# Patient Record
Sex: FEMALE | Race: WHITE | Employment: OTHER | URBAN - METROPOLITAN AREA
[De-identification: names, ages, dates, MRNs, and addresses within clinical notes are randomized per-mention and may not be internally consistent; named-entity substitution may affect disease eponyms.]

---

## 2017-04-24 ENCOUNTER — OFFICE VISIT (OUTPATIENT)
Dept: FAMILY MEDICINE CLINIC | Facility: CLINIC | Age: 67
End: 2017-04-24

## 2017-04-24 VITALS
OXYGEN SATURATION: 96 % | HEART RATE: 73 BPM | TEMPERATURE: 97.5 F | BODY MASS INDEX: 29.65 KG/M2 | RESPIRATION RATE: 12 BRPM | SYSTOLIC BLOOD PRESSURE: 124 MMHG | DIASTOLIC BLOOD PRESSURE: 68 MMHG | HEIGHT: 69 IN | WEIGHT: 200.2 LBS

## 2017-04-24 DIAGNOSIS — J32.0 MAXILLARY SINUSITIS, UNSPECIFIED CHRONICITY: Primary | ICD-10-CM

## 2017-04-24 DIAGNOSIS — J30.81 ALLERGIC RHINITIS DUE TO ANIMAL HAIR AND DANDER: ICD-10-CM

## 2017-04-24 RX ORDER — AMOXICILLIN 500 MG/1
500 CAPSULE ORAL 2 TIMES DAILY
Qty: 20 CAP | Refills: 0 | Status: SHIPPED | OUTPATIENT
Start: 2017-04-24 | End: 2017-05-04

## 2017-04-24 NOTE — PATIENT INSTRUCTIONS
Saline Nasal Washes: Care Instructions  Your Care Instructions  Saline nasal washes help keep the nasal passages open by washing out thick or dried mucus. This simple remedy can help relieve symptoms of allergies, sinusitis, and colds. It also can make the nose feel more comfortable by keeping the mucous membranes moist. You may notice a little burning sensation in your nose the first few times you use the solution, but this usually gets better in a few days. Follow-up care is a key part of your treatment and safety. Be sure to make and go to all appointments, and call your doctor if you are having problems. It's also a good idea to know your test results and keep a list of the medicines you take. How can you care for yourself at home? · You can buy premixed saline solution in a squeeze bottle or other sinus rinse products at a drugstore. Read and follow the instructions on the label. · You also can make your own saline solution by adding 1 teaspoon of salt and 1 teaspoon of baking soda to 2 cups of distilled water. · If you use a homemade solution, pour a small amount into a clean bowl. Using a rubber bulb syringe, squeeze the syringe and place the tip in the salt water. Pull a small amount of the salt water into the syringe by relaxing your hand. · Sit down with your head tilted slightly back. Do not lie down. Put the tip of the bulb syringe or the squeeze bottle a little way into one of your nostrils. Gently drip or squirt a few drops into the nostril. Repeat with the other nostril. Some sneezing and gagging are normal at first.  · Gently blow your nose. · Wipe the syringe or bottle tip clean after each use. · Repeat this 2 or 3 times a day. · Use nasal washes gently if you have nosebleeds often. When should you call for help? Watch closely for changes in your health, and be sure to contact your doctor if:  · You often get nosebleeds. · You have problems doing the nasal washes.   Where can you learn more? Go to http://bruno-alicia.info/. Enter [id number] in the search box to learn more about \"Saline Nasal Washes: Care Instructions. \"  Current as of: July 29, 2016  Content Version: 11.2  © 8145-5856 [employer]. Care instructions adapted under license by [employer] (which disclaims liability or warranty for this information). If you have questions about a medical condition or this instruction, always ask your healthcare professional. [address] any warranty or liability for your use of this information. Sinusitis: Care Instructions  Your Care Instructions    Sinusitis is an infection of the lining of the sinus cavities in your head. Sinusitis often follows a cold. It causes pain and pressure in your head and face. In most cases, sinusitis gets better on its own in 1 to 2 weeks. But some mild symptoms may last for several weeks. Sometimes antibiotics are needed. Follow-up care is a key part of your treatment and safety. Be sure to make and go to all appointments, and call your doctor if you are having problems. It's also a good idea to know your test results and keep a list of the medicines you take. How can you care for yourself at home? · Take an over-the-counter pain medicine, such as acetaminophen (Tylenol), ibuprofen (Advil, Motrin), or naproxen (Aleve). Read and follow all instructions on the label. · If the doctor prescribed antibiotics, take them as directed. Do not stop taking them just because you feel better. You need to take the full course of antibiotics. · Be careful when taking over-the-counter cold or flu medicines and Tylenol at the same time. Many of these medicines have acetaminophen, which is Tylenol. Read the labels to make sure that you are not taking more than the recommended dose. Too much acetaminophen (Tylenol) can be harmful.   · Breathe warm, moist air from a steamy shower, a hot bath, or a sink filled with hot water. Avoid cold, dry air. Using a humidifier in your home may help. Follow the directions for cleaning the machine. · Use saline (saltwater) nasal washes to help keep your nasal passages open and wash out mucus and bacteria. You can buy saline nose drops at a grocery store or drugstore. Or you can make your own at home by adding 1 teaspoon of salt and 1 teaspoon of baking soda to 2 cups of distilled water. If you make your own, fill a bulb syringe with the solution, insert the tip into your nostril, and squeeze gently. [place] Maffucci your nose. · Put a hot, wet towel or a warm gel pack on your face 3 or 4 times a day for 5 to 10 minutes each time. · Try a decongestant nasal spray like oxymetazoline (Afrin). Do not use it for more than 3 days in a row. Using it for more than 3 days can make your congestion worse. When should you call for help? Call your doctor now or seek immediate medical care if:  · You have new or worse swelling or redness in your face or around your eyes. · You have a new or higher fever. Watch closely for changes in your health, and be sure to contact your doctor if:  · You have new or worse facial pain. · The mucus from your nose becomes thicker (like pus) or has new blood in it. · You are not getting better as expected. Where can you learn more? Go to http://bruno-alicia.info/. Enter A121 in the search box to learn more about \"Sinusitis: Care Instructions. \"  Current as of: July 29, 2016  Content Version: 11.2  © 1869-3205 [employer]. Care instructions adapted under license by [employer] (which disclaims liability or warranty for this information). If you have questions about a medical condition or this instruction, always ask your healthcare professional. [address] any warranty or liability for your use of this information.

## 2017-04-24 NOTE — MR AVS SNAPSHOT
Visit Information Date & Time Provider Department Dept. Phone Encounter #  
 4/24/2017  2:00 PM Priya Abreu NP [employer] [phone] [id number] Follow-up Instructions Return if symptoms worsen or fail to improve. Upcoming Health Maintenance Date Due COLONOSCOPY 8/1/1970 ZOSTER VACCINE AGE 60> 8/1/2012 EYE EXAM RETINAL OR DILATED Q1 3/1/2016 PAP AKA CERVICAL CYTOLOGY 5/30/2016 MICROALBUMIN Q1 6/9/2016 INFLUENZA AGE 9 TO ADULT 8/1/2016 HEMOGLOBIN A1C Q6M 2/10/2017 BREAST CANCER SCRN MAMMOGRAM 4/7/2017 FOOT EXAM Q1 5/4/2017 LIPID PANEL Q1 8/10/2017 DTaP/Tdap/Td series (2 - Td) 12/3/2025 Allergies as of 4/24/2017  Review Complete On: 4/24/2017 By: Shelby Zendejas LPN Severity Noted Reaction Type Reactions Iodine High 02/05/2013    Anaphylaxis Current Immunizations  Never Reviewed Name Date Tdap 12/3/2015 Not reviewed this visit You Were Diagnosed With   
  
 Codes Comments Maxillary sinusitis, unspecified chronicity    -  Primary ICD-10-CM: J32.0 ICD-9-CM: 473.0 Allergic rhinitis due to animal hair and dander     ICD-10-CM: J30.81 ICD-9-CM: 477.2 Vitals BP Pulse Temp Resp Height(growth percentile) Weight(growth percentile) 124/68 (BP 1 Location: Right arm, BP Patient Position: Sitting) 73 97.5 °F (36.4 °C) (Oral) 12 5' 9\" (1.753 m) 200 lb 3.2 oz (90.8 kg) SpO2 BMI OB Status Smoking Status 96% 29.56 kg/m2 Hysterectomy Former Smoker BMI and BSA Data Body Mass Index Body Surface Area  
 29.56 kg/m 2 2.1 m 2 Preferred Pharmacy Pharmacy Name Phone [facility] [place] [facility] [address] [phone] Your Updated Medication List  
  
   
This list is accurate as of: 4/24/17  2:40 PM.  Always use your most recent med list.  
  
  
  
  
 amoxicillin 500 mg capsule Commonly known as:  AMOXIL Take 1 Cap by mouth two (2) times a day for 10 days. aspirin delayed-release 81 mg tablet Take  by mouth daily. atorvastatin 20 mg tablet Commonly known as:  LIPITOR Take 1 Tab by mouth daily. FLUoxetine 40 mg capsule Commonly known as:  PROzac Take 1 Cap by mouth daily. fluticasone 50 mcg/actuation nasal spray Commonly known as:  Maritza Aid 2 Sprays by Both Nostrils route daily. gabapentin 300 mg capsule Commonly known as:  NEURONTIN Take 1 Cap by mouth three (3) times daily. lisinopril 5 mg tablet Commonly known as:  Noe Camel Take 1 Tab by mouth daily. loratadine 10 mg tablet Commonly known as:  Vonna Hedger Take 1 Tab by mouth daily as needed for Allergies. LORazepam 1 mg tablet Commonly known as:  ATIVAN Take 1 Tab by mouth every eight (8) hours as needed for Anxiety. Max Daily Amount: 3 mg.  
  
 metFORMIN 500 mg tablet Commonly known as:  GLUCOPHAGE Take 1 Tab by mouth two (2) times daily (with meals). omeprazole 40 mg capsule Commonly known as:  PriLOSEC Take 1 Cap by mouth daily. Prescriptions Sent to Pharmacy Refills  
 amoxicillin (AMOXIL) 500 mg capsule 0 Sig: Take 1 Cap by mouth two (2) times a day for 10 days. Class: Normal  
 Pharmacy: [facility] [address] [place] [address] #: [phone] Route: Oral  
  
Follow-up Instructions Return if symptoms worsen or fail to improve. Patient Instructions Saline Nasal Washes: Care Instructions Your Care Instructions Saline nasal washes help keep the nasal passages open by washing out thick or dried mucus. This simple remedy can help relieve symptoms of allergies, sinusitis, and colds.  It also can make the nose feel more comfortable by keeping the mucous membranes moist. You may notice a little burning sensation in your nose the first few times you use the solution, but this usually gets better in a few days. Follow-up care is a key part of your treatment and safety. Be sure to make and go to all appointments, and call your doctor if you are having problems. It's also a good idea to know your test results and keep a list of the medicines you take. How can you care for yourself at home? · You can buy premixed saline solution in a squeeze bottle or other sinus rinse products at a drugstore. Read and follow the instructions on the label. · You also can make your own saline solution by adding 1 teaspoon of salt and 1 teaspoon of baking soda to 2 cups of distilled water. · If you use a homemade solution, pour a small amount into a clean bowl. Using a rubber bulb syringe, squeeze the syringe and place the tip in the salt water. Pull a small amount of the salt water into the syringe by relaxing your hand. · Sit down with your head tilted slightly back. Do not lie down. Put the tip of the bulb syringe or the squeeze bottle a little way into one of your nostrils. Gently drip or squirt a few drops into the nostril. Repeat with the other nostril. Some sneezing and gagging are normal at first. 
· Gently blow your nose. · Wipe the syringe or bottle tip clean after each use. · Repeat this 2 or 3 times a day. · Use nasal washes gently if you have nosebleeds often. When should you call for help? Watch closely for changes in your health, and be sure to contact your doctor if: 
· You often get nosebleeds. · You have problems doing the nasal washes. Where can you learn more? Go to http://bruno-alicia.info/. Enter [id number] in the search box to learn more about \"Saline Nasal Washes: Care Instructions. \" Current as of: July 29, 2016 Content Version: 11.2 © 9558-5523 [employer]. Care instructions adapted under license by [employer] (which disclaims liability or warranty for this information).  If you have questions about a medical condition or this instruction, always ask your healthcare professional. [place] [address] any warranty or liability for your use of this information. Sinusitis: Care Instructions Your Care Instructions Sinusitis is an infection of the lining of the sinus cavities in your head. Sinusitis often follows a cold. It causes pain and pressure in your head and face. In most cases, sinusitis gets better on its own in 1 to 2 weeks. But some mild symptoms may last for several weeks. Sometimes antibiotics are needed. Follow-up care is a key part of your treatment and safety. Be sure to make and go to all appointments, and call your doctor if you are having problems. It's also a good idea to know your test results and keep a list of the medicines you take. How can you care for yourself at home? · Take an over-the-counter pain medicine, such as acetaminophen (Tylenol), ibuprofen (Advil, Motrin), or naproxen (Aleve). Read and follow all instructions on the label. · If the doctor prescribed antibiotics, take them as directed. Do not stop taking them just because you feel better. You need to take the full course of antibiotics. · Be careful when taking over-the-counter cold or flu medicines and Tylenol at the same time. Many of these medicines have acetaminophen, which is Tylenol. Read the labels to make sure that you are not taking more than the recommended dose. Too much acetaminophen (Tylenol) can be harmful. · Breathe warm, moist air from a steamy shower, a hot bath, or a sink filled with hot water. Avoid cold, dry air. Using a humidifier in your home may help. Follow the directions for cleaning the machine. · Use saline (saltwater) nasal washes to help keep your nasal passages open and wash out mucus and bacteria. You can buy saline nose drops at a grocery store or drugstore.  Or you can make your own at home by adding 1 teaspoon of salt and 1 teaspoon of baking soda to 2 cups of distilled water. If you make your own, fill a bulb syringe with the solution, insert the tip into your nostril, and squeeze gently. Sherryn Anis your nose. · Put a hot, wet towel or a warm gel pack on your face 3 or 4 times a day for 5 to 10 minutes each time. · Try a decongestant nasal spray like oxymetazoline (Afrin). Do not use it for more than 3 days in a row. Using it for more than 3 days can make your congestion worse. When should you call for help? Call your doctor now or seek immediate medical care if: 
· You have new or worse swelling or redness in your face or around your eyes. · You have a new or higher fever. Watch closely for changes in your health, and be sure to contact your doctor if: 
· You have new or worse facial pain. · The mucus from your nose becomes thicker (like pus) or has new blood in it. · You are not getting better as expected. Where can you learn more? Go to http://bruno-alicia.info/. Enter P563 in the search box to learn more about \"Sinusitis: Care Instructions. \" Current as of: July 29, 2016 Content Version: 11.2 © 7952-2710 [employer]. Care instructions adapted under license by [employer] (which disclaims liability or warranty for this information). If you have questions about a medical condition or this instruction, always ask your healthcare professional. [place] [address] any warranty or liability for your use of this information. Introducing [facility]! [employer] introduces HOSTEX patient portal. Now you can access parts of your medical record, email your doctor's office, and request medication refills online. 1. In your internet browser, go to [email or web address] 2. Click on the First Time User? Click Here link in the Sign In box. You will see the New Member Sign Up page. 3. Enter your HOSTEX Access Code exactly as it appears below.  You will not need to use this code after youve completed the sign-up process. If you do not sign up before the expiration date, you must request a new code. · LiveOps Access Code: [email or web address] Expires: 7/23/2017  2:37 PM 
 
4. Enter the last four digits of your Social Security Number (xxxx) and Date of Birth (mm/dd/yyyy) as indicated and click Submit. You will be taken to the next sign-up page. 5. Create a LiveOps ID. This will be your LiveOps login ID and cannot be changed, so think of one that is secure and easy to remember. 6. Create a LiveOps password. You can change your password at any time. 7. Enter your Password Reset Question and Answer. This can be used at a later time if you forget your password. 8. Enter your e-mail address. You will receive e-mail notification when new information is available in [address]. 9. Click Sign Up. You can now view and download portions of your medical record. 10. Click the Download Summary menu link to download a portable copy of your medical information. If you have questions, please visit the Frequently Asked Questions section of the LiveOps website. Remember, LiveOps is NOT to be used for urgent needs. For medical emergencies, dial 911. Now available from your iPhone and Android! Please provide this summary of care documentation to your next provider. Your primary care clinician is listed as Angus Guevara. If you have any questions after today's visit, please call [phone].

## 2017-04-24 NOTE — PROGRESS NOTES
1. Have you been to the ER, urgent care clinic since your last visit? Hospitalized since your last visit? No    2. Have you seen or consulted any other health care providers outside of the [address] [place] [address] since your last visit? Include any pap smears or colon screening.  No

## 2017-04-24 NOTE — PROGRESS NOTES
HISTORY OF PRESENT ILLNESS  Champ Fowler is a 61 y.o. female. HPI Comments: Acute care visit with c/o nasal congestion and sinus pain for more than 2 days. She reports an allergy to cat dander, which she has been around recently. She denies any fever or chills. She does not have a cough. She has a daughter getting  this weekend and wants to make sure that she is better by then. Sinus Pain    The history is provided by the patient. This is a new problem. The current episode started more than 2 days ago. The problem has not changed since onset. There has been no fever. The pain is at a severity of 1/10. The pain is mild. The pain has been constant since onset. Associated symptoms include congestion (nasal) and sinus pressure. Pertinent negatives include no chills, no ear pain and no cough. She has tried nothing for the symptoms. Nausea    The history is provided by the patient. This is a new problem. The current episode started more than 2 days ago. The problem has been resolved. There has been no fever. Pertinent negatives include no chills and no cough. Nasal Congestion    The history is provided by the patient. This is a new problem. The current episode started more than 2 days ago. The problem has not changed since onset. There has been no fever. The pain is at a severity of 1/10. The pain is mild. The pain has been constant since onset. Associated symptoms include congestion (nasal) and sinus pressure. Pertinent negatives include no chills, no ear pain and no cough. She has tried nothing for the symptoms. Review of Systems   Constitutional: Negative for chills. HENT: Positive for congestion (nasal) and sinus pressure. Negative for ear pain. Respiratory: Negative for cough. Cardiovascular: Negative. Gastrointestinal: Positive for nausea. Genitourinary: Negative. Musculoskeletal: Negative. Skin: Negative. Neurological: Negative.     Endo/Heme/Allergies: Positive for environmental allergies (allergic to cats. ). Past Medical History:   Diagnosis Date    Abnormal nuclear cardiac imaging test 05/11/2015    Low risk. No ischemia or prior infarction. No RWMA. EF 68%. Neg EKG on pharm stress test.    Agatston CAC score, <100 05/15/2015    Coronary calcium score 70.  Diabetes ([facility] [address]     GERD (gastroesophageal reflux disease)     History of echocardiogram 02/06/2013    EF 65%. No RWMA. Mod LVH. Gr 1 DDfx. RVSP 25 mmHg.  HX OTHER MEDICAL     anxiety, \"mitral valve prolapse\"    Hyperlipidemia     Hypertension     Psychiatric disorder     anxiety    RLE venous duplex 05/06/2015    Right leg:  No DVT. Past Surgical History:   Procedure Laterality Date    ABDOMEN SURGERY PROC UNLISTED  1999    tumor removal    HX CHOLECYSTECTOMY      HX GYN  1997    hysterectomy    HX OTHER SURGICAL      FATTY TISSUE REMOVED    HX TONSILLECTOMY       Current Outpatient Prescriptions on File Prior to Visit   Medication Sig Dispense Refill    metFORMIN (GLUCOPHAGE) 500 mg tablet Take 1 Tab by mouth two (2) times daily (with meals). 60 Tab 6    FLUoxetine (PROZAC) 40 mg capsule Take 1 Cap by mouth daily. 30 Cap 6    atorvastatin (LIPITOR) 20 mg tablet Take 1 Tab by mouth daily. 30 Tab 6    lisinopril (PRINIVIL, ZESTRIL) 5 mg tablet Take 1 Tab by mouth daily. 30 Tab 6    omeprazole (PRILOSEC) 40 mg capsule Take 1 Cap by mouth daily. 30 Cap 6    gabapentin (NEURONTIN) 300 mg capsule Take 1 Cap by mouth three (3) times daily. 180 Cap 2    aspirin delayed-release 81 mg tablet Take  by mouth daily.  loratadine (CLARITIN) 10 mg tablet Take 1 Tab by mouth daily as needed for Allergies. 30 Tab 1    fluticasone (FLONASE) 50 mcg/actuation nasal spray 2 Sprays by Both Nostrils route daily. 1 Bottle 3    LORazepam (ATIVAN) 1 mg tablet Take 1 Tab by mouth every eight (8) hours as needed for Anxiety.  Max Daily Amount: 3 mg. 30 Tab 0     No current facility-administered medications on file prior to visit. Allergies and Intolerances: Allergies   Allergen Reactions    Iodine Anaphylaxis       Family History:   Family History   Problem Relation Age of Onset    Heart Disease Father     Heart Attack Father     Heart Disease Paternal Grandmother     Heart Disease Paternal Grandfather     Breast Cancer Mother        Social History:   She  reports that she quit smoking about 4 years ago. She has a 20.00 pack-year smoking history. She has quit using smokeless tobacco. She  reports that she does not drink alcohol. Vitals:   Visit Vitals    /68 (BP 1 Location: Right arm, BP Patient Position: Sitting)    Pulse 73    Temp 97.5 °F (36.4 °C) (Oral)    Resp 12    Ht 5' 9\" (1.753 m)    Wt 200 lb 3.2 oz (90.8 kg)    SpO2 96%    BMI 29.56 kg/m2     Body surface area is 2.1 meters squared. Physical Exam   Constitutional: She is oriented to person, place, and time. She appears well-developed and well-nourished. HENT:   Head: Atraumatic. Cardiovascular: Normal rate. Pulmonary/Chest: Effort normal and breath sounds normal. She has no wheezes. Neurological: She is alert and oriented to person, place, and time. Psychiatric: She has a normal mood and affect. Her behavior is normal.   Nursing note and vitals reviewed. ASSESSMENT and PLAN    ICD-10-CM ICD-9-CM    1. Maxillary sinusitis, unspecified chronicity J32.0 473.0 amoxicillin (AMOXIL) 500 mg capsule   2. Allergic rhinitis due to animal hair and dander J30.81 477.2      Follow-up Disposition:  Return if symptoms worsen or fail to improve. reviewed medications and side effects in detail    - Alarm signals discussed. ER precautions  - Plan of care reviewed with patient. Understanding verbalized and they are in agreement with plan of care.

## 2017-06-20 ENCOUNTER — HOSPITAL ENCOUNTER (OUTPATIENT)
Dept: LAB | Age: 67
Discharge: HOME | End: 2017-06-20
Payer: SELF-PAY

## 2017-06-20 ENCOUNTER — OFFICE VISIT (OUTPATIENT)
Dept: FAMILY MEDICINE CLINIC | Age: 67
End: 2017-06-20

## 2017-06-20 VITALS
WEIGHT: 198.8 LBS | RESPIRATION RATE: 16 BRPM | SYSTOLIC BLOOD PRESSURE: 142 MMHG | OXYGEN SATURATION: 95 % | HEIGHT: 69 IN | TEMPERATURE: 98.2 F | BODY MASS INDEX: 29.44 KG/M2 | DIASTOLIC BLOOD PRESSURE: 80 MMHG | HEART RATE: 95 BPM

## 2017-06-20 DIAGNOSIS — R30.9 PAIN PASSING URINE: Primary | ICD-10-CM

## 2017-06-20 DIAGNOSIS — R35.0 URINARY FREQUENCY: ICD-10-CM

## 2017-06-20 DIAGNOSIS — R30.9 PAIN PASSING URINE: ICD-10-CM

## 2017-06-20 LAB
BILIRUB UR QL STRIP: NEGATIVE
GLUCOSE UR-MCNC: NORMAL MG/DL
KETONES P FAST UR STRIP-MCNC: NEGATIVE MG/DL
PH UR STRIP: 5 [PH] (ref 4.6–8)
PROT UR QL STRIP: NORMAL MG/DL
SP GR UR STRIP: 1.02 (ref 1–1.03)
UA UROBILINOGEN AMB POC: NORMAL (ref 0.2–1)
URINALYSIS CLARITY POC: CLEAR
URINALYSIS COLOR POC: YELLOW
URINE BLOOD POC: NEGATIVE
URINE LEUKOCYTES POC: NEGATIVE
URINE NITRITES POC: NEGATIVE

## 2017-06-20 PROCEDURE — 87086 URINE CULTURE/COLONY COUNT: CPT | Performed by: NURSE PRACTITIONER

## 2017-06-20 RX ORDER — AZITHROMYCIN 250 MG/1
TABLET, FILM COATED ORAL
Qty: 6 TAB | Refills: 0 | Status: SHIPPED | OUTPATIENT
Start: 2017-06-20 | End: 2017-06-25

## 2017-06-20 NOTE — PROGRESS NOTES
1. Have you been to the ER, urgent care clinic since your last visit? Hospitalized since your last visit? No    2. Have you seen or consulted any other health care providers outside of the [address] [place] [address] since your last visit? Include any pap smears or colon screening.  No

## 2017-06-20 NOTE — PROGRESS NOTES
HISTORY OF PRESENT ILLNESS  Dharmesh Grayson is a 61 y.o. female. Pt presents today with back pain, urinary burning and frequency. She has seen a chiropractor for years regarding her back pain, this however has not improved with manipulation treatments. She denies fever, chills, nausea or vomiting. She just doesn't feel good. She knows her diet is not good. Urinary Frequency    The history is provided by the patient. This is a new problem. The current episode started more than 2 days ago. The problem occurs every urination. The problem has been gradually worsening. The quality of the pain is described as burning and aching. There has been no fever. Associated symptoms include frequency, urgency, flank pain and back pain. Pertinent negatives include no chills, no nausea, no vomiting, no discharge, no hematuria, no vaginal discharge and no abdominal pain. The patient is not pregnant. She has tried nothing for the symptoms. LOW BACK PAIN   Pertinent negatives include no abdominal pain and no headaches. Urinary Pain    Associated symptoms include frequency, urgency, flank pain and back pain. Pertinent negatives include no chills, no nausea, no vomiting, no discharge, no hematuria, no vaginal discharge and no abdominal pain. The patient is not pregnant. Review of Systems   Constitutional: Negative for chills and fever. Respiratory: Negative. Cardiovascular: Negative. Gastrointestinal: Negative for abdominal pain, constipation, diarrhea, nausea and vomiting. Genitourinary: Positive for flank pain, frequency and urgency. Negative for dysuria, hematuria and vaginal discharge. Musculoskeletal: Positive for back pain. Skin: Negative for rash. Neurological: Negative for headaches. Physical Exam   Constitutional: She appears well-developed. No distress. Neck: Neck supple. Cardiovascular: Normal rate, regular rhythm and normal heart sounds. No murmur heard.   Pulmonary/Chest: Effort normal and breath sounds normal. No respiratory distress. She has no wheezes. She has no rales. Musculoskeletal: She exhibits no edema. positive CVA tenderness aleida. ASSESSMENT and PLAN    ICD-10-CM ICD-9-CM    1. Pain passing urine R30.9 788.1 AMB POC URINALYSIS DIP STICK AUTO W/O MICRO      CULTURE, URINE   2. Urinary frequency R35.0 788.41 azithromycin (ZITHROMAX) 250 mg tablet       PLAN: push fluids  We discussed her symptoms. Pt is to call if symptoms persist or worsen. Pt given after care summary.

## 2017-06-20 NOTE — MR AVS SNAPSHOT
Visit Information Date & Time Provider Department Dept. Phone Encounter #  
 6/20/2017 10:00 AM Janice Abreu, [facility] [address] [facility] [id number] Upcoming Health Maintenance Date Due COLONOSCOPY 8/1/1970 ZOSTER VACCINE AGE 60> 8/1/2012 PAP AKA CERVICAL CYTOLOGY 5/30/2016 BREAST CANCER SCRN MAMMOGRAM 4/7/2017 INFLUENZA AGE 9 TO ADULT 8/1/2017 DTaP/Tdap/Td series (2 - Td) 12/3/2025 Allergies as of 6/20/2017  Review Complete On: 6/20/2017 By: Jordi Jimenez LPN Severity Noted Reaction Type Reactions Iodine High 02/05/2013    Anaphylaxis Current Immunizations  Never Reviewed Name Date Tdap 12/3/2015 Not reviewed this visit You Were Diagnosed With   
  
 Codes Comments Pain passing urine    -  Primary ICD-10-CM: R30.9 ICD-9-CM: 788.1 Urinary frequency     ICD-10-CM: R35.0 ICD-9-CM: 788.41 Vitals BP Pulse Temp Resp Height(growth percentile) Weight(growth percentile) 142/80 (BP 1 Location: Left arm, BP Patient Position: Sitting) 95 98.2 °F (36.8 °C) (Oral) 16 5' 9\" (1.753 m) 198 lb 12.8 oz (90.2 kg) SpO2 BMI OB Status Smoking Status 95% 29.36 kg/m2 Hysterectomy Former Smoker BMI and BSA Data Body Mass Index Body Surface Area  
 29.36 kg/m 2 2.1 m 2 Preferred Pharmacy Pharmacy Name Phone Lori Rizvi [facility] [address] [address] [phone] Your Updated Medication List  
  
   
This list is accurate as of: 6/20/17 10:42 AM.  Always use your most recent med list.  
  
  
  
  
 aspirin delayed-release 81 mg tablet Take  by mouth daily. atorvastatin 20 mg tablet Commonly known as:  LIPITOR Take 1 Tab by mouth daily. azithromycin 250 mg tablet Commonly known as:  Carlos Alexys Take 2 tablets today, then take 1 tablet daily FLUoxetine 40 mg capsule Commonly known as:  PROzac Take 1 Cap by mouth daily. fluticasone 50 mcg/actuation nasal spray Commonly known as:  Algernon Raad 2 Sprays by Both Nostrils route daily. gabapentin 300 mg capsule Commonly known as:  NEURONTIN Take 1 Cap by mouth three (3) times daily. lisinopril 5 mg tablet Commonly known as:  Anurag Mash Take 1 Tab by mouth daily. loratadine 10 mg tablet Commonly known as:  Manual Me Take 1 Tab by mouth daily as needed for Allergies. LORazepam 1 mg tablet Commonly known as:  ATIVAN Take 1 Tab by mouth every eight (8) hours as needed for Anxiety. Max Daily Amount: 3 mg.  
  
 metFORMIN 500 mg tablet Commonly known as:  GLUCOPHAGE Take 1 Tab by mouth two (2) times daily (with meals). omeprazole 40 mg capsule Commonly known as:  PriLOSEC Take 1 Cap by mouth daily. Prescriptions Sent to Pharmacy Refills  
 azithromycin (ZITHROMAX) 250 mg tablet 0 Sig: Take 2 tablets today, then take 1 tablet daily Class: Normal  
 Pharmacy: [address] #: [phone] We Performed the Following AMB POC URINALYSIS DIP STICK AUTO W/O MICRO [30662 CPT(R)] To-Do List   
 06/20/2017 Microbiology:  CULTURE, URINE Introducing [facility]! Jakub Chavez introduces [employer] patient portal. Now you can access parts of your medical record, email your doctor's office, and request medication refills online. 1. In your internet browser, go to [email or web address] 2. Click on the First Time User? Click Here link in the Sign In box. You will see the New Member Sign Up page. 3. Enter your [employer] Access Code exactly as it appears below. You will not need to use this code after youve completed the sign-up process. If you do not sign up before the expiration date, you must request a new code. · [employer] Access Code: [email or web address] Expires: 7/23/2017  2:37 PM 
 
 4. Enter the last four digits of your Social Security Number (xxxx) and Date of Birth (mm/dd/yyyy) as indicated and click Submit. You will be taken to the next sign-up page. 5. Create a Windcentrale ID. This will be your Windcentr[employer] login ID and cannot be changed, so think of one that is secure and easy to remember. 6. Create a Windcentr[employer] password. You can change your password at any time. 7. Enter your Password Reset Question and Answer. This can be used at a later time if you forget your password. 8. Enter your e-mail address. You will receive e-mail notification when new information is available in [address] 9. Click Sign Up. You can now view and download portions of your medical record. 10. Click the Download Summary menu link to download a portable copy of your medical information. If you have questions, please visit the Frequently Asked Questions section of the [employer] website. Remember, [employer] is NOT to be used for urgent needs. For medical emergencies, dial 911. Now available from your iPhone and Android! Please provide this summary of care documentation to your next provider. Your primary care clinician is listed as Roe Guevara. If you have any questions after today's visit, please call [phone].

## 2017-06-21 NOTE — PROGRESS NOTES
Please advise Pt that her urine culture showed no bacterial infection. How is she feeling? If better, advise her to complete the antibiotic.     Thanks

## 2017-06-22 ENCOUNTER — TELEPHONE (OUTPATIENT)
Dept: FAMILY MEDICINE CLINIC | Age: 67
End: 2017-06-22

## 2017-06-22 LAB
BACTERIA SPEC CULT: NORMAL
SERVICE CMNT-IMP: NORMAL

## 2017-06-22 NOTE — TELEPHONE ENCOUNTER
Spoke with patient informing her that alexis would like for her to come in for a repeat UA after her trip and if it shows protein again we will do a 24 hour urine collection. I told patient to call when she gets back. Patient verbally understood.

## 2017-06-22 NOTE — TELEPHONE ENCOUNTER
----- Message from Jay Cabrera NP sent at 6/21/2017 12:10 PM EDT -----  Please advise Pt that her urine culture showed no bacterial infection. How is she feeling? If better, advise her to complete the antibiotic.     Thanks

## 2017-06-22 NOTE — TELEPHONE ENCOUNTER
Spoke with patient informing her that her urine culture was negative and to continue antibiotics. Patient states she is feeling better but just feels \"worn out\"  She was curious about the protein in her urine. I informed her I would let alexis know and if she needed anything to call the office. Patient verbally understood.

## 2017-07-18 RX ORDER — LISINOPRIL 5 MG/1
TABLET ORAL
Qty: 30 TAB | Refills: 3 | Status: SHIPPED | OUTPATIENT
Start: 2017-07-18 | End: 2017-08-23 | Stop reason: SDUPTHER

## 2017-07-19 ENCOUNTER — OFFICE VISIT (OUTPATIENT)
Dept: FAMILY MEDICINE CLINIC | Age: 67
End: 2017-07-19

## 2017-07-19 VITALS
TEMPERATURE: 98 F | WEIGHT: 200.2 LBS | DIASTOLIC BLOOD PRESSURE: 78 MMHG | RESPIRATION RATE: 16 BRPM | BODY MASS INDEX: 29.65 KG/M2 | HEIGHT: 69 IN | SYSTOLIC BLOOD PRESSURE: 126 MMHG | HEART RATE: 74 BPM

## 2017-07-19 DIAGNOSIS — N95.2 ATROPHIC VAGINITIS: Primary | ICD-10-CM

## 2017-07-19 NOTE — PROGRESS NOTES
HISTORY OF PRESENT ILLNESS  Katarina Bryson is a 61 y.o. female. Pt returns today with continued vaginal issues. She states she is now in pain down there and feels like something is going to fall out. She denies fever or chills, denies vaginal discharge. She denies nausea or vomiting. Follow-up         Review of Systems   Constitutional: Negative. HENT: Negative. Eyes: Negative. Respiratory: Negative. Cardiovascular: Negative. Gastrointestinal: Negative. Genitourinary: Negative. Skin: Negative. Visit Vitals    /78 (BP 1 Location: Right arm, BP Patient Position: Sitting)    Pulse 74    Temp 98 °F (36.7 °C) (Oral)    Resp 16    Ht 5' 9\" (1.753 m)    Wt 200 lb 3.2 oz (90.8 kg)    BMI 29.56 kg/m2       Physical Exam   Constitutional: She appears well-developed. No distress. Cardiovascular: Normal rate, regular rhythm and normal heart sounds. Pulmonary/Chest: Effort normal and breath sounds normal. No respiratory distress. She has no wheezes. She has no rales. Abdominal: Soft. Bowel sounds are normal. She exhibits no distension and no mass. There is no tenderness. There is no rebound and no guarding. Genitourinary: No labial fusion. There is no rash, tenderness, lesion or injury on the right labia. There is no rash, tenderness, lesion or injury on the left labia. There is erythema and tenderness in the vagina. No bleeding in the vagina. No foreign body in the vagina. No signs of injury around the vagina. No vaginal discharge found. Cervix is absent, uterus absent     ASSESSMENT and PLAN    ICD-10-CM ICD-9-CM    1. Atrophic vaginitis N95.2 627.3 conjugated estrogens (PREMARIN) 0.625 mg/gram vaginal cream       PLAN:  We discussed atrophic vaginitis and treatment options. Pt advised to use medicine at night for 3 weeks, then she can use it twice a week for maintenance if symptoms improve. Pt is to RTC if symptoms persist.    Pt was given after care summary.

## 2017-07-19 NOTE — MR AVS SNAPSHOT
Visit Information Date & Time Provider Department Dept. Phone Encounter #  
 7/19/2017  4:15 PM Nayana Lopez  [facility] [phone] [id number] Upcoming Health Maintenance Date Due COLONOSCOPY 8/1/1970 ZOSTER VACCINE AGE 60> 8/1/2012 PAP AKA CERVICAL CYTOLOGY 5/30/2016 BREAST CANCER SCRN MAMMOGRAM 4/7/2017 INFLUENZA AGE 9 TO ADULT 8/1/2017 DTaP/Tdap/Td series (2 - Td) 12/3/2025 Allergies as of 7/19/2017  Review Complete On: 7/19/2017 By: Nayana Lopez NP Severity Noted Reaction Type Reactions Iodine High 02/05/2013    Anaphylaxis Current Immunizations  Never Reviewed Name Date Tdap 12/3/2015 Not reviewed this visit You Were Diagnosed With   
  
 Codes Comments Atrophic vaginitis    -  Primary ICD-10-CM: N95.2 ICD-9-CM: 627.3 Vitals BP Pulse Temp Resp Height(growth percentile) Weight(growth percentile) 126/78 (BP 1 Location: Right arm, BP Patient Position: Sitting) 74 98 °F (36.7 °C) (Oral) 16 5' 9\" (1.753 m) 200 lb 3.2 oz (90.8 kg) BMI OB Status Smoking Status 29.56 kg/m2 Hysterectomy Former Smoker BMI and BSA Data Body Mass Index Body Surface Area  
 29.56 kg/m 2 2.1 m 2 Preferred Pharmacy Pharmacy Name Phone Ivanna Rizvi [facility],[address], [address] [address] [phone] Your Updated Medication List  
  
   
This list is accurate as of: 7/19/17  4:59 PM.  Always use your most recent med list.  
  
  
  
  
 aspirin delayed-release 81 mg tablet Take  by mouth daily. atorvastatin 20 mg tablet Commonly known as:  LIPITOR Take 1 Tab by mouth daily. conjugated estrogens 0.625 mg/gram vaginal cream  
Commonly known as:  PREMARIN Use nightly 3 week then one week off, repeat. FLUoxetine 40 mg capsule Commonly known as:  PROzac Take 1 Cap by mouth daily. fluticasone 50 mcg/actuation nasal spray Commonly known as:  Lashell Saez 2 Sprays by Both Nostrils route daily. gabapentin 300 mg capsule Commonly known as:  NEURONTIN Take 1 Cap by mouth three (3) times daily. lisinopril 5 mg tablet Commonly known as:  PRINIVIL, ZESTRIL  
TAKE ONE TABLET BY MOUTH DAILY  
  
 loratadine 10 mg tablet Commonly known as:  Hero Nereyda Take 1 Tab by mouth daily as needed for Allergies. LORazepam 1 mg tablet Commonly known as:  ATIVAN Take 1 Tab by mouth every eight (8) hours as needed for Anxiety. Max Daily Amount: 3 mg.  
  
 metFORMIN 500 mg tablet Commonly known as:  GLUCOPHAGE Take 1 Tab by mouth two (2) times daily (with meals). omeprazole 40 mg capsule Commonly known as:  PriLOSEC Take 1 Cap by mouth daily. Prescriptions Sent to Pharmacy Refills  
 conjugated estrogens (PREMARIN) 0.625 mg/gram vaginal cream 11 Sig: Use nightly 3 week then one week off, repeat. Class: Normal  
 Pharmacy: [address] #: [phone] Introducing [facility]! [facility] introduces [employer] patient portal. Now you can access parts of your medical record, email your doctor's office, and request medication refills online. 1. In your internet browser, go to [email or web address] 2. Click on the First Time User? Click Here link in the Sign In box. You will see the New Member Sign Up page. 3. Enter your [employer] Access Code exactly as it appears below. You will not need to use this code after youve completed the sign-up process. If you do not sign up before the expiration date, you must request a new code. · [employer] Access Code: [email or web address] Expires: 7/23/2017  2:37 PM 
 
4. Enter the last four digits of your Social Security Number (xxxx) and Date of Birth (mm/dd/yyyy) as indicated and click Submit. You will be taken to the next sign-up page. 5. Create a FanDuel ID. This will be your FanDuel login ID and cannot be changed, so think of one that is secure and easy to remember. 6. Create a FanDuel password. You can change your password at any time. 7. Enter your Password Reset Question and Answer. This can be used at a later time if you forget your password. 8. Enter your e-mail address. You will receive e-mail notification when new information is available in [address] 9. Click Sign Up. You can now view and download portions of your medical record. 10. Click the Download Summary menu link to download a portable copy of your medical information. If you have questions, please visit the Frequently Asked Questions section of the FanD[employer] website. Remember, Fan[employer] is NOT to be used for urgent needs. For medical emergencies, dial 911. Now available from your iPhone and Android! Please provide this summary of care documentation to your next provider. Your primary care clinician is listed as Pauline Neumann 61. If you have any questions after today's visit, please call [phone].

## 2017-07-20 ENCOUNTER — TELEPHONE (OUTPATIENT)
Dept: FAMILY MEDICINE CLINIC | Age: 67
End: 2017-07-20

## 2017-07-20 RX ORDER — ESTRADIOL 10 UG/1
10 INSERT VAGINAL DAILY
Qty: 22 TAB | Refills: 11 | Status: SHIPPED | OUTPATIENT
Start: 2017-07-20 | End: 2018-01-30 | Stop reason: ALTCHOICE

## 2017-07-21 NOTE — TELEPHONE ENCOUNTER
Spoke with patient. States the tablets are 400 dollars. Has 400 dollar deductible. So she will try to find something over the counter.

## 2017-07-27 DIAGNOSIS — E11.43 DIABETIC AUTONOMIC NEUROPATHY ASSOCIATED WITH TYPE 2 DIABETES MELLITUS (HCC): ICD-10-CM

## 2017-07-27 RX ORDER — GABAPENTIN 300 MG/1
300 CAPSULE ORAL 3 TIMES DAILY
Qty: 180 CAP | Refills: 2 | Status: SHIPPED | OUTPATIENT
Start: 2017-07-27 | End: 2018-01-30 | Stop reason: ALTCHOICE

## 2017-08-23 ENCOUNTER — OFFICE VISIT (OUTPATIENT)
Dept: FAMILY MEDICINE CLINIC | Age: 67
End: 2017-08-23

## 2017-08-23 ENCOUNTER — HOSPITAL ENCOUNTER (OUTPATIENT)
Dept: LAB | Age: 67
Discharge: HOME | End: 2017-08-23
Payer: MEDICARE

## 2017-08-23 VITALS
DIASTOLIC BLOOD PRESSURE: 74 MMHG | TEMPERATURE: 97.8 F | OXYGEN SATURATION: 96 % | HEART RATE: 81 BPM | HEIGHT: 69 IN | WEIGHT: 202.4 LBS | BODY MASS INDEX: 29.98 KG/M2 | SYSTOLIC BLOOD PRESSURE: 136 MMHG | RESPIRATION RATE: 16 BRPM

## 2017-08-23 DIAGNOSIS — E78.2 MIXED HYPERLIPIDEMIA: ICD-10-CM

## 2017-08-23 DIAGNOSIS — Z13.820 ENCOUNTER FOR SCREENING FOR OSTEOPOROSIS: ICD-10-CM

## 2017-08-23 DIAGNOSIS — M89.9 DISORDER OF BONE AND CARTILAGE, UNSPECIFIED: ICD-10-CM

## 2017-08-23 DIAGNOSIS — I10 ESSENTIAL HYPERTENSION: ICD-10-CM

## 2017-08-23 DIAGNOSIS — Z23 ENCOUNTER FOR IMMUNIZATION: ICD-10-CM

## 2017-08-23 DIAGNOSIS — L20.84 INTRINSIC ECZEMA: Primary | ICD-10-CM

## 2017-08-23 DIAGNOSIS — Z11.59 NEED FOR HEPATITIS C SCREENING TEST: ICD-10-CM

## 2017-08-23 DIAGNOSIS — F41.9 ANXIETY: ICD-10-CM

## 2017-08-23 DIAGNOSIS — F32.89 OTHER DEPRESSION: ICD-10-CM

## 2017-08-23 DIAGNOSIS — Z00.00 WELCOME TO MEDICARE PREVENTIVE VISIT: ICD-10-CM

## 2017-08-23 DIAGNOSIS — M94.9 DISORDER OF BONE AND CARTILAGE, UNSPECIFIED: ICD-10-CM

## 2017-08-23 DIAGNOSIS — Z12.31 ENCOUNTER FOR SCREENING MAMMOGRAM FOR MALIGNANT NEOPLASM OF BREAST: ICD-10-CM

## 2017-08-23 LAB
ALBUMIN SERPL-MCNC: 4 G/DL (ref 3.4–5)
ALBUMIN/GLOB SERPL: 1.3 {RATIO} (ref 0.8–1.7)
ALP SERPL-CCNC: 86 U/L (ref 45–117)
ALT SERPL-CCNC: 50 U/L (ref 13–56)
ANION GAP SERPL CALC-SCNC: 11 MMOL/L (ref 3–18)
AST SERPL-CCNC: 23 U/L (ref 15–37)
BILIRUB SERPL-MCNC: 0.4 MG/DL (ref 0.2–1)
BUN SERPL-MCNC: 14 MG/DL (ref 7–18)
BUN/CREAT SERPL: 15 (ref 12–20)
CALCIUM SERPL-MCNC: 9.7 MG/DL (ref 8.5–10.1)
CHLORIDE SERPL-SCNC: 102 MMOL/L (ref 100–108)
CHOLEST SERPL-MCNC: 214 MG/DL
CO2 SERPL-SCNC: 26 MMOL/L (ref 21–32)
CREAT SERPL-MCNC: 0.96 MG/DL (ref 0.6–1.3)
GLOBULIN SER CALC-MCNC: 3 G/DL (ref 2–4)
GLUCOSE SERPL-MCNC: 154 MG/DL (ref 74–99)
HDLC SERPL-MCNC: 54 MG/DL (ref 40–60)
HDLC SERPL: 4 {RATIO} (ref 0–5)
LDLC SERPL CALC-MCNC: 115.2 MG/DL (ref 0–100)
LIPID PROFILE,FLP: ABNORMAL
POTASSIUM SERPL-SCNC: 4.8 MMOL/L (ref 3.5–5.5)
PROT SERPL-MCNC: 7 G/DL (ref 6.4–8.2)
SODIUM SERPL-SCNC: 139 MMOL/L (ref 136–145)
TRIGL SERPL-MCNC: 224 MG/DL (ref ?–150)
VLDLC SERPL CALC-MCNC: 44.8 MG/DL

## 2017-08-23 PROCEDURE — 36415 COLL VENOUS BLD VENIPUNCTURE: CPT | Performed by: NURSE PRACTITIONER

## 2017-08-23 PROCEDURE — 80061 LIPID PANEL: CPT | Performed by: NURSE PRACTITIONER

## 2017-08-23 PROCEDURE — 80053 COMPREHEN METABOLIC PANEL: CPT | Performed by: NURSE PRACTITIONER

## 2017-08-23 RX ORDER — LORAZEPAM 1 MG/1
1 TABLET ORAL
Qty: 30 TAB | Refills: 0 | Status: SHIPPED | OUTPATIENT
Start: 2017-08-23 | End: 2020-10-30

## 2017-08-23 RX ORDER — CLOBETASOL PROPIONATE 0.46 MG/ML
SOLUTION TOPICAL
Qty: 1 BOTTLE | Refills: 5 | Status: SHIPPED | OUTPATIENT
Start: 2017-08-23 | End: 2018-01-30

## 2017-08-23 RX ORDER — LISINOPRIL 5 MG/1
5 TABLET ORAL DAILY
Qty: 90 TAB | Refills: 1 | Status: SHIPPED | OUTPATIENT
Start: 2017-08-23

## 2017-08-23 RX ORDER — FLUOXETINE HYDROCHLORIDE 40 MG/1
40 CAPSULE ORAL DAILY
Qty: 90 CAP | Refills: 1 | Status: SHIPPED | OUTPATIENT
Start: 2017-08-23 | End: 2017-11-07

## 2017-08-23 RX ORDER — ATORVASTATIN CALCIUM 20 MG/1
20 TABLET, FILM COATED ORAL DAILY
Qty: 90 TAB | Refills: 1 | Status: SHIPPED | OUTPATIENT
Start: 2017-08-23 | End: 2018-07-17 | Stop reason: SDUPTHER

## 2017-08-23 NOTE — PROGRESS NOTES
This is a \"Welcome to [employer]"  Initial Preventive Physical Examination (IPPE) providing Personalized Prevention Plan Services (Performed in the first 12 months of enrollment)    I have reviewed the patient's medical history in detail and updated the computerized patient record. History     Past Medical History:   Diagnosis Date    Abnormal nuclear cardiac imaging test 05/11/2015    Low risk. No ischemia or prior infarction. No RWMA. EF 68%. Neg EKG on pharm stress test.    Agatston CAC score, <100 05/15/2015    Coronary calcium score 70.  Diabetes ([address]     GERD (gastroesophageal reflux disease)     History of echocardiogram 02/06/2013    EF 65%. No RWMA. Mod LVH. Gr 1 DDfx. RVSP 25 mmHg.  HX OTHER MEDICAL     anxiety, \"mitral valve prolapse\"    Hyperlipidemia     Hypertension     Psychiatric disorder     anxiety    RLE venous duplex 05/06/2015    Right leg:  No DVT. Past Surgical History:   Procedure Laterality Date    ABDOMEN SURGERY PROC UNLISTED  1999    tumor removal    HX CHOLECYSTECTOMY      HX GYN  1997    hysterectomy    HX OTHER SURGICAL      FATTY TISSUE REMOVED    HX TONSILLECTOMY       Current Outpatient Prescriptions   Medication Sig Dispense Refill    gabapentin (NEURONTIN) 300 mg capsule Take 1 Cap by mouth three (3) times daily. 180 Cap 2    lisinopril (PRINIVIL, ZESTRIL) 5 mg tablet TAKE ONE TABLET BY MOUTH DAILY 30 Tab 3    metFORMIN (GLUCOPHAGE) 500 mg tablet Take 1 Tab by mouth two (2) times daily (with meals). 60 Tab 6    FLUoxetine (PROZAC) 40 mg capsule Take 1 Cap by mouth daily. 30 Cap 6    atorvastatin (LIPITOR) 20 mg tablet Take 1 Tab by mouth daily. 30 Tab 6    LORazepam (ATIVAN) 1 mg tablet Take 1 Tab by mouth every eight (8) hours as needed for Anxiety. Max Daily Amount: 3 mg. 30 Tab 0    aspirin delayed-release 81 mg tablet Take  by mouth daily.  estradiol (VAGIFEM) 10 mcg tab vaginal tablet Insert 1 Tab into vagina daily.  1 tablet daily x 2 weeks then 1 tablet twice a week. 22 Tab 11    omeprazole (PRILOSEC) 40 mg capsule Take 1 Cap by mouth daily. 30 Cap 6    loratadine (CLARITIN) 10 mg tablet Take 1 Tab by mouth daily as needed for Allergies. 30 Tab 1    fluticasone (FLONASE) 50 mcg/actuation nasal spray 2 Sprays by Both Nostrils route daily. 1 Bottle 3     Allergies   Allergen Reactions    Iodine Anaphylaxis     Family History   Problem Relation Age of Onset    Heart Disease Father     Heart Attack Father     Heart Disease Paternal Grandmother     Heart Disease Paternal Grandfather     Breast Cancer Mother      Social History   Substance Use Topics    Smoking status: Former Smoker     Packs/day: 1.00     Years: 20.00     Quit date: 10/19/2012    Smokeless tobacco: Former User    Alcohol use No      Comment: occasional     Diet, Lifestyle: The patient is prescribed and does not follow the special diet. Exercise level: moderately active    Depression Risk Screen     PHQ over the last two weeks 8/23/2017   PHQ Not Done -   Little interest or pleasure in doing things Not at all   Feeling down, depressed or hopeless Not at all   Total Score PHQ 2 0     Alcohol Risk Screen   On any occasion during the past 3 months, have you had more than 3 drinks containing alcohol? Yes    Do you average more than 7 drinks per week? No      Functional Ability and Level of Safety   Hearing LossHearing is good. Vision Screening  Vision is good. No exam data present      Activities of Daily Living  The home contains: no safety equipment  Patient does total self care    Fall Risk Screen  Fall Risk Assessment, last 12 mths 8/23/2017   Able to walk? Yes   Fall in past 12 months?  No       Abuse Screen  Patient is not abused    Screening EKG   EKG order placed: Yes    Patient Care Team   Patient Care Team:  Beena Macias NP as PCP - General (Family Practice)  Marena Bumpers, MD (Cardiology)  Chaz Chowdhury MD (General Surgery) End-of-life planning  Advanced care planning directives were discussed with the patient and /or family/caregiver. Assessment/Plan   Education and counseling provided:  Are appropriate based on today's review and evaluation  Pneumococcal Vaccine  Screening Mammography  Screening Pap and pelvic (covered once every 2 years)  Colorectal cancer screening tests  Bone mass measurement (DEXA)  Diabetes screening test  Diagnoses and all orders for this visit:    1. Intrinsic eczema  -     clobetasol (TEMOVATE) 0.05 % external solution; Apply twice a day prn    2. Welcome to Medicare preventive visit  -     Bilateral Digital Screening Mammography; Future  -     EKG, SCREEN, INIT PREV EXAM W/ INTERP/REPORT (only orderable in first 12 months of Medicare)  -     Pneumococcal Conjugate Vaccine  -     Pneumococcal Admin ()  -     varicella zoster vacine live (ZOSTAVAX) 19,400 unit/0.65 mL susr injection; 1 Vial by SubCUTAneous route once for 1 dose. -     AMB POC EKG ROUTINE W/ 12 LEADS, INTER & REP    3. Encounter for screening for osteoporosis  -     Dexa Bone Density Study Axial ([id number]); Future    4. Anxiety  -     LORazepam (ATIVAN) 1 mg tablet; Take 1 Tab by mouth every eight (8) hours as needed for Anxiety. Max Daily Amount: 3 mg.    5. Other depression  -     FLUoxetine (PROZAC) 40 mg capsule; Take 1 Cap by mouth daily. 6. Mixed hyperlipidemia  -     LIPID PANEL; Future    7. Essential hypertension  -     METABOLIC PANEL, COMPREHENSIVE; Future    8. Encounter for screening mammogram for malignant neoplasm of breast  -     Bilateral Digital Screening Mammography; Future    9. Encounter for immunization  -     Pneumococcal Conjugate Vaccine  -     Pneumococcal Admin ()  -     varicella zoster vacine live (ZOSTAVAX) 19,400 unit/0.65 mL susr injection; 1 Vial by SubCUTAneous route once for 1 dose. 10. Disorder of bone and cartilage, unspecified    11.  Need for hepatitis C screening test    Other orders  -     lisinopril (PRINIVIL, ZESTRIL) 5 mg tablet; Take 1 Tab by mouth daily. -     atorvastatin (LIPITOR) 20 mg tablet; Take 1 Tab by mouth daily. Health Maintenance Due   Topic Date Due    COLONOSCOPY  08/01/1970    ZOSTER VACCINE AGE 60>  06/01/2012    PAP AKA CERVICAL CYTOLOGY  05/30/2016    BREAST CANCER SCRN MAMMOGRAM  04/07/2017    GLAUCOMA SCREENING Q2Y  08/01/2017    OSTEOPOROSIS SCREENING (DEXA)  08/01/2017    Pneumococcal 65+ Low/Medium Risk (1 of 2 - PCV13) 08/01/2017    MEDICARE YEARLY EXAM  08/01/2017    INFLUENZA AGE 9 TO ADULT  08/01/2017       PLAN:  Pt will fill out form for advance directives and bring that back in. Pt will schedul an appointment to discuss her symptoms of shortness of breath. Pt given after visit summary.

## 2017-08-23 NOTE — MR AVS SNAPSHOT
Visit Information Date & Time Provider Department Dept. Phone Encounter #  
 8/23/2017  9:00 AM Monalisa Gallardo NP [facility] [id number] Upcoming Health Maintenance Date Due COLONOSCOPY 8/1/1970 ZOSTER VACCINE AGE 60> 6/1/2012 PAP AKA CERVICAL CYTOLOGY 5/30/2016 BREAST CANCER SCRN MAMMOGRAM 4/7/2017 GLAUCOMA SCREENING Q2Y 8/1/2017 OSTEOPOROSIS SCREENING (DEXA) 8/1/2017 Pneumococcal 65+ Low/Medium Risk (1 of 2 - PCV13) 8/1/2017 MEDICARE YEARLY EXAM 8/1/2017 INFLUENZA AGE 9 TO ADULT 8/1/2017 DTaP/Tdap/Td series (2 - Td) 12/3/2025 Allergies as of 8/23/2017  Review Complete On: 8/23/2017 By: Monalisa Gallardo NP Severity Noted Reaction Type Reactions Iodine High 02/05/2013    Anaphylaxis Current Immunizations  Reviewed on 8/23/2017 Name Date Pneumococcal Conjugate (PCV-13)  Incomplete Tdap 12/3/2015 Reviewed by Monalisa Gallardo NP on 8/23/2017 at  9:24 AM  
You Were Diagnosed With   
  
 Codes Comments Intrinsic eczema    -  Primary ICD-10-CM: L20.84 ICD-9-CM: 691.8 Welcome to Medicare preventive visit     ICD-10-CM: Z00.00 ICD-9-CM: V70.0 Encounter for screening for osteoporosis     ICD-10-CM: Z13.820 ICD-9-CM: V82.81 Anxiety     ICD-10-CM: F41.9 ICD-9-CM: 300.00 Other depression     ICD-10-CM: F32.89 ICD-9-CM: 902 Mixed hyperlipidemia     ICD-10-CM: E78.2 ICD-9-CM: 272.2 Essential hypertension     ICD-10-CM: I10 
ICD-9-CM: 401.9 Encounter for screening mammogram for malignant neoplasm of breast     ICD-10-CM: Z12.31 
ICD-9-CM: V76.12 Encounter for immunization     ICD-10-CM: N36 ICD-9-CM: V03.89 Disorder of bone and cartilage, unspecified     ICD-10-CM: M89.9, M94.9 ICD-9-CM: 733.90 Need for hepatitis C screening test     ICD-10-CM: Z11.59 
ICD-9-CM: V73.89 Vitals BP Pulse Temp Resp Height(growth percentile) Weight(growth percentile) 136/74 81 97.8 °F (36.6 °C) 16 5' 9\" (1.753 m) 202 lb 6.4 oz (91.8 kg) SpO2 BMI OB Status Smoking Status 96% 29.89 kg/m2 Hysterectomy Former Smoker Vitals History BMI and BSA Data Body Mass Index Body Surface Area  
 29.89 kg/m 2 2.11 m 2 Preferred Pharmacy Pharmacy Name Phone [facility] [address] [facility] [phone] Your Updated Medication List  
  
   
This list is accurate as of: 8/23/17  9:59 AM.  Always use your most recent med list.  
  
  
  
  
 aspirin delayed-release 81 mg tablet Take  by mouth daily. atorvastatin 20 mg tablet Commonly known as:  LIPITOR Take 1 Tab by mouth daily. clobetasol 0.05 % external solution Commonly known as:  Orbie Deter Apply twice a day prn  
  
 estradiol 10 mcg Tab vaginal tablet Commonly known as:  Lonne Cutting Insert 1 Tab into vagina daily. 1 tablet daily x 2 weeks then 1 tablet twice a week. FLUoxetine 40 mg capsule Commonly known as:  PROzac Take 1 Cap by mouth daily. fluticasone 50 mcg/actuation nasal spray Commonly known as:  Ladd Blanco 2 Sprays by Both Nostrils route daily. gabapentin 300 mg capsule Commonly known as:  NEURONTIN Take 1 Cap by mouth three (3) times daily. lisinopril 5 mg tablet Commonly known as:  Dorathy Massed Take 1 Tab by mouth daily. loratadine 10 mg tablet Commonly known as:  Susa Fuchs Take 1 Tab by mouth daily as needed for Allergies. LORazepam 1 mg tablet Commonly known as:  ATIVAN Take 1 Tab by mouth every eight (8) hours as needed for Anxiety. Max Daily Amount: 3 mg.  
  
 metFORMIN 500 mg tablet Commonly known as:  GLUCOPHAGE Take 1 Tab by mouth two (2) times daily (with meals). omeprazole 40 mg capsule Commonly known as:  PriLOSEC Take 1 Cap by mouth daily. varicella zoster vacine live 19,400 unit/0.65 mL Susr injection Commonly known as:  ZOSTAVAX 1 Vial by SubCUTAneous route once for 1 dose. Prescriptions Printed Refills LORazepam (ATIVAN) 1 mg tablet 0 Sig: Take 1 Tab by mouth every eight (8) hours as needed for Anxiety. Max Daily Amount: 3 mg. Class: Print Route: Oral  
 varicella zoster vacine live (ZOSTAVAX) 19,400 unit/0.65 mL susr injection 0 Si Vial by SubCUTAneous route once for 1 dose. Class: Print Route: SubCUTAneous Prescriptions Sent to Pharmacy Refills  
 lisinopril (PRINIVIL, ZESTRIL) 5 mg tablet 1 Sig: Take 1 Tab by mouth daily. Class: Normal  
 Pharmacy: [address] Ph #: [phone] Route: Oral  
 FLUoxetine (PROZAC) 40 mg capsule 1 Sig: Take 1 Cap by mouth daily. Class: Normal  
 Pharmacy: [address] Ph #: [phone] Route: Oral  
 atorvastatin (LIPITOR) 20 mg tablet 1 Sig: Take 1 Tab by mouth daily. Class: Normal  
 Pharmacy: [address] Ph #: [phone] Route: Oral  
 clobetasol (TEMOVATE) 0.05 % external solution 5 Sig: Apply twice a day prn  
 Class: Normal  
 Pharmacy: [address] Ph #: [phone] We Performed the Following ADMIN PNEUMOCOCCAL VACCINE [ HCP] EKG, SCREEN, INIT PREV EXAM W/ INTERP/REPORT [ HCP] PNEUMOCOCCAL CONJ VACCINE 13 VALENT IM [id number] CPT(R)] To-Do List   
 2017 Lab:  LIPID PANEL   
  
 2017 Lab:  METABOLIC PANEL, COMPREHENSIVE   
  
 2017 Imaging:  DEXA BONE DENSITY STUDY AXIAL   
  
 2017 Imaging:  SEAN MAMMO BI SCREENING INCL CAD Patient Instructions Medicare Wellness Visit, Female The best way to live healthy is to have a healthy lifestyle by eating a well-balanced diet, exercising regularly, limiting alcohol and stopping smoking. Regular physical exams and screening tests are another way to keep healthy. Preventive exams provided by your health care provider can find health problems before they become diseases or illnesses. Preventive services including immunizations, screening tests, monitoring and exams can help you take care of your own health. All people over age 72 should have a pneumovax  and and a prevnar shot to prevent pneumonia. These are once in a lifetime unless you and your provider decide differently. All people over 65 should have a yearly flu shot and a tetanus vaccine every 10 years. A bone mass density to screen for osteoporosis or thinning of the bones should be done every 2 years after 65. Screening for diabetes mellitus with a blood sugar test should be done every year. Glaucoma is a disease of the eye due to increased ocular pressure that can lead to blindness and it should be done every year by an eye professional. 
 
Cardiovascular screening tests that check for elevated lipids (fatty part of blood) which can lead to heart disease and strokes should be done every 5 years. Colorectal screening that evaluates for blood or polyps in your colon should be done yearly as a stool test or every five years as a flexible sigmoidoscope or every 10 years as a colonoscopy up to age 76. Breast cancer screening with a mammogram is recommended biennially  for women age 54-69. Screening for cervical cancer with a pap smear and pelvic exam is recommended for women after age 72 years every 2 years up to age 79 or when the provider and patient decide to stop. If there is a history of cervical abnormalities or other increased risk for cancer then the test is recommended yearly. Hepatitis C screening is also recommended for anyone born between 80 through [address]. A shingles vaccine is also recommended once in a lifetime after age 61. Your Medicare Wellness Exam is recommended annually. Here is a list of your current Health Maintenance items with a due date: 
Health Maintenance Due Topic Date Due  
 Colonoscopy  08/01/1970  Shingles Vaccine  06/01/2012  Cervical Cancer Screening  05/30/2016  Breast Cancer Screening  04/07/2017  Glaucoma Screening   08/01/2017  Bone Density Screening  08/01/2017  Pneumococcal Vaccine (1 of 2 - PCV13) 08/01/2017 [facility] Annual Well Visit  08/01/2017  Flu Vaccine  08/01/2017 Medicare Wellness Visit, Female The best way to live healthy is to have a healthy lifestyle by eating a well-balanced diet, exercising regularly, limiting alcohol and stopping smoking. Regular physical exams and screening tests are another way to keep healthy. Preventive exams provided by your health care provider can find health problems before they become diseases or illnesses. Preventive services including immunizations, screening tests, monitoring and exams can help you take care of your own health. All people over age 72 should have a pneumovax  and and a prevnar shot to prevent pneumonia. These are once in a lifetime unless you and your provider decide differently. All people over 65 should have a yearly flu shot and a tetanus vaccine every 10 years. A bone mass density to screen for osteoporosis or thinning of the bones should be done every 2 years after 65. Screening for diabetes mellitus with a blood sugar test should be done every year. Glaucoma is a disease of the eye due to increased ocular pressure that can lead to blindness and it should be done every year by an eye professional. 
 
Cardiovascular screening tests that check for elevated lipids (fatty part of blood) which can lead to heart disease and strokes should be done every 5 years.  
 
Colorectal screening that evaluates for blood or polyps in your colon should be done yearly as a stool test or every five years as a flexible sigmoidoscope or every 10 years as a colonoscopy up to age 76. Breast cancer screening with a mammogram is recommended biennially  for women age 54-69. Screening for cervical cancer with a pap smear and pelvic exam is recommended for women after age 72 years every 2 years up to age 79 or when the provider and patient decide to stop. If there is a history of cervical abnormalities or other increased risk for cancer then the test is recommended yearly. Hepatitis C screening is also recommended for anyone born between 80 through [address]. A shingles vaccine is also recommended once in a lifetime after age 61. Your Medicare Wellness Exam is recommended annually. Here is a list of your current Health Maintenance items with a due date: 
Health Maintenance Due Topic Date Due  
 Colonoscopy  08/01/1970  Shingles Vaccine  06/01/2012  Cervical Cancer Screening  05/30/2016  Breast Cancer Screening  04/07/2017  Glaucoma Screening   08/01/2017  Bone Density Screening  08/01/2017  Pneumococcal Vaccine (1 of 2 - PCV13) 08/01/2017 [facility] Annual Well Visit  08/01/2017  Flu Vaccine  08/01/2017 Introducing \[facility] [place] [facility]\"" & HEALTH SERVICES! [employer] introduces [employer] patient portal. Now you can access parts of your medical record, email your doctor's office, and request medication refills online. 1. In your internet browser, go to [email or web address] 2. Click on the First Time User? Click Here link in the Sign In box. You will see the New Member Sign Up page. 3. Enter your [employer] Access Code exactly as it appears below. You will not need to use this code after youve completed the sign-up process. If you do not sign up before the expiration date, you must request a new code. · [employer] Access Code: [email or web address] Expires: 11/21/2017  9:25 AM 
 
4.  Enter the last four digits of your Social Security Number (xxxx) and Date of Birth (mm/dd/yyyy) as indicated and click Submit. You will be taken to the next sign-up page. 5. Create a Umami ID. This will be your [employer]ami login ID and cannot be changed, so think of one that is secure and easy to remember. 6. Create a Umami password. You can change your password at any time. 7. Enter your Password Reset Question and Answer. This can be used at a later time if you forget your password. 8. Enter your e-mail address. You will receive e-mail notification when new information is available in [address] 9. Click Sign Up. You can now view and download portions of your medical record. 10. Click the Download Summary menu link to download a portable copy of your medical information. If you have questions, please visit the Frequently Asked Questions section of the Um[employer] website. Remember, [employer] is NOT to be used for urgent needs. For medical emergencies, dial 911. Now available from your iPhone and Android! Please provide this summary of care documentation to your next provider. Your primary care clinician is listed as Derick Guevara. If you have any questions after today's visit, please call [phone].

## 2017-08-23 NOTE — LETTER
8/28/2017 5:07 PM 
 
Ms. Sosa Kumar 
[address] [facility] [id number] Dear Sosa Kumar: 
 
Please find your most recent results below. Resulted Orders METABOLIC PANEL, COMPREHENSIVE Result Value Ref Range Sodium 139 136 - 145 mmol/L Potassium 4.8 3.5 - 5.5 mmol/L Chloride 102 100 - 108 mmol/L  
 CO2 26 21 - 32 mmol/L Anion gap 11 3.0 - 18 mmol/L Glucose 154 (H) 74 - 99 mg/dL BUN 14 7.0 - 18 MG/DL Creatinine 0.96 0.6 - 1.3 MG/DL  
 BUN/Creatinine ratio 15 12 - 20 GFR est AA >60 >60 ml/min/1.73m2 GFR est non-AA 58 (L) >60 ml/min/1.73m2 Comment:  
   (NOTE) Estimated GFR is calculated using the Modification of Diet in Renal  
Disease (MDRD) Study equation, reported for both  Americans [facility]) and non- Americans (GFRNA), and normalized to 1.73m2  
body surface area. The physician must decide which value applies to  
the patient. The MDRD study equation should only be used in  
individuals age 25 or older. It has not been validated for the  
following: pregnant women, patients with serious comorbid conditions,  
or on certain medications, or persons with extremes of body size,  
muscle mass, or nutritional status. Calcium 9.7 8.5 - 10.1 MG/DL Bilirubin, total 0.4 0.2 - 1.0 MG/DL  
 ALT (SGPT) 50 13 - 56 U/L  
 AST (SGOT) 23 15 - 37 U/L Alk. phosphatase 86 45 - 117 U/L Protein, total 7.0 6.4 - 8.2 g/dL Albumin 4.0 3.4 - 5.0 g/dL Globulin 3.0 2.0 - 4.0 g/dL A-G Ratio 1.3 0.8 - 1.7 LIPID PANEL Result Value Ref Range LIPID PROFILE Cholesterol, total 214 (H) <200 MG/DL Triglyceride 224 (H) <150 MG/DL Comment:  
   The drugs N-acetylcysteine (NAC) and 
Metamiszole have been found to cause falsely 
low results in this chemical assay. Please 
be sure to submit blood samples obtained BEFORE administration of either of these 
drugs to assure correct results.  
  
 HDL Cholesterol 54 40 - 60 MG/DL  
 LDL, calculated 115.2 (H) 0 - 100 MG/DL VLDL, calculated 44.8 MG/DL  
 CHOL/HDL Ratio 4.0 0 - 5.0    
 
 
RECOMMENDATIONS: 
Your labs were essentially normal. However your glucose is elevated. Adhere to lower carbohydrate/glucose diet and incorporate daily aerobic exercise as tolerated. Try to eat food rich in Omega 3 and Niacin as your triglycerides are elevated as well. We will continue to monitor. Please follow up in about 6 months. Schedule morning appointment and be sure to fast.  
 
  
Please call me if you have any questions: [phone] Sincerely, Josh Gleason NP

## 2017-08-23 NOTE — PATIENT INSTRUCTIONS
Medicare Wellness Visit, Female    The best way to live healthy is to have a healthy lifestyle by eating a well-balanced diet, exercising regularly, limiting alcohol and stopping smoking. Regular physical exams and screening tests are another way to keep healthy. Preventive exams provided by your health care provider can find health problems before they become diseases or illnesses. Preventive services including immunizations, screening tests, monitoring and exams can help you take care of your own health. All people over age 72 should have a pneumovax  and and a prevnar shot to prevent pneumonia. These are once in a lifetime unless you and your provider decide differently. All people over 65 should have a yearly flu shot and a tetanus vaccine every 10 years. A bone mass density to screen for osteoporosis or thinning of the bones should be done every 2 years after 65. Screening for diabetes mellitus with a blood sugar test should be done every year. Glaucoma is a disease of the eye due to increased ocular pressure that can lead to blindness and it should be done every year by an eye professional.    Cardiovascular screening tests that check for elevated lipids (fatty part of blood) which can lead to heart disease and strokes should be done every 5 years. Colorectal screening that evaluates for blood or polyps in your colon should be done yearly as a stool test or every five years as a flexible sigmoidoscope or every 10 years as a colonoscopy up to age 76. Breast cancer screening with a mammogram is recommended biennially  for women age 54-69. Screening for cervical cancer with a pap smear and pelvic exam is recommended for women after age 72 years every 2 years up to age 79 or when the provider and patient decide to stop. If there is a history of cervical abnormalities or other increased risk for cancer then the test is recommended yearly.     Hepatitis C screening is also recommended for anyone born between [birth year] [address] [facility] [address]. A shingles vaccine is also recommended once in a lifetime after age 61. Your Medicare Wellness Exam is recommended annually. Here is a list of your current Health Maintenance items with a due date:  Health Maintenance Due   Topic Date Due    Colonoscopy  08/01/1970    Shingles Vaccine  06/01/2012    Cervical Cancer Screening  05/30/2016    Breast Cancer Screening  04/07/2017    Glaucoma Screening   08/01/2017    Bone Density Screening  08/01/2017    Pneumococcal Vaccine (1 of 2 - PCV13) 08/01/2017    Annual Well Visit  08/01/2017    Flu Vaccine  08/01/2017       Medicare Wellness Visit, Female    The best way to live healthy is to have a healthy lifestyle by eating a well-balanced diet, exercising regularly, limiting alcohol and stopping smoking. Regular physical exams and screening tests are another way to keep healthy. Preventive exams provided by your health care provider can find health problems before they become diseases or illnesses. Preventive services including immunizations, screening tests, monitoring and exams can help you take care of your own health. All people over age 72 should have a pneumovax  and and a prevnar shot to prevent pneumonia. These are once in a lifetime unless you and your provider decide differently. All people over 65 should have a yearly flu shot and a tetanus vaccine every 10 years. A bone mass density to screen for osteoporosis or thinning of the bones should be done every 2 years after 65. Screening for diabetes mellitus with a blood sugar test should be done every year. Glaucoma is a disease of the eye due to increased ocular pressure that can lead to blindness and it should be done every year by an eye professional.    Cardiovascular screening tests that check for elevated lipids (fatty part of blood) which can lead to heart disease and strokes should be done every 5 years.     Colorectal screening that evaluates for blood or polyps in your colon should be done yearly as a stool test or every five years as a flexible sigmoidoscope or every 10 years as a colonoscopy up to age 76. Breast cancer screening with a mammogram is recommended biennially  for women age 54-69. Screening for cervical cancer with a pap smear and pelvic exam is recommended for women after age 72 years every 2 years up to age 79 or when the provider and patient decide to stop. If there is a history of cervical abnormalities or other increased risk for cancer then the test is recommended yearly. Hepatitis C screening is also recommended for anyone born between [birth year] through [address]. A shingles vaccine is also recommended once in a lifetime after age 61. Your Medicare Wellness Exam is recommended annually.     Here is a list of your current Health Maintenance items with a due date:  Health Maintenance Due   Topic Date Due    Colonoscopy  08/01/1970    Shingles Vaccine  06/01/2012    Cervical Cancer Screening  05/30/2016    Breast Cancer Screening  04/07/2017    Glaucoma Screening   08/01/2017    Bone Density Screening  08/01/2017    Pneumococcal Vaccine (1 of 2 - PCV13) 08/01/2017    Annual Well Visit  08/01/2017    Flu Vaccine  08/01/2017

## 2017-08-24 NOTE — PROGRESS NOTES
Please advise patient that her glucose is elevated at 154, we need to get this down below 120 or lower. Her total cholesterol is 214 and this should be below 200, her triglycerides are 224 this is what I want her to work on, eat more foods rich in Omega 3 and Niacin, walk more. Eat less foods that are fried and rich. Her HDL is 54 which is good as it is above 40 . Her LDL is 115 which is not bad. If she can get her triglycerides down her glucose will also come down as well as her other cholesterol numbers. These should be rechecked fasting in 6 months.

## 2017-09-06 ENCOUNTER — OFFICE VISIT (OUTPATIENT)
Dept: FAMILY MEDICINE CLINIC | Age: 67
End: 2017-09-06

## 2017-09-06 VITALS
BODY MASS INDEX: 30.01 KG/M2 | WEIGHT: 202.6 LBS | HEART RATE: 77 BPM | RESPIRATION RATE: 8 BRPM | OXYGEN SATURATION: 98 % | DIASTOLIC BLOOD PRESSURE: 90 MMHG | SYSTOLIC BLOOD PRESSURE: 138 MMHG | HEIGHT: 69 IN | TEMPERATURE: 97.5 F

## 2017-09-06 DIAGNOSIS — F43.0 ACUTE REACTION TO STRESS: ICD-10-CM

## 2017-09-06 DIAGNOSIS — R06.02 SHORTNESS OF BREATH: Primary | ICD-10-CM

## 2017-09-06 NOTE — MR AVS SNAPSHOT
Visit Information Date & Time Provider Department Dept. Phone Encounter #  
 9/6/2017  9:00 AM Sonali Anderson  [place] [id number] [phone] Upcoming Health Maintenance Date Due COLONOSCOPY 8/1/1970 ZOSTER VACCINE AGE 60> 6/1/2012 BREAST CANCER SCRN MAMMOGRAM 4/7/2017 GLAUCOMA SCREENING Q2Y 8/1/2017 OSTEOPOROSIS SCREENING (DEXA) 8/1/2017 INFLUENZA AGE 9 TO ADULT 8/1/2017 Pneumococcal 65+ Low/Medium Risk (2 of 2 - PPSV23) 8/23/2018 MEDICARE YEARLY EXAM 8/24/2018 DTaP/Tdap/Td series (2 - Td) 12/3/2025 Allergies as of 9/6/2017  Review Complete On: 9/6/2017 By: Sonali Anderson NP Severity Noted Reaction Type Reactions Iodine High 02/05/2013    Anaphylaxis Current Immunizations  Reviewed on 8/23/2017 Name Date Pneumococcal Conjugate (PCV-13) 8/23/2017 Tdap 12/3/2015 Not reviewed this visit You Were Diagnosed With   
  
 Codes Comments Dyspnea on exertion    -  Primary ICD-10-CM: R06.09 
ICD-9-CM: 786.09 Shortness of breath     ICD-10-CM: R06.02 
ICD-9-CM: 786.05 Vitals BP Pulse Temp Resp Height(growth percentile) Weight(growth percentile) 138/90 (BP 1 Location: Left arm, BP Patient Position: Sitting) 77 97.5 °F (36.4 °C) (Oral) 8 5' 9\" (1.753 m) 202 lb 9.6 oz (91.9 kg) SpO2 BMI OB Status Smoking Status 98% 29.92 kg/m2 Hysterectomy Former Smoker BMI and BSA Data Body Mass Index Body Surface Area  
 29.92 kg/m 2 2.12 m 2 Preferred Pharmacy Pharmacy Name Phone [facility] [place] [address] [facility] [phone] Your Updated Medication List  
  
   
This list is accurate as of: 9/6/17  9:37 AM.  Always use your most recent med list.  
  
  
  
  
 aspirin delayed-release 81 mg tablet Take  by mouth daily. atorvastatin 20 mg tablet Commonly known as:  LIPITOR Take 1 Tab by mouth daily. clobetasol 0.05 % external solution Commonly known as:  Regenia Thais Apply twice a day prn  
  
 estradiol 10 mcg Tab vaginal tablet Commonly known as:  Shaila Feliciano Insert 1 Tab into vagina daily. 1 tablet daily x 2 weeks then 1 tablet twice a week. FLUoxetine 40 mg capsule Commonly known as:  PROzac Take 1 Cap by mouth daily. fluticasone 50 mcg/actuation nasal spray Commonly known as:  Gino Ahsley 2 Sprays by Both Nostrils route daily. gabapentin 300 mg capsule Commonly known as:  NEURONTIN Take 1 Cap by mouth three (3) times daily. lisinopril 5 mg tablet Commonly known as:  Yip LaGrange Take 1 Tab by mouth daily. loratadine 10 mg tablet Commonly known as:  Beather Genera Take 1 Tab by mouth daily as needed for Allergies. LORazepam 1 mg tablet Commonly known as:  ATIVAN Take 1 Tab by mouth every eight (8) hours as needed for Anxiety. Max Daily Amount: 3 mg.  
  
 metFORMIN 500 mg tablet Commonly known as:  GLUCOPHAGE Take 1 Tab by mouth two (2) times daily (with meals). omeprazole 40 mg capsule Commonly known as:  PriLOSEC Take 1 Cap by mouth daily. We Performed the Following REFERRAL TO PULMONARY DISEASE [GEP05 Custom] Comments:  
 Please evaluate patient for shortness of breath at rest and worse with exertion. Pt has been seen in ED and has had some cardiac work up done there. To-Do List   
 09/07/2017 2:30 PM  
  Appointment with [facility]OLA ESTRADA RM 1 at [address] [place] [address] ([phone]) OUTSIDE FILMS  - Any outside films related to the study being scheduled should be brought with you on the day of the exam.  If this cannot be done there may be a delay in the reading of the study.   MEDICATIONS  - Patient must bring a complete list of all medications currently taking to include prescriptions, over-the-counter meds, herbals, vitamins & any dietary supplements  GENERAL INSTRUCTIONS  - On the day of your exam do not use any bath powder, deodorant or lotions on the armpit area. -Tenderness of breasts may cause an increase of discomfort during procedure. If you are experiencing breast tenderness on the day of your appointment and would like to reschedule, please call [phone].  
  
 09/07/2017 3:00 PM  
  Appointment with [facility] BONE DEXA RM 1 at [facility] RAD BONE DENSITY ([phone]) OUTSIDE FILMS  - Any outside films related to the study being scheduled should be brought with you on the day of the exam.  If this cannot be done there may be a delay in the reading of the study. MEDICATIONS  - Patient must bring a complete list of all medications currently taking to include prescriptions, over-the-counter meds, herbals, vitamins & any dietary supplements - Patient must discontinue use of calcium, vitamins, or calcium supplements including antacids (calcium based) 24 hours before scan. GENERAL INSTRUCTIONS  - [facility] cannot accommodate patients on stretchers, patient must be able to walk into the room and be able to sit up for a portion of the exam.  
  
  
Referral Information Referral ID Referred By Referred To  
  
 [id number] [facility] [place]   
   [address] [place] [address] [facility] [place], [address] [facility] [address] Phone: [phone] Fax: [phone] Visits Status Start Date End Date 1 New Request 9/6/17 9/6/18 If your referral has a status of pending review or denied, additional information will be sent to support the outcome of this decision. Introducing hospitals & HEALTH SERVICES! [facility] introduces [employer] patient portal. Now you can access parts of your medical record, email your doctor's office, and request medication refills online. 1. In your internet browser, go to [email or web address] 2. Click on the First Time User? Click Here link in the Sign In box. You will see the New Member Sign Up page. 3. Enter your [email or web address] Access Code exactly as it appears below. You will not need to use this code after youve completed the sign-up process. If you do not sign up before the expiration date, you must request a new code. · [email or web address] Access Code: [email or web address] Expires: 11/21/2017  9:25 AM 
 
4. Enter the last four digits of your Social Security Number (xxxx) and Date of Birth (mm/dd/yyyy) as indicated and click Submit. You will be taken to the next sign-up page. 5. Create a [email or web address] ID. This will be your [email or web address] login ID and cannot be changed, so think of one that is secure and easy to remember. 6. Create a [email or web address] password. You can change your password at any time. 7. Enter your Password Reset Question and Answer. This can be used at a later time if you forget your password. 8. Enter your e-mail address. You will receive e-mail notification when new information is available in [address] 9. Click Sign Up. You can now view and download portions of your medical record. 10. Click the Download Summary menu link to download a portable copy of your medical information. If you have questions, please visit the Frequently Asked Questions section of the [email or web address] website. Remember, [email or web address] is NOT to be used for urgent needs. For medical emergencies, dial 911. Now available from your iPhone and Android! Please provide this summary of care documentation to your next provider. Your primary care clinician is listed as Pauline Guevara. If you have any questions after today's visit, please call [phone].

## 2017-09-06 NOTE — PROGRESS NOTES
1. Have you been to the ER, urgent care clinic since your last visit? Hospitalized since your last visit? No    2. Have you seen or consulted any other health care providers outside of the [address] [place] [address] since your last visit? Include any pap smears or colon screening.  No

## 2017-09-06 NOTE — PROGRESS NOTES
HISTORY OF PRESENT ILLNESS  Bryce Cockayne is a 74 y.o. female. Patient presents today for follow up chest pain and shortness of breath. HPI   She has been in the ED several times for this and has had EKGs and stress test done which have been all normal.  She is under a lot of stress. Review of Systems   Constitutional: Positive for malaise/fatigue. HENT: Negative. Eyes: Negative. Respiratory: Positive for shortness of breath. Cardiovascular: Positive for chest pain. Visit Vitals    /90 (BP 1 Location: Left arm, BP Patient Position: Sitting)    Pulse 77    Temp 97.5 °F (36.4 °C) (Oral)    Resp 8    Ht 5' 9\" (1.753 m)    Wt 202 lb 9.6 oz (91.9 kg)    SpO2 98%    BMI 29.92 kg/m2       Physical Exam   Constitutional: She appears well-developed. No distress. Cardiovascular: Normal rate, regular rhythm and normal heart sounds. Exam reveals no gallop and no friction rub. No murmur heard. Pulmonary/Chest: Effort normal and breath sounds normal. No respiratory distress. She has no wheezes. She has no rales. Musculoskeletal: She exhibits no edema. Psychiatric: She exhibits a depressed mood. ASSESSMENT and PLAN    ICD-10-CM ICD-9-CM    1. Dyspnea on exertion R06.09 786.09    2. Shortness of breath R06.02 786.05 REFERRAL TO PULMONARY DISEASE     PLAN:  We spent 30 minutes face to face discussing her stressors. We may need to make medicine adjustments pending Pulmonary findings. Pt given ref to pulmonologist.  Pt has her mammogram and bone density is scheduled for tomorrow. Pt was given after visit summary.

## 2017-09-07 ENCOUNTER — HOSPITAL ENCOUNTER (OUTPATIENT)
Dept: BONE DENSITY | Age: 67
Discharge: HOME | End: 2017-09-07
Attending: NURSE PRACTITIONER
Payer: MEDICARE

## 2017-09-07 ENCOUNTER — HOSPITAL ENCOUNTER (OUTPATIENT)
Dept: MAMMOGRAPHY | Age: 67
Discharge: HOME | End: 2017-09-07
Attending: NURSE PRACTITIONER
Payer: MEDICARE

## 2017-09-07 DIAGNOSIS — Z12.31 ENCOUNTER FOR SCREENING MAMMOGRAM FOR MALIGNANT NEOPLASM OF BREAST: ICD-10-CM

## 2017-09-07 DIAGNOSIS — Z00.00 WELCOME TO MEDICARE PREVENTIVE VISIT: ICD-10-CM

## 2017-09-07 DIAGNOSIS — Z13.820 ENCOUNTER FOR SCREENING FOR OSTEOPOROSIS: ICD-10-CM

## 2017-09-07 PROCEDURE — 77067 SCR MAMMO BI INCL CAD: CPT

## 2017-09-07 PROCEDURE — 77080 DXA BONE DENSITY AXIAL: CPT

## 2017-09-11 NOTE — TELEPHONE ENCOUNTER
----- Message from Alexander Roger NP sent at 9/11/2017 10:05 AM EDT -----  Please advise Pt that her mammogram is normal.

## 2017-09-12 ENCOUNTER — TELEPHONE (OUTPATIENT)
Dept: FAMILY MEDICINE CLINIC | Age: 67
End: 2017-09-12

## 2017-09-12 NOTE — TELEPHONE ENCOUNTER
Spoke with patient informing her of normal mammogram.  Patient verbally understood. She also needs a prescription refill of metformin.

## 2017-09-12 NOTE — TELEPHONE ENCOUNTER
----- Message from Alfred Ribera NP sent at 9/7/2017  5:22 PM EDT -----  Please advise pt that her bone density is normal.

## 2017-09-13 DIAGNOSIS — E11.43 DIABETIC AUTONOMIC NEUROPATHY ASSOCIATED WITH TYPE 2 DIABETES MELLITUS (HCC): Primary | ICD-10-CM

## 2017-09-13 RX ORDER — METFORMIN HYDROCHLORIDE 500 MG/1
500 TABLET ORAL 2 TIMES DAILY WITH MEALS
Qty: 60 TAB | Refills: 6 | Status: SHIPPED | OUTPATIENT
Start: 2017-09-13 | End: 2017-11-07 | Stop reason: SDUPTHER

## 2017-09-13 RX ORDER — METFORMIN HYDROCHLORIDE 500 MG/1
500 TABLET ORAL 2 TIMES DAILY WITH MEALS
Qty: 60 TAB | Refills: 6 | Status: SHIPPED | OUTPATIENT
Start: 2017-09-13 | End: 2017-09-13 | Stop reason: SDUPTHER

## 2017-09-15 ENCOUNTER — HOSPITAL ENCOUNTER (INPATIENT)
Age: 67
LOS: 1 days | Discharge: HOME | DRG: 311 | End: 2017-09-18
Attending: EMERGENCY MEDICINE | Admitting: INTERNAL MEDICINE
Payer: MEDICARE

## 2017-09-15 ENCOUNTER — APPOINTMENT (OUTPATIENT)
Dept: GENERAL RADIOLOGY | Age: 67
DRG: 311 | End: 2017-09-15
Attending: EMERGENCY MEDICINE
Payer: MEDICARE

## 2017-09-15 DIAGNOSIS — R07.9 CHEST PAIN, UNSPECIFIED TYPE: Primary | ICD-10-CM

## 2017-09-15 LAB
ALBUMIN SERPL-MCNC: 3.8 G/DL (ref 3.4–5)
ALBUMIN/GLOB SERPL: 1.1 {RATIO} (ref 0.8–1.7)
ALP SERPL-CCNC: 95 U/L (ref 45–117)
ALT SERPL-CCNC: 50 U/L (ref 13–56)
ANION GAP SERPL CALC-SCNC: 5 MMOL/L (ref 3–18)
AST SERPL-CCNC: 21 U/L (ref 15–37)
ATRIAL RATE: 71 BPM
BASOPHILS # BLD: 0 K/UL (ref 0–0.1)
BASOPHILS NFR BLD: 0 % (ref 0–2)
BILIRUB SERPL-MCNC: 0.4 MG/DL (ref 0.2–1)
BUN SERPL-MCNC: 21 MG/DL (ref 7–18)
BUN/CREAT SERPL: 20 (ref 12–20)
CALCIUM SERPL-MCNC: 10.1 MG/DL (ref 8.5–10.1)
CALCULATED P AXIS, ECG09: 19 DEGREES
CALCULATED R AXIS, ECG10: -33 DEGREES
CALCULATED T AXIS, ECG11: 98 DEGREES
CHLORIDE SERPL-SCNC: 102 MMOL/L (ref 100–108)
CK MB CFR SERPL CALC: NORMAL % (ref 0–4)
CK MB CFR SERPL CALC: NORMAL % (ref 0–4)
CK MB SERPL-MCNC: <1 NG/ML (ref 5–25)
CK MB SERPL-MCNC: <1 NG/ML (ref 5–25)
CK SERPL-CCNC: 55 U/L (ref 26–192)
CK SERPL-CCNC: 69 U/L (ref 26–192)
CO2 SERPL-SCNC: 30 MMOL/L (ref 21–32)
CREAT SERPL-MCNC: 1.05 MG/DL (ref 0.6–1.3)
DIAGNOSIS, 93000: NORMAL
DIFFERENTIAL METHOD BLD: NORMAL
EOSINOPHIL # BLD: 0.1 K/UL (ref 0–0.4)
EOSINOPHIL NFR BLD: 1 % (ref 0–5)
ERYTHROCYTE [DISTWIDTH] IN BLOOD BY AUTOMATED COUNT: 12.8 % (ref 11.6–14.5)
GLOBULIN SER CALC-MCNC: 3.6 G/DL (ref 2–4)
GLUCOSE BLD STRIP.AUTO-MCNC: 187 MG/DL (ref 70–110)
GLUCOSE SERPL-MCNC: 185 MG/DL (ref 74–99)
HCT VFR BLD AUTO: 42.5 % (ref 35–45)
HGB BLD-MCNC: 14.7 G/DL (ref 12–16)
LYMPHOCYTES # BLD: 2.7 K/UL (ref 0.9–3.6)
LYMPHOCYTES NFR BLD: 32 % (ref 21–52)
MCH RBC QN AUTO: 29.5 PG (ref 24–34)
MCHC RBC AUTO-ENTMCNC: 34.6 G/DL (ref 31–37)
MCV RBC AUTO: 85.3 FL (ref 74–97)
MONOCYTES # BLD: 0.3 K/UL (ref 0.05–1.2)
MONOCYTES NFR BLD: 4 % (ref 3–10)
NEUTS SEG # BLD: 5.3 K/UL (ref 1.8–8)
NEUTS SEG NFR BLD: 63 % (ref 40–73)
P-R INTERVAL, ECG05: 150 MS
PLATELET # BLD AUTO: 266 K/UL (ref 135–420)
PMV BLD AUTO: 9.7 FL (ref 9.2–11.8)
POTASSIUM SERPL-SCNC: 4.6 MMOL/L (ref 3.5–5.5)
PROT SERPL-MCNC: 7.4 G/DL (ref 6.4–8.2)
Q-T INTERVAL, ECG07: 372 MS
QRS DURATION, ECG06: 74 MS
QTC CALCULATION (BEZET), ECG08: 404 MS
RBC # BLD AUTO: 4.98 M/UL (ref 4.2–5.3)
SODIUM SERPL-SCNC: 137 MMOL/L (ref 136–145)
TROPONIN I SERPL-MCNC: <0.02 NG/ML (ref 0–0.04)
TROPONIN I SERPL-MCNC: <0.02 NG/ML (ref 0–0.04)
VENTRICULAR RATE, ECG03: 71 BPM
WBC # BLD AUTO: 8.5 K/UL (ref 4.6–13.2)

## 2017-09-15 PROCEDURE — 77030013079 HC BLNKT BAIR HGGR 3M -A

## 2017-09-15 PROCEDURE — 84484 ASSAY OF TROPONIN QUANT: CPT | Performed by: INTERNAL MEDICINE

## 2017-09-15 PROCEDURE — 71010 XR CHEST PORT: CPT

## 2017-09-15 PROCEDURE — 82550 ASSAY OF CK (CPK): CPT | Performed by: EMERGENCY MEDICINE

## 2017-09-15 PROCEDURE — 99218 HC RM OBSERVATION: CPT

## 2017-09-15 PROCEDURE — 85025 COMPLETE CBC W/AUTO DIFF WBC: CPT | Performed by: EMERGENCY MEDICINE

## 2017-09-15 PROCEDURE — 99285 EMERGENCY DEPT VISIT HI MDM: CPT

## 2017-09-15 PROCEDURE — 36415 COLL VENOUS BLD VENIPUNCTURE: CPT | Performed by: INTERNAL MEDICINE

## 2017-09-15 PROCEDURE — 93005 ELECTROCARDIOGRAM TRACING: CPT

## 2017-09-15 PROCEDURE — 82962 GLUCOSE BLOOD TEST: CPT

## 2017-09-15 PROCEDURE — 74011250637 HC RX REV CODE- 250/637: Performed by: EMERGENCY MEDICINE

## 2017-09-15 PROCEDURE — 74011250636 HC RX REV CODE- 250/636: Performed by: INTERNAL MEDICINE

## 2017-09-15 PROCEDURE — 74011250637 HC RX REV CODE- 250/637: Performed by: INTERNAL MEDICINE

## 2017-09-15 PROCEDURE — 80053 COMPREHEN METABOLIC PANEL: CPT | Performed by: EMERGENCY MEDICINE

## 2017-09-15 RX ORDER — HEPARIN SODIUM 5000 [USP'U]/ML
5000 INJECTION, SOLUTION INTRAVENOUS; SUBCUTANEOUS EVERY 8 HOURS
Status: DISCONTINUED | OUTPATIENT
Start: 2017-09-16 | End: 2017-09-18 | Stop reason: HOSPADM

## 2017-09-15 RX ORDER — NITROGLYCERIN 0.4 MG/1
0.4 TABLET SUBLINGUAL ONCE
Status: COMPLETED | OUTPATIENT
Start: 2017-09-15 | End: 2017-09-15

## 2017-09-15 RX ORDER — LORAZEPAM 1 MG/1
1 TABLET ORAL ONCE
Status: COMPLETED | OUTPATIENT
Start: 2017-09-15 | End: 2017-09-15

## 2017-09-15 RX ORDER — LORAZEPAM 1 MG/1
1 TABLET ORAL
Status: DISCONTINUED | OUTPATIENT
Start: 2017-09-15 | End: 2017-09-18 | Stop reason: HOSPADM

## 2017-09-15 RX ORDER — ACETAMINOPHEN 325 MG/1
650 TABLET ORAL
Status: DISCONTINUED | OUTPATIENT
Start: 2017-09-15 | End: 2017-09-18 | Stop reason: HOSPADM

## 2017-09-15 RX ORDER — GABAPENTIN 300 MG/1
300 CAPSULE ORAL 3 TIMES DAILY
Status: DISCONTINUED | OUTPATIENT
Start: 2017-09-16 | End: 2017-09-18 | Stop reason: HOSPADM

## 2017-09-15 RX ORDER — PANTOPRAZOLE SODIUM 40 MG/1
40 TABLET, DELAYED RELEASE ORAL
Status: DISCONTINUED | OUTPATIENT
Start: 2017-09-16 | End: 2017-09-18 | Stop reason: HOSPADM

## 2017-09-15 RX ORDER — GUAIFENESIN 100 MG/5ML
324 LIQUID (ML) ORAL
Status: COMPLETED | OUTPATIENT
Start: 2017-09-15 | End: 2017-09-15

## 2017-09-15 RX ORDER — MAGNESIUM SULFATE 100 %
16 CRYSTALS MISCELLANEOUS AS NEEDED
Status: DISCONTINUED | OUTPATIENT
Start: 2017-09-15 | End: 2017-09-18 | Stop reason: HOSPADM

## 2017-09-15 RX ORDER — DEXTROSE 50 % IN WATER (D50W) INTRAVENOUS SYRINGE
25-50 AS NEEDED
Status: DISCONTINUED | OUTPATIENT
Start: 2017-09-15 | End: 2017-09-18 | Stop reason: HOSPADM

## 2017-09-15 RX ORDER — INSULIN LISPRO 100 [IU]/ML
INJECTION, SOLUTION INTRAVENOUS; SUBCUTANEOUS
Status: DISCONTINUED | OUTPATIENT
Start: 2017-09-15 | End: 2017-09-18 | Stop reason: HOSPADM

## 2017-09-15 RX ORDER — FLUTICASONE PROPIONATE 50 MCG
2 SPRAY, SUSPENSION (ML) NASAL DAILY
Status: DISCONTINUED | OUTPATIENT
Start: 2017-09-16 | End: 2017-09-18 | Stop reason: HOSPADM

## 2017-09-15 RX ORDER — ASPIRIN 81 MG/1
81 TABLET ORAL DAILY
Status: DISCONTINUED | OUTPATIENT
Start: 2017-09-16 | End: 2017-09-18 | Stop reason: HOSPADM

## 2017-09-15 RX ORDER — LISINOPRIL 5 MG/1
5 TABLET ORAL DAILY
Status: DISCONTINUED | OUTPATIENT
Start: 2017-09-16 | End: 2017-09-18 | Stop reason: HOSPADM

## 2017-09-15 RX ORDER — METFORMIN HYDROCHLORIDE 500 MG/1
500 TABLET ORAL 2 TIMES DAILY WITH MEALS
Status: DISCONTINUED | OUTPATIENT
Start: 2017-09-16 | End: 2017-09-16

## 2017-09-15 RX ORDER — ATORVASTATIN CALCIUM 20 MG/1
20 TABLET, FILM COATED ORAL DAILY
Status: DISCONTINUED | OUTPATIENT
Start: 2017-09-16 | End: 2017-09-18 | Stop reason: HOSPADM

## 2017-09-15 RX ORDER — LORATADINE 10 MG/1
10 TABLET ORAL
Status: DISCONTINUED | OUTPATIENT
Start: 2017-09-15 | End: 2017-09-18 | Stop reason: HOSPADM

## 2017-09-15 RX ORDER — FLUOXETINE HYDROCHLORIDE 20 MG/1
40 CAPSULE ORAL DAILY
Status: DISCONTINUED | OUTPATIENT
Start: 2017-09-16 | End: 2017-09-18 | Stop reason: HOSPADM

## 2017-09-15 RX ADMIN — ASPIRIN 81 MG 324 MG: 81 TABLET ORAL at 15:59

## 2017-09-15 RX ADMIN — LORAZEPAM 1 MG: 1 TABLET ORAL at 15:59

## 2017-09-15 RX ADMIN — GABAPENTIN 300 MG: 300 CAPSULE ORAL at 23:41

## 2017-09-15 RX ADMIN — HEPARIN SODIUM 5000 UNITS: 5000 INJECTION, SOLUTION INTRAVENOUS; SUBCUTANEOUS at 23:41

## 2017-09-15 RX ADMIN — NITROGLYCERIN 0.4 MG: 0.4 TABLET SUBLINGUAL at 15:59

## 2017-09-15 NOTE — H&P
History and Physical      NAME:  Sunita Pacheco   :   1950   MRN:   [id number]     Date/Time:  9/15/2017     CHIEF COMPLAINT: chest pain     HISTORY OF PRESENT ILLNESS:     Ms. Nash Lepe is a 74 y.o.   female with a PMH of DM-2, HTN, Hyperlipidemia, GERD and anxiety who presents with c/c of  Chest pain. She has intermittent chest pain for the past several weeks. She is feeling chest heaviness. Pain is non radiating, associated with dyspnea but denies diaphoresis or palpitation. Here in ED she has EKG that showed T wave abnormality on lateral leads. She is chest pain free at this time. ED physician has consulted cardiology and recommended admission and will see her as a consult. She said that she had stress test ib  and was unremarkable. Past Medical History:   Diagnosis Date    Abnormal nuclear cardiac imaging test 2015    Low risk. No ischemia or prior infarction. No RWMA. EF 68%. Neg EKG on pharm stress test.    Agatston CAC score, <100 05/15/2015    Coronary calcium score 70.  Diabetes ([address]     GERD (gastroesophageal reflux disease)     History of echocardiogram 2013    EF 65%. No RWMA. Mod LVH. Gr 1 DDfx. RVSP 25 mmHg.  HX OTHER MEDICAL     anxiety, \"mitral valve prolapse\"    Hyperlipidemia     Hypertension     Psychiatric disorder     anxiety    RLE venous duplex 2015    Right leg:  No DVT.           Past Surgical History:   Procedure Laterality Date    ABDOMEN SURGERY PROC UNLISTED      tumor removal    HX CHOLECYSTECTOMY      HX GYN      hysterectomy    HX OTHER SURGICAL      FATTY TISSUE REMOVED    HX TONSILLECTOMY         Social History   Substance Use Topics    Smoking status: Former Smoker     Packs/day: 1.00     Years: 20.00     Quit date: 10/19/2012    Smokeless tobacco: Former User    Alcohol use No      Comment: occasional        Family History   Problem Relation Age of Onset    Heart Disease Father     Heart Attack Father     Heart Disease Paternal Grandmother     Heart Disease Paternal Grandfather     Breast Cancer Mother         Allergies   Allergen Reactions    Iodine Anaphylaxis        Prior to Admission medications    Medication Sig Start Date End Date Taking? Authorizing Provider   metFORMIN (GLUCOPHAGE) 500 mg tablet Take 1 Tab by mouth two (2) times daily (with meals). 9/13/17   Gris Walker NP   lisinopril (PRINIVIL, ZESTRIL) 5 mg tablet Take 1 Tab by mouth daily. 8/23/17   Gris Walker NP   FLUoxetine (PROZAC) 40 mg capsule Take 1 Cap by mouth daily. 8/23/17   Gris Walker NP   atorvastatin (LIPITOR) 20 mg tablet Take 1 Tab by mouth daily. 8/23/17   Gris Walker NP   LORazepam (ATIVAN) 1 mg tablet Take 1 Tab by mouth every eight (8) hours as needed for Anxiety. Max Daily Amount: 3 mg. 8/23/17   Gris Walker NP   clobetasol (TEMOVATE) 0.05 % external solution Apply twice a day prn 8/23/17   Gris Walker NP   gabapentin (NEURONTIN) 300 mg capsule Take 1 Cap by mouth three (3) times daily. 7/27/17   Gris Wakler NP   estradiol (VAGIFEM) 10 mcg tab vaginal tablet Insert 1 Tab into vagina daily. 1 tablet daily x 2 weeks then 1 tablet twice a week. 7/20/17   Gris Walker NP   omeprazole (PRILOSEC) 40 mg capsule Take 1 Cap by mouth daily. 8/9/16   Isael Khan MD   loratadine (CLARITIN) 10 mg tablet Take 1 Tab by mouth daily as needed for Allergies. 5/4/16   Valentina Godinez NP   fluticasone (FLONASE) 50 mcg/actuation nasal spray 2 Sprays by Both Nostrils route daily. 5/4/16   Valentina Godinez NP   aspirin delayed-release 81 mg tablet Take  by mouth daily.     Historical Provider       REVIEW OF SYSTEMS:     CONSTITUTIONAL: No Fever, No chills, No weight loss, No Night sweats  HEENT:  No epistaxis, No diff in swallowing  CVS: No chest pain, No palpitations, No syncope, No peripheral edema, No PND, No orthopnea  RS: No shortness of breath, No cough, No hemoptysis, No pleuritic chest pain  GI: No abd pain, No vomitting, No diarrhea, No hematemesis, No rectal bleeding, No acid reflux or heartburn  NEURO: No focal weakness, No headaches, No seizures  PSYCH: No anxiety, No depression  MUSCULOSKLETAL: No joint pain or swelling  : No hematuria or dysuria  SKIN: No rash      Physical Exam:    VITALS:    Vital signs reviewed; most recent are:    Visit Vitals    /60    Pulse 68    Temp 97.6 °F (36.4 °C)    Resp 15    Ht 5' 9\" (1.753 m)    Wt 90.7 kg (200 lb)    SpO2 99%    BMI 29.53 kg/m2     SpO2 Readings from Last 6 Encounters:   09/15/17 99%   09/06/17 98%   08/23/17 96%   06/20/17 95%   04/24/17 96%   08/09/16 98%        No intake or output data in the 24 hours ending 09/15/17 1943       GENERAL: Not in acute distress  HEENT: pink conjunctiva, un icteric sclera,   NECK: No lymphadenopthy or thyroid swelling, JVD not seen  LYMPH: No supraclavicular or cervical or axillary nodes on both sides  CVS: S1S2, No murmurs, No gallop or rub  RS: CTA, No wheezing or crackles  Abd: Soft, non tender, not distended, No guarding, No rigidity  NEURO:  No focal neurologic deficits   Extrm: no leg edema or swelling   Skin: No rash      Labs:  Recent Results (from the past 24 hour(s))   EKG, 12 LEAD, INITIAL    Collection Time: 09/15/17  2:49 PM   Result Value Ref Range    Ventricular Rate 71 BPM    Atrial Rate 71 BPM    P-R Interval 150 ms    QRS Duration 74 ms    Q-T Interval 372 ms    QTC Calculation (Bezet) 404 ms    Calculated P Axis 19 degrees    Calculated R Axis -33 degrees    Calculated T Axis 98 degrees    Diagnosis       Normal sinus rhythm  Nonspecific ST and T wave abnormality  Abnormal ECG  When compared with ECG of 08-SEP-2015 17:45,  Non-specific ST/T wave changes Lateral leads  improved precordial R progression  Confirmed by Kelvin Toussaint MD ([id number]) on 9/15/2017 3:24:05 PM     CBC WITH AUTOMATED DIFF    Collection Time: 09/15/17  2:50 PM   Result Value Ref Range WBC 8.5 4.6 - 13.2 K/uL    RBC 4.98 4.20 - 5.30 M/uL    HGB 14.7 12.0 - 16.0 g/dL    HCT 42.5 35.0 - 45.0 %    MCV 85.3 74.0 - 97.0 FL    MCH 29.5 24.0 - 34.0 PG    MCHC 34.6 31.0 - 37.0 g/dL    RDW 12.8 11.6 - 14.5 %    PLATELET 726 112 - 776 K/uL    MPV 9.7 9.2 - 11.8 FL    NEUTROPHILS 63 40 - 73 %    LYMPHOCYTES 32 21 - 52 %    MONOCYTES 4 3 - 10 %    EOSINOPHILS 1 0 - 5 %    BASOPHILS 0 0 - 2 %    ABS. NEUTROPHILS 5.3 1.8 - 8.0 K/UL    ABS. LYMPHOCYTES 2.7 0.9 - 3.6 K/UL    ABS. MONOCYTES 0.3 0.05 - 1.2 K/UL    ABS. EOSINOPHILS 0.1 0.0 - 0.4 K/UL    ABS. BASOPHILS 0.0 0.0 - 0.1 K/UL    DF AUTOMATED     METABOLIC PANEL, COMPREHENSIVE    Collection Time: 09/15/17  2:50 PM   Result Value Ref Range    Sodium 137 136 - 145 mmol/L    Potassium 4.6 3.5 - 5.5 mmol/L    Chloride 102 100 - 108 mmol/L    CO2 30 21 - 32 mmol/L    Anion gap 5 3.0 - 18 mmol/L    Glucose 185 (H) 74 - 99 mg/dL    BUN 21 (H) 7.0 - 18 MG/DL    Creatinine 1.05 0.6 - 1.3 MG/DL    BUN/Creatinine ratio 20 12 - 20      GFR est AA >60 >60 ml/min/1.73m2    GFR est non-AA 53 (L) >60 ml/min/1.73m2    Calcium 10.1 8.5 - 10.1 MG/DL    Bilirubin, total 0.4 0.2 - 1.0 MG/DL    ALT (SGPT) 50 13 - 56 U/L    AST (SGOT) 21 15 - 37 U/L    Alk. phosphatase 95 45 - 117 U/L    Protein, total 7.4 6.4 - 8.2 g/dL    Albumin 3.8 3.4 - 5.0 g/dL    Globulin 3.6 2.0 - 4.0 g/dL    A-G Ratio 1.1 0.8 - 1.7     CARDIAC PANEL,(CK, CKMB & TROPONIN)    Collection Time: 09/15/17  2:50 PM   Result Value Ref Range    CK 69 26 - 192 U/L    CK - MB <1.0 <3.6 ng/ml    CK-MB Index  0.0 - 4.0 %     CALCULATION NOT PERFORMED WHEN RESULT IS BELOW LINEAR LIMIT    Troponin-I, Qt. <0.02 0.0 - 0.045 NG/ML         Active Problems:    Chest pain (9/15/2017)        Assessment:       1. Chest pain  2. DM-2, uncontrolled with hyperglycemia  3. HTN, controlled  4. Hyperlipidemia,   5. GERD   6.  Anxiety     Plan:       · Admit to telemetry unit  · Serial cardiac enzymes and EKG  · Cardiology already consulted  · Will get Echo  · She may need stress test. Unfortunately she may need wait until Monday  · Monitor blood glucose, SSI coverage  · Resume home medications  · Full code  · DVT prophylaxsis        Total time:  62 minutes             _______________________________________________________________________        Attending Physician:  Ying Morton MD

## 2017-09-15 NOTE — ED NOTES
Patient: Jemal Simons MRN: [id number]  CSN: [id number]    YOB: 1950  Age: 74 y.o. Sex: female           HPI:     Jemal Simons is a 74 y.o. female with PMH DM2, diabetic neuropathy, HTN, HLD, GERD, scoliosis, spinal arthritis, and anxiety, now presenting with complaint of CP and SOB. Pt has had intermittent CP since before last seeing PCP on 8/23. Referred to pulm at last appt with PCP but felt she could not wait. Pain is substernal to L side, \"burning, kind of like someone is sitting on my chest, also like my lungs hurt\". She has also experienced increased work of breathing and dry cough in AM. She has also been dizzy and lightheaded. Review of Systems  General ROS: negative for  - chills, fever, and weight loss, positive for weight gain  Psychological ROS: positive for - anxiety  Ophthalmic ROS: negative for - blurry vision, decreased vision or loss of vision  ENT ROS: negative for - headaches, hearing change or visual changes  Hematological and Lymphatic ROS: negative for - bruising, jaundice  Respiratory ROS: negative for - hemoptysis, dyspnea, or wheezing, positive for dry cough and SOB  Cardiovascular ROS: negative for - dyspnea on exertion, edema, loss of consciousness, or palpitations, positive for substernal/ L sided burning/crushing/pleuritic CP   Gastrointestinal ROS: negative for - abdominal pain, blood in stools, change in stools, constipation, diarrhea, hematemesis, nausea/vomiting   Genito-Urinary ROS: negative for - dysuria, hematuria or urinary frequency/urgency  Musculoskeletal ROS: negative for - joint pain, joint swelling or muscle pain  Neurological ROS: positive for - dizziness and lightheadedness      Past Medical History:   Diagnosis Date    Abnormal nuclear cardiac imaging test 05/11/2015    Low risk. No ischemia or prior infarction. No RWMA. EF 68%. Neg EKG on pharm stress test.    Agatston CAC score, <100 05/15/2015    Coronary calcium score 70.       Diabetes ([facility] [address]     GERD (gastroesophageal reflux disease)     History of echocardiogram 02/06/2013    EF 65%. No RWMA. Mod LVH. Gr 1 DDfx. RVSP 25 mmHg.  HX OTHER MEDICAL     anxiety, \"mitral valve prolapse\"    Hyperlipidemia     Hypertension     Psychiatric disorder     anxiety    RLE venous duplex 05/06/2015    Right leg:  No DVT. Past Surgical History:   Procedure Laterality Date    ABDOMEN SURGERY PROC UNLISTED  1999    tumor removal    HX CHOLECYSTECTOMY      HX GYN  1997    hysterectomy    HX OTHER SURGICAL      FATTY TISSUE REMOVED    HX TONSILLECTOMY         Family History   Problem Relation Age of Onset    Heart Disease Father     Heart Attack Father     Heart Disease Paternal Grandmother     Heart Disease Paternal Grandfather     Breast Cancer Mother        Social History     Social History    Marital status:      Spouse name: N/A    Number of children: N/A    Years of education: N/A     Social History Main Topics    Smoking status: Former Smoker     Packs/day: 1.00     Years: 20.00     Quit date: 10/19/2012    Smokeless tobacco: Former User    Alcohol use No      Comment: occasional    Drug use: No    Sexual activity: Yes     Partners: Male     Birth control/ protection: Surgical     Other Topics Concern    Caffeine Concern Yes     coffee daily 8oz    Sleep Concern No    Stress Concern No    Weight Concern Yes     increased weight    Exercise No    Seat Belt Yes     Social History Narrative       Allergies   Allergen Reactions    Iodine Anaphylaxis       Prior to Admission Medications   Prescriptions Last Dose Informant Patient Reported? Taking? FLUoxetine (PROZAC) 40 mg capsule   No No   Sig: Take 1 Cap by mouth daily. LORazepam (ATIVAN) 1 mg tablet   No No   Sig: Take 1 Tab by mouth every eight (8) hours as needed for Anxiety. Max Daily Amount: 3 mg.   aspirin delayed-release 81 mg tablet   Yes No   Sig: Take  by mouth daily.    atorvastatin (LIPITOR) 20 mg tablet   No No   Sig: Take 1 Tab by mouth daily. clobetasol (TEMOVATE) 0.05 % external solution   No No   Sig: Apply twice a day prn   estradiol (VAGIFEM) 10 mcg tab vaginal tablet   No No   Sig: Insert 1 Tab into vagina daily. 1 tablet daily x 2 weeks then 1 tablet twice a week. fluticasone (FLONASE) 50 mcg/actuation nasal spray   No No   Si Sprays by Both Nostrils route daily. gabapentin (NEURONTIN) 300 mg capsule   No No   Sig: Take 1 Cap by mouth three (3) times daily. lisinopril (PRINIVIL, ZESTRIL) 5 mg tablet   No No   Sig: Take 1 Tab by mouth daily. loratadine (CLARITIN) 10 mg tablet   No No   Sig: Take 1 Tab by mouth daily as needed for Allergies. metFORMIN (GLUCOPHAGE) 500 mg tablet   No No   Sig: Take 1 Tab by mouth two (2) times daily (with meals). omeprazole (PRILOSEC) 40 mg capsule   No No   Sig: Take 1 Cap by mouth daily. Facility-Administered Medications: None       Physical Exam:     Patient Vitals for the past 24 hrs:   Temp Pulse Resp BP SpO2   09/15/17 1444 97.6 °F (36.4 °C) 79 24 143/68 100 %       Physical Exam:   General:  Alert and Responsive and in No acute distress. HEENT: Conjunctiva pink, sclera anicteric. EOMI. Pharynx moist, nonerythematous. Moist mucous membranes. Thyroid not enlarged, no nodules. No cervical, supraclavicular, occipital or submandibular lymphadenopathy. No other gross abnormalities appreciated. CV:  RRR. No murmurs, rubs, or gallops appreciated. No visible pulsations or thrills. RESP:  Unlabored breathing. Lungs clear to auscultation. No wheeze, rales, or rhonchi. Equal expansion bilaterally. ABD:  Soft, nontender, nondistended. Normoactive bowel sounds. No hepatosplenomegaly. No suprapubic tenderness. MS:  No joint deformity or instability. No atrophy. Neuro: A+Ox3. Ext:  No edema. 2+ radial and dp pulses bilaterally. Skin:  No rashes, lesions, or ulcers. Good turgor.     IMAGING: EKG: Normal sinus rhythm, Left axis deviation, Nonspecific lateral ST and T wave abnormality      Recent Results (from the past 12 hour(s))   EKG, 12 LEAD, INITIAL    Collection Time: 09/15/17  2:49 PM   Result Value Ref Range    Ventricular Rate 71 BPM    Atrial Rate 71 BPM    P-R Interval 150 ms    QRS Duration 74 ms    Q-T Interval 372 ms    QTC Calculation (Bezet) 404 ms    Calculated P Axis 19 degrees    Calculated R Axis -33 degrees    Calculated T Axis 98 degrees    Diagnosis       Normal sinus rhythm  Left axis deviation  Nonspecific ST and T wave abnormality  Abnormal ECG  When compared with ECG of 08-SEP-2015 17:45,  Nonspecific T wave abnormality now evident in Inferior leads  T wave inversion now evident in Lateral leads     CBC WITH AUTOMATED DIFF    Collection Time: 09/15/17  2:50 PM   Result Value Ref Range    WBC 8.5 4.6 - 13.2 K/uL    RBC 4.98 4.20 - 5.30 M/uL    HGB 14.7 12.0 - 16.0 g/dL    HCT 42.5 35.0 - 45.0 %    MCV 85.3 74.0 - 97.0 FL    MCH 29.5 24.0 - 34.0 PG    MCHC 34.6 31.0 - 37.0 g/dL    RDW 12.8 11.6 - 14.5 %    PLATELET 204 443 - 863 K/uL    MPV 9.7 9.2 - 11.8 FL    NEUTROPHILS 63 40 - 73 %    LYMPHOCYTES 32 21 - 52 %    MONOCYTES 4 3 - 10 %    EOSINOPHILS 1 0 - 5 %    BASOPHILS 0 0 - 2 %    ABS. NEUTROPHILS 5.3 1.8 - 8.0 K/UL    ABS. LYMPHOCYTES 2.7 0.9 - 3.6 K/UL    ABS. MONOCYTES 0.3 0.05 - 1.2 K/UL    ABS. EOSINOPHILS 0.1 0.0 - 0.4 K/UL    ABS. BASOPHILS 0.0 0.0 - 0.1 K/UL    DF AUTOMATED           Assessment/Plan:   74 y.o. female with PMH DM2, diabetic neuropathy, HTN, HLD, GERD, scoliosis, spinal arthritis, and anxiety, now admitted with chest pain. Chest pain: moderate risk of cardiac event d/t age & risk factors, slight ST changes on EKG, CXR unremarkable, cardiac enzymes wnl; PE unlikely d/t smoking cessation, no leg swelling or pain, normal exam; Pt has h/o anxiety and repeated request for cardiac workup that has been normal. Possible chronic condition 2/2 anxiety.     Recommend admission for observation          Letty Nina MD, PGY-1  [facility]  09/15/17 3:10 PM        I have reviewed the chart and agree with the documentation recorded by the resident, including the assessment, treatment plan, and disposition. I performed a history and physical examination of the patient and discussed the management with the resident. Pt with new TWI lateral +/- inferior leads. Neg trop but mod HEART score- cardiology consulted, Dr. Cristina Olmstead will see pt, to be admitted for obs/tele.       Graham Oliveros MD

## 2017-09-15 NOTE — IP AVS SNAPSHOT
Ana Harrington 
 
 
 [address] [facility] [address] Patient: Veronica Pinto 
MRN: [id number] YDH:1/0/9695 You are allergic to the following Allergen Reactions Iodine Anaphylaxis Recent Documentation Height Weight Breastfeeding? BMI OB Status Smoking Status 1.753 m 91.6 kg No 29.83 kg/m2 Hysterectomy Former Smoker Unresulted Labs Order Current Status HEMOGLOBIN A1C W/O EAG In process Emergency Contacts Name Discharge Info Relation Home Work Mobile Ranulfo De La Cruz CAREGIVER [3] Spouse [3] [phone] About your hospitalization You were admitted on:  September 15, 2017 You last received care in the:  [address] [place] [address] You were discharged on:  September 18, 2017 Unit phone number:  [phone] Why you were hospitalized Your primary diagnosis was:  Not on File Your diagnoses also included:  Chest Pain Providers Seen During Your Hospitalizations Provider Role Specialty Primary office phone Trenton Rowe MD Attending Provider Emergency Medicine [phone] Ru Miles MD Attending Provider Internal Medicine [phone] Arlen Santa MD Attending Provider [facility] [phone] Your Primary Care Physician (PCP) Primary Care Physician Office Phone Office Fax Nael Flowers [phone] Follow-up Information Follow up With Details Comments Contact Info Ronnie Huynh NP In 1 week  [address] [address] [phone] Patrizia Shrestha MD In 4 weeks  [address] [facility] [address] [facility] 
[phone] Your Appointments Tuesday September 26, 2017 11:30 AM EDT Nurse Visit with PPA SPIROMETRY [address] ([facility]) 235 [facility], [address] [address]  
[phone] Tuesday September 26, 2017  2:30 PM EDT New Patient with Nithin Olmstead MD  
[address] ([facility]) 235 [facility] [address], [address] [address]  
[phone] Current Discharge Medication List  
  
START taking these medications Dose & Instructions Dispensing Information Comments Morning Noon Evening Bedtime  
 nitroglycerin 0.4 mg SL tablet Commonly known as:  NITROSTAT Your last dose was: Your next dose is:    
   
   
 Dose:  0.4 mg  
1 Tab by SubLINGual route every five (5) minutes as needed for Chest Pain. Quantity:  1 Bottle Refills:  1 CONTINUE these medications which have NOT CHANGED Dose & Instructions Dispensing Information Comments Morning Noon Evening Bedtime  
 aspirin delayed-release 81 mg tablet Your last dose was: Your next dose is: Take  by mouth daily. Refills:  0  
     
   
   
   
  
 atorvastatin 20 mg tablet Commonly known as:  LIPITOR Your last dose was: Your next dose is:    
   
   
 Dose:  20 mg Take 1 Tab by mouth daily. Quantity:  90 Tab Refills:  1  
     
   
   
   
  
 clobetasol 0.05 % external solution Commonly known as:  Seth Torrez Your last dose was: Your next dose is:    
   
   
 Apply twice a day prn Quantity:  1 Bottle Refills:  5  
     
   
   
   
  
 estradiol 10 mcg Tab vaginal tablet Commonly known as:  Jose Rodolfo Your last dose was: Your next dose is:    
   
   
 Dose:  10 mcg Insert 1 Tab into vagina daily. 1 tablet daily x 2 weeks then 1 tablet twice a week. Quantity:  22 Tab Refills:  11 FLUoxetine 40 mg capsule Commonly known as:  PROzac Your last dose was: Your next dose is:    
   
   
 Dose:  40 mg Take 1 Cap by mouth daily. Quantity:  90 Cap Refills:  1 fluticasone 50 mcg/actuation nasal spray Commonly known as:  Ladd [place] Your last dose was: Your next dose is:    
   
   
 Dose:  2 Spray 2 Sprays by Both Nostrils route daily. Quantity:  1 Bottle Refills:  3  
     
   
   
   
  
 gabapentin 300 mg capsule Commonly known as:  NEURONTIN Your last dose was: Your next dose is:    
   
   
 Dose:  300 mg Take 1 Cap by mouth three (3) times daily. Quantity:  180 Cap Refills:  2  
     
   
   
   
  
 lisinopril 5 mg tablet Commonly known as:  Dorathy Massed Your last dose was: Your next dose is:    
   
   
 Dose:  5 mg Take 1 Tab by mouth daily. Quantity:  90 Tab Refills:  1  
     
   
   
   
  
 loratadine 10 mg tablet Commonly known as:  Susa Fuchs Your last dose was: Your next dose is:    
   
   
 Dose:  10 mg Take 1 Tab by mouth daily as needed for Allergies. Quantity:  30 Tab Refills:  1 LORazepam 1 mg tablet Commonly known as:  ATIVAN Your last dose was: Your next dose is:    
   
   
 Dose:  1 mg Take 1 Tab by mouth every eight (8) hours as needed for Anxiety. Max Daily Amount: 3 mg. Quantity:  30 Tab Refills:  0  
     
   
   
   
  
 metFORMIN 500 mg tablet Commonly known as:  GLUCOPHAGE Your last dose was: Your next dose is:    
   
   
 Dose:  500 mg Take 1 Tab by mouth two (2) times daily (with meals). Quantity:  60 Tab Refills:  6  
     
   
   
   
  
 omeprazole 40 mg capsule Commonly known as:  PriLOSEC Your last dose was: Your next dose is:    
   
   
 Dose:  40 mg Take 1 Cap by mouth daily. Quantity:  30 Cap Refills:  6 Where to Get Your Medications These medications were sent to [facility] [address] [place], [place] [address] [address] Phone:  [phone] nitroglycerin 0.4 mg SL tablet Discharge Instructions Chest Pain: Care Instructions Your Care Instructions There are many things that can cause chest pain. Some are not serious and will get better on their own in a few days. But some kinds of chest pain need more testing and treatment. Your doctor may have recommended a follow-up visit in the next 8 to 12 hours. If you are not getting better, you may need more tests or treatment. Even though your doctor has released you, you still need to watch for any problems. The doctor carefully checked you, but sometimes problems can develop later. If you have new symptoms or if your symptoms do not get better, get medical care right away. If you have worse or different chest pain or pressure that lasts more than 5 minutes or you passed out (lost consciousness), call 911 or seek other emergency help right away. A medical visit is only one step in your treatment. Even if you feel better, you still need to do what your doctor recommends, such as going to all suggested follow-up appointments and taking medicines exactly as directed. This will help you recover and help prevent future problems. How can you care for yourself at home? · Rest until you feel better. · Take your medicine exactly as prescribed. Call your doctor if you think you are having a problem with your medicine. · Do not drive after taking a prescription pain medicine. When should you call for help? Call 911 if: 
· You passed out (lost consciousness). · You have severe difficulty breathing. · You have symptoms of a heart attack. These may include: ¨ Chest pain or pressure, or a strange feeling in your chest. 
¨ Sweating. ¨ Shortness of breath. ¨ Nausea or vomiting. ¨ Pain, pressure, or a strange feeling in your back, neck, jaw, or upper belly or in one or both shoulders or arms. ¨ Lightheadedness or sudden weakness. ¨ A fast or irregular heartbeat. After you call 911, the  may tell you to chew 1 adult-strength or 2 to 4 low-dose aspirin. Wait for an ambulance. Do not try to drive yourself. Call your doctor today if: 
· You have any trouble breathing. · Your chest pain gets worse. · You are dizzy or lightheaded, or you feel like you may faint. · You are not getting better as expected. · You are having new or different chest pain. Where can you learn more? Go to http://bruno-alicia.info/. Enter A120 in the search box to learn more about \"Chest Pain: Care Instructions. \" Current as of: March 20, 2017 Content Version: 11.3 © 4775-3098 [employer]. Care instructions adapted under license by [employer] (which disclaims liability or warranty for this information). If you have questions about a medical condition or this instruction, always ask your healthcare professional. [place] [address] any warranty or liability for your use of this information. Musculoskeletal Chest Pain: Care Instructions Your Care Instructions Chest pain is not always a sign that something is wrong with your heart or that you have another serious problem. The doctor thinks your chest pain is caused by strained muscles or ligaments, inflamed chest cartilage, or another problem in your chest, rather than by your heart. You may need more tests to find the cause of your chest pain. Follow-up care is a key part of your treatment and safety. Be sure to make and go to all appointments, and call your doctor if you are having problems. Its also a good idea to know your test results and keep a list of the medicines you take. How can you care for yourself at home? · Take pain medicines exactly as directed. ¨ If the doctor gave you a prescription medicine for pain, take it as prescribed. ¨ If you are not taking a prescription pain medicine, ask your doctor if you can take an over-the-counter medicine. · Rest and protect the sore area. · Stop, change, or take a break from any activity that may be causing your pain or soreness. · Put ice or a cold pack on the sore area for 10 to 20 minutes at a time. Try to do this every 1 to 2 hours for the next 3 days (when you are awake) or until the swelling goes down. Put a thin cloth between the ice and your skin. · After 2 or 3 days, apply a heating pad set on low or a warm cloth to the area that hurts. Some doctors suggest that you go back and forth between hot and cold. · Do not wrap or tape your ribs for support. This may cause you to take smaller breaths, which could increase your risk of lung problems. · Mentholated creams such as Bengay or Icy Hot may soothe sore muscles. Follow the instructions on the package. · Follow your doctor's instructions for exercising. · Gentle stretching and massage may help you get better faster. Stretch slowly to the point just before pain begins, and hold the stretch for at least 15 to 30 seconds. Do this 3 or 4 times a day. Stretch just after you have applied heat. · As your pain gets better, slowly return to your normal activities. Any increased pain may be a sign that you need to rest a while longer. When should you call for help? Call 911 anytime you think you may need emergency care. For example, call if: 
· You have chest pain or pressure. This may occur with: ¨ Sweating. ¨ Shortness of breath. ¨ Nausea or vomiting. ¨ Pain that spreads from the chest to the neck, jaw, or one or both shoulders or arms. ¨ Dizziness or lightheadedness. ¨ A fast or uneven pulse. After calling 911, chew 1 adult-strength aspirin. Wait for an ambulance. Do not try to drive yourself. · You have sudden chest pain and shortness of breath, or you cough up blood. Call your doctor now or seek immediate medical care if: 
· You have any trouble breathing. · Your chest pain gets worse. · Your chest pain occurs consistently with exercise and is relieved by rest. 
Watch closely for changes in your health, and be sure to contact your doctor if: 
· Your chest pain does not get better after 1 week. Where can you learn more? Go to http://bruno-alicia.info/. Enter V293 in the search box to learn more about \"Musculoskeletal Chest Pain: Care Instructions. \" Current as of: March 20, 2017 Content Version: 11.3 © 0107-1228 [employer]. Care instructions adapted under license by [employer] (which disclaims liability or warranty for this information). If you have questions about a medical condition or this instruction, always ask your healthcare professional. [place] [address] any warranty or liability for your use of this information. DISCHARGE SUMMARY from Nurse The following personal items are in your possession at time of discharge: 
 
Dental Appliances: None Visual Aid: At bedside, Glasses Home Medications: None Jewelry: None Clothing: At bedside, Pants, Shirt Other Valuables: At bedside, Cell Phone Personal Items Sent to Safe: N/A 
 
 
 
 
PATIENT INSTRUCTIONS: 
 
 
F-face looks uneven A-arms unable to move or move unevenly S-speech slurred or non-existent T-time-call 911 as soon as signs and symptoms begin-DO NOT go Back to bed or wait to see if you get better-TIME IS BRAIN. Warning Signs of HEART ATTACK Call 911 if you have these symptoms: 
? Chest discomfort. Most heart attacks involve discomfort in the center of the chest that lasts more than a few minutes, or that goes away and comes back. It can feel like uncomfortable pressure, squeezing, fullness, or pain. ? Discomfort in other areas of the upper body.  Symptoms can include pain or discomfort in one or both arms, the back, neck, jaw, or stomach. ? Shortness of breath with or without chest discomfort. ? Other signs may include breaking out in a cold sweat, nausea, or lightheadedness. Don't wait more than five minutes to call [address]! Fast action can save your life. Calling 911 is almost always the fastest way to get lifesaving treatment. Emergency Medical Services staff can begin treatment when they arrive  up to an hour sooner than if someone gets to the hospital by car. The discharge information has been reviewed with the {PATIENT}. The {PATIENT} verbalized understanding. Discharge medications reviewed with the {Dishcarge meds reviewed with patient} and appropriate educational materials and side effects teaching were provided. Discharge Orders None [employer] Announcement We are excited to announce that we are making your provider's discharge notes available to you in [employer]. You will see these notes when they are completed and signed by the physician that discharged you from your recent hospital stay. If you have any questions or concerns about any information you see in [employer], please call the Health Information Department where you were seen or reach out to your Primary Care Provider for more information about your plan of care. Introducing [facility]! [employer] introduces [employer] patient portal. Now you can access parts of your medical record, email your doctor's office, and request medication refills online. 1. In your internet browser, go to [email or web address] 2. Click on the First Time User? Click Here link in the Sign In box. You will see the New Member Sign Up page. 3. Enter your [employer] Access Code exactly as it appears below. You will not need to use this code after youve completed the sign-up process. If you do not sign up before the expiration date, you must request a new code. · [employer] Access Code: [email or web address] Expires: 11/21/2017  9:25 AM 
 
4. Enter the last four digits of your Social Security Number (xxxx) and Date of Birth (mm/dd/yyyy) as indicated and click Submit. You will be taken to the next sign-up page. 5. Create a [employer] ID. This will be your [employer] login ID and cannot be changed, so think of one that is secure and easy to remember. 6. Create a [employer] password. You can change your password at any time. 7. Enter your Password Reset Question and Answer. This can be used at a later time if you forget your password. 8. Enter your e-mail address. You will receive e-mail notification when new information is available in [address] 9. Click Sign Up. You can now view and download portions of your medical record. 10. Click the Download Summary menu link to download a portable copy of your medical information. If you have questions, please visit the Frequently Asked Questions section of the [employer] website. Remember, [employer] is NOT to be used for urgent needs. For medical emergencies, dial 911. Now available from your iPhone and Android! General Information Please provide this summary of care documentation to your next provider. Patient Signature:  ____________________________________________________________ Date:  ____________________________________________________________  
  
Fabio Mckeon Provider Signature:  ____________________________________________________________ Date:  ____________________________________________________________

## 2017-09-15 NOTE — ED TRIAGE NOTES
PT arrived via triage, shortness of breath and chest pain, CP radiates to left arm, has been going on for a few days, patient in mild distress

## 2017-09-15 NOTE — ED NOTES
Bedside shift change report given to Tawanna Baxter RN (oncoming nurse) by Theodora Peter RN (offgoing nurse). Report included the following information SBAR, ED Summary, MAR and Recent Results.

## 2017-09-15 NOTE — IP AVS SNAPSHOT
[place] [address] Patient: Katarina Bryson 
MRN: [id number] [facility]:0[birth year]13 Current Discharge Medication List  
  
START taking these medications Dose & Instructions Dispensing Information Comments Morning Noon Evening Bedtime  
 nitroglycerin 0.4 mg SL tablet Commonly known as:  NITROSTAT Your last dose was: Your next dose is:    
   
   
 Dose:  0.4 mg  
1 Tab by SubLINGual route every five (5) minutes as needed for Chest Pain. Quantity:  1 Bottle Refills:  1 CONTINUE these medications which have NOT CHANGED Dose & Instructions Dispensing Information Comments Morning Noon Evening Bedtime  
 aspirin delayed-release 81 mg tablet Your last dose was: Your next dose is: Take  by mouth daily. Refills:  0  
     
   
   
   
  
 atorvastatin 20 mg tablet Commonly known as:  LIPITOR Your last dose was: Your next dose is:    
   
   
 Dose:  20 mg Take 1 Tab by mouth daily. Quantity:  90 Tab Refills:  1  
     
   
   
   
  
 clobetasol 0.05 % external solution Commonly known as:  Trewendy Zapienel Your last dose was: Your next dose is:    
   
   
 Apply twice a day prn Quantity:  1 Bottle Refills:  5  
     
   
   
   
  
 estradiol 10 mcg Tab vaginal tablet Commonly known as:  James Biggs Your last dose was: Your next dose is:    
   
   
 Dose:  10 mcg Insert 1 Tab into vagina daily. 1 tablet daily x 2 weeks then 1 tablet twice a week. Quantity:  22 Tab Refills:  11 FLUoxetine 40 mg capsule Commonly known as:  PROzac Your last dose was: Your next dose is:    
   
   
 Dose:  40 mg Take 1 Cap by mouth daily. Quantity:  90 Cap Refills:  1  
     
   
   
   
  
 fluticasone 50 mcg/actuation nasal spray Commonly known as:  Senia Lazo Your last dose was: Your next dose is:    
   
   
 Dose:  2 Spray 2 Sprays by Both Nostrils route daily. Quantity:  1 Bottle Refills:  3  
     
   
   
   
  
 gabapentin 300 mg capsule Commonly known as:  NEURONTIN Your last dose was: Your next dose is:    
   
   
 Dose:  300 mg Take 1 Cap by mouth three (3) times daily. Quantity:  180 Cap Refills:  2  
     
   
   
   
  
 lisinopril 5 mg tablet Commonly known as:  Farrah Teressa Your last dose was: Your next dose is:    
   
   
 Dose:  5 mg Take 1 Tab by mouth daily. Quantity:  90 Tab Refills:  1  
     
   
   
   
  
 loratadine 10 mg tablet Commonly known as:  Veryl Bleak Your last dose was: Your next dose is:    
   
   
 Dose:  10 mg Take 1 Tab by mouth daily as needed for Allergies. Quantity:  30 Tab Refills:  1 LORazepam 1 mg tablet Commonly known as:  ATIVAN Your last dose was: Your next dose is:    
   
   
 Dose:  1 mg Take 1 Tab by mouth every eight (8) hours as needed for Anxiety. Max Daily Amount: 3 mg. Quantity:  30 Tab Refills:  0  
     
   
   
   
  
 metFORMIN 500 mg tablet Commonly known as:  GLUCOPHAGE Your last dose was: Your next dose is:    
   
   
 Dose:  500 mg Take 1 Tab by mouth two (2) times daily (with meals). Quantity:  60 Tab Refills:  6  
     
   
   
   
  
 omeprazole 40 mg capsule Commonly known as:  PriLOSEC Your last dose was: Your next dose is:    
   
   
 Dose:  40 mg Take 1 Cap by mouth daily. Quantity:  30 Cap Refills:  6 Where to Get Your Medications These medications were sent to [address] [address] Phone:  [phone]  
  nitroglycerin 0.4 mg SL tablet

## 2017-09-16 LAB
GLUCOSE BLD STRIP.AUTO-MCNC: 123 MG/DL (ref 70–110)
GLUCOSE BLD STRIP.AUTO-MCNC: 154 MG/DL (ref 70–110)
GLUCOSE BLD STRIP.AUTO-MCNC: 192 MG/DL (ref 70–110)
TROPONIN I SERPL-MCNC: <0.02 NG/ML (ref 0–0.04)

## 2017-09-16 PROCEDURE — 99218 HC RM OBSERVATION: CPT

## 2017-09-16 PROCEDURE — 74011636637 HC RX REV CODE- 636/637: Performed by: INTERNAL MEDICINE

## 2017-09-16 PROCEDURE — 78452 HT MUSCLE IMAGE SPECT MULT: CPT | Performed by: PHYSICIAN ASSISTANT

## 2017-09-16 PROCEDURE — 74011250636 HC RX REV CODE- 250/636: Performed by: INTERNAL MEDICINE

## 2017-09-16 PROCEDURE — 82962 GLUCOSE BLOOD TEST: CPT

## 2017-09-16 PROCEDURE — 74011250637 HC RX REV CODE- 250/637: Performed by: INTERNAL MEDICINE

## 2017-09-16 RX ORDER — IPRATROPIUM BROMIDE AND ALBUTEROL SULFATE 2.5; .5 MG/3ML; MG/3ML
3 SOLUTION RESPIRATORY (INHALATION)
Status: DISCONTINUED | OUTPATIENT
Start: 2017-09-16 | End: 2017-09-18 | Stop reason: HOSPADM

## 2017-09-16 RX ADMIN — PANTOPRAZOLE SODIUM 40 MG: 40 TABLET, DELAYED RELEASE ORAL at 08:49

## 2017-09-16 RX ADMIN — HEPARIN SODIUM 5000 UNITS: 5000 INJECTION, SOLUTION INTRAVENOUS; SUBCUTANEOUS at 23:44

## 2017-09-16 RX ADMIN — LISINOPRIL 5 MG: 5 TABLET ORAL at 08:49

## 2017-09-16 RX ADMIN — ASPIRIN 81 MG: 81 TABLET, COATED ORAL at 08:49

## 2017-09-16 RX ADMIN — GABAPENTIN 300 MG: 300 CAPSULE ORAL at 08:49

## 2017-09-16 RX ADMIN — Medication 2 SPRAY: at 08:49

## 2017-09-16 RX ADMIN — INSULIN LISPRO 2 UNITS: 100 INJECTION, SOLUTION INTRAVENOUS; SUBCUTANEOUS at 21:19

## 2017-09-16 RX ADMIN — FLUOXETINE 40 MG: 20 CAPSULE ORAL at 08:49

## 2017-09-16 RX ADMIN — HEPARIN SODIUM 5000 UNITS: 5000 INJECTION, SOLUTION INTRAVENOUS; SUBCUTANEOUS at 17:29

## 2017-09-16 RX ADMIN — GABAPENTIN 300 MG: 300 CAPSULE ORAL at 21:19

## 2017-09-16 RX ADMIN — HEPARIN SODIUM 5000 UNITS: 5000 INJECTION, SOLUTION INTRAVENOUS; SUBCUTANEOUS at 08:54

## 2017-09-16 RX ADMIN — GABAPENTIN 300 MG: 300 CAPSULE ORAL at 17:29

## 2017-09-16 RX ADMIN — ATORVASTATIN CALCIUM 20 MG: 20 TABLET, FILM COATED ORAL at 08:49

## 2017-09-16 NOTE — ROUTINE PROCESS
Bedside and Verbal shift change report given to [facility] RN (oncoming nurse) by Enid Millard RN (offgoing nurse). Report included the following information SBAR, Kardex and MAR.

## 2017-09-16 NOTE — CONSULTS
[facility] - Consult Note    Consultation request by Carson Olmstead MD for advice/opinion related to evaluating Chest pain    Date of  Admission: 9/15/2017  2:40 PM   Primary Care Physician:  Sammi Johnson NP     Assessment:     Patient Active Problem List   Diagnosis Code    Chest pain, unspecified R07.9    Hypercalcemia E83.52    CHURCH (dyspnea on exertion) R06.09    Seasonal allergic rhinitis J30.2    Essential hypertension I10    Advanced care planning/counseling discussion Z71.89    Chest pain R07.9     -Chest pain- ACS. On heparin and no changes to her EKG but does have some non-specific ST changes. She is currently pain free. -HTN- stable at this time.  -Seasonal allergies  -Multiple stessors     Plan:     Stable at this time. Will proceed with nuclear stress on Monday  Continue with ASA, statin, ACE  More recommendations pending stress and echo, which is also ordered for today. Will follow. History of Present Illness: This is a 74 y.o. female admitted for Chest pain. Patient complains of:  Patient admitted for chest tightness that has worsened over the last month. She states that 6-months ago she was able to perform most activities without any problems. Now she states that she cannot walk a few steps without getting a tight feeling across her chest.  She also has some shortness of breath with some of these episodes. No other symptoms noted. Cardiac risk factors: diabetes mellitus, stress, post-menopausal      Review of Symptoms:  Except as stated above include:  Constitutional:  negative  Respiratory:  negative  Cardiovascular:  negative  Gastrointestinal: negative  Genitourinary:  negative  Musculoskeletal:  Negative  Neurological:  Negative  Dermatological:  Negative  Endocrinological: Negative  Psychological:  Negative    A comprehensive review of systems was negative except for that written in the HPI.      Past Medical History:     Past Medical History: Diagnosis Date    Abnormal nuclear cardiac imaging test 05/11/2015    Low risk. No ischemia or prior infarction. No RWMA. EF 68%. Neg EKG on pharm stress test.    Agatston CAC score, <100 05/15/2015    Coronary calcium score 70.  Diabetes ([address]     GERD (gastroesophageal reflux disease)     History of echocardiogram 02/06/2013    EF 65%. No RWMA. Mod LVH. Gr 1 DDfx. RVSP 25 mmHg.  HX OTHER MEDICAL     anxiety, \"mitral valve prolapse\"    Hyperlipidemia     Hypertension     Psychiatric disorder     anxiety    RLE venous duplex 05/06/2015    Right leg:  No DVT. Social History:     Social History     Social History    Marital status:      Spouse name: N/A    Number of children: N/A    Years of education: N/A     Social History Main Topics    Smoking status: Former Smoker     Packs/day: 1.00     Years: 20.00     Quit date: 10/19/2012    Smokeless tobacco: Former User    Alcohol use No      Comment: occasional    Drug use: No    Sexual activity: Yes     Partners: Male     Birth control/ protection: Surgical     Other Topics Concern    Caffeine Concern Yes     coffee daily 8oz    Sleep Concern No    Stress Concern No    Weight Concern Yes     increased weight    Exercise No    Seat Belt Yes     Social History Narrative        Family History:     Family History   Problem Relation Age of Onset    Heart Disease Father     Heart Attack Father     Heart Disease Paternal Grandmother     Heart Disease Paternal Grandfather     Breast Cancer Mother         Medications:      Allergies   Allergen Reactions    Iodine Anaphylaxis        Current Facility-Administered Medications   Medication Dose Route Frequency    aspirin delayed-release tablet 81 mg  81 mg Oral DAILY    atorvastatin (LIPITOR) tablet 20 mg  20 mg Oral DAILY    FLUoxetine (PROzac) capsule 40 mg  40 mg Oral DAILY    fluticasone (FLONASE) 50 mcg/actuation nasal spray 2 Spray  2 Spray Both Nostrils DAILY  gabapentin (NEURONTIN) capsule 300 mg  300 mg Oral TID    lisinopril (PRINIVIL, ZESTRIL) tablet 5 mg  5 mg Oral DAILY    loratadine (CLARITIN) tablet 10 mg  10 mg Oral DAILY PRN    LORazepam (ATIVAN) tablet 1 mg  1 mg Oral Q8H PRN    metFORMIN (GLUCOPHAGE) tablet 500 mg  500 mg Oral BID WITH MEALS    pantoprazole (PROTONIX) tablet 40 mg  40 mg Oral ACB    acetaminophen (TYLENOL) tablet 650 mg  650 mg Oral Q4H PRN    heparin (porcine) injection 5,000 Units  5,000 Units SubCUTAneous Q8H    insulin lispro (HUMALOG) injection   SubCUTAneous AC&HS    glucose chewable tablet 16 g  16 g Oral PRN    glucagon (GLUCAGEN) injection 1 mg  1 mg IntraMUSCular PRN    dextrose (D50W) injection syrg 12.5-25 g  25-50 mL IntraVENous PRN         Physical Exam:     Visit Vitals    /74 (BP 1 Location: Right arm, BP Patient Position: At rest)    Pulse 70    Temp 97.9 °F (36.6 °C)    Resp 19    Ht 5' 9\" (1.753 m)    Wt 91.8 kg (202 lb 4.8 oz)    SpO2 95%    Breastfeeding No    BMI 29.87 kg/m2     BP Readings from Last 3 Encounters:   09/16/17 139/74   09/06/17 138/90   08/23/17 136/74     Pulse Readings from Last 3 Encounters:   09/16/17 70   09/06/17 77   08/23/17 81     Wt Readings from Last 3 Encounters:   09/16/17 91.8 kg (202 lb 4.8 oz)   09/06/17 91.9 kg (202 lb 9.6 oz)   08/23/17 91.8 kg (202 lb 6.4 oz)       General:  alert, cooperative, no distress, appears stated age  Neck:  no JVD  Lungs:  clear to auscultation bilaterally  Heart:  regular rate and rhythm, S1, S2 normal, no murmur, click, rub or gallop  Abdomen:  abdomen is soft without significant tenderness, masses, organomegaly or guarding  Extremities:  extremities normal, atraumatic, no cyanosis or edema  Skin: Warm and dry.  no hyperpigmentation, vitiligo, or suspicious lesions  Neuro: alert, oriented x3, affect appropriate, no focal neurological deficits, moves all extremities well, no involuntary movements  Psych: non focal     Data Review: Recent Labs      09/15/17   1450   WBC  8.5   HGB  14.7   HCT  42.5   PLT  266     Recent Labs      09/15/17   1450   NA  137   K  4.6   CL  102   CO2  30   GLU  185*   BUN  21*   CREA  1.05   CA  10.1   ALB  3.8   SGOT  21   ALT  50       Results for orders placed or performed during the hospital encounter of 09/15/17   EKG, 12 LEAD, INITIAL   Result Value Ref Range    Ventricular Rate 71 BPM    Atrial Rate 71 BPM    P-R Interval 150 ms    QRS Duration 74 ms    Q-T Interval 372 ms    QTC Calculation (Bezet) 404 ms    Calculated P Axis 19 degrees    Calculated R Axis -33 degrees    Calculated T Axis 98 degrees    Diagnosis       Normal sinus rhythm  Nonspecific ST and T wave abnormality  Abnormal ECG  When compared with ECG of 08-SEP-2015 17:45,  Non-specific ST/T wave changes Lateral leads  improved precordial R progression  Confirmed by Floridalma Ly MD ([id number]) on 9/15/2017 3:24:05 PM     Results for orders placed or performed in visit on 05/12/15   AMB POC EKG ROUTINE W/ 12 LEADS, INTER & REP    Narrative    EKG: unchanged from previous tracings, normal sinus rhythm,  nonspecific ST and T waves changes, left axis deviation.          All Cardiac Markers in the last 24 hours:    Lab Results   Component Value Date/Time    CPK 55 09/15/2017 07:40 PM    CPK 69 09/15/2017 02:50 PM    CKMB <1.0 09/15/2017 07:40 PM    CKMB <1.0 09/15/2017 02:50 PM    CKND1  09/15/2017 07:40 PM     CALCULATION NOT PERFORMED WHEN RESULT IS BELOW LINEAR LIMIT    CKND1  09/15/2017 02:50 PM     CALCULATION NOT PERFORMED WHEN RESULT IS BELOW LINEAR LIMIT    Noy Lady <0.02 09/15/2017 11:53 PM    TROIQ <0.02 09/15/2017 07:40 PM    Noy Lady <0.02 09/15/2017 02:50 PM       Last Lipid:    Lab Results   Component Value Date/Time    Cholesterol, total 214 08/23/2017 10:22 AM    HDL Cholesterol 54 08/23/2017 10:22 AM    LDL, calculated 115.2 08/23/2017 10:22 AM    Triglyceride 224 08/23/2017 10:22 AM    CHOL/HDL Ratio 4.0 08/23/2017 10:22 AM Signed By: DIANA Hudson     September 16, 2017

## 2017-09-16 NOTE — PROGRESS NOTES
[facility] Hospitalist Group  Progress Note    Patient: Sosa Kumar Age: 74 y.o. : 1950 MR#: [id number] SSN: [id number]  Date/Time: 2017     Subjective: pt feels ok, c/o chronic SOB and on/off CP. Currently CP free. Assessment/Plan:   1. Chest pain: CE and trop neg, cardio in put noted. Cont asa and statin  2. DM-2, uncontrolled with hyperglycemia: cont SSI  3. HTN, controlled: cont lisinopril   4. Hyperlipidemia, cont statin   5. GERD: cont PPI  6. Anxiety: stable   7. Former smoker ? COPD: start BD        Case discussed with:  [x]Patient  []Family  [x]Nursing  []Case Management  DVT Prophylaxis:  []Lovenox  [x]Hep SQ  []SCDs  []Coumadin   []On Heparin gtt    Objective:   VS:   Visit Vitals    /74 (BP 1 Location: Right arm, BP Patient Position: At rest)    Pulse 62    Temp 98 °F (36.7 °C)    Resp 18    Ht 5' 9\" (1.753 m)    Wt 91.8 kg (202 lb 4.8 oz)    SpO2 96%    Breastfeeding No    BMI 29.87 kg/m2      Tmax/24hrs: Temp (24hrs), Av.5 °F (36.4 °C), Min:97 °F (36.1 °C), Max:98 °F (36.7 °C)  IOBRIEF  Intake/Output Summary (Last 24 hours) at 17 1232  Last data filed at 17 1133   Gross per 24 hour   Intake              240 ml   Output             1450 ml   Net            -1210 ml       General:  Alert, cooperative, no acute distress    Pulmonary:  CTA Bilaterally. No Wheezing/Rhonchi/Rales. Cardiovascular: Regular rate and Rhythm. GI:  Soft, Non distended, Non tender. + Bowel sounds. Extremities:  No edema, cyanosis, clubbing. No calf tenderness. Psych: Good insight. Not anxious or agitated. Neurologic: Alert and oriented X 3. No acute neuro deficits.   Additional:    Medications:   Current Facility-Administered Medications   Medication Dose Route Frequency    aspirin delayed-release tablet 81 mg  81 mg Oral DAILY    atorvastatin (LIPITOR) tablet 20 mg  20 mg Oral DAILY    FLUoxetine (PROzac) capsule 40 mg  40 mg Oral DAILY    fluticasone (FLONASE) 50 mcg/actuation nasal spray 2 Spray  2 Spray Both Nostrils DAILY    gabapentin (NEURONTIN) capsule 300 mg  300 mg Oral TID    lisinopril (PRINIVIL, ZESTRIL) tablet 5 mg  5 mg Oral DAILY    loratadine (CLARITIN) tablet 10 mg  10 mg Oral DAILY PRN    LORazepam (ATIVAN) tablet 1 mg  1 mg Oral Q8H PRN    pantoprazole (PROTONIX) tablet 40 mg  40 mg Oral ACB    acetaminophen (TYLENOL) tablet 650 mg  650 mg Oral Q4H PRN    heparin (porcine) injection 5,000 Units  5,000 Units SubCUTAneous Q8H    insulin lispro (HUMALOG) injection   SubCUTAneous AC&HS    glucose chewable tablet 16 g  16 g Oral PRN    glucagon (GLUCAGEN) injection 1 mg  1 mg IntraMUSCular PRN    dextrose (D50W) injection syrg 12.5-25 g  25-50 mL IntraVENous PRN       Labs:    Recent Results (from the past 24 hour(s))   EKG, 12 LEAD, INITIAL    Collection Time: 09/15/17  2:49 PM   Result Value Ref Range    Ventricular Rate 71 BPM    Atrial Rate 71 BPM    P-R Interval 150 ms    QRS Duration 74 ms    Q-T Interval 372 ms    QTC Calculation (Bezet) 404 ms    Calculated P Axis 19 degrees    Calculated R Axis -33 degrees    Calculated T Axis 98 degrees    Diagnosis       Normal sinus rhythm  Nonspecific ST and T wave abnormality  Abnormal ECG  When compared with ECG of 08-SEP-2015 17:45,  Non-specific ST/T wave changes Lateral leads  improved precordial R progression  Confirmed by Juan M Schultz MD ([id number]) on 9/15/2017 3:24:05 PM     CBC WITH AUTOMATED DIFF    Collection Time: 09/15/17  2:50 PM   Result Value Ref Range    WBC 8.5 4.6 - 13.2 K/uL    RBC 4.98 4.20 - 5.30 M/uL    HGB 14.7 12.0 - 16.0 g/dL    HCT 42.5 35.0 - 45.0 %    MCV 85.3 74.0 - 97.0 FL    MCH 29.5 24.0 - 34.0 PG    MCHC 34.6 31.0 - 37.0 g/dL    RDW 12.8 11.6 - 14.5 %    PLATELET 248 621 - 218 K/uL    MPV 9.7 9.2 - 11.8 FL    NEUTROPHILS 63 40 - 73 %    LYMPHOCYTES 32 21 - 52 %    MONOCYTES 4 3 - 10 %    EOSINOPHILS 1 0 - 5 %    BASOPHILS 0 0 - 2 %    ABS. NEUTROPHILS 5.3 1.8 - 8.0 K/UL    ABS. LYMPHOCYTES 2.7 0.9 - 3.6 K/UL    ABS. MONOCYTES 0.3 0.05 - 1.2 K/UL    ABS. EOSINOPHILS 0.1 0.0 - 0.4 K/UL    ABS. BASOPHILS 0.0 0.0 - 0.1 K/UL    DF AUTOMATED     METABOLIC PANEL, COMPREHENSIVE    Collection Time: 09/15/17  2:50 PM   Result Value Ref Range    Sodium 137 136 - 145 mmol/L    Potassium 4.6 3.5 - 5.5 mmol/L    Chloride 102 100 - 108 mmol/L    CO2 30 21 - 32 mmol/L    Anion gap 5 3.0 - 18 mmol/L    Glucose 185 (H) 74 - 99 mg/dL    BUN 21 (H) 7.0 - 18 MG/DL    Creatinine 1.05 0.6 - 1.3 MG/DL    BUN/Creatinine ratio 20 12 - 20      GFR est AA >60 >60 ml/min/1.73m2    GFR est non-AA 53 (L) >60 ml/min/1.73m2    Calcium 10.1 8.5 - 10.1 MG/DL    Bilirubin, total 0.4 0.2 - 1.0 MG/DL    ALT (SGPT) 50 13 - 56 U/L    AST (SGOT) 21 15 - 37 U/L    Alk.  phosphatase 95 45 - 117 U/L    Protein, total 7.4 6.4 - 8.2 g/dL    Albumin 3.8 3.4 - 5.0 g/dL    Globulin 3.6 2.0 - 4.0 g/dL    A-G Ratio 1.1 0.8 - 1.7     CARDIAC PANEL,(CK, CKMB & TROPONIN)    Collection Time: 09/15/17  2:50 PM   Result Value Ref Range    CK 69 26 - 192 U/L    CK - MB <1.0 <3.6 ng/ml    CK-MB Index  0.0 - 4.0 %     CALCULATION NOT PERFORMED WHEN RESULT IS BELOW LINEAR LIMIT    Troponin-I, Qt. <0.02 0.0 - 0.045 NG/ML   CARDIAC PANEL,(CK, CKMB & TROPONIN)    Collection Time: 09/15/17  7:40 PM   Result Value Ref Range    CK 55 26 - 192 U/L    CK - MB <1.0 <3.6 ng/ml    CK-MB Index  0.0 - 4.0 %     CALCULATION NOT PERFORMED WHEN RESULT IS BELOW LINEAR LIMIT    Troponin-I, Qt. <0.02 0.0 - 0.045 NG/ML   GLUCOSE, POC    Collection Time: 09/15/17 10:29 PM   Result Value Ref Range    Glucose (POC) 187 (H) 70 - 110 mg/dL   TROPONIN I    Collection Time: 09/15/17 11:53 PM   Result Value Ref Range    Troponin-I, Qt. <0.02 0.0 - 0.045 NG/ML   TROPONIN I    Collection Time: 09/16/17  7:51 AM   Result Value Ref Range    Troponin-I, Qt. <0.02 0.0 - 0.045 NG/ML   GLUCOSE, POC    Collection Time: 09/16/17 11:35 AM   Result Value Ref Range    Glucose (POC) 154 (H) 70 - 110 mg/dL       Signed By: Cate Keyes MD     September 16, 2017

## 2017-09-17 LAB
GLUCOSE BLD STRIP.AUTO-MCNC: 173 MG/DL (ref 70–110)
GLUCOSE BLD STRIP.AUTO-MCNC: 188 MG/DL (ref 70–110)
GLUCOSE BLD STRIP.AUTO-MCNC: 190 MG/DL (ref 70–110)
GLUCOSE BLD STRIP.AUTO-MCNC: 211 MG/DL (ref 70–110)

## 2017-09-17 PROCEDURE — 99218 HC RM OBSERVATION: CPT

## 2017-09-17 PROCEDURE — 74011250636 HC RX REV CODE- 250/636: Performed by: INTERNAL MEDICINE

## 2017-09-17 PROCEDURE — 74011636637 HC RX REV CODE- 636/637: Performed by: INTERNAL MEDICINE

## 2017-09-17 PROCEDURE — 74011250637 HC RX REV CODE- 250/637: Performed by: INTERNAL MEDICINE

## 2017-09-17 PROCEDURE — 82962 GLUCOSE BLOOD TEST: CPT

## 2017-09-17 RX ADMIN — GABAPENTIN 300 MG: 300 CAPSULE ORAL at 22:10

## 2017-09-17 RX ADMIN — Medication 2 SPRAY: at 09:05

## 2017-09-17 RX ADMIN — GABAPENTIN 300 MG: 300 CAPSULE ORAL at 16:16

## 2017-09-17 RX ADMIN — GABAPENTIN 300 MG: 300 CAPSULE ORAL at 09:05

## 2017-09-17 RX ADMIN — FLUOXETINE 40 MG: 20 CAPSULE ORAL at 09:04

## 2017-09-17 RX ADMIN — INSULIN LISPRO 2 UNITS: 100 INJECTION, SOLUTION INTRAVENOUS; SUBCUTANEOUS at 09:05

## 2017-09-17 RX ADMIN — INSULIN LISPRO 2 UNITS: 100 INJECTION, SOLUTION INTRAVENOUS; SUBCUTANEOUS at 16:51

## 2017-09-17 RX ADMIN — PANTOPRAZOLE SODIUM 40 MG: 40 TABLET, DELAYED RELEASE ORAL at 09:06

## 2017-09-17 RX ADMIN — HEPARIN SODIUM 5000 UNITS: 5000 INJECTION, SOLUTION INTRAVENOUS; SUBCUTANEOUS at 16:15

## 2017-09-17 RX ADMIN — LISINOPRIL 5 MG: 5 TABLET ORAL at 09:06

## 2017-09-17 RX ADMIN — ASPIRIN 81 MG: 81 TABLET, COATED ORAL at 09:04

## 2017-09-17 RX ADMIN — ATORVASTATIN CALCIUM 20 MG: 20 TABLET, FILM COATED ORAL at 09:04

## 2017-09-17 RX ADMIN — INSULIN LISPRO 2 UNITS: 100 INJECTION, SOLUTION INTRAVENOUS; SUBCUTANEOUS at 12:34

## 2017-09-17 RX ADMIN — INSULIN LISPRO 4 UNITS: 100 INJECTION, SOLUTION INTRAVENOUS; SUBCUTANEOUS at 22:09

## 2017-09-17 RX ADMIN — HEPARIN SODIUM 5000 UNITS: 5000 INJECTION, SOLUTION INTRAVENOUS; SUBCUTANEOUS at 09:05

## 2017-09-17 NOTE — ROUTINE PROCESS
Received report from Aníbal/WILFREDO. Pt AAOx3, NAD, breathing non labored, on room air, HOB up. Pt's  Or stress test in the morning. Instructed to have nothing by mouth for the test. Pt verbalized understanding of instruction given. Bed at the lowest level on lock position, call bell w/i reach. Bedside and Verbal shift change report given to WILFREDO Bass (oncoming nurse) by me (offgoing nurse).  Report included the following information SBAR, Kardex, Procedure Summary, MAR, Recent Results and Cardiac Rhythm SR.

## 2017-09-17 NOTE — PROGRESS NOTES
[facility]  -  Progress Note      Patient: Marcie Saenz MRN: [id number]  SSN: [id number]    YOB: 1950  Age: 74 y.o. Sex: female      Admit Date: 9/15/2017    Assessment:     Hospital Problems  Date Reviewed: 9/6/2017          Codes Class Noted POA    Chest pain ICD-10-CM: R07.9  ICD-9-CM: 786.50  9/15/2017 Unknown            -Chest pain- ACS. On heparin and no changes to her EKG but does have some non-specific ST changes. She is currently pain free. -HTN- stable at this time.  -Seasonal allergies  -Multiple stessors    Plan: Will plan for pharm nuc in the am  More recommendations pending results. All questions asked. NPo after midnight    Subjective:     States she was having some trouble taking in a deep breath. No pain at this time.     Objective:      Patient Vitals for the past 8 hrs:   Temp Pulse Resp BP SpO2   09/17/17 0758 97.4 °F (36.3 °C) 61 18 104/63 96 %   09/17/17 0400 97.3 °F (36.3 °C) (!) 56 20 101/64 98 %         Patient Vitals for the past 96 hrs:   Weight   09/17/17 0506 91.8 kg (202 lb 6.4 oz)   09/16/17 0400 91.8 kg (202 lb 4.8 oz)   09/16/17 0000 91.4 kg (201 lb 9.6 oz)   09/15/17 1444 90.7 kg (200 lb)         Intake/Output Summary (Last 24 hours) at 09/17/17 1035  Last data filed at 09/17/17 0949   Gross per 24 hour   Intake              960 ml   Output             1750 ml   Net             -790 ml       Physical Exam:  General:  alert, cooperative, no distress, appears stated age  Neck:  nontender  Lungs:  clear to auscultation bilaterally  Heart:  regular rate and rhythm, S1, S2 normal, no murmur, click, rub or gallop  Extremities:  extremities normal, atraumatic, no cyanosis or edema    Data Review:     Labs: Results:       Chemistry Recent Labs      09/15/17   1450   GLU  185*   NA  137   K  4.6   CL  102   CO2  30   BUN  21*   CREA  1.05   CA  10.1   AGAP  5   BUCR  20   AP  95   TP  7.4   ALB  3.8   GLOB  3.6   AGRAT  1.1      CBC w/Diff Recent Labs      09/15/17   1450   WBC  8.5   RBC  4.98   HGB  14.7   HCT  42.5   PLT  266   GRANS  63   LYMPH  32   EOS  1      Cardiac Enzymes Lab Results   Component Value Date/Time    TROIQ <0.02 09/16/2017 03:32 PM      Coagulation No results for input(s): PTP, INR, APTT in the last 72 hours.     No lab exists for component: INREXT    Lipid Panel Lab Results   Component Value Date/Time    Cholesterol, total 214 08/23/2017 10:22 AM    HDL Cholesterol 54 08/23/2017 10:22 AM    LDL, calculated 115.2 08/23/2017 10:22 AM    VLDL, calculated 44.8 08/23/2017 10:22 AM    Triglyceride 224 08/23/2017 10:22 AM    CHOL/HDL Ratio 4.0 08/23/2017 10:22 AM      BNP No results found for: BNP, BNPP, XBNPT   Liver Enzymes Recent Labs      09/15/17   1450   TP  7.4   ALB  3.8   AP  95   SGOT  21      Digoxin    Thyroid Studies Lab Results   Component Value Date/Time    TSH 1.82 05/23/2013 02:36 PM

## 2017-09-17 NOTE — PROGRESS NOTES
[facility] Hospitalist Group  Progress Note    Patient: Darius Khalil Age: 74 y.o. : 1950 MR#: [id number] SSN: [id number]  Date: 2017     Subjective:     No F/C, N/V, CP, SOB at rest, gets CHURCH when ambulating room. Assessment/Plan:   1. CP - ACS. Trop I all <0.02. On Heparin gtt. Plan for stress test in AM. Med tx with ASA, ACEi, statin. Holding off on BBlocker for some bradycardia and some low-nml BPs.   2. DM - SSI. 3. HTN - as above. BPs nml/low-nml, following. 4. Hyperlipidemia - statin. 5. GERD - no acute. 6. Anxiety d/o - no acute. 7. Aim for home tomorrow if stress negative, no symptoms. Additional Notes:      Case discussed with:  [x]Patient  []Family  []Nursing  []Case Management  DVT Prophylaxis:  []Lovenox  []Hep SQ  []SCDs  []Coumadin   [x]On Heparin gtt    Objective:   VS:   Visit Vitals    /73 (BP 1 Location: Right arm, BP Patient Position: At rest)    Pulse 69    Temp 97.1 °F (36.2 °C)    Resp 20    Ht 5' 9\" (1.753 m)    Wt 91.8 kg (202 lb 6.4 oz)    SpO2 98%    Breastfeeding No    BMI 29.89 kg/m2      Tmax/24hrs: Temp (24hrs), Av.3 °F (36.3 °C), Min:97 °F (36.1 °C), Max:97.9 °F (36.6 °C)    Intake/Output Summary (Last 24 hours) at 17 1702  Last data filed at 17 1617   Gross per 24 hour   Intake             1740 ml   Output             2300 ml   Net             -560 ml       General:  Awake, alert, NAD. Cardiovascular:  RRR. Pulmonary:  CTA B.  GI:  Soft, NT/ND, NABS. Extremities:  No CT or edema.    Additional:      Labs:    Recent Results (from the past 24 hour(s))   GLUCOSE, POC    Collection Time: 17  5:10 PM   Result Value Ref Range    Glucose (POC) 123 (H) 70 - 110 mg/dL   GLUCOSE, POC    Collection Time: 17  8:16 PM   Result Value Ref Range    Glucose (POC) 192 (H) 70 - 110 mg/dL   GLUCOSE, POC    Collection Time: 17  8:37 AM   Result Value Ref Range    Glucose (POC) 188 (H) 70 - 110 mg/dL GLUCOSE, POC    Collection Time: 09/17/17 11:52 AM   Result Value Ref Range    Glucose (POC) 190 (H) 70 - 110 mg/dL   GLUCOSE, POC    Collection Time: 09/17/17  4:14 PM   Result Value Ref Range    Glucose (POC) 173 (H) 70 - 110 mg/dL       Signed By: Maksim Echeverria MD     September 17, 2017 5:02 PM

## 2017-09-17 NOTE — PROGRESS NOTES
conducted an initial consultation and Spiritual Assessment for Candy Kimble, who is a 74 y.o.,female. Patients Primary Language is: [place]. According to the patients EMR Buddhism Affiliation is: Dandre Faye. The reason the Patient came to the hospital is:   Patient Active Problem List    Diagnosis Date Noted    Chest pain 09/15/2017    Advanced care planning/counseling discussion 12/07/2015    Essential hypertension 07/07/2015    Seasonal allergic rhinitis 06/09/2015    CHURCH (dyspnea on exertion) 05/12/2015    Chest pain, unspecified 02/05/2013    Hypercalcemia 02/05/2013        The  provided the following Interventions:  Initiated a relationship of care and support. Explored issues of denice, belief, spirituality and Quaker/ritual needs while hospitalized. Listened empathically. Provided chaplaincy education. Provided information about Spiritual Care Services. Offered prayer and assurance of continued prayers on patient's behalf. Chart reviewed. The following outcomes were achieved:  Patient shared limited information about both their medical narrative and spiritual journey/beliefs. Patient processed feeling about current hospitalization. Patient expressed gratitude for the 's visit. Assessment:  Patient does not have any Quaker/cultural needs that will affect patients preferences in health care. Plan:  Chaplains will continue to follow and will provide pastoral care on an as needed/requested basis.  recommends bedside caregivers page  on duty if patient shows signs of acute spiritual or emotional distress.     Chaplain Sol Wilhelm

## 2017-09-18 ENCOUNTER — APPOINTMENT (OUTPATIENT)
Dept: CARDIAC CATH/INVASIVE PROCEDURES | Age: 67
DRG: 311 | End: 2017-09-18
Payer: MEDICARE

## 2017-09-18 VITALS
DIASTOLIC BLOOD PRESSURE: 68 MMHG | RESPIRATION RATE: 20 BRPM | SYSTOLIC BLOOD PRESSURE: 105 MMHG | OXYGEN SATURATION: 95 % | WEIGHT: 202 LBS | HEIGHT: 69 IN | HEART RATE: 64 BPM | TEMPERATURE: 97.6 F | BODY MASS INDEX: 29.92 KG/M2

## 2017-09-18 LAB
ANION GAP SERPL CALC-SCNC: 8 MMOL/L (ref 3–18)
ATRIAL RATE: 66 BPM
ATRIAL RATE: 76 BPM
ATTENDING PHYSICIAN, CST07: NORMAL
BUN SERPL-MCNC: 17 MG/DL (ref 7–18)
BUN/CREAT SERPL: 16 (ref 12–20)
CALCIUM SERPL-MCNC: 9.5 MG/DL (ref 8.5–10.1)
CALCULATED P AXIS, ECG09: -19 DEGREES
CALCULATED P AXIS, ECG09: 52 DEGREES
CALCULATED R AXIS, ECG10: -24 DEGREES
CALCULATED R AXIS, ECG10: -34 DEGREES
CALCULATED T AXIS, ECG11: 101 DEGREES
CALCULATED T AXIS, ECG11: 110 DEGREES
CHLORIDE SERPL-SCNC: 102 MMOL/L (ref 100–108)
CO2 SERPL-SCNC: 28 MMOL/L (ref 21–32)
CREAT SERPL-MCNC: 1.07 MG/DL (ref 0.6–1.3)
DIAGNOSIS, 93000: NORMAL
DUKE TM SCORE RESULT, CST14: NORMAL
DUKE TREADMILL SCORE, CST13: NORMAL
ECG INTERP BEFORE EX, CST11: NORMAL
ECG INTERP DURING EX, CST12: NORMAL
FUNCTIONAL CAPACITY, CST17: NORMAL
GLUCOSE BLD STRIP.AUTO-MCNC: 118 MG/DL (ref 70–110)
GLUCOSE BLD STRIP.AUTO-MCNC: 138 MG/DL (ref 70–110)
GLUCOSE BLD STRIP.AUTO-MCNC: 170 MG/DL (ref 70–110)
GLUCOSE SERPL-MCNC: 154 MG/DL (ref 74–99)
HBA1C MFR BLD: 7.9 % (ref 4.2–5.6)
KNOWN CARDIAC CONDITION, CST08: NORMAL
MAX. DIASTOLIC BP, CST04: 71 MMHG
MAX. HEART RATE, CST05: 93 BPM
MAX. SYSTOLIC BP, CST03: 120 MMHG
OVERALL BP RESPONSE TO EXERCISE, CST16: NORMAL
OVERALL HR RESPONSE TO EXERCISE, CST15: NORMAL
P-R INTERVAL, ECG05: 164 MS
P-R INTERVAL, ECG05: 168 MS
PEAK EX METS, CST10: 1 METS
POTASSIUM SERPL-SCNC: 3.9 MMOL/L (ref 3.5–5.5)
PROTOCOL NAME, CST01: NORMAL
Q-T INTERVAL, ECG07: 404 MS
Q-T INTERVAL, ECG07: 412 MS
QRS DURATION, ECG06: 78 MS
QRS DURATION, ECG06: 82 MS
QTC CALCULATION (BEZET), ECG08: 423 MS
QTC CALCULATION (BEZET), ECG08: 463 MS
SODIUM SERPL-SCNC: 138 MMOL/L (ref 136–145)
TEST INDICATION, CST09: NORMAL
TROPONIN I SERPL-MCNC: <0.02 NG/ML (ref 0–0.04)
VENTRICULAR RATE, ECG03: 66 BPM
VENTRICULAR RATE, ECG03: 76 BPM

## 2017-09-18 PROCEDURE — 74011636637 HC RX REV CODE- 636/637: Performed by: INTERNAL MEDICINE

## 2017-09-18 PROCEDURE — 74011250637 HC RX REV CODE- 250/637: Performed by: INTERNAL MEDICINE

## 2017-09-18 PROCEDURE — 74011250636 HC RX REV CODE- 250/636

## 2017-09-18 PROCEDURE — 65660000000 HC RM CCU STEPDOWN

## 2017-09-18 PROCEDURE — 82962 GLUCOSE BLOOD TEST: CPT

## 2017-09-18 PROCEDURE — 93017 CV STRESS TEST TRACING ONLY: CPT | Performed by: PHYSICIAN ASSISTANT

## 2017-09-18 PROCEDURE — 99218 HC RM OBSERVATION: CPT

## 2017-09-18 PROCEDURE — 77030013140 HC MSK NEB VYRM -A

## 2017-09-18 PROCEDURE — C8929 TTE W OR WO FOL WCON,DOPPLER: HCPCS

## 2017-09-18 PROCEDURE — 36415 COLL VENOUS BLD VENIPUNCTURE: CPT | Performed by: FAMILY MEDICINE

## 2017-09-18 PROCEDURE — 77010033678 HC OXYGEN DAILY

## 2017-09-18 PROCEDURE — A9500 TC99M SESTAMIBI: HCPCS

## 2017-09-18 PROCEDURE — 74011250636 HC RX REV CODE- 250/636: Performed by: INTERNAL MEDICINE

## 2017-09-18 PROCEDURE — 93005 ELECTROCARDIOGRAM TRACING: CPT

## 2017-09-18 PROCEDURE — 74011250637 HC RX REV CODE- 250/637

## 2017-09-18 PROCEDURE — 74011250637 HC RX REV CODE- 250/637: Performed by: PHYSICIAN ASSISTANT

## 2017-09-18 PROCEDURE — 80048 BASIC METABOLIC PNL TOTAL CA: CPT | Performed by: FAMILY MEDICINE

## 2017-09-18 PROCEDURE — 84484 ASSAY OF TROPONIN QUANT: CPT | Performed by: PHYSICIAN ASSISTANT

## 2017-09-18 PROCEDURE — 83036 HEMOGLOBIN GLYCOSYLATED A1C: CPT | Performed by: FAMILY MEDICINE

## 2017-09-18 PROCEDURE — 77030013079 HC BLNKT BAIR HGGR 3M -A

## 2017-09-18 PROCEDURE — 74011000250 HC RX REV CODE- 250: Performed by: HOSPITALIST

## 2017-09-18 RX ORDER — NITROGLYCERIN 0.4 MG/1
0.4 TABLET SUBLINGUAL
Qty: 1 BOTTLE | Refills: 1 | Status: SHIPPED | OUTPATIENT
Start: 2017-09-18 | End: 2018-01-30

## 2017-09-18 RX ORDER — HEPARIN SODIUM 200 [USP'U]/100ML
500 INJECTION, SOLUTION INTRAVENOUS ONCE
Status: CANCELLED | OUTPATIENT
Start: 2017-09-18 | End: 2017-09-18

## 2017-09-18 RX ORDER — IODIXANOL 320 MG/ML
10-200 INJECTION, SOLUTION INTRAVASCULAR
Status: CANCELLED | OUTPATIENT
Start: 2017-09-18

## 2017-09-18 RX ORDER — FENTANYL CITRATE 50 UG/ML
100 INJECTION, SOLUTION INTRAMUSCULAR; INTRAVENOUS
Status: CANCELLED | OUTPATIENT
Start: 2017-09-18

## 2017-09-18 RX ORDER — VERAPAMIL HYDROCHLORIDE 2.5 MG/ML
5 INJECTION, SOLUTION INTRAVENOUS ONCE
Status: CANCELLED | OUTPATIENT
Start: 2017-09-18 | End: 2017-09-18

## 2017-09-18 RX ORDER — HEPARIN SODIUM 1000 [USP'U]/ML
5000 INJECTION, SOLUTION INTRAVENOUS; SUBCUTANEOUS ONCE
Status: CANCELLED | OUTPATIENT
Start: 2017-09-18 | End: 2017-09-18

## 2017-09-18 RX ORDER — FENTANYL CITRATE 50 UG/ML
INJECTION, SOLUTION INTRAMUSCULAR; INTRAVENOUS
Status: DISCONTINUED
Start: 2017-09-18 | End: 2017-09-18 | Stop reason: HOSPADM

## 2017-09-18 RX ORDER — MIDAZOLAM HYDROCHLORIDE 1 MG/ML
1-4 INJECTION, SOLUTION INTRAMUSCULAR; INTRAVENOUS
Status: CANCELLED | OUTPATIENT
Start: 2017-09-18

## 2017-09-18 RX ORDER — LIDOCAINE HYDROCHLORIDE 10 MG/ML
30 INJECTION, SOLUTION EPIDURAL; INFILTRATION; INTRACAUDAL; PERINEURAL
Status: CANCELLED | OUTPATIENT
Start: 2017-09-18

## 2017-09-18 RX ORDER — BIVALIRUDIN 250 MG/5ML
0.75 INJECTION, POWDER, LYOPHILIZED, FOR SOLUTION INTRAVENOUS ONCE
Status: CANCELLED | OUTPATIENT
Start: 2017-09-18 | End: 2017-09-18

## 2017-09-18 RX ORDER — NITROGLYCERIN 0.4 MG/1
0.4 TABLET SUBLINGUAL AS NEEDED
Status: DISCONTINUED | OUTPATIENT
Start: 2017-09-18 | End: 2017-09-18 | Stop reason: HOSPADM

## 2017-09-18 RX ORDER — NITROGLYCERIN 0.4 MG/1
TABLET SUBLINGUAL
Status: COMPLETED
Start: 2017-09-18 | End: 2017-09-18

## 2017-09-18 RX ORDER — NITROGLYCERIN 0.4 MG/1
0.4 TABLET SUBLINGUAL AS NEEDED
Qty: 1 BOTTLE | Refills: 1 | Status: SHIPPED | OUTPATIENT
Start: 2017-09-18 | End: 2017-09-18

## 2017-09-18 RX ADMIN — HEPARIN SODIUM 5000 UNITS: 5000 INJECTION, SOLUTION INTRAVENOUS; SUBCUTANEOUS at 00:40

## 2017-09-18 RX ADMIN — PANTOPRAZOLE SODIUM 40 MG: 40 TABLET, DELAYED RELEASE ORAL at 08:25

## 2017-09-18 RX ADMIN — FLUOXETINE 40 MG: 20 CAPSULE ORAL at 14:47

## 2017-09-18 RX ADMIN — NITROGLYCERIN 0.4 MG: 0.4 TABLET SUBLINGUAL at 08:48

## 2017-09-18 RX ADMIN — REGADENOSON 0.4 MG: 0.08 INJECTION, SOLUTION INTRAVENOUS at 12:25

## 2017-09-18 RX ADMIN — IPRATROPIUM BROMIDE AND ALBUTEROL SULFATE 3 ML: .5; 3 SOLUTION RESPIRATORY (INHALATION) at 07:46

## 2017-09-18 RX ADMIN — ASPIRIN 81 MG: 81 TABLET, COATED ORAL at 08:29

## 2017-09-18 RX ADMIN — INSULIN LISPRO 2 UNITS: 100 INJECTION, SOLUTION INTRAVENOUS; SUBCUTANEOUS at 08:24

## 2017-09-18 RX ADMIN — LORATADINE 10 MG: 10 TABLET ORAL at 08:25

## 2017-09-18 RX ADMIN — PERFLUTREN 2 ML: 6.52 INJECTION, SUSPENSION INTRAVENOUS at 10:05

## 2017-09-18 RX ADMIN — Medication 2 SPRAY: at 07:39

## 2017-09-18 RX ADMIN — GABAPENTIN 300 MG: 300 CAPSULE ORAL at 14:47

## 2017-09-18 RX ADMIN — NITROGLYCERIN: 0.4 TABLET SUBLINGUAL at 08:48

## 2017-09-18 RX ADMIN — LORAZEPAM 1 MG: 1 TABLET ORAL at 08:02

## 2017-09-18 RX ADMIN — LISINOPRIL 5 MG: 5 TABLET ORAL at 08:25

## 2017-09-18 RX ADMIN — ATORVASTATIN CALCIUM 20 MG: 20 TABLET, FILM COATED ORAL at 14:47

## 2017-09-18 NOTE — PROGRESS NOTES
Patient was given 11 mCi of Sestamibi for the Resting pictures. Patient received 0.4 mg of Lexiscan for the exercise portion of the Stress test. Patient was then given 32.2 mCi of Sestamibi for the Stress pictures. Patient went back to room with armband still on.

## 2017-09-18 NOTE — PROGRESS NOTES
Problem: Falls - Risk of  Goal: *Absence of Falls  Document Rashaun Fall Risk and appropriate interventions in the flowsheet.    Outcome: Progressing Towards Goal  Fall Risk Interventions:              Medication Interventions: Evaluate medications/consider consulting pharmacy, Patient to call before getting OOB, Teach patient to arise slowly     Elimination Interventions: Call light in reach, Elevated toilet seat, Patient to call for help with toileting needs, Toilet paper/wipes in reach

## 2017-09-18 NOTE — DIABETES MGMT
GLYCEMIC CONTROL PLAN OF CARE    Assessment/Recommendations:  Pt is a 74year old female with a past medical history significant for GERD, hyperlipidemia, hypertension, and type 2 diabetes. Blood glucose elevated, pt receiving correctional Lispro insulin. Monitor need to add basal insulin coverage. Pt currently NPO. Current A1c pending. Most recent blood glucose values:  9/17/2017 11:52 9/17/2017 16:14 9/17/2017 22:00 9/18/2017 07:49   190 (H) 173 (H) 211 (H) 170 (H)     Current A1C of 7.7% is equivalent to average blood glucose of 174 mg/dl over the past 2-3 months.     Current hospital diabetes medications:   Correctional Lispro insulin 4 times daily ACHS (normal insulin sensitivity)     Previous day's insulin requirements:   10 units of Lispro insulin     Home diabetes medications:  Metformin 500 mg two times daily     Diet: NPO      Education:  Will follow up after current A1c checked       Radha Francis RD, CDE

## 2017-09-18 NOTE — PROGRESS NOTES
[facility] - Progress Note  Admit Date: 9/15/2017    Assessment:     Hospital Problems  Date Reviewed: 9/6/2017          Codes Class Noted POA    Chest pain ICD-10-CM: R07.9  ICD-9-CM: 786.50  9/15/2017 Unknown               -Chest pain- concerning for possible ACS. Ruled out for MI, EKG but does have some non-specific ST changes. Multiple components of CP, exertional , pleuritic, musculoskeletal.  -HTN- stable at this time.  -Seasonal allergies  -Multiple stressors, anxiety.  -Iodine allergy. Plan:     Recurrent CP/CHURCH this AM when washing up this AM prior to nuclear stress. Multiple compoenents, exertional but somewhat pleuritic and also with chest wall tenderness. No acute ECG changes. Trop pending. CP improving with SLN. Have discussed options, patient concerned about recurrent symptoms and previous normal stress testing, however patient is nervous about cath risks, is willing to consider but would like to start with echo which we will do now. ** Preliminary Echo with normal without obvious WMA. Patient concerned about her iodine allergy and would like to start with nuclear stress testing prior to considering cath.     Subjective:     CP as noted above     Objective:      Patient Vitals for the past 8 hrs:   Temp Pulse Resp BP SpO2   09/18/17 0903 - 72 20 116/73 97 %   09/18/17 0848 - 68 - 124/83 -   09/18/17 0741 - 65 24 128/85 -   09/18/17 0453 98.1 °F (36.7 °C) 62 20 107/58 97 %   09/18/17 0115 97.9 °F (36.6 °C) 68 20 103/53 98 %         Patient Vitals for the past 96 hrs:   Weight   09/18/17 0458 91.6 kg (202 lb)   09/17/17 0506 91.8 kg (202 lb 6.4 oz)   09/16/17 0400 91.8 kg (202 lb 4.8 oz)   09/16/17 0000 91.4 kg (201 lb 9.6 oz)   09/15/17 1444 90.7 kg (200 lb)                    Intake/Output Summary (Last 24 hours) at 09/18/17 0908  Last data filed at 09/18/17 0126   Gross per 24 hour   Intake             1600 ml   Output             2400 ml   Net             -800 ml Physical Exam:  General:  alert, cooperative, mild distress, appears stated age  Neck:  no JVD  Lungs:  clear to auscultation bilaterally  Heart:  regular rate and rhythm  Abdomen:  abdomen is soft without significant tenderness, masses, organomegaly or guarding  Extremities:  extremities normal, atraumatic, no cyanosis or edema    Data Review:     Labs: Results:       Chemistry Recent Labs      09/18/17   0316  09/15/17   1450   GLU  154*  185*   NA  138  137   K  3.9  4.6   CL  102  102   CO2  28  30   BUN  17  21*   CREA  1.07  1.05   CA  9.5  10.1   AGAP  8  5   BUCR  16  20   AP   --   95   TP   --   7.4   ALB   --   3.8   GLOB   --   3.6   AGRAT   --   1.1      CBC w/Diff Recent Labs      09/15/17   1450   WBC  8.5   RBC  4.98   HGB  14.7   HCT  42.5   PLT  266   GRANS  63   LYMPH  32   EOS  1      Cardiac Enzymes No results found for: CPK, CKMMB, CKMB, RCK3, CKMBT, CKNDX, CKND1, MARK, TROPT, TROIQ, FRANCIS, TROPT, TNIPOC, BNP, BNPP   Coagulation No results for input(s): PTP, INR, APTT in the last 72 hours.     No lab exists for component: INREXT    Lipid Panel Lab Results   Component Value Date/Time    Cholesterol, total 214 08/23/2017 10:22 AM    HDL Cholesterol 54 08/23/2017 10:22 AM    LDL, calculated 115.2 08/23/2017 10:22 AM    VLDL, calculated 44.8 08/23/2017 10:22 AM    Triglyceride 224 08/23/2017 10:22 AM    CHOL/HDL Ratio 4.0 08/23/2017 10:22 AM      BNP No results found for: BNP, BNPP, XBNPT   Liver Enzymes Recent Labs      09/15/17   1450   TP  7.4   ALB  3.8   AP  95   SGOT  21      Digoxin    Thyroid Studies Lab Results   Component Value Date/Time    TSH 1.82 05/23/2013 02:36 PM          Signed By: DIANA Lara     September 18, 2017

## 2017-09-18 NOTE — ROUTINE PROCESS
Bedside report received from Will Joe, [facility], SB[facility], [address], [facility] reviewed, pt In bed, short of breath on RA, O2 via NC @ 2LPM applied, RR 24, (see MAR), stress test pending this morning, will continue current POC    Pt in bed, s/p neb, sitting upright in bed, facial grimacing, clenching throat/midchest, continues with SOB, RR 28, admits feeling \"shaky and my throat is burning\", mid-sternal chest pain 4/10, DIANA Brumfield notified, TO received, nuclear stress test on hold at this time, continue NPO    In bed, continues with mid-sternal chest pain, non-radiating, SOB, Nitro SL given x 2, DIANA Brumfield present at bedside

## 2017-09-18 NOTE — PROGRESS NOTES
Completed Echocardiogram with definity. Report to follow. Patient to be transported to cath holding.     AMALIA Parks, RDCS

## 2017-09-18 NOTE — PROCEDURES
[address] STRESS    Name:  Bambi Recio  MR#:  [id number]  :  1950  Account #:  [de-identified]  Date of Adm:  09/15/2017  Date of Service:      PROCEDURE: Pharmacological cardiac nuclear stress test.    INDICATIONS: Chest pain. BASELINE ELECTROCARDIOGRAM: Sinus rhythm. Nonspecific  lateral T-wave abnormality. PROTOCOL: The patient was given Lexiscan infusion through a left  antecubital IV, 11 mCi sestamibi was injected before rest and 32  mCi sestamibi was injected before stress. Gated processing was  performed. Resting blood pressure 109/70 mmHg increased 120/71  mmHg. ELECTROCARDIOGRAM FINDINGS: No significant changes beyond  baseline. No arrhythmias. NUCLEAR FINDINGS: Left ventricular systolic function is normal and  calculated at 76%. No evidence of transient ischemic dilatation or  regional wall motion abnormalities. There is a small fixed apical defect  without ischemia. IMPRESSION:  1. Low risk pharmacological cardiac nuclear stress test.  2. Normal left ventricular systolic function with calculated ejection  fraction of 76%. No regional wall motion abnormalities. 3. No obvious ischemia or previous infarct in the setting of mild fixed  apical thinning abnormality which is likely artifact.   4. Normal EKG portion of stress test.        Benedicto Cortez M.D.    Mansi Roldan / Neela Valdez  D:  2017   13:20  T:  2017   14:06  Job #:  [id number]

## 2017-09-18 NOTE — DISCHARGE INSTRUCTIONS
Chest Pain: Care Instructions  Your Care Instructions  There are many things that can cause chest pain. Some are not serious and will get better on their own in a few days. But some kinds of chest pain need more testing and treatment. Your doctor may have recommended a follow-up visit in the next 8 to 12 hours. If you are not getting better, you may need more tests or treatment. Even though your doctor has released you, you still need to watch for any problems. The doctor carefully checked you, but sometimes problems can develop later. If you have new symptoms or if your symptoms do not get better, get medical care right away. If you have worse or different chest pain or pressure that lasts more than 5 minutes or you passed out (lost consciousness), call 911 or seek other emergency help right away. A medical visit is only one step in your treatment. Even if you feel better, you still need to do what your doctor recommends, such as going to all suggested follow-up appointments and taking medicines exactly as directed. This will help you recover and help prevent future problems. How can you care for yourself at home? · Rest until you feel better. · Take your medicine exactly as prescribed. Call your doctor if you think you are having a problem with your medicine. · Do not drive after taking a prescription pain medicine. When should you call for help? Call 911 if:  · You passed out (lost consciousness). · You have severe difficulty breathing. · You have symptoms of a heart attack. These may include:  ¨ Chest pain or pressure, or a strange feeling in your chest.  ¨ Sweating. ¨ Shortness of breath. ¨ Nausea or vomiting. ¨ Pain, pressure, or a strange feeling in your back, neck, jaw, or upper belly or in one or both shoulders or arms. ¨ Lightheadedness or sudden weakness. ¨ A fast or irregular heartbeat.   After you call 911, the  may tell you to chew 1 adult-strength or 2 to 4 low-dose aspirin. Wait for an ambulance. Do not try to drive yourself. Call your doctor today if:  · You have any trouble breathing. · Your chest pain gets worse. · You are dizzy or lightheaded, or you feel like you may faint. · You are not getting better as expected. · You are having new or different chest pain. Where can you learn more? Go to http://bruno-alicia.info/. Enter A120 in the search box to learn more about \"Chest Pain: Care Instructions. \"  Current as of: March 20, 2017  Content Version: 11.3  © 0369-1317 "[employer]. Care instructions adapted under license by [employer] (which disclaims liability or warranty for this information). If you have questions about a medical condition or this instruction, always ask your healthcare professional. [address] any warranty or liability for your use of this information. Musculoskeletal Chest Pain: Care Instructions  Your Care Instructions  Chest pain is not always a sign that something is wrong with your heart or that you have another serious problem. The doctor thinks your chest pain is caused by strained muscles or ligaments, inflamed chest cartilage, or another problem in your chest, rather than by your heart. You may need more tests to find the cause of your chest pain. Follow-up care is a key part of your treatment and safety. Be sure to make and go to all appointments, and call your doctor if you are having problems. Its also a good idea to know your test results and keep a list of the medicines you take. How can you care for yourself at home? · Take pain medicines exactly as directed. ¨ If the doctor gave you a prescription medicine for pain, take it as prescribed. ¨ If you are not taking a prescription pain medicine, ask your doctor if you can take an over-the-counter medicine. · Rest and protect the sore area.   · Stop, change, or take a break from any activity that may be causing your pain or soreness. · Put ice or a cold pack on the sore area for 10 to 20 minutes at a time. Try to do this every 1 to 2 hours for the next 3 days (when you are awake) or until the swelling goes down. Put a thin cloth between the ice and your skin. · After 2 or 3 days, apply a heating pad set on low or a warm cloth to the area that hurts. Some doctors suggest that you go back and forth between hot and cold. · Do not wrap or tape your ribs for support. This may cause you to take smaller breaths, which could increase your risk of lung problems. · Mentholated creams such as Bengay or Icy Hot may soothe sore muscles. Follow the instructions on the package. · Follow your doctor's instructions for exercising. · Gentle stretching and massage may help you get better faster. Stretch slowly to the point just before pain begins, and hold the stretch for at least 15 to 30 seconds. Do this 3 or 4 times a day. Stretch just after you have applied heat. · As your pain gets better, slowly return to your normal activities. Any increased pain may be a sign that you need to rest a while longer. When should you call for help? Call 911 anytime you think you may need emergency care. For example, call if:  · You have chest pain or pressure. This may occur with:  ¨ Sweating. ¨ Shortness of breath. ¨ Nausea or vomiting. ¨ Pain that spreads from the chest to the neck, jaw, or one or both shoulders or arms. ¨ Dizziness or lightheadedness. ¨ A fast or uneven pulse. After calling 911, chew 1 adult-strength aspirin. Wait for an ambulance. Do not try to drive yourself. · You have sudden chest pain and shortness of breath, or you cough up blood. Call your doctor now or seek immediate medical care if:  · You have any trouble breathing. · Your chest pain gets worse.   · Your chest pain occurs consistently with exercise and is relieved by rest.  Watch closely for changes in your health, and be sure to contact your doctor if:  · Your chest pain does not get better after 1 week. Where can you learn more? Go to http://bruno-alicia.info/. Enter V293 in the search box to learn more about \"Musculoskeletal Chest Pain: Care Instructions. \"  Current as of: March 20, 2017  Content Version: 11.3  © 0299-3861 [employer]. Care instructions adapted under license by [employer] (which disclaims liability or warranty for this information). If you have questions about a medical condition or this instruction, always ask your healthcare professional. [place] [address] any warranty or liability for your use of this information. DISCHARGE SUMMARY from Nurse    The following personal items are in your possession at time of discharge:    Dental Appliances: None  Visual Aid: At bedside, Glasses     Home Medications: None  Jewelry: None  Clothing: At bedside, Pants, Shirt  Other Valuables: At bedside, Cell Phone  Personal Items Sent to Safe: N/A          PATIENT INSTRUCTIONS:    After general anesthesia or intravenous sedation, for 24 hours or while taking prescription Narcotics:  · Limit your activities  · Do not drive and operate hazardous machinery  · Do not make important personal or business decisions  · Do  not drink alcoholic beverages  · If you have not urinated within 8 hours after discharge, please contact your surgeon on call.     Report the following to your surgeon:  · Excessive pain, swelling, redness or odor of or around the surgical area  · Temperature over 100.5  · Nausea and vomiting lasting longer than 4 hours or if unable to take medications  · Any signs of decreased circulation or nerve impairment to extremity: change in color, persistent  numbness, tingling, coldness or increase pain  · Any questions        What to do at Home:  Recommended activity: as tolerated    If you experience any of the following symptoms  PCP/call 911      *  Please give a list of your current medications to your Primary Care Provider. *  Please update this list whenever your medications are discontinued, doses are      changed, or new medications (including over-the-counter products) are added. *  Please carry medication information at all times in case of emergency situations. These are general instructions for a healthy lifestyle:    No smoking/ No tobacco products/ Avoid exposure to second hand smoke    Surgeon General's Warning:  Quitting smoking now greatly reduces serious risk to your health. Obesity, smoking, and sedentary lifestyle greatly increases your risk for illness    A healthy diet, regular physical exercise & weight monitoring are important for maintaining a healthy lifestyle    You may be retaining fluid if you have a history of heart failure or if you experience any of the following symptoms:  Weight gain of 3 pounds or more overnight or 5 pounds in a week, increased swelling in our hands or feet or shortness of breath while lying flat in bed. Please call your doctor as soon as you notice any of these symptoms; do not wait until your next office visit. Recognize signs and symptoms of STROKE:    F-face looks uneven    A-arms unable to move or move unevenly    S-speech slurred or non-existent    T-time-call 911 as soon as signs and symptoms begin-DO NOT go       Back to bed or wait to see if you get better-TIME IS BRAIN. Warning Signs of HEART ATTACK     Call 911 if you have these symptoms:   Chest discomfort. Most heart attacks involve discomfort in the center of the chest that lasts more than a few minutes, or that goes away and comes back. It can feel like uncomfortable pressure, squeezing, fullness, or pain.  Discomfort in other areas of the upper body. Symptoms can include pain or discomfort in one or both arms, the back, neck, jaw, or stomach.  Shortness of breath with or without chest discomfort.    Other signs may include breaking out in a cold sweat, nausea, or lightheadedness. Don't wait more than five minutes to call 911 - MINUTES MATTER! Fast action can save your life. Calling 911 is almost always the fastest way to get lifesaving treatment. Emergency Medical Services staff can begin treatment when they arrive -- up to an hour sooner than if someone gets to the hospital by car. The discharge information has been reviewed with the PATIENT. The PATIENTverbalized understanding. Discharge medications reviewed with the [employer] meds reviewed with patient and appropriate educational materials and side effects teaching were provided.

## 2017-09-19 NOTE — DISCHARGE SUMMARY
[facility] [address]  [address] SUMMARY    Name:  Abel Macias  MR#:  [id number]  :  1950  Account #:  [de-identified]  Date of Adm:  09/15/2017  Date of Discharge:  2017      DISCHARGE DIAGNOSES  1. Chest pain, possible acute coronary syndrome. 2. Diabetes mellitus. 3. Hypertension. 4. Hyperlipidemia. 5. Gastroesophageal reflux disease. 6. Anxiety disorder. SERVICE: The patient was admitted to the hospitalist service. CONSULTS: Dr. Kamar Pratt was consulted for Cardiology. PROCEDURES: On 2017, the patient underwent a  pharmacological cardiac nuclear stress test. This showed a low risk  pharmacological cardiac nuclear stress test. Normal LV systolic  function and a calculated EF of 76% with no regional wall motion  abnormalities. No obvious ischemia or previous infarct in the setting of  mild fixed apical thinning abnormality, which is likely artifact. Normal  EKG portion of stress test.    HISTORY OF PRESENT ILLNESS: Please refer to admission H and  P.    Briefly, the patient is a 72-year-old female with a history significant for  the above, who came in complaining of chest pain. She has had  intermittent chest pain for several weeks prior to presentation, it felt like  chest heaviness. Nonradiating. Associated with some dyspnea. Denies  any diaphoresis or palpitations. EKG showed some T-wave  abnormalities in the lateral leads. When we saw the patient, vital signs were stable and normal. She  appeared to be in no distress, had a regular heart, clear lungs, benign  abdomen. No edema was noted. LABORATORY DATA: Included on admission a metabolic panel which  was within normal limits. Normal cardiac biomarkers, repeat troponins were all less than 0.02. CBC within normal limits as well. EKG, as noted above. Chest x-ray by report is negative. HOSPITAL COURSE BY PROBLEM  1. Chest pain, possible ACS. Again, serial troponins were all less than  0.02. Maintained on aspirin, ACE inhibitor and statin therapy. We held  off on beta blocker dosing because she had a little bit of bradycardia  and some low-normal blood pressures. No recurrence. She underwent  a nuclear stress test as per above. Cleared for discharge by  Cardiology. We will maintain her on her outpatient medications, I have  written for sublingual nitroglycerin as needed as well. 2. Diabetes mellitus: No acute issues. Cover with sliding scale insulin  regimen. Outpatient medicines. 3. Hypertension: Pressures have been normal. Again, she has had  some low-normal pressures. Last several reads have been within  normal limits. 4. Hyperlipidemia: On statin therapy. 5. Gastroesophageal reflux disease: No acute issues. 6. Anxiety disorder: Has some anxiety over her hospital stay. Otherwise, no acute at the current time. On examination today, vital signs are stable and normal. The patient  denies any fevers or chills, nausea, vomiting, chest pain, shortness of  breath, palpitations, or edema. She has a regular heart, clear lungs, benign abdomen. No calf  tenderness or edema. A 2-D echocardiogram with this admission shows an EF of 60% with  no obvious wall motion abnormalities. Metabolic panel today within normal limits. DISPOSITION: Home. MEDICATIONS ON DISCHARGE  NEW MEDICINES AS FOLLOWS  1. Sublingual nitroglycerin 0.4 mg under the tongue as needed for  chest pain every 5 minutes. CONTINUE THE FOLLOWING  1. Aspirin 81 mg p.o. daily. 2. Lipitor 10 mg p.o. daily. 3. Clobetasol 0.05% sterile solution as needed b.i.d. to affected area. 4. Estradiol 10 mcg intravaginally as indicated, twice weekly. 5. Prozac 40 mg daily. 6. Flonase 2 sprays in both nostrils daily. 7. Neurontin 300 mg t.i.d.  8. Lisinopril 5 mg daily. 9. Claritin 10 mg daily as needed for allergies. 10. Ativan 1 mg every 8 hours as needed for anxiety.   11. Metformin 500 mg b.i.d.  12. Prilosec 40 mg daily.    FOLLOWUP  1. With PCP, Dary Jimenez NP in 7-10 days. 2. With Cardiology in 4-6 weeks. Total time of discharge was greater than 30 minutes.         Sulema Alfaro MD    PP / DC  D:  09/18/2017   17:20  T:  09/18/2017   22:02  Job #:  [id number]

## 2017-09-25 DIAGNOSIS — R06.00 DYSPNEA, UNSPECIFIED TYPE: Primary | ICD-10-CM

## 2017-09-25 NOTE — PROGRESS NOTES
Verbal Order with read back per Anny Velazquez MD  For PFT smart panel. AMB POC PFT complete w/ bronchodilator  AMB POC PFT complete w/o bronchodilator    Dr. Tonja Iglesias MD will co-sign the orders.

## 2017-09-26 ENCOUNTER — OFFICE VISIT (OUTPATIENT)
Dept: PULMONOLOGY | Age: 67
End: 2017-09-26

## 2017-09-26 VITALS
HEIGHT: 69 IN | SYSTOLIC BLOOD PRESSURE: 110 MMHG | DIASTOLIC BLOOD PRESSURE: 78 MMHG | BODY MASS INDEX: 30.36 KG/M2 | WEIGHT: 205 LBS | HEART RATE: 83 BPM | RESPIRATION RATE: 18 BRPM | OXYGEN SATURATION: 98 % | TEMPERATURE: 97.7 F

## 2017-09-26 DIAGNOSIS — R06.02 SOB (SHORTNESS OF BREATH): Primary | ICD-10-CM

## 2017-09-26 NOTE — MR AVS SNAPSHOT
Visit Information Date & Time Provider Department Dept. Phone Encounter #  
 9/26/2017  2:30 PM Elvia Nunn MD [facility] [id number] Follow-up Instructions Return in about 3 weeks (around 10/17/2017). Follow-up and Disposition History Your Appointments 10/16/2017  1:00 PM  
Nurse Visit with PPA SPIROMETRY [address] ([address]) Appt Note: APPT [email or web address]  
 [address] [place] [address], [address] [address]  
[phone]  
  
   
 [address] [place] [address], [address] [place] [address],[address] [place] [address]  
  
    
 10/16/2017  2:00 PM  
Follow Up with Elvia Nunn MD  
[address] ([address]) Appt Note: FROM 9/26/17 - [email or web address]  
 [address] [place] [address], [address] [address]  
[phone]  
  
   
 [address] [place] [address], [address] [place] [address],[address] [place] [address] Upcoming Health Maintenance Date Due COLONOSCOPY 8/1/1970 ZOSTER VACCINE AGE 60> 6/1/2012 GLAUCOMA SCREENING Q2Y 8/1/2017 INFLUENZA AGE 9 TO ADULT 8/1/2017 Pneumococcal 65+ Low/Medium Risk (2 of 2 - PPSV23) 8/23/2018 MEDICARE YEARLY EXAM 8/24/2018 BREAST CANCER SCRN MAMMOGRAM 9/7/2019 DTaP/Tdap/Td series (2 - Td) 12/3/2025 Allergies as of 9/26/2017  Review Complete On: 9/26/2017 By: Yohana Mason LPN Severity Noted Reaction Type Reactions Iodine High 02/05/2013    Anaphylaxis Current Immunizations  Reviewed on 8/23/2017 Name Date Pneumococcal Conjugate (PCV-13) 8/23/2017 Tdap 12/3/2015 Not reviewed this visit You Were Diagnosed With   
  
 Codes Comments SOB (shortness of breath)    -  Primary ICD-10-CM: R06.02 
ICD-9-CM: 786.05 Vitals BP Pulse Temp Resp Height(growth percentile) Weight(growth percentile) 110/78 (BP 1 Location: Left arm, BP Patient Position: Sitting) 83 97.7 °F (36.5 °C) (Oral) 18 5' 9\" (1.753 m) 205 lb (93 kg) SpO2 BMI OB Status Smoking Status 98% 30.27 kg/m2 Hysterectomy Former Smoker BMI and BSA Data Body Mass Index Body Surface Area  
 30.27 kg/m 2 2.13 m 2 Preferred Pharmacy Pharmacy Name Phone [facility] [place] [facility] [phone] Your Updated Medication List  
  
   
This list is accurate as of: 9/26/17  4:00 PM.  Always use your most recent med list.  
  
  
  
  
 aspirin delayed-release 81 mg tablet Take  by mouth daily. atorvastatin 20 mg tablet Commonly known as:  LIPITOR Take 1 Tab by mouth daily. clobetasol 0.05 % external solution Commonly known as:  Essie Rodo Apply twice a day prn  
  
 estradiol 10 mcg Tab vaginal tablet Commonly known as:  Anna Ball Insert 1 Tab into vagina daily. 1 tablet daily x 2 weeks then 1 tablet twice a week. FLUoxetine 40 mg capsule Commonly known as:  PROzac Take 1 Cap by mouth daily. fluticasone 50 mcg/actuation nasal spray Commonly known as:  Marcine Infield 2 Sprays by Both Nostrils route daily. gabapentin 300 mg capsule Commonly known as:  NEURONTIN Take 1 Cap by mouth three (3) times daily. lisinopril 5 mg tablet Commonly known as:  Burma Fairy Take 1 Tab by mouth daily. loratadine 10 mg tablet Commonly known as:  Reina Buddle Take 1 Tab by mouth daily as needed for Allergies. LORazepam 1 mg tablet Commonly known as:  ATIVAN Take 1 Tab by mouth every eight (8) hours as needed for Anxiety. Max Daily Amount: 3 mg.  
  
 metFORMIN 500 mg tablet Commonly known as:  GLUCOPHAGE Take 1 Tab by mouth two (2) times daily (with meals). nitroglycerin 0.4 mg SL tablet Commonly known as:  NITROSTAT  
1 Tab by SubLINGual route every five (5) minutes as needed for Chest Pain. omeprazole 40 mg capsule Commonly known as:  PriLOSEC Take 1 Cap by mouth daily. We Performed the Following REFERRAL TO PSYCHIATRY [REF91 Custom] Comments:  
 Please evaluate patient for severe anxiety. Follow-up Instructions Return in about 3 weeks (around 10/17/2017). To-Do List   
 Around 09/26/2017 Procedures:  DIFFUSING CAPACITY Around 09/26/2017 Procedures:  GAS DILUT/WASHOUT LUNG VOL W/WO DISTRIB VENT&VOL Referral Information Referral ID Referred By Referred To  
  
 [id number] LIVIA Rodríguez Not Available Visits Status Start Date End Date 1 New Request 9/26/17 9/26/18 If your referral has a status of pending review or denied, additional information will be sent to support the outcome of this decision. Patient Instructions Begin exercising by walking without stopping starting with 5 minutes and gradually increasing the time you walk Call if there is a problem with your referral or for symptoms of worsening shortness of breath Introducing [facility] SERVICES! [facility] introduces [employer] patient portal. Now you can access parts of your medical record, email your doctor's office, and request medication refills online. 1. In your internet browser, go to [email or web address] 2. Click on the First Time User? Click Here link in the Sign In box. You will see the New Member Sign Up page. 3. Enter your [employer] Access Code exactly as it appears below. You will not need to use this code after youve completed the sign-up process. If you do not sign up before the expiration date, you must request a new code. · [employer] Access Code: [email or web address] Expires: 11/21/2017  9:25 AM 
 
4. Enter the last four digits of your Social Security Number (xxxx) and Date of Birth (mm/dd/yyyy) as indicated and click Submit. You will be taken to the next sign-up page. 5. Create a [employer] ID. This will be your [employer] login ID and cannot be changed, so think of one that is secure and easy to remember. 6. Create a [employer] password. You can change your password at any time. 7. Enter your Password Reset Question and Answer. This can be used at a later time if you forget your password. 8. Enter your e-mail address. You will receive e-mail notification when new information is available in [address] 9. Click Sign Up. You can now view and download portions of your medical record. 10. Click the Download Summary menu link to download a portable copy of your medical information. If you have questions, please visit the Frequently Asked Questions section of the [employer] website. Remember, [employer] is NOT to be used for urgent needs. For medical emergencies, dial 911. Now available from your iPhone and Android! Please provide this summary of care documentation to your next provider. Your primary care clinician is listed as Francisca Guevara. If you have any questions after today's visit, please call [phone].

## 2017-09-26 NOTE — PROGRESS NOTES
Chief Complaint   Patient presents with    Shortness of Breath     Patient referred by Nohemy Samuels NP for SOB. Patient was also in [facility] 9/15/17-9/18/17.     PATIENT'S O2 SATS:   97% Room Air Rest, 70 Heart Rate                                          97% Room Air Activity, 92 Heart Rate - Patient Walked 550 Ft

## 2017-09-26 NOTE — PROGRESS NOTES
[facility] PULMONARY SPECIALISTS  Pulmonary, Critical Care, and Sleep Medicine    Dear Ms. Crisostomothaddeusmaria victoria,    Chief complaint:  Shortness of breath    HPI:  Paulino Lutz is 74years old and comes to the office today at your request concerning shortness of breath. The patient relates she has had shortness of breath for approximately 6 months and it is been more noticeable recently. She relates the shortness of breath usually comes on with activity but it can come on at other times as well and when it becomes significant she has substernal chest pain which radiates to her left shoulder blade. Because of the chest pain she had an evaluation at the hospital with a negative stress test and echocardiogram.  The patient relates that she also has a cough which is not every day and which is nonproductive without hemoptysis or purulent mucus production. She denies leg pain or swelling or PND. She does relate that when she becomes short of breath it sometimes feels like a band is wrapped around her neck and she cannot get air in she also becomes lightheaded and then the pain is noted in her chest and scapula. The patient relates that it occurs with activity such as vacuuming but can occur with other activity which is less stressful like walking. The patient also relates she is under a lot of stress which originally started with her  requiring a heart transplant  Allergies   Allergen Reactions    Iodine Anaphylaxis     Current Outpatient Prescriptions   Medication Sig    nitroglycerin (NITROSTAT) 0.4 mg SL tablet 1 Tab by SubLINGual route every five (5) minutes as needed for Chest Pain.  metFORMIN (GLUCOPHAGE) 500 mg tablet Take 1 Tab by mouth two (2) times daily (with meals).  lisinopril (PRINIVIL, ZESTRIL) 5 mg tablet Take 1 Tab by mouth daily.  FLUoxetine (PROZAC) 40 mg capsule Take 1 Cap by mouth daily.  atorvastatin (LIPITOR) 20 mg tablet Take 1 Tab by mouth daily.     LORazepam (ATIVAN) 1 mg tablet Take 1 Tab by mouth every eight (8) hours as needed for Anxiety. Max Daily Amount: 3 mg.  clobetasol (TEMOVATE) 0.05 % external solution Apply twice a day prn    gabapentin (NEURONTIN) 300 mg capsule Take 1 Cap by mouth three (3) times daily.  omeprazole (PRILOSEC) 40 mg capsule Take 1 Cap by mouth daily.  fluticasone (FLONASE) 50 mcg/actuation nasal spray 2 Sprays by Both Nostrils route daily.  aspirin delayed-release 81 mg tablet Take  by mouth daily.  estradiol (VAGIFEM) 10 mcg tab vaginal tablet Insert 1 Tab into vagina daily. 1 tablet daily x 2 weeks then 1 tablet twice a week.  loratadine (CLARITIN) 10 mg tablet Take 1 Tab by mouth daily as needed for Allergies. No current facility-administered medications for this visit. Past Medical History:   Diagnosis Date    Abnormal nuclear cardiac imaging test 05/11/2015    Low risk. No ischemia or prior infarction. No RWMA. EF 68%. Neg EKG on pharm stress test.    Agatston CAC score, <100 05/15/2015    Coronary calcium score 70.  Diabetes ([facility] [address]     GERD (gastroesophageal reflux disease)     History of echocardiogram 02/06/2013    EF 65%. No RWMA. Mod LVH. Gr 1 DDfx. RVSP 25 mmHg.  HX OTHER MEDICAL     anxiety, \"mitral valve prolapse\"    Hyperlipidemia     Hypertension     Psychiatric disorder     anxiety    RLE venous duplex 05/06/2015    Right leg:  No DVT.      She denies a history of coronary artery disease kidney disease liver disease ulcers tuberculosis cancer asthma or emphysema   Past Surgical History:   Procedure Laterality Date    ABDOMEN SURGERY PROC UNLISTED  1999    tumor removal    HX CHOLECYSTECTOMY      HX GYN  1997    hysterectomy    HX OTHER SURGICAL      FATTY TISSUE REMOVED    HX TONSILLECTOMY         Family history:        Social History: Started smoking when she was young and smoked off and on for 30 years no smoking for 6 years and is retired furniture salesman    Review of systems:  She denies fever chills poor appetite weight loss syncope focal muscle weakness or numbness trouble hearing trouble seeing chronic abdominal pain melena or blood in her stools dysuria hematuria rash but reports 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 with chronic back pain and dysphasia where she will even choke on water at times but this is not related to her episodes of shortness of breath she is describing    Physical Exam:  Visit Vitals    /78 (BP 1 Location: Left arm, BP Patient Position: Sitting)    Pulse 83    Temp 97.7 °F (36.5 °C) (Oral)    Resp 18    Ht 5' 9\" (1.753 m)    Wt 93 kg (205 lb)    SpO2 98%    BMI 30.27 kg/m2     Well-developed obese   HEENT: pupils equal, reactive, sclera, non-icteric   Oropharynx tongue: normal   Neck: Supple   Lymph Nodes: Supra clavicular and cervical nodes, negative   Chest: Equal symmetrical expansion, no dullness, no wheezes, rales or rubs   Heart: Regular, rhythm without carmen or murmur no carotid bruits  Abdomen: soft, non-tender no masses or organomegaly   Extremities: no cyanosis, clubbing, no edema no calf tenderness  Musculoskeletal: No acute joint inflammation or muscle wasting  Skin: No rash   Neurological: alert, oriented, moves all extremities      LABS:  Chest x-ray 9/15/17: Personally reviewed, normal appearance  O2 sat room air at rest 98% and with walking 600 feet 97% with heart rate 92  Impression:   The cause of the patient's shortness of breath is not clear however a normal echocardiogram and cardiac stress test makes a cardiac origin very unlikely.   The unusual characteristics of the patient's shortness of breath including a band around her throat and inability to breathe in air does not suggest airway disease and is more likely indicates  an anxiety reaction likely    Plan:  Pulmonary function tests to confirm no airway disease or other lung disease  In discussion with patient she feels that she is overwhelmed by anxiety and will refer to psychiatry (regardless of whether this is related to her symptoms of shortness of breath)    Thanks for allowing me to share in this patient's evaluation    Sincerely,    Rafael Vazquez MD , [facility]    CC: Kathy Gregory NP    [address] [facility] [facility]  [place], [address]     P: [phone]     F: [phone]

## 2017-09-26 NOTE — PATIENT INSTRUCTIONS
Begin exercising by walking without stopping starting with 5 minutes and gradually increasing the time you walk    Call if there is a problem with your referral or for symptoms of worsening shortness of breath

## 2017-10-16 ENCOUNTER — OFFICE VISIT (OUTPATIENT)
Dept: PULMONOLOGY | Age: 67
End: 2017-10-16

## 2017-10-16 VITALS
TEMPERATURE: 97.9 F | BODY MASS INDEX: 30.92 KG/M2 | HEART RATE: 76 BPM | RESPIRATION RATE: 20 BRPM | DIASTOLIC BLOOD PRESSURE: 80 MMHG | SYSTOLIC BLOOD PRESSURE: 120 MMHG | WEIGHT: 204 LBS | OXYGEN SATURATION: 96 % | HEIGHT: 68 IN

## 2017-10-16 DIAGNOSIS — R06.00 DYSPNEA, UNSPECIFIED TYPE: ICD-10-CM

## 2017-10-16 DIAGNOSIS — R06.02 SOB (SHORTNESS OF BREATH): ICD-10-CM

## 2017-10-16 RX ORDER — RABEPRAZOLE SODIUM 20 MG/1
20 TABLET, DELAYED RELEASE ORAL DAILY
Qty: 30 TAB | Refills: 0 | Status: SHIPPED | OUTPATIENT
Start: 2017-10-16 | End: 2017-11-07 | Stop reason: ALTCHOICE

## 2017-10-16 NOTE — PROGRESS NOTES
Chief Complaint   Patient presents with    Shortness of Breath     Patient here for routine  follow up visit.

## 2017-10-16 NOTE — PATIENT INSTRUCTIONS
Aciphex one daily 20 mg tablet and stop prilosec  Sleep with the head of your bed elevated. This can be accomplished by putting the head post of your bed on a 4 inch block and make sure that the top of the block is cut out so that the head post fits into the block or you can obtain 'bed risers'.   I've  heard they are available at [address] your physician for a tablet instead of a capsule  to substitute for Prozac    Call for symptoms such as shortness of breath

## 2017-10-16 NOTE — MR AVS SNAPSHOT
Visit Information Date & Time Provider Department Dept. Phone Encounter #  
 10/16/2017  2:00 PM Ismael Armstrong MD [facility] Pulmonary Specialists [facility] [id number] Follow-up Instructions Return in about 1 year (around 10/16/2018). Follow-up and Disposition History Upcoming Health Maintenance Date Due COLONOSCOPY 8/1/1970 ZOSTER VACCINE AGE 60> 6/1/2012 GLAUCOMA SCREENING Q2Y 8/1/2017 INFLUENZA AGE 9 TO ADULT 8/1/2017 Pneumococcal 65+ Low/Medium Risk (2 of 2 - PPSV23) 8/23/2018 MEDICARE YEARLY EXAM 8/24/2018 BREAST CANCER SCRN MAMMOGRAM 9/7/2019 DTaP/Tdap/Td series (2 - Td) 12/3/2025 Allergies as of 10/16/2017  Review Complete On: 10/16/2017 By: RT Rebel Severity Noted Reaction Type Reactions Iodine High 02/05/2013    Anaphylaxis Current Immunizations  Reviewed on 8/23/2017 Name Date Pneumococcal Conjugate (PCV-13) 8/23/2017 Tdap 12/3/2015 Not reviewed this visit You Were Diagnosed With   
  
 Codes Comments SOB (shortness of breath)     ICD-10-CM: R06.02 
ICD-9-CM: 786.05 Dyspnea, unspecified type     ICD-10-CM: R06.00 
ICD-9-CM: 786.09 Vitals BP Pulse Temp Resp Height(growth percentile) Weight(growth percentile) 120/80 (BP 1 Location: Left arm, BP Patient Position: At rest) 76 97.9 °F (36.6 °C) (Oral) 20 5' 8\" (1.727 m) 204 lb (92.5 kg) SpO2 BMI OB Status Smoking Status 96% 31.02 kg/m2 Hysterectomy Former Smoker BMI and BSA Data Body Mass Index Body Surface Area 31.02 kg/m 2 2.11 m 2 Preferred Pharmacy Pharmacy Name Phone [address] [facility] [phone] Your Updated Medication List  
  
   
This list is accurate as of: 10/16/17  2:56 PM.  Always use your most recent med list.  
  
  
  
  
 aspirin delayed-release 81 mg tablet Take  by mouth daily. atorvastatin 20 mg tablet Commonly known as:  LIPITOR Take 1 Tab by mouth daily. clobetasol 0.05 % external solution Commonly known as:  José Miranda Apply twice a day prn  
  
 estradiol 10 mcg Tab vaginal tablet Commonly known as:  Kyle Lowe Insert 1 Tab into vagina daily. 1 tablet daily x 2 weeks then 1 tablet twice a week. FLUoxetine 40 mg capsule Commonly known as:  PROzac Take 1 Cap by mouth daily. fluticasone 50 mcg/actuation nasal spray Commonly known as:  Connie Jumper 2 Sprays by Both Nostrils route daily. gabapentin 300 mg capsule Commonly known as:  NEURONTIN Take 1 Cap by mouth three (3) times daily. lisinopril 5 mg tablet Commonly known as:  Arnold Files Take 1 Tab by mouth daily. loratadine 10 mg tablet Commonly known as:  Blanca Freeman Take 1 Tab by mouth daily as needed for Allergies. LORazepam 1 mg tablet Commonly known as:  ATIVAN Take 1 Tab by mouth every eight (8) hours as needed for Anxiety. Max Daily Amount: 3 mg.  
  
 metFORMIN 500 mg tablet Commonly known as:  GLUCOPHAGE Take 1 Tab by mouth two (2) times daily (with meals). nitroglycerin 0.4 mg SL tablet Commonly known as:  NITROSTAT  
1 Tab by SubLINGual route every five (5) minutes as needed for Chest Pain. omeprazole 40 mg capsule Commonly known as:  PriLOSEC Take 1 Cap by mouth daily. RABEprazole 20 mg tablet Commonly known as:  ACIPHEX Take 1 Tab by mouth daily. Prescriptions Sent to Pharmacy Refills RABEprazole (ACIPHEX) 20 mg tablet 0 Sig: Take 1 Tab by mouth daily. Class: Normal  
 Pharmacy: [facility] [address] [place] [address] #: [phone] Route: Oral  
  
We Performed the Following AMB POC PFT COMPLETE W/BRONCHODILATOR [75494 CPT(R)] DIFFUSING CAPACITY Y9198826 CPT(R)] GAS DILUT/WASHOUT LUNG VOL W/WO DISTRIB VENT&VOL [37349 CPT(R)] REFERRAL TO PSYCHIATRY [REF91 Custom] Comments:  
 Please evaluate patient for severe anxiety Follow-up Instructions Return in about 1 year (around 10/16/2018). Referral Information Referral ID Referred By Referred To  
  
 [id number] LIVIA Martinez Not Available Visits Status Start Date End Date 1 New Request 10/16/17 10/16/18 If your referral has a status of pending review or denied, additional information will be sent to support the outcome of this decision. Patient Instructions Aciphex one daily 20 mg tablet and stop prilosec Sleep with the head of your bed elevated. This can be accomplished by putting the head post of your bed on a 4 inch block and make sure that the top of the block is cut out so that the head post fits into the block or you can obtain 'bed risers'. I've  heard they are available at [facility] Ask your physician for a tablet instead of a capsule  to substitute for Prozac Call for symptoms such as shortness of breath Patient Instructions History Introducing [facility] SERVICES! [facility] introduces [employer] patient portal. Now you can access parts of your medical record, email your doctor's office, and request medication refills online. 1. In your internet browser, go to [email or web address] 2. Click on the First Time User? Click Here link in the Sign In box. You will see the New Member Sign Up page. 3. Enter your [employer] Access Code exactly as it appears below. You will not need to use this code after youve completed the sign-up process. If you do not sign up before the expiration date, you must request a new code. · [employer] Access Code: [email or web address] Expires: 11/21/2017  9:25 AM 
 
4. Enter the last four digits of your Social Security Number (xxxx) and Date of Birth (mm/dd/yyyy) as indicated and click Submit. You will be taken to the next sign-up page. 5. Create a [employer]e ID. This will be your [employer] login ID and cannot be changed, so think of one that is secure and easy to remember. 6. Create a [employer] password. You can change your password at any time. 7. Enter your Password Reset Question and Answer. This can be used at a later time if you forget your password. 8. Enter your e-mail address. You will receive e-mail notification when new information is available in [address] 9. Click Sign Up. You can now view and download portions of your medical record. 10. Click the Download Summary menu link to download a portable copy of your medical information. If you have questions, please visit the Frequently Asked Questions section of the [employer] website. Remember, [employer] is NOT to be used for urgent needs. For medical emergencies, dial 911. Now available from your iPhone and Android! Please provide this summary of care documentation to your next provider. Your primary care clinician is listed as Ernie Guevara. If you have any questions after today's visit, please call [phone].

## 2017-10-16 NOTE — PROGRESS NOTES
[facility]  Pulmonary, Critical Care, and Sleep Medicine    Chief complaint:  Shortness of breath    HPI:    Jonah Capps    is 74years old returns the office today for follow-up concerning shortness of breath and relates that since she  has been working on her emotional stress or shortness of breath has almost completely resolved and she is walking 1 hour without stopping and only notices minimal dyspnea and no longer notices a tightness around her throat. She also denies having chest pain or cough leg swelling but does have severe gastroesophageal reflux symptoms and says she cannot take her medication because she cannot swallow capsules. Importantly she also notes she cannot swallow her Prozac now she denies fever chills poor appetite or weight loss      Allergies   Allergen Reactions    Iodine Anaphylaxis     Current Outpatient Prescriptions   Medication Sig    nitroglycerin (NITROSTAT) 0.4 mg SL tablet 1 Tab by SubLINGual route every five (5) minutes as needed for Chest Pain.  metFORMIN (GLUCOPHAGE) 500 mg tablet Take 1 Tab by mouth two (2) times daily (with meals).  lisinopril (PRINIVIL, ZESTRIL) 5 mg tablet Take 1 Tab by mouth daily.  FLUoxetine (PROZAC) 40 mg capsule Take 1 Cap by mouth daily.  atorvastatin (LIPITOR) 20 mg tablet Take 1 Tab by mouth daily.  LORazepam (ATIVAN) 1 mg tablet Take 1 Tab by mouth every eight (8) hours as needed for Anxiety. Max Daily Amount: 3 mg.  clobetasol (TEMOVATE) 0.05 % external solution Apply twice a day prn    gabapentin (NEURONTIN) 300 mg capsule Take 1 Cap by mouth three (3) times daily.  estradiol (VAGIFEM) 10 mcg tab vaginal tablet Insert 1 Tab into vagina daily. 1 tablet daily x 2 weeks then 1 tablet twice a week.  omeprazole (PRILOSEC) 40 mg capsule Take 1 Cap by mouth daily.  fluticasone (FLONASE) 50 mcg/actuation nasal spray 2 Sprays by Both Nostrils route daily.     aspirin delayed-release 81 mg tablet Take by mouth daily.  loratadine (CLARITIN) 10 mg tablet Take 1 Tab by mouth daily as needed for Allergies. No current facility-administered medications for this visit. Past Medical History:   Diagnosis Date    Abnormal nuclear cardiac imaging test 05/11/2015    Low risk. No ischemia or prior infarction. No RWMA. EF 68%. Neg EKG on pharm stress test.    Agatston CAC score, <100 05/15/2015    Coronary calcium score 70.  Diabetes ([facility] [address]     Diabetes mellitus ([facility] [address]     GERD (gastroesophageal reflux disease)     History of echocardiogram 02/06/2013    EF 65%. No RWMA. Mod LVH. Gr 1 DDfx. RVSP 25 mmHg.  HX OTHER MEDICAL     anxiety, \"mitral valve prolapse\"    Hyperlipidemia     Hypertension     Psychiatric disorder     anxiety    RLE venous duplex 05/06/2015    Right leg:  No DVT. Past Surgical History:   Procedure Laterality Date    ABDOMEN SURGERY PROC UNLISTED  1999    tumor removal    HX CHOLECYSTECTOMY      HX GYN  1997    hysterectomy    HX OTHER SURGICAL      FATTY TISSUE REMOVED    HX TONSILLECTOMY       Social History     Social History    Marital status:      Spouse name: N/A    Number of children: N/A    Years of education: N/A     Occupational History    Not on file.      Social History Main Topics    Smoking status: Former Smoker     Packs/day: 1.00     Years: 30.00     Types: Cigarettes     Quit date: 10/19/2012    Smokeless tobacco: Former User    Alcohol use No      Comment: occasional    Drug use: No    Sexual activity: Yes     Partners: Male     Birth control/ protection: Surgical     Other Topics Concern    Caffeine Concern Yes     coffee daily 8oz    Sleep Concern No    Stress Concern No    Weight Concern Yes     increased weight    Exercise No    Seat Belt Yes     Social History Narrative     Family History   Problem Relation Age of Onset    Heart Disease Father     Heart Attack Father     Heart Disease Paternal Grandmother    [facility] Heart Disease Paternal Grandfather     Breast Cancer Mother     Cancer Mother        Review of systems:  She denies fever chills poor appetite or weight loss and continues to recognize severe stress issues     Physical Exam:  Visit Vitals    /80 (BP 1 Location: Left arm, BP Patient Position: At rest)    Pulse 76    Temp 97.9 °F (36.6 °C) (Oral)    Resp 20    Ht 5' 8\" (1.727 m)    Wt 92.5 kg (204 lb)    SpO2 96%  Comment: Tan nail Polish    BMI 31.02 kg/m2       Well-developed well-nourished  HEENT: WNL  Lymph node exam: Supraclavicular cervical lymph nodes negative  Chest: Equal symmetrical expansion no dullness no wheezes rales rubs  Heart: Regular rhythm no gallop no murmur no JVD no peripheral edema  Extremities: No cyanosis clubbing or calf tenderness  Neurological: Alert and oriented    Labs:    O2 sat 96% room air at rest  Pulmonary function test 10/16/17: Normal spirometry lung volumes and slightly reduced difusion    Impression:   Resolution of dyspnea with confrontation of emotional disorder  Mild diffusion abnormality and I can find no and because patient is feeling better would prefer to follow-up reason for it at this time and repeat if necessary  Gastroesophageal reflux disease    Plan:   A pill to subsitute for PRozac per her physician  A pill for gastroesophageal reflux disease And Follow-Up with Physician, (Aciphex 20 mg daily)  Psychiatric follow-up  Follow-up in 1 year or sooner    Dirk Shetty MD , [facility]    CC: Red Faust NP     [address] [facility]  [place], [address] [address]     P: [phone]     F: [phone]    Shortness of breath

## 2017-11-07 ENCOUNTER — OFFICE VISIT (OUTPATIENT)
Dept: FAMILY MEDICINE CLINIC | Age: 67
End: 2017-11-07

## 2017-11-07 VITALS
TEMPERATURE: 97.9 F | OXYGEN SATURATION: 96 % | BODY MASS INDEX: 31.07 KG/M2 | HEIGHT: 68 IN | RESPIRATION RATE: 18 BRPM | HEART RATE: 70 BPM | DIASTOLIC BLOOD PRESSURE: 85 MMHG | WEIGHT: 205 LBS | SYSTOLIC BLOOD PRESSURE: 136 MMHG

## 2017-11-07 DIAGNOSIS — F41.9 ANXIETY: ICD-10-CM

## 2017-11-07 DIAGNOSIS — K21.9 GASTROESOPHAGEAL REFLUX DISEASE, ESOPHAGITIS PRESENCE NOT SPECIFIED: Primary | ICD-10-CM

## 2017-11-07 RX ORDER — METFORMIN HYDROCHLORIDE 500 MG/1
500 TABLET ORAL 2 TIMES DAILY WITH MEALS
Qty: 60 TAB | Refills: 6 | Status: SHIPPED | OUTPATIENT
Start: 2017-11-07

## 2017-11-07 RX ORDER — FLUOXETINE 20 MG/1
40 TABLET ORAL DAILY
Qty: 180 TAB | Refills: 3 | Status: SHIPPED | OUTPATIENT
Start: 2017-11-07 | End: 2018-07-31 | Stop reason: SDUPTHER

## 2017-11-07 RX ORDER — PANTOPRAZOLE SODIUM 40 MG/1
40 TABLET, DELAYED RELEASE ORAL DAILY
Qty: 90 TAB | Refills: 3 | Status: SHIPPED | OUTPATIENT
Start: 2017-11-07 | End: 2018-12-06 | Stop reason: SDUPTHER

## 2017-11-07 NOTE — PROGRESS NOTES
HISTORY OF PRESENT ILLNESS  Eleno Martinez is a 74 y.o. female. HPI  Pt needs a refill on metformin. She has difficulty swallowing capsules. She is on aciphex and she needs a pill form of med. GERD is controlled but she has to take med. She agrees to change  To a different med if she gets a pill form. She has had upper endoscopy before. Pt is also in ativan which she uses for anxiety. She is also on Prozac and needs a refill. Has a skin tag in the anterior neck she wants to removed; She is travelling tomorrow and prefers to wait until she returns; she did try to remove this herself with dental floss; area is tender currently. ;         Current Outpatient Prescriptions:     metFORMIN (GLUCOPHAGE) 500 mg tablet, Take 1 Tab by mouth two (2) times daily (with meals). , Disp: 60 Tab, Rfl: 6    Aciphex    FLUoxetine (PROZAC) 20 mg tablet, Take 2 Tabs by mouth daily. , Disp: 180 Tab, Rfl: 3    nitroglycerin (NITROSTAT) 0.4 mg SL tablet, 1 Tab by SubLINGual route every five (5) minutes as needed for Chest Pain., Disp: 1 Bottle, Rfl: 1    lisinopril (PRINIVIL, ZESTRIL) 5 mg tablet, Take 1 Tab by mouth daily. , Disp: 90 Tab, Rfl: 1    atorvastatin (LIPITOR) 20 mg tablet, Take 1 Tab by mouth daily. , Disp: 90 Tab, Rfl: 1    LORazepam (ATIVAN) 1 mg tablet, Take 1 Tab by mouth every eight (8) hours as needed for Anxiety. Max Daily Amount: 3 mg., Disp: 30 Tab, Rfl: 0    clobetasol (TEMOVATE) 0.05 % external solution, Apply twice a day prn, Disp: 1 Bottle, Rfl: 5    gabapentin (NEURONTIN) 300 mg capsule, Take 1 Cap by mouth three (3) times daily. , Disp: 180 Cap, Rfl: 2    estradiol (VAGIFEM) 10 mcg tab vaginal tablet, Insert 1 Tab into vagina daily. 1 tablet daily x 2 weeks then 1 tablet twice a week., Disp: 22 Tab, Rfl: 11    loratadine (CLARITIN) 10 mg tablet, Take 1 Tab by mouth daily as needed for Allergies. , Disp: 30 Tab, Rfl: 1    fluticasone (FLONASE) 50 mcg/actuation nasal spray, 2 Sprays by Both Nostrils route daily. , Disp: 1 Bottle, Rfl: 3    aspirin delayed-release 81 mg tablet, Take  by mouth daily. , Disp: , Rfl:       PMH,  Meds, Allergies, Family History, Social history reviewed    Review of Systems   Constitutional: Negative for chills and fever. Respiratory: Negative for sputum production and shortness of breath. Cardiovascular: Negative for chest pain and palpitations. Physical Exam   Constitutional: She appears well-developed and well-nourished. No distress. Cardiovascular: Normal rate and regular rhythm. Exam reveals no gallop and no friction rub. No murmur heard. Pulmonary/Chest: Breath sounds normal. No respiratory distress. She has no wheezes. She has no rales. Musculoskeletal: She exhibits no edema. Nursing note and vitals reviewed. Anterior neck reveals a small flesh toned skin tag; mild traumatic appearing  erythema; no drainage. ASSESSMENT and PLAN    ICD-10-CM ICD-9-CM    1. Gastroesophageal reflux disease, esophagitis presence not specified K21.9 530.81    2. Anxiety F41.9 300.00        As above,   above all stable unless otherwise noted  Continue current meds as ordered  Orders Placed This Encounter    metFORMIN (GLUCOPHAGE) 500 mg tablet    pantoprazole (PROTONIX) 40 mg tablet    FLUoxetine (PROZAC) 20 mg tablet     Dc aciphex  protonix as ordered  Refilled prozac. Follow-up Disposition:  Return in about 4 months (around 3/7/2018) for anxiety and GERD(sooner for skin tag removal). An After Visit Summary was printed and given to the patient. This has been fully explained to the patient, who indicates understanding.

## 2017-11-07 NOTE — MR AVS SNAPSHOT
Visit Information Date & Time Provider Department Dept. Phone Encounter #  
 11/7/2017 11:20 AM Daniel Casarez, [address] [place] [address] [place] [address] [facility] [id number] Upcoming Health Maintenance Date Due COLONOSCOPY 8/1/1970 ZOSTER VACCINE AGE 60> 6/1/2012 GLAUCOMA SCREENING Q2Y 8/1/2017 Influenza Age 5 to Adult 8/1/2017 Pneumococcal 65+ Low/Medium Risk (2 of 2 - PPSV23) 8/23/2018 MEDICARE YEARLY EXAM 8/24/2018 BREAST CANCER SCRN MAMMOGRAM 9/7/2019 DTaP/Tdap/Td series (2 - Td) 12/3/2025 Allergies as of 11/7/2017  Review Complete On: 11/7/2017 By: Gerardo Sanchez LPN Severity Noted Reaction Type Reactions Iodine High 02/05/2013    Anaphylaxis Bee Venom Protein (Honey Bee)  11/07/2017    Hives Current Immunizations  Reviewed on 8/23/2017 Name Date Pneumococcal Conjugate (PCV-13) 8/23/2017 Tdap 12/3/2015 Not reviewed this visit You Were Diagnosed With   
  
 Codes Comments Gastroesophageal reflux disease, esophagitis presence not specified    -  Primary ICD-10-CM: K21.9 ICD-9-CM: 530.81 Anxiety     ICD-10-CM: F41.9 ICD-9-CM: 300.00 Vitals BP Pulse Temp Resp Height(growth percentile) Weight(growth percentile) 136/85 (BP 1 Location: Left arm, BP Patient Position: Sitting) 70 97.9 °F (36.6 °C) (Oral) 18 5' 8\" (1.727 m) 205 lb (93 kg) SpO2 BMI OB Status Smoking Status 96% 31.17 kg/m2 Hysterectomy Former Smoker Vitals History BMI and BSA Data Body Mass Index Body Surface Area  
 31.17 kg/m 2 2.11 m 2 Preferred Pharmacy Pharmacy Name Phone [facility] [address] [facility] [phone] Your Updated Medication List  
  
   
This list is accurate as of: 11/7/17 11:57 AM.  Always use your most recent med list.  
  
  
  
  
 aspirin delayed-release 81 mg tablet Take  by mouth daily. atorvastatin 20 mg tablet Commonly known as:  LIPITOR Take 1 Tab by mouth daily. clobetasol 0.05 % external solution Commonly known as:  [place] Apply twice a day prn  
  
 estradiol 10 mcg Tab vaginal tablet Commonly known as:  Henry Betancourt Insert 1 Tab into vagina daily. 1 tablet daily x 2 weeks then 1 tablet twice a week. FLUoxetine 20 mg tablet Commonly known as:  PROzac Take 2 Tabs by mouth daily. fluticasone 50 mcg/actuation nasal spray Commonly known as:  Dary Fryer 2 Sprays by Both Nostrils route daily. gabapentin 300 mg capsule Commonly known as:  NEURONTIN Take 1 Cap by mouth three (3) times daily. lisinopril 5 mg tablet Commonly known as:  Ros Dhaval Take 1 Tab by mouth daily. loratadine 10 mg tablet Commonly known as:  Deepali Dustman Take 1 Tab by mouth daily as needed for Allergies. LORazepam 1 mg tablet Commonly known as:  ATIVAN Take 1 Tab by mouth every eight (8) hours as needed for Anxiety. Max Daily Amount: 3 mg.  
  
 metFORMIN 500 mg tablet Commonly known as:  GLUCOPHAGE Take 1 Tab by mouth two (2) times daily (with meals). nitroglycerin 0.4 mg SL tablet Commonly known as:  NITROSTAT  
1 Tab by SubLINGual route every five (5) minutes as needed for Chest Pain.  
  
 pantoprazole 40 mg tablet Commonly known as:  PROTONIX Take 1 Tab by mouth daily. Prescriptions Sent to Pharmacy Refills  
 metFORMIN (GLUCOPHAGE) 500 mg tablet 6 Sig: Take 1 Tab by mouth two (2) times daily (with meals). Class: Normal  
 Pharmacy: [address] Ph #: [phone] Route: Oral  
 pantoprazole (PROTONIX) 40 mg tablet 3 Sig: Take 1 Tab by mouth daily. Class: Normal  
 Pharmacy: [address] Ph #: [phone] Route: Oral  
 FLUoxetine (PROZAC) 20 mg tablet 3 Sig: Take 2 Tabs by mouth daily.   
 Class: Normal  
 Pharmacy: [facility] [address], [address] [facility] #: [phone] Route: Oral  
  
Patient Instructions Anxiety Disorder: Care Instructions Your Care Instructions Anxiety is a normal reaction to stress. Difficult situations can cause you to have symptoms such as sweaty palms and a nervous feeling. In an anxiety disorder, the symptoms are far more severe. Constant worry, muscle tension, trouble sleeping, nausea and diarrhea, and other symptoms can make normal daily activities difficult or impossible. These symptoms may occur for no reason, and they can affect your work, school, or social life. Medicines, counseling, and self-care can all help. Follow-up care is a key part of your treatment and safety. Be sure to make and go to all appointments, and call your doctor if you are having problems. It's also a good idea to know your test results and keep a list of the medicines you take. How can you care for yourself at home? · Take medicines exactly as directed. Call your doctor if you think you are having a problem with your medicine. · Go to your counseling sessions and follow-up appointments. · Recognize and accept your anxiety. Then, when you are in a situation that makes you anxious, say to yourself, \"This is not an emergency. I feel uncomfortable, but I am not in danger. I can keep going even if I feel anxious. \" · Be kind to your body: ¨ Relieve tension with exercise or a massage. ¨ Get enough rest. 
¨ Avoid alcohol, caffeine, nicotine, and illegal drugs. They can increase your anxiety level and cause sleep problems. ¨ Learn and do relaxation techniques. See below for more about these techniques. · Engage your mind. Get out and do something you enjoy. Go to a funny movie, or take a walk or hike. Plan your day. Having too much or too little to do can make you anxious. · Keep a record of your symptoms.  Discuss your fears with a good friend or family member, or join a support group for people with similar problems. Talking to others sometimes relieves stress. · Get involved in social groups, or volunteer to help others. Being alone sometimes makes things seem worse than they are. · Get at least 30 minutes of exercise on most days of the week to relieve stress. Walking is a good choice. You also may want to do other activities, such as running, swimming, cycling, or playing tennis or team sports. Relaxation techniques Do relaxation exercises 10 to 20 minutes a day. You can play soothing, relaxing music while you do them, if you wish. · Tell others in your house that you are going to do your relaxation exercises. Ask them not to disturb you. · Find a comfortable place, away from all distractions and noise. · Lie down on your back, or sit with your back straight. · Focus on your breathing. Make it slow and steady. · Breathe in through your nose. Breathe out through either your nose or mouth. · Breathe deeply, filling up the area between your navel and your rib cage. Breathe so that your belly goes up and down. · Do not hold your breath. · Breathe like this for 5 to 10 minutes. Notice the feeling of calmness throughout your whole body. As you continue to breathe slowly and deeply, relax by doing the following for another 5 to 10 minutes: · Tighten and relax each muscle group in your body. You can begin at your toes and work your way up to your head. · Imagine your muscle groups relaxing and becoming heavy. · Empty your mind of all thoughts. · Let yourself relax more and more deeply. · Become aware of the state of calmness that surrounds you. · When your relaxation time is over, you can bring yourself back to alertness by moving your fingers and toes and then your hands and feet and then stretching and moving your entire body. Sometimes people fall asleep during relaxation, but they usually wake up shortly afterward. · Always give yourself time to return to full alertness before you drive a car or do anything that might cause an accident if you are not fully alert. Never play a relaxation tape while you drive a car. When should you call for help? Call 911 anytime you think you may need emergency care. For example, call if: 
? · You feel you cannot stop from hurting yourself or someone else. ? Keep the numbers for these national suicide hotlines: 6-179-791-TALK (0-730.864.9110) and 8-881-BKOAMPH (7-581.492.3978). If you or someone you know talks about suicide or feeling hopeless, get help right away. ? Watch closely for changes in your health, and be sure to contact your doctor if: 
? · You have anxiety or fear that affects your life. ? · You have symptoms of anxiety that are new or different from those you had before. Where can you learn more? Go to http://bruno-alicia.info/. Enter P754 in the search box to learn more about \"Anxiety Disorder: Care Instructions. \" Current as of: May 12, 2017 Content Version: 11.4 © 3538-7365 [employer]. Care instructions adapted under license by [employer] - is now [employer] (which disclaims liability or warranty for this information). If you have questions about a medical condition or this instruction, always ask your healthcare professional. [address] any warranty or liability for your use of this information. Introducing [facility] [place] [facility]! [facility] introduces [employer] patient portal. Now you can access parts of your medical record, email your doctor's office, and request medication refills online. 1. In your internet browser, go to [email or web address] 2. Click on the First Time User? Click Here link in the Sign In box. You will see the New Member Sign Up page. 3. Enter your [employer] Access Code exactly as it appears below.  You will not need to use this code after youve completed the sign-up process. If you do not sign up before the expiration date, you must request a new code. · minicabit Access Code: [email or web address] Expires: 11/21/2017  8:25 AM 
 
4. Enter the last four digits of your Social Security Number (xxxx) and Date of Birth (mm/dd/yyyy) as indicated and click Submit. You will be taken to the next sign-up page. 5. Create a minicabit ID. This will be your minicabit login ID and cannot be changed, so think of one that is secure and easy to remember. 6. Create a minicabit password. You can change your password at any time. 7. Enter your Password Reset Question and Answer. This can be used at a later time if you forget your password. 8. Enter your e-mail address. You will receive e-mail notification when new information is available in [address]. 9. Click Sign Up. You can now view and download portions of your medical record. 10. Click the Download Summary menu link to download a portable copy of your medical information. If you have questions, please visit the Frequently Asked Questions section of the minicabit website. Remember, minicabit is NOT to be used for urgent needs. For medical emergencies, dial 911. Now available from your iPhone and Android! Please provide this summary of care documentation to your next provider. Your primary care clinician is listed as Everett Guevara. If you have any questions after today's visit, please call [phone].

## 2017-11-07 NOTE — PROGRESS NOTES
1. Have you been to the ER, urgent care clinic since your last visit? Hospitalized since your last visit? No    2. Have you seen or consulted any other health care providers outside of the [address] [place] [address] since your last visit? Include any pap smears or colon screening.  No

## 2017-11-07 NOTE — PATIENT INSTRUCTIONS
Anxiety Disorder: Care Instructions  Your Care Instructions    Anxiety is a normal reaction to stress. Difficult situations can cause you to have symptoms such as sweaty palms and a nervous feeling. In an anxiety disorder, the symptoms are far more severe. Constant worry, muscle tension, trouble sleeping, nausea and diarrhea, and other symptoms can make normal daily activities difficult or impossible. These symptoms may occur for no reason, and they can affect your work, school, or social life. Medicines, counseling, and self-care can all help. Follow-up care is a key part of your treatment and safety. Be sure to make and go to all appointments, and call your doctor if you are having problems. It's also a good idea to know your test results and keep a list of the medicines you take. How can you care for yourself at home? · Take medicines exactly as directed. Call your doctor if you think you are having a problem with your medicine. · Go to your counseling sessions and follow-up appointments. · Recognize and accept your anxiety. Then, when you are in a situation that makes you anxious, say to yourself, \"This is not an emergency. I feel uncomfortable, but I am not in danger. I can keep going even if I feel anxious. \"  · Be kind to your body:  ¨ Relieve tension with exercise or a massage. ¨ Get enough rest.  ¨ Avoid alcohol, caffeine, nicotine, and illegal drugs. They can increase your anxiety level and cause sleep problems. ¨ Learn and do relaxation techniques. See below for more about these techniques. · Engage your mind. Get out and do something you enjoy. Go to a funny movie, or take a walk or hike. Plan your day. Having too much or too little to do can make you anxious. · Keep a record of your symptoms. Discuss your fears with a good friend or family member, or join a support group for people with similar problems. Talking to others sometimes relieves stress.   · Get involved in social groups, or volunteer to help others. Being alone sometimes makes things seem worse than they are. · Get at least 30 minutes of exercise on most days of the week to relieve stress. Walking is a good choice. You also may want to do other activities, such as running, swimming, cycling, or playing tennis or team sports. Relaxation techniques  Do relaxation exercises 10 to 20 minutes a day. You can play soothing, relaxing music while you do them, if you wish. · Tell others in your house that you are going to do your relaxation exercises. Ask them not to disturb you. · Find a comfortable place, away from all distractions and noise. · Lie down on your back, or sit with your back straight. · Focus on your breathing. Make it slow and steady. · Breathe in through your nose. Breathe out through either your nose or mouth. · Breathe deeply, filling up the area between your navel and your rib cage. Breathe so that your belly goes up and down. · Do not hold your breath. · Breathe like this for 5 to 10 minutes. Notice the feeling of calmness throughout your whole body. As you continue to breathe slowly and deeply, relax by doing the following for another 5 to 10 minutes:  · Tighten and relax each muscle group in your body. You can begin at your toes and work your way up to your head. · Imagine your muscle groups relaxing and becoming heavy. · Empty your mind of all thoughts. · Let yourself relax more and more deeply. · Become aware of the state of calmness that surrounds you. · When your relaxation time is over, you can bring yourself back to alertness by moving your fingers and toes and then your hands and feet and then stretching and moving your entire body. Sometimes people fall asleep during relaxation, but they usually wake up shortly afterward. · Always give yourself time to return to full alertness before you drive a car or do anything that might cause an accident if you are not fully alert.  Never play a relaxation tape while you drive a car. When should you call for help? Call 911 anytime you think you may need emergency care. For example, call if:  ? · You feel you cannot stop from hurting yourself or someone else. ? Keep the numbers for these national suicide hotlines: 6-991-461-TALK (4-915.512.6607) and 3-169-PMVXKLJ (1-754.893.1683). If you or someone you know talks about suicide or feeling hopeless, get help right away. ? Watch closely for changes in your health, and be sure to contact your doctor if:  ? · You have anxiety or fear that affects your life. ? · You have symptoms of anxiety that are new or different from those you had before. Where can you learn more? Go to http://bruno-alicia.info/. Enter P754 in the search box to learn more about \"Anxiety Disorder: Care Instructions. \"  Current as of: May 12, 2017  Content Version: 11.4  © 9260-4701 [employer]. Care instructions adapted under license by [employer] (which disclaims liability or warranty for this information). If you have questions about a medical condition or this instruction, always ask your healthcare professional. [address] any warranty or liability for your use of this information.

## 2017-11-21 ENCOUNTER — TELEPHONE (OUTPATIENT)
Dept: PULMONOLOGY | Age: 67
End: 2017-11-21

## 2017-11-21 NOTE — TELEPHONE ENCOUNTER
Called patient, left message with a woman who answered phone. She requested I give patient a call back in couple of weeks due to the patient being out of town.

## 2017-12-19 ENCOUNTER — OFFICE VISIT (OUTPATIENT)
Dept: FAMILY MEDICINE CLINIC | Age: 67
End: 2017-12-19

## 2017-12-19 ENCOUNTER — HOSPITAL ENCOUNTER (OUTPATIENT)
Dept: GENERAL RADIOLOGY | Age: 67
Discharge: HOME | End: 2017-12-19
Payer: MEDICARE

## 2017-12-19 VITALS
SYSTOLIC BLOOD PRESSURE: 120 MMHG | OXYGEN SATURATION: 98 % | BODY MASS INDEX: 30.92 KG/M2 | TEMPERATURE: 98.2 F | DIASTOLIC BLOOD PRESSURE: 78 MMHG | WEIGHT: 204 LBS | HEIGHT: 68 IN | RESPIRATION RATE: 18 BRPM | HEART RATE: 92 BPM

## 2017-12-19 DIAGNOSIS — M89.8X1 CLAVICLE PAIN: Primary | ICD-10-CM

## 2017-12-19 DIAGNOSIS — M89.8X1 CLAVICLE PAIN: ICD-10-CM

## 2017-12-19 PROCEDURE — 73000 X-RAY EXAM OF COLLAR BONE: CPT

## 2017-12-19 RX ORDER — MELOXICAM 7.5 MG/1
7.5 TABLET ORAL
Qty: 30 TAB | Refills: 1 | Status: SHIPPED | OUTPATIENT
Start: 2017-12-19 | End: 2018-01-30

## 2017-12-19 NOTE — PATIENT INSTRUCTIONS

## 2017-12-19 NOTE — PROGRESS NOTES
HISTORY OF PRESENT ILLNESS  Valentin Huynh is a 74 y.o. female. HPI    Patient presents today for neck pain related to a possible collarbone pain  That started 11/9/2017 after she picked up a suitcase before a trip ; she states the suitcase was heavy and she \" heard\" something when she picked up the suitcase. Current Outpatient Prescriptions:     metFORMIN (GLUCOPHAGE) 500 mg tablet, Take 1 Tab by mouth two (2) times daily (with meals). , Disp: 60 Tab, Rfl: 6    pantoprazole (PROTONIX) 40 mg tablet, Take 1 Tab by mouth daily. , Disp: 90 Tab, Rfl: 3    FLUoxetine (PROZAC) 20 mg tablet, Take 2 Tabs by mouth daily. , Disp: 180 Tab, Rfl: 3    nitroglycerin (NITROSTAT) 0.4 mg SL tablet, 1 Tab by SubLINGual route every five (5) minutes as needed for Chest Pain., Disp: 1 Bottle, Rfl: 1    lisinopril (PRINIVIL, ZESTRIL) 5 mg tablet, Take 1 Tab by mouth daily. , Disp: 90 Tab, Rfl: 1    atorvastatin (LIPITOR) 20 mg tablet, Take 1 Tab by mouth daily. , Disp: 90 Tab, Rfl: 1    LORazepam (ATIVAN) 1 mg tablet, Take 1 Tab by mouth every eight (8) hours as needed for Anxiety. Max Daily Amount: 3 mg., Disp: 30 Tab, Rfl: 0    clobetasol (TEMOVATE) 0.05 % external solution, Apply twice a day prn, Disp: 1 Bottle, Rfl: 5    gabapentin (NEURONTIN) 300 mg capsule, Take 1 Cap by mouth three (3) times daily. , Disp: 180 Cap, Rfl: 2    estradiol (VAGIFEM) 10 mcg tab vaginal tablet, Insert 1 Tab into vagina daily. 1 tablet daily x 2 weeks then 1 tablet twice a week., Disp: 22 Tab, Rfl: 11    loratadine (CLARITIN) 10 mg tablet, Take 1 Tab by mouth daily as needed for Allergies. , Disp: 30 Tab, Rfl: 1    fluticasone (FLONASE) 50 mcg/actuation nasal spray, 2 Sprays by Both Nostrils route daily. , Disp: 1 Bottle, Rfl: 3    aspirin delayed-release 81 mg tablet, Take  by mouth daily. , Disp: , Rfl:     PMH,  Meds, Allergies, Family History, Social history reviewed    Review of Systems   Constitutional: Negative for chills and fever. Cardiovascular: Negative for chest pain and leg swelling. Physical Exam   Constitutional: She appears well-developed and well-nourished. No distress. Cardiovascular: Normal rate and regular rhythm. Exam reveals no gallop and no friction rub. No murmur heard. Pulmonary/Chest: Breath sounds normal. No respiratory distress. She has no wheezes. Musculoskeletal: She exhibits no edema. Nursing note and vitals reviewed. + TTP at right clavicle ; no significant edema    ASSESSMENT and PLAN    ICD-10-CM ICD-9-CM    1. Clavicle pain M89.8X1 733.90 XR CLAVICLE RT       As above, new   treatment plan as listed below  Orders Placed This Encounter    XR CLAVICLE RT    meloxicam (MOBIC) 7.5 mg tablet     Prudent use of NSAID reviewed  Follow-up Disposition:  Return if symptoms worsen or fail to improve. An After Visit Summary was printed and given to the patient. This has been fully explained to the patient, who indicates understanding.

## 2017-12-19 NOTE — MR AVS SNAPSHOT
Visit Information Date & Time Provider Department Dept. Phone Encounter #  
 12/19/2017 10:20 AM [facility]ud[facility] [address] [place] [facility] [id number] Follow-up Instructions Return if symptoms worsen or fail to improve. Upcoming Health Maintenance Date Due COLONOSCOPY 8/1/1970 ZOSTER VACCINE AGE 60> 6/1/2012 GLAUCOMA SCREENING Q2Y 8/1/2017 Influenza Age 5 to Adult 8/1/2017 Pneumococcal 65+ Low/Medium Risk (2 of 2 - PPSV23) 8/23/2018 MEDICARE YEARLY EXAM 8/24/2018 DTaP/Tdap/Td series (2 - Td) 12/3/2025 Allergies as of 12/19/2017  Review Complete On: 12/19/2017 By: Zay Mendoza Severity Noted Reaction Type Reactions Iodine High 02/05/2013    Anaphylaxis Bee Venom Protein (Honey Bee)  11/07/2017    Hives Current Immunizations  Reviewed on 8/23/2017 Name Date Pneumococcal Conjugate (PCV-13) 8/23/2017 Tdap 12/3/2015 Not reviewed this visit You Were Diagnosed With   
  
 Codes Comments Clavicle pain    -  Primary ICD-10-CM: Q78.9I9 ICD-9-CM: 733.90 Vitals BP Pulse Temp Resp Height(growth percentile) Weight(growth percentile) 120/78 92 98.2 °F (36.8 °C) (Oral) 18 5' 8\" (1.727 m) 204 lb (92.5 kg) SpO2 BMI OB Status Smoking Status 98% 31.02 kg/m2 Hysterectomy Former Smoker Vitals History BMI and BSA Data Body Mass Index Body Surface Area 31.02 kg/m 2 2.11 m 2 Preferred Pharmacy Pharmacy Name Phone [facility] [address] [facility] [phone] Your Updated Medication List  
  
   
This list is accurate as of: 12/19/17 11:08 AM.  Always use your most recent med list.  
  
  
  
  
 aspirin delayed-release 81 mg tablet Take  by mouth daily. atorvastatin 20 mg tablet Commonly known as:  LIPITOR Take 1 Tab by mouth daily. clobetasol 0.05 % external solution Commonly known as:  Beola Dino Apply twice a day prn  
  
 estradiol 10 mcg Tab vaginal tablet Commonly known as:  Nasrin Arvizuon Insert 1 Tab into vagina daily. 1 tablet daily x 2 weeks then 1 tablet twice a week. FLUoxetine 20 mg tablet Commonly known as:  PROzac Take 2 Tabs by mouth daily. fluticasone 50 mcg/actuation nasal spray Commonly known as:  Driscoll [place] 2 Sprays by Both Nostrils route daily. gabapentin 300 mg capsule Commonly known as:  NEURONTIN Take 1 Cap by mouth three (3) times daily. lisinopril 5 mg tablet Commonly known as:  Delores Kristin Take 1 Tab by mouth daily. loratadine 10 mg tablet Commonly known as:  Debi Larisa Take 1 Tab by mouth daily as needed for Allergies. LORazepam 1 mg tablet Commonly known as:  ATIVAN Take 1 Tab by mouth every eight (8) hours as needed for Anxiety. Max Daily Amount: 3 mg.  
  
 meloxicam 7.5 mg tablet Commonly known as:  MOBIC Take 1 Tab by mouth daily as needed for Pain. Take with food  
  
 metFORMIN 500 mg tablet Commonly known as:  GLUCOPHAGE Take 1 Tab by mouth two (2) times daily (with meals). nitroglycerin 0.4 mg SL tablet Commonly known as:  NITROSTAT  
1 Tab by SubLINGual route every five (5) minutes as needed for Chest Pain.  
  
 pantoprazole 40 mg tablet Commonly known as:  PROTONIX Take 1 Tab by mouth daily. Prescriptions Sent to Pharmacy Refills  
 meloxicam (MOBIC) 7.5 mg tablet 1 Sig: Take 1 Tab by mouth daily as needed for Pain. Take with food Class: Normal  
 Pharmacy: [facility] [address] [place] [address] #: [phone] Route: Oral  
  
Follow-up Instructions Return if symptoms worsen or fail to improve. To-Do List   
 12/19/2017 Imaging:  XR CLAVICLE RT Patient Instructions Neck: Exercises Your Care Instructions Here are some examples of typical rehabilitation exercises for your condition. Start each exercise slowly. Ease off the exercise if you start to have pain. Your doctor or physical therapist will tell you when you can start these exercises and which ones will work best for you. How to do the exercises Neck stretch 1. This stretch works best if you keep your shoulder down as you lean away from it. To help you remember to do this, start by relaxing your shoulders and lightly holding on to your thighs or your chair. 2. Tilt your head toward your shoulder and hold for 15 to 30 seconds. Let the weight of your head stretch your muscles. 3. If you would like a little added stretch, use your hand to gently and steadily pull your head toward your shoulder. For example, keeping your right shoulder down, lean your head to the left. 4. Repeat 2 to 4 times toward each shoulder. Diagonal neck stretch 1. Turn your head slightly toward the direction you will be stretching, and tilt your head diagonally toward your chest and hold for 15 to 30 seconds. 2. If you would like a little added stretch, use your hand to gently and steadily pull your head forward on the diagonal. 
3. Repeat 2 to 4 times toward each side. Dorsal glide stretch The dorsal glide stretches the back of the neck. If you feel pain, do not glide so far back. Some people find this exercise easier to do while lying on their backs with an ice pack on the neck. 1. Sit or stand tall and look straight ahead. 2. Slowly tuck your chin as you glide your head backward over your body 3. Hold for a count of 6, and then relax for up to 10 seconds. 4. Repeat 8 to 12 times. Chest and shoulder stretch 1. Sit or stand tall and glide your head backward as in the dorsal glide stretch. 2. Raise both arms so that your hands are next to your ears. 3. Take a deep breath, and as you breathe out, lower your elbows down and behind your back.  You will feel your shoulder blades slide down and together, and at the same time you will feel a stretch across your chest and the front of your shoulders. 4. Hold for about 6 seconds, and then relax for up to 10 seconds. 5. Repeat 8 to 12 times. Strengthening: Hands on head 1. Move your head backward, forward, and side to side against gentle pressure from your hands, holding each position for about 6 seconds. 2. Repeat 8 to 12 times. Follow-up care is a key part of your treatment and safety. Be sure to make and go to all appointments, and call your doctor if you are having problems. It's also a good idea to know your test results and keep a list of the medicines you take. Where can you learn more? Go to http://bruno-alicia.info/. Enter P975 in the search box to learn more about \"Neck: Exercises. \" Current as of: March 21, 2017 Content Version: 11.4 © 9046-4278 [employer]. Care instructions adapted under license by [employer] (which disclaims liability or warranty for this information). If you have questions about a medical condition or this instruction, always ask your healthcare professional. [address] any warranty or liability for your use of this information. Introducing [facility]! Nehal Suh introduces Viewpost patient portal. Now you can access parts of your medical record, email your doctor's office, and request medication refills online. 1. In your internet browser, go to [email or web address] 2. Click on the First Time User? Click Here link in the Sign In box. You will see the New Member Sign Up page. 3. Enter your Viewpost Access Code exactly as it appears below. You will not need to use this code after youve completed the sign-up process. If you do not sign up before the expiration date, you must request a new code. · Viewpost Access Code: [email or web address] Expires: 3/19/2018 11:08 AM 
 
 4. Enter the last four digits of your Social Security Number (xxxx) and Date of Birth (mm/dd/yyyy) as indicated and click Submit. You will be taken to the next sign-up page. 5. Create a Financeit ID. This will be your Financeit login ID and cannot be changed, so think of one that is secure and easy to remember. 6. Create a Financeit password. You can change your password at any time. 7. Enter your Password Reset Question and Answer. This can be used at a later time if you forget your password. 8. Enter your e-mail address. You will receive e-mail notification when new information is available in [address] 9. Click Sign Up. You can now view and download portions of your medical record. 10. Click the Download Summary menu link to download a portable copy of your medical information. If you have questions, please visit the Frequently Asked Questions section of the Financeit website. Remember, Financeit is NOT to be used for urgent needs. For medical emergencies, dial 911. Now available from your iPhone and Android! Please provide this summary of care documentation to your next provider. Your primary care clinician is listed as Lily Neumann 61. If you have any questions after today's visit, please call [phone].

## 2018-01-30 ENCOUNTER — OFFICE VISIT (OUTPATIENT)
Dept: FAMILY MEDICINE CLINIC | Age: 68
End: 2018-01-30

## 2018-01-30 VITALS
BODY MASS INDEX: 31.37 KG/M2 | DIASTOLIC BLOOD PRESSURE: 76 MMHG | SYSTOLIC BLOOD PRESSURE: 123 MMHG | HEIGHT: 68 IN | RESPIRATION RATE: 20 BRPM | OXYGEN SATURATION: 92 % | TEMPERATURE: 97.7 F | HEART RATE: 83 BPM | WEIGHT: 207 LBS

## 2018-01-30 DIAGNOSIS — J02.9 SORE THROAT: Primary | ICD-10-CM

## 2018-01-30 DIAGNOSIS — B34.9 VIRAL ILLNESS: ICD-10-CM

## 2018-01-30 DIAGNOSIS — F43.0 ACUTE REACTION TO STRESS: ICD-10-CM

## 2018-01-30 LAB
QUICKVUE INFLUENZA TEST: NEGATIVE
S PYO AG THROAT QL: NEGATIVE
VALID INTERNAL CONTROL?: YES
VALID INTERNAL CONTROL?: YES

## 2018-01-30 NOTE — PROGRESS NOTES
1. Have you been to the ER, urgent care clinic since your last visit? Hospitalized since your last visit? No    2. Have you seen or consulted any other health care providers outside of the [address] [place] [address] since your last visit? Include any pap smears or colon screening.  No

## 2018-01-30 NOTE — PROGRESS NOTES
HISTORY OF PRESENT ILLNESS  Eileen Candelaria is a 74 y.o. female. Patient presents for cold symptoms  Chief Complaint   Patient presents with    Sore Throat    Headache    Nausea       HPI  Symptoms started Sunday, headache, sore throat and stomach hurts. Her grandson was positive for strep. Pt has so much going on. Her niece is dying and she is very upset about this as she is like a daughter to her. Her  has some health issues. Review of Systems   Constitutional: Negative. HENT: Positive for sore throat. Respiratory: Negative. Cardiovascular: Negative. Gastrointestinal: Positive for nausea. Skin: Negative. Neurological: Positive for headaches. Visit Vitals    /76 (BP 1 Location: Right arm, BP Patient Position: Sitting)    Pulse 83    Temp 97.7 °F (36.5 °C) (Oral)    Resp 20    Ht 5' 8\" (1.727 m)    Wt 207 lb (93.9 kg)    SpO2 92%    BMI 31.47 kg/m2       Physical Exam   Constitutional: She appears well-developed. No distress. HENT:   Right Ear: Tympanic membrane, external ear and ear canal normal.   Left Ear: Tympanic membrane, external ear and ear canal normal.   Mouth/Throat: Posterior oropharyngeal erythema present. Neck: Normal range of motion. Cardiovascular: Normal rate, regular rhythm and normal heart sounds. No murmur heard. Pulmonary/Chest: Effort normal and breath sounds normal. No respiratory distress. She has no wheezes. She has no rales. ASSESSMENT and PLAN    ICD-10-CM ICD-9-CM    1. Sore throat J02.9 462 AMB POC RAPID STREP A      AMB POC RAPID INFLUENZA TEST   2. Viral illness B34.9 079.99    3. Acute reaction to stress F43.0 308.9         Q strep: negative Pt aware of results  Rapid Flu test is negative. Pt is aware of results. Pt advised rest. We discussed that this can be a virus. Pt is to call with any concerns. We talked about all her stressors that are going on.  We spent greater than 30 minutes talking about how she feels.  Pt has a counselor appt tomorrow.,    Pt was given after visit summary.

## 2018-04-05 ENCOUNTER — OFFICE VISIT (OUTPATIENT)
Dept: FAMILY MEDICINE CLINIC | Age: 68
End: 2018-04-05

## 2018-04-05 VITALS
DIASTOLIC BLOOD PRESSURE: 74 MMHG | HEART RATE: 86 BPM | HEIGHT: 68 IN | OXYGEN SATURATION: 100 % | TEMPERATURE: 98.4 F | WEIGHT: 205.4 LBS | RESPIRATION RATE: 18 BRPM | BODY MASS INDEX: 31.13 KG/M2 | SYSTOLIC BLOOD PRESSURE: 126 MMHG

## 2018-04-05 DIAGNOSIS — B96.89 BV (BACTERIAL VAGINOSIS): ICD-10-CM

## 2018-04-05 DIAGNOSIS — N76.4 LABIAL ABSCESS: ICD-10-CM

## 2018-04-05 DIAGNOSIS — N30.01 ACUTE CYSTITIS WITH HEMATURIA: ICD-10-CM

## 2018-04-05 DIAGNOSIS — N76.0 BV (BACTERIAL VAGINOSIS): ICD-10-CM

## 2018-04-05 DIAGNOSIS — R10.9 ABDOMINAL PAIN, UNSPECIFIED ABDOMINAL LOCATION: Primary | ICD-10-CM

## 2018-04-05 LAB
BILIRUB UR QL STRIP: NEGATIVE
GLUCOSE UR-MCNC: NORMAL MG/DL
KETONES P FAST UR STRIP-MCNC: NORMAL MG/DL
PH UR STRIP: 5.5 [PH] (ref 4.6–8)
PROT UR QL STRIP: NEGATIVE
SP GR UR STRIP: 1.02 (ref 1–1.03)
UA UROBILINOGEN AMB POC: NORMAL (ref 0.2–1)
URINALYSIS CLARITY POC: NORMAL
URINALYSIS COLOR POC: NORMAL
URINE BLOOD POC: NORMAL
URINE LEUKOCYTES POC: NORMAL
URINE NITRITES POC: NEGATIVE
WET MOUNT POCT, WMPOCT: NORMAL

## 2018-04-05 RX ORDER — AMOXICILLIN 875 MG/1
875 TABLET, FILM COATED ORAL 2 TIMES DAILY
Qty: 20 TAB | Refills: 0 | Status: SHIPPED | OUTPATIENT
Start: 2018-04-05 | End: 2018-04-15

## 2018-04-05 RX ORDER — METRONIDAZOLE 500 MG/1
500 TABLET ORAL 2 TIMES DAILY
Qty: 14 TAB | Refills: 0 | Status: SHIPPED | OUTPATIENT
Start: 2018-04-05 | End: 2018-04-12

## 2018-04-05 NOTE — PATIENT INSTRUCTIONS
Bacterial Vaginosis: Care Instructions  Your Care Instructions    Bacterial vaginosis is a type of vaginal infection. It is caused by excess growth of certain bacteria that are normally found in the vagina. Symptoms can include itching, swelling, pain when you urinate or have sex, and a gray or yellow discharge with a \"fishy\" odor. It is not considered an infection that is spread through sexual contact. Although symptoms can be annoying and uncomfortable, bacterial vaginosis does not usually cause other health problems. However, if you have it while you are pregnant, it can cause complications. While the infection may go away on its own, most doctors use antibiotics to treat it. You may have been prescribed pills or vaginal cream. With treatment, bacterial vaginosis usually clears up in 5 to 7 days. Follow-up care is a key part of your treatment and safety. Be sure to make and go to all appointments, and call your doctor if you are having problems. It's also a good idea to know your test results and keep a list of the medicines you take. How can you care for yourself at home? · Take your antibiotics as directed. Do not stop taking them just because you feel better. You need to take the full course of antibiotics. · Do not eat or drink anything that contains alcohol if you are taking metronidazole (Flagyl). · Keep using your medicine if you start your period. Use pads instead of tampons while using a vaginal cream or suppository. Tampons can absorb the medicine. · Wear loose cotton clothing. Do not wear nylon and other materials that hold body heat and moisture close to the skin. · Do not scratch. Relieve itching with a cold pack or a cool bath. · Do not wash your vaginal area more than once a day. Use plain water or a mild, unscented soap. Do not douche. When should you call for help?   Watch closely for changes in your health, and be sure to contact your doctor if:  ? · You have unexpected vaginal bleeding. ? · You have a fever. ? · You have new or increased pain in your vagina or pelvis. ? · You are not getting better after 1 week. ? · Your symptoms return after you finish the course of your medicine. Where can you learn more? Go to http://bruno-alicia.info/. Lexy Dillon in the search box to learn more about \"Bacterial Vaginosis: Care Instructions. \"  Current as of: October 13, 2016  Content Version: 11.4  © 5536-9789 [employer]. Care instructions adapted under license by [employer] (which disclaims liability or warranty for this information). If you have questions about a medical condition or this instruction, always ask your healthcare professional. [address] any warranty or liability for your use of this information. Skin Abscess: Care Instructions  Your Care Instructions    A skin abscess is a bacterial infection that forms a pocket of pus. A boil is a kind of skin abscess. The doctor may have cut an opening in the abscess so that the pus can drain out. You may have gauze in the cut so that the abscess will stay open and keep draining. You may need antibiotics. You will need to follow up with your doctor to make sure the infection has gone away. The doctor has checked you carefully, but problems can develop later. If you notice any problems or new symptoms, get medical treatment right away. Follow-up care is a key part of your treatment and safety. Be sure to make and go to all appointments, and call your doctor if you are having problems. It's also a good idea to know your test results and keep a list of the medicines you take. How can you care for yourself at home? · Apply warm and dry compresses, a heating pad set on low, or a hot water bottle 3 or 4 times a day for pain. Keep a cloth between the heat source and your skin. · If your doctor prescribed antibiotics, take them as directed.  Do not stop taking them just because you feel better. You need to take the full course of antibiotics. · Take pain medicines exactly as directed. ¨ If the doctor gave you a prescription medicine for pain, take it as prescribed. ¨ If you are not taking a prescription pain medicine, ask your doctor if you can take an over-the-counter medicine. · Keep your bandage clean and dry. Change the bandage whenever it gets wet or dirty, or at least one time a day. · If the abscess was packed with gauze:  ¨ Keep follow-up appointments to have the gauze changed or removed. If the doctor instructed you to remove the gauze, gently pull out all of the gauze when your doctor tells you to. ¨ After the gauze is removed, soak the area in warm water for 15 to 20 minutes 2 times a day, until the wound closes. When should you call for help? Call your doctor now or seek immediate medical care if:  ? · You have signs of worsening infection, such as:  ¨ Increased pain, swelling, warmth, or redness. ¨ Red streaks leading from the infected skin. ¨ Pus draining from the wound. ¨ A fever. ? Watch closely for changes in your health, and be sure to contact your doctor if:  ? · You do not get better as expected. Where can you learn more? Go to http://bruno-alicia.info/. Enter J011 in the search box to learn more about \"Skin Abscess: Care Instructions. \"  Current as of: October 13, 2016  Content Version: 11.4  © 8420-1258 [employer]: [place]. Care instructions adapted under license by [employer] (which disclaims liability or warranty for this information). If you have questions about a medical condition or this instruction, always ask your healthcare professional. [place] [address] any warranty or liability for your use of this information.

## 2018-04-05 NOTE — PROGRESS NOTES
HISTORY OF PRESENT ILLNESS  Sunita Pacheco is a 74 y.o. female. Cyst   The history is provided by the patient. This is a new problem. Episode onset: Monday  The problem occurs constantly. The problem has not changed since onset. Pertinent negatives include no chest pain, no abdominal pain and no shortness of breath. Associated symptoms comments: Vaginal odor and area is bleeding . Nothing aggravates the symptoms. Relieved by: not wearing underpants. Treatments tried: shower water running on area. The treatment provided mild relief. Allergies   Allergen Reactions    Iodine Anaphylaxis    Bee Venom Protein (Honey Bee) Hives     Current Outpatient Prescriptions   Medication Sig Dispense Refill    metFORMIN (GLUCOPHAGE) 500 mg tablet Take 1 Tab by mouth two (2) times daily (with meals). 60 Tab 6    pantoprazole (PROTONIX) 40 mg tablet Take 1 Tab by mouth daily. 90 Tab 3    FLUoxetine (PROZAC) 20 mg tablet Take 2 Tabs by mouth daily. 180 Tab 3    lisinopril (PRINIVIL, ZESTRIL) 5 mg tablet Take 1 Tab by mouth daily. 90 Tab 1    atorvastatin (LIPITOR) 20 mg tablet Take 1 Tab by mouth daily. 90 Tab 1    LORazepam (ATIVAN) 1 mg tablet Take 1 Tab by mouth every eight (8) hours as needed for Anxiety. Max Daily Amount: 3 mg. 30 Tab 0    aspirin delayed-release 81 mg tablet Take  by mouth daily. Past Medical History:   Diagnosis Date    Abnormal nuclear cardiac imaging test 05/11/2015    Low risk. No ischemia or prior infarction. No RWMA. EF 68%. Neg EKG on pharm stress test.    Agatston CAC score, <100 05/15/2015    Coronary calcium score 70.  Diabetes ([facility] [address]     Diabetes mellitus ([facility] [address] [facility]     GERD (gastroesophageal reflux disease)     History of echocardiogram 02/06/2013    EF 65%. No RWMA. Mod LVH. Gr 1 DDfx. RVSP 25 mmHg.       HX OTHER MEDICAL     anxiety, \"mitral valve prolapse\"    Hyperlipidemia     Hypertension     Psychiatric disorder     anxiety    RLE venous duplex 05/06/2015 Right leg:  No DVT. Review of Systems   Constitutional: Negative for chills and fever. Respiratory: Negative for shortness of breath. Cardiovascular: Negative for chest pain. Gastrointestinal: Negative for abdominal pain. Genitourinary: Negative for dysuria, frequency and urgency. Left labia cyst     Results for orders placed or performed in visit on 04/05/18   AMB POC URINALYSIS DIP STICK AUTO W/O MICRO     Status: None   Result Value Ref Range Status    Color (UA POC) Light Yellow  Final    Clarity (UA POC) Slightly Cloudy  Final    Glucose (UA POC) 2+ Negative Final    Bilirubin (UA POC) Negative Negative Final    Ketones (UA POC) Trace Negative Final    Specific gravity (UA POC) 1.020 1.001 - 1.035 Final    Blood (UA POC) 2+ Negative Final    pH (UA POC) 5.5 4.6 - 8.0 Final    Protein (UA POC) Negative Negative Final    Urobilinogen (UA POC) 0.2 mg/dL 0.2 - 1 Final    Nitrites (UA POC) Negative Negative Final    Leukocyte esterase (UA POC) 1+ Negative Final     Visit Vitals    /74 (BP 1 Location: Right arm, BP Patient Position: Sitting)    Pulse 86    Temp 98.4 °F (36.9 °C) (Oral)    Resp 18    Ht 5' 8\" (1.727 m)    Wt 205 lb 6.4 oz (93.2 kg)    SpO2 100%    BMI 31.23 kg/m2     Physical Exam   Constitutional: She appears well-developed and well-nourished. Neck: Normal range of motion. Neck supple. Carotid bruit is not present. Cardiovascular: Normal rate, regular rhythm and normal heart sounds. Exam reveals no gallop and no friction rub. No murmur heard. Pulmonary/Chest: Effort normal and breath sounds normal. She has no wheezes. She has no rhonchi. She has no rales. Abdominal: Soft. Normal appearance and bowel sounds are normal. There is no tenderness. There is no CVA tenderness. Genitourinary: Pelvic exam was performed with patient supine. There is no rash, tenderness or lesion on the right labia.  There is tenderness and lesion (tender palpule ) on the left labia. There is no rash on the left labia. Vaginal discharge (white) found. Genitourinary Comments: Small pustulant papule to left labia majora with scant amount pustular drainage and surrounding erythema. Uterus/cervix surgically absent, vaginal cuff well healed   Skin: Skin is warm and dry. ASSESSMENT and PLAN    ICD-10-CM ICD-9-CM    1. Abdominal pain, unspecified abdominal location R10.9 789.00 AMB POC URINALYSIS DIP STICK AUTO W/O MICRO   2. Labial abscess N76.4 616.4 amoxicillin (AMOXIL) 875 mg tablet   3. Acute cystitis with hematuria N30.01 595.0 amoxicillin (AMOXIL) 875 mg tablet   4. BV (bacterial vaginosis) N76.0 616.10 metroNIDAZOLE (FLAGYL) 500 mg tablet    B96.89 041.9 AMB POC SMEAR, STAIN & INTERPRET, WET MOUNT   instructed may use warm compress to area 3-4 times per day in 20 min intervals  I have discussed the diagnosis with the patient and the intended plan as seen in the above orders. The patient has received an after-visit summary and questions were answered concerning future plans. I have discussed medication side effects and warnings with the patient as well. Patient agreeable with above plan and verbalizes understanding. Follow-up Disposition:  Return in about 4 weeks (around 5/3/2018) for HTN/HLD.

## 2018-04-05 NOTE — PROGRESS NOTES
Chief Complaint   Patient presents with    Cyst       Patient is here today for Cyst on vaginal lip. 1. Have you been to the ER, urgent care clinic since your last visit? Hospitalized since your last visit? No    2. Have you seen or consulted any other health care providers outside of the [address] [place] [address] since your last visit? Include any pap smears or colon screening.  No

## 2018-07-30 NOTE — PROGRESS NOTES
Received from ER via stretcher and assisted into 355. Pt admit for chest pain now pain free. 02 placed on at 2l nc. Tele=SR without ectopics. skin warm and dry without ulcers or sores. vital signs stable.  In no acute distress Is This A New Presentation, Or A Follow-Up?: Skin Lesion How Severe Is Your Skin Lesion?: mild Has Your Skin Lesion Been Treated?: not been treated

## 2018-07-31 ENCOUNTER — HOSPITAL ENCOUNTER (OUTPATIENT)
Dept: LAB | Age: 68
Discharge: HOME | End: 2018-07-31
Payer: MEDICARE

## 2018-07-31 ENCOUNTER — OFFICE VISIT (OUTPATIENT)
Dept: FAMILY MEDICINE CLINIC | Age: 68
End: 2018-07-31

## 2018-07-31 VITALS
HEIGHT: 68 IN | SYSTOLIC BLOOD PRESSURE: 142 MMHG | DIASTOLIC BLOOD PRESSURE: 89 MMHG | RESPIRATION RATE: 16 BRPM | WEIGHT: 201 LBS | HEART RATE: 85 BPM | TEMPERATURE: 98.3 F | BODY MASS INDEX: 30.46 KG/M2 | OXYGEN SATURATION: 99 %

## 2018-07-31 DIAGNOSIS — I10 ESSENTIAL HYPERTENSION: ICD-10-CM

## 2018-07-31 DIAGNOSIS — E78.2 MIXED HYPERLIPIDEMIA: ICD-10-CM

## 2018-07-31 DIAGNOSIS — F43.0 ACUTE REACTION TO STRESS: Primary | ICD-10-CM

## 2018-07-31 LAB
ALBUMIN SERPL-MCNC: 4.2 G/DL (ref 3.4–5)
ALBUMIN/GLOB SERPL: 1.4 {RATIO} (ref 0.8–1.7)
ALP SERPL-CCNC: 98 U/L (ref 45–117)
ALT SERPL-CCNC: 56 U/L (ref 13–56)
ANION GAP SERPL CALC-SCNC: 7 MMOL/L (ref 3–18)
AST SERPL-CCNC: 26 U/L (ref 15–37)
BILIRUB SERPL-MCNC: 0.6 MG/DL (ref 0.2–1)
BUN SERPL-MCNC: 20 MG/DL (ref 7–18)
BUN/CREAT SERPL: 18 (ref 12–20)
CALCIUM SERPL-MCNC: 10.5 MG/DL (ref 8.5–10.1)
CHLORIDE SERPL-SCNC: 101 MMOL/L (ref 100–108)
CHOLEST SERPL-MCNC: 191 MG/DL
CO2 SERPL-SCNC: 30 MMOL/L (ref 21–32)
CREAT SERPL-MCNC: 1.11 MG/DL (ref 0.6–1.3)
GLOBULIN SER CALC-MCNC: 3.1 G/DL (ref 2–4)
GLUCOSE SERPL-MCNC: 222 MG/DL (ref 74–99)
HDLC SERPL-MCNC: 53 MG/DL (ref 40–60)
HDLC SERPL: 3.6 {RATIO} (ref 0–5)
LDLC SERPL CALC-MCNC: 94.6 MG/DL (ref 0–100)
LIPID PROFILE,FLP: ABNORMAL
POTASSIUM SERPL-SCNC: 5.2 MMOL/L (ref 3.5–5.5)
PROT SERPL-MCNC: 7.3 G/DL (ref 6.4–8.2)
SODIUM SERPL-SCNC: 138 MMOL/L (ref 136–145)
TRIGL SERPL-MCNC: 217 MG/DL (ref ?–150)
VLDLC SERPL CALC-MCNC: 43.4 MG/DL

## 2018-07-31 PROCEDURE — 80061 LIPID PANEL: CPT | Performed by: NURSE PRACTITIONER

## 2018-07-31 PROCEDURE — 36415 COLL VENOUS BLD VENIPUNCTURE: CPT | Performed by: NURSE PRACTITIONER

## 2018-07-31 PROCEDURE — 80053 COMPREHEN METABOLIC PANEL: CPT | Performed by: NURSE PRACTITIONER

## 2018-07-31 RX ORDER — FLUOXETINE 20 MG/1
TABLET ORAL
Qty: 270 TAB | Refills: 1 | Status: SHIPPED | OUTPATIENT
Start: 2018-07-31 | End: 2020-10-30

## 2018-07-31 NOTE — MR AVS SNAPSHOT
[facility] 
 
 
 [address] [address] [address] [facility] [id number] [phone] Patient: Blu Morales 
MRN: [id number] Visit Information Date & Time Provider Department Dept. Phone Encounter #  
 7/31/2018  8:45 AM Milly Addison NP [facility] [phone] [id number] Follow-up Instructions Return in about 6 months (around 1/31/2019). Upcoming Health Maintenance Date Due COLONOSCOPY 8/1/1970 ZOSTER VACCINE AGE 60> 6/1/2012 GLAUCOMA SCREENING Q2Y 8/1/2017 Influenza Age 5 to Adult 10/4/2018* Pneumococcal 65+ Low/Medium Risk (2 of 2 - PPSV23) 8/23/2018 MEDICARE YEARLY EXAM 8/24/2018 BREAST CANCER SCRN MAMMOGRAM 9/7/2019 DTaP/Tdap/Td series (2 - Td) 12/3/2025 *Topic was postponed. The date shown is not the original due date. Allergies as of 7/31/2018  Review Complete On: 7/31/2018 By: Milly Addison NP Severity Noted Reaction Type Reactions Iodine High 02/05/2013    Anaphylaxis Bee Venom Protein (Honey Bee)  11/07/2017    Hives Current Immunizations  Reviewed on 8/23/2017 Name Date Pneumococcal Conjugate (PCV-13) 8/23/2017 Tdap 12/3/2015 Not reviewed this visit You Were Diagnosed With   
  
 Codes Comments Acute reaction to stress    -  Primary ICD-10-CM: F43.0 ICD-9-CM: 308.9 Mixed hyperlipidemia     ICD-10-CM: E78.2 ICD-9-CM: 272.2 Essential hypertension     ICD-10-CM: I10 
ICD-9-CM: 401.9 Vitals BP Pulse Temp Resp Height(growth percentile) Weight(growth percentile) 142/89 (BP 1 Location: Left arm, BP Patient Position: Sitting) 85 98.3 °F (36.8 °C) (Oral) 16 5' 8\" (1.727 m) 201 lb (91.2 kg) SpO2 BMI OB Status Smoking Status 99% 30.56 kg/m2 Hysterectomy Former Smoker BMI and BSA Data Body Mass Index Body Surface Area 30.56 kg/m 2 2.09 m 2 Preferred Pharmacy Pharm[facility] Name Phone [facility] [facility] [phone] Your Updated Medication List  
  
   
This list is accurate as of 7/31/18  9:33 AM.  Always use your most recent med list.  
  
  
  
  
 aspirin delayed-release 81 mg tablet Take  by mouth daily. atorvastatin 20 mg tablet Commonly known as:  LIPITOR  
TAKE ONE TABLET BY MOUTH DAILY FLUoxetine 20 mg tablet Commonly known as:  PROzac Take 3 tablets qd  
  
 lisinopril 5 mg tablet Commonly known as:  Gallegos Edman Take 1 Tab by mouth daily. LORazepam 1 mg tablet Commonly known as:  ATIVAN Take 1 Tab by mouth every eight (8) hours as needed for Anxiety. Max Daily Amount: 3 mg.  
  
 metFORMIN 500 mg tablet Commonly known as:  GLUCOPHAGE Take 1 Tab by mouth two (2) times daily (with meals). pantoprazole 40 mg tablet Commonly known as:  PROTONIX Take 1 Tab by mouth daily. Prescriptions Sent to Pharmacy Refills FLUoxetine (PROZAC) 20 mg tablet 1 Sig: Take 3 tablets qd Class: Normal  
 Pharmacy: [facility] [address] [place] [address] #: [phone] Follow-up Instructions Return in about 6 months (around 1/31/2019). To-Do List   
 07/31/2018 Lab:  LIPID PANEL   
  
 07/31/2018 Lab:  METABOLIC PANEL, COMPREHENSIVE [facility] SERVICES! Vicki Norwood introduces [employer] patient portal. Now you can access parts of your medical record, email your doctor's office, and request medication refills online. 1. In your internet browser, go to [email or web address] 2. Click on the First Time User? Click Here link in the Sign In box. You will see the New Member Sign Up page. 3. Enter your Next[employer] Access Code exactly as it appears below. You will not need to use this code after youve completed the sign-up process.  If you do not sign up before the expiration date, you must request a new code. 
 
· [employer] Access Code: [email or web address] Expires: 10/29/2018  8:55 AM 
 
4. Enter the last four digits of your Social Security Number (xxxx) and Date of Birth (mm/dd/yyyy) as indicated and click Submit. You will be taken to the next sign-up page. 5. Create a [employer] ID. This will be your [employer] login ID and cannot be changed, so think of one that is secure and easy to remember. 6. Create a [employer] password. You can change your password at any time. 7. Enter your Password Reset Question and Answer. This can be used at a later time if you forget your password. 8. Enter your e-mail address. You will receive e-mail notification when new information is available in [address] 9. Click Sign Up. You can now view and download portions of your medical record. 10. Click the Download Summary menu link to download a portable copy of your medical information. If you have questions, please visit the Frequently Asked Questions section of the [employer] website. Remember, [employer] is NOT to be used for urgent needs. For medical emergencies, dial 911. Now available from your iPhone and Android! Please provide this summary of care documentation to your next provider. Your primary care clinician is listed as Myles Guevara. If you have any questions after today's visit, please call [phone].

## 2018-07-31 NOTE — PROGRESS NOTES
HISTORY OF PRESENT ILLNESS  Jonah Capps is a 74 y.o. female. Patient presents for chronic care. HPI  Pt was wondering if she can take a higher dose of her Prozac, she does not want to add anything or switch to anything else. She states she feels short tempered. Review of Systems   Constitutional: Negative. Respiratory: Negative. Cardiovascular: Negative. Psychiatric/Behavioral: Positive for depression. Visit Vitals    /89 (BP 1 Location: Left arm, BP Patient Position: Sitting)    Pulse 85    Temp 98.3 °F (36.8 °C) (Oral)    Resp 16    Ht 5' 8\" (1.727 m)    Wt 201 lb (91.2 kg)    SpO2 99%    BMI 30.56 kg/m2       Physical Exam   Constitutional: She is oriented to person, place, and time. She appears well-developed. No distress. Cardiovascular: Normal rate, regular rhythm and normal heart sounds. No murmur heard. Pulmonary/Chest: Effort normal and breath sounds normal. No respiratory distress. She has no wheezes. She has no rales. Neurological: She is alert and oriented to person, place, and time. No cranial nerve deficit. Psychiatric: She has a normal mood and affect. Her behavior is normal. Judgment and thought content normal.       ASSESSMENT and PLAN    ICD-10-CM ICD-9-CM    1. Acute reaction to stress F43.0 308.9 FLUoxetine (PROZAC) 20 mg tablet   2. Mixed hyperlipidemia E78.2 272.2 LIPID PANEL   3. Essential hypertension X39 899.2 METABOLIC PANEL, COMPREHENSIVE     PLAN:  We reviewed her medications and decided to increase her prozac from 40mg to 60mg    Pt will call when she need refills on her other medications. Pt asked to RTC in 6 months for chronic care. Pt given after visit summary.

## 2018-07-31 NOTE — PROGRESS NOTES
Chief Complaint   Patient presents with    Follow-up     1. Have you been to the ER, urgent care clinic since your last visit? Hospitalized since your last visit? No    2. Have you seen or consulted any other health care providers outside of the [facility] since your last visit? Include any pap smears or colon screening.  No

## 2018-08-01 NOTE — PROGRESS NOTES
Please advise Pt that her glucose is too high at 222, please ask Pt to make an OV to discuss treatment for diabetes. Advise her to cut down on carbs and eat smaller portions. Her triglycerides are elevated at 217. Remind her to eat more fish and foods rich in Niacin.

## 2018-08-23 ENCOUNTER — DOCUMENTATION ONLY (OUTPATIENT)
Dept: FAMILY MEDICINE CLINIC | Age: 68
End: 2018-08-23

## 2018-08-23 NOTE — PROGRESS NOTES
Patient arrived on time for scheduled office visit, during the check in process patient became irate because she did not want to update her yearly paperwork. Patient stated that \" She has been coming here for years, nothing has changed and that she will not be updating anything. \" Patient was informed that we have to update these forms yearly. Patient still stated that Catheline Fothergill will not update anything and that I better not put that she will not update paperwork in her chart. \" Patient was asked to have a seat in the waiting area and became even angrier. Patient stated that \" she does not know how long I have been here in this office but she was just seen three weeks ago and no one ever asks her to verify information or update anything. \" Patient began using profanity towards me, started to approach the counter divider and stated \" I will come across this ( insert profanity) counter and slap you.  Do not test me!!.\"

## 2018-08-23 NOTE — PROGRESS NOTES
I was asked to speak to patient at  due to she would not complete paperwork. Patient was asked to to come to the desk and patient was informed that we do have certain things that we have to have filled out yearly. Patient states she was here 3 weeks ago and she did not have to fill out any forms. Patient was informed that she may not have been due yet as they are yearly. I informed patient that they were pretty much just updated signatures and one was a consent to treat and file insurance that we would have to have signed. She again states nothing has changed and she did not like questions she was being asked. I showed patient what she she would need to sign. Patient states she will find another provider. And patient walked out.

## 2018-09-03 ENCOUNTER — HOSPITAL ENCOUNTER (EMERGENCY)
Age: 68
Discharge: HOME | End: 2018-09-03
Attending: EMERGENCY MEDICINE
Payer: MEDICARE

## 2018-09-03 ENCOUNTER — APPOINTMENT (OUTPATIENT)
Dept: CT IMAGING | Age: 68
End: 2018-09-03
Attending: EMERGENCY MEDICINE
Payer: MEDICARE

## 2018-09-03 VITALS
DIASTOLIC BLOOD PRESSURE: 86 MMHG | SYSTOLIC BLOOD PRESSURE: 167 MMHG | RESPIRATION RATE: 18 BRPM | OXYGEN SATURATION: 98 % | HEART RATE: 81 BPM | BODY MASS INDEX: 29.92 KG/M2 | HEIGHT: 69 IN | TEMPERATURE: 98.9 F | WEIGHT: 202 LBS

## 2018-09-03 DIAGNOSIS — N12 PYELONEPHRITIS: Primary | ICD-10-CM

## 2018-09-03 LAB
ALBUMIN SERPL-MCNC: 3.9 G/DL (ref 3.4–5)
ALBUMIN/GLOB SERPL: 1.2 {RATIO} (ref 0.8–1.7)
ALP SERPL-CCNC: 89 U/L (ref 45–117)
ALT SERPL-CCNC: 48 U/L (ref 13–56)
ANION GAP SERPL CALC-SCNC: 10 MMOL/L (ref 3–18)
APPEARANCE UR: CLEAR
AST SERPL-CCNC: 19 U/L (ref 15–37)
BACTERIA URNS QL MICRO: ABNORMAL /HPF
BASOPHILS # BLD: 0 K/UL (ref 0–0.1)
BASOPHILS NFR BLD: 0 % (ref 0–2)
BILIRUB SERPL-MCNC: 0.6 MG/DL (ref 0.2–1)
BILIRUB UR QL: NEGATIVE
BUN SERPL-MCNC: 15 MG/DL (ref 7–18)
BUN/CREAT SERPL: 15 (ref 12–20)
CALCIUM SERPL-MCNC: 9.6 MG/DL (ref 8.5–10.1)
CHLORIDE SERPL-SCNC: 103 MMOL/L (ref 100–108)
CO2 SERPL-SCNC: 25 MMOL/L (ref 21–32)
COLOR UR: YELLOW
CREAT SERPL-MCNC: 1.01 MG/DL (ref 0.6–1.3)
DIFFERENTIAL METHOD BLD: NORMAL
EOSINOPHIL # BLD: 0.1 K/UL (ref 0–0.4)
EOSINOPHIL NFR BLD: 1 % (ref 0–5)
EPITH CASTS URNS QL MICRO: ABNORMAL /LPF (ref 0–5)
ERYTHROCYTE [DISTWIDTH] IN BLOOD BY AUTOMATED COUNT: 12.2 % (ref 11.6–14.5)
GLOBULIN SER CALC-MCNC: 3.2 G/DL (ref 2–4)
GLUCOSE SERPL-MCNC: 131 MG/DL (ref 74–99)
GLUCOSE UR STRIP.AUTO-MCNC: NEGATIVE MG/DL
HCT VFR BLD AUTO: 43.5 % (ref 35–45)
HGB BLD-MCNC: 15.1 G/DL (ref 12–16)
HGB UR QL STRIP: NEGATIVE
KETONES UR QL STRIP.AUTO: NEGATIVE MG/DL
LEUKOCYTE ESTERASE UR QL STRIP.AUTO: ABNORMAL
LIPASE SERPL-CCNC: 147 U/L (ref 73–393)
LYMPHOCYTES # BLD: 2.6 K/UL (ref 0.9–3.6)
LYMPHOCYTES NFR BLD: 33 % (ref 21–52)
MCH RBC QN AUTO: 29.5 PG (ref 24–34)
MCHC RBC AUTO-ENTMCNC: 34.7 G/DL (ref 31–37)
MCV RBC AUTO: 85 FL (ref 74–97)
MONOCYTES # BLD: 0.5 K/UL (ref 0.05–1.2)
MONOCYTES NFR BLD: 6 % (ref 3–10)
NEUTS SEG # BLD: 4.8 K/UL (ref 1.8–8)
NEUTS SEG NFR BLD: 60 % (ref 40–73)
NITRITE UR QL STRIP.AUTO: NEGATIVE
PH UR STRIP: 5 [PH] (ref 5–8)
PLATELET # BLD AUTO: 224 K/UL (ref 135–420)
PMV BLD AUTO: 9.3 FL (ref 9.2–11.8)
POTASSIUM SERPL-SCNC: 4.1 MMOL/L (ref 3.5–5.5)
PROT SERPL-MCNC: 7.1 G/DL (ref 6.4–8.2)
PROT UR STRIP-MCNC: NEGATIVE MG/DL
RBC # BLD AUTO: 5.12 M/UL (ref 4.2–5.3)
RBC #/AREA URNS HPF: ABNORMAL /HPF (ref 0–5)
SODIUM SERPL-SCNC: 138 MMOL/L (ref 136–145)
SP GR UR REFRACTOMETRY: 1.01 (ref 1–1.03)
UROBILINOGEN UR QL STRIP.AUTO: 0.2 EU/DL (ref 0.2–1)
WBC # BLD AUTO: 8 K/UL (ref 4.6–13.2)
WBC URNS QL MICRO: ABNORMAL /HPF (ref 0–4)

## 2018-09-03 PROCEDURE — 99284 EMERGENCY DEPT VISIT MOD MDM: CPT

## 2018-09-03 PROCEDURE — 80053 COMPREHEN METABOLIC PANEL: CPT | Performed by: EMERGENCY MEDICINE

## 2018-09-03 PROCEDURE — 74176 CT ABD & PELVIS W/O CONTRAST: CPT

## 2018-09-03 PROCEDURE — 74011250637 HC RX REV CODE- 250/637: Performed by: EMERGENCY MEDICINE

## 2018-09-03 PROCEDURE — 81001 URINALYSIS AUTO W/SCOPE: CPT | Performed by: EMERGENCY MEDICINE

## 2018-09-03 PROCEDURE — 83690 ASSAY OF LIPASE: CPT | Performed by: EMERGENCY MEDICINE

## 2018-09-03 PROCEDURE — 85025 COMPLETE CBC W/AUTO DIFF WBC: CPT | Performed by: EMERGENCY MEDICINE

## 2018-09-03 RX ORDER — CEFPODOXIME PROXETIL 200 MG/1
200 TABLET, FILM COATED ORAL
Status: COMPLETED | OUTPATIENT
Start: 2018-09-03 | End: 2018-09-03

## 2018-09-03 RX ORDER — CEFPODOXIME PROXETIL 200 MG/1
200 TABLET, FILM COATED ORAL 2 TIMES DAILY
Qty: 20 TAB | Refills: 0 | Status: SHIPPED | OUTPATIENT
Start: 2018-09-03 | End: 2018-09-13

## 2018-09-03 RX ADMIN — CEFPODOXIME PROXETIL 200 MG: 200 TABLET, FILM COATED ORAL at 19:39

## 2018-09-03 NOTE — ED PROVIDER NOTES
EMERGENCY DEPARTMENT HISTORY AND PHYSICAL EXAM 
 
5:51 PM 
 
 
Date: 9/3/2018 Patient Name: Jj Abdullahi 
 
History of Presenting Illness Chief Complaint Patient presents with  Abdominal Pain  
  RLQ abd  Flank Pain R flank  Nausea History Provided By: Patient Chief Complaint: Abdominal pain Duration: 1 Weeks Timing:  Constant Location: RLQ Quality: Aching Severity: Moderate Modifying Factors: None Associated Symptoms: right side flank pain, fatigue, and nausea Additional History (Context): Jj Abdullahi is a 79 y.o. female with PMHx of HTN, anxiety, diabetes, and GERD who presents with c/o moderate constant aching abdominal pain that started 1 week ago with associated Sx of right sided flank pain, fatigue,and nausea. Pt states she was evaluated at [facility] and they advised that she come to the ED because she needed a CAT scan and they could only perform an X-ray. Pt is a diabetic and states her sugar has been running in the 220's. Pt states she still has her appendix. Pt is deathly allergic to Iodine per the pt. Pt states she thinks she has a fever because 98.9 is a fever for her. Pt denies cough, SOB, and rash. Denies any further complaints or symptoms at the moment. PCP: None Current Outpatient Prescriptions Medication Sig Dispense Refill  cefpodoxime (VANTIN) 200 mg tablet Take 1 Tab by mouth two (2) times a day for 10 days. 20 Tab 0  
 FLUoxetine (PROZAC) 20 mg tablet Take 3 tablets qd 270 Tab 1  
 atorvastatin (LIPITOR) 20 mg tablet TAKE ONE TABLET BY MOUTH DAILY 30 Tab 0  
 metFORMIN (GLUCOPHAGE) 500 mg tablet Take 1 Tab by mouth two (2) times daily (with meals). 60 Tab 6  pantoprazole (PROTONIX) 40 mg tablet Take 1 Tab by mouth daily. 90 Tab 3  
 lisinopril (PRINIVIL, ZESTRIL) 5 mg tablet Take 1 Tab by mouth daily.  90 Tab 1  
 LORazepam (ATIVAN) 1 mg tablet Take 1 Tab by mouth every eight (8) hours as needed for Anxiety. Max Daily Amount: 3 mg. 30 Tab 0  
 aspirin delayed-release 81 mg tablet Take  by mouth daily. Past History Past Medical History: 
Past Medical History:  
Diagnosis Date  Abnormal nuclear cardiac imaging test 05/11/2015 Low risk. No ischemia or prior infarction. No RWMA. EF 68%. Neg EKG on pharm stress test.  
 Agatston CAC score, <100 05/15/2015 Coronary calcium score 70.  Diabetes ([facility]  Diabetes mellitus ([facility]  GERD (gastroesophageal reflux disease)  History of echocardiogram 02/06/2013 EF 65%. No RWMA. Mod LVH. Gr 1 DDfx. RVSP 25 mmHg.  HX OTHER MEDICAL   
 anxiety, \"mitral valve prolapse\"  Hyperlipidemia  Hypertension  Psychiatric disorder   
 anxiety  RLE venous duplex 05/06/2015 Right leg:  No DVT. Past Surgical History: 
Past Surgical History:  
Procedure Laterality Date  ABDOMEN SURGERY PROC UNLISTED  1999  
 tumor removal  
 HX CHOLECYSTECTOMY [address]  
 hysterectomy  HX OTHER SURGICAL    
 FATTY TISSUE REMOVED  HX TONSILLECTOMY Family History: 
Family History Problem Relation Age of Onset  Heart Disease Father  Heart Attack Father  Heart Disease Paternal Grandmother  Heart Disease Paternal Grandfather  Breast Cancer Mother  Cancer Mother Social History: 
Social History Substance Use Topics  Smoking status: Former Smoker Packs/day: 1.00 Years: 30.00 Types: Cigarettes Quit date: 10/19/2012  Smokeless tobacco: Former User  Alcohol use No  
   Comment: occasional  
 
 
Allergies: Allergies Allergen Reactions  Iodine Anaphylaxis  Bee Venom Protein (Honey Bee) Hives Review of Systems Review of Systems Gastrointestinal: Positive for abdominal pain (RLQ). Genitourinary: Positive for flank pain. All other systems reviewed and are negative.  
 
 
 
Physical Exam  
 
 Patient Vitals for the past 12 hrs: 
 Temp Pulse Resp BP SpO2  
09/03/18 1735 98.9 °F (37.2 °C) 81 18 167/86 98 % Physical Exam  
Constitutional: She is oriented to person, place, and time. She appears well-developed. HENT:  
Head: Normocephalic and atraumatic. Eyes: Conjunctivae and EOM are normal.  
Neck: Normal range of motion. Cardiovascular: Normal heart sounds. Exam reveals no gallop and no friction rub. No murmur heard. Pulmonary/Chest: Effort normal and breath sounds normal. No stridor. Abdominal: Soft. There is tenderness in the right lower quadrant. Musculoskeletal: Normal range of motion. She exhibits no tenderness. Right CVA TTP Neurological: She is alert and oriented to person, place, and time. Skin: Skin is warm and dry. She is not diaphoretic. Psychiatric: She has a normal mood and affect. Her behavior is normal.  
Nursing note and vitals reviewed. Diagnostic Study Results Labs - Recent Results (from the past 12 hour(s)) URINALYSIS W/ RFLX MICROSCOPIC Collection Time: 09/03/18  5:40 PM  
Result Value Ref Range Color YELLOW Appearance CLEAR Specific gravity 1.014 1.005 - 1.030    
 pH (UA) 5.0 5.0 - 8.0 Protein NEGATIVE  NEG mg/dL Glucose NEGATIVE  NEG mg/dL Ketone NEGATIVE  NEG mg/dL Bilirubin NEGATIVE  NEG Blood NEGATIVE  NEG Urobilinogen 0.2 0.2 - 1.0 EU/dL Nitrites NEGATIVE  NEG Leukocyte Esterase MODERATE (A) NEG URINE MICROSCOPIC ONLY Collection Time: 09/03/18  5:40 PM  
Result Value Ref Range WBC 20 to 30 0 - 4 /hpf  
 RBC 0 to 2 0 - 5 /hpf Epithelial cells 2+ 0 - 5 /lpf Bacteria 1+ (A) NEG /hpf  
CBC WITH AUTOMATED DIFF Collection Time: 09/03/18  6:12 PM  
Result Value Ref Range WBC 8.0 4.6 - 13.2 K/uL  
 RBC 5.12 4.20 - 5.30 M/uL  
 HGB 15.1 12.0 - 16.0 g/dL HCT 43.5 35.0 - 45.0 % MCV 85.0 74.0 - 97.0 FL  
 MCH 29.5 24.0 - 34.0 PG  
 MCHC 34.7 31.0 - 37.0 g/dL RDW 12.2 11.6 - 14.5 % PLATELET 579 756 - 484 K/uL MPV 9.3 9.2 - 11.8 FL  
 NEUTROPHILS 60 40 - 73 % LYMPHOCYTES 33 21 - 52 % MONOCYTES 6 3 - 10 % EOSINOPHILS 1 0 - 5 % BASOPHILS 0 0 - 2 %  
 ABS. NEUTROPHILS 4.8 1.8 - 8.0 K/UL  
 ABS. LYMPHOCYTES 2.6 0.9 - 3.6 K/UL  
 ABS. MONOCYTES 0.5 0.05 - 1.2 K/UL  
 ABS. EOSINOPHILS 0.1 0.0 - 0.4 K/UL  
 ABS. BASOPHILS 0.0 0.0 - 0.1 K/UL  
 DF AUTOMATED METABOLIC PANEL, COMPREHENSIVE Collection Time: 09/03/18  6:12 PM  
Result Value Ref Range Sodium 138 136 - 145 mmol/L Potassium 4.1 3.5 - 5.5 mmol/L Chloride 103 100 - 108 mmol/L  
 CO2 25 21 - 32 mmol/L Anion gap 10 3.0 - 18 mmol/L Glucose 131 (H) 74 - 99 mg/dL BUN 15 7.0 - 18 MG/DL Creatinine 1.01 0.6 - 1.3 MG/DL  
 BUN/Creatinine ratio 15 12 - 20 GFR est AA >60 >60 ml/min/1.73m2 GFR est non-AA 55 (L) >60 ml/min/1.73m2 Calcium 9.6 8.5 - 10.1 MG/DL Bilirubin, total 0.6 0.2 - 1.0 MG/DL  
 ALT (SGPT) 48 13 - 56 U/L  
 AST (SGOT) 19 15 - 37 U/L Alk. phosphatase 89 45 - 117 U/L Protein, total 7.1 6.4 - 8.2 g/dL Albumin 3.9 3.4 - 5.0 g/dL Globulin 3.2 2.0 - 4.0 g/dL A-G Ratio 1.2 0.8 - 1.7 LIPASE Collection Time: 09/03/18  6:12 PM  
Result Value Ref Range Lipase 147 73 - 393 U/L Radiologic Studies -  
CT ABD PELV WO CONT Final Result Impression:   
  IMPRESSION: 1.   No acute pathology in the abdomen or pelvis. 2.  Diverticulosis without evidence of diverticulitis. Fatty infiltration of the liver with likely cyst in the left lobe. If clinical 
concern, consider ultrasound for further evaluation. Medical Decision Making Provider Notes (Medical Decision Making): Pt presents with R CVA tenderness that wraps to her suprapubic area.  She is unable to tolerate contrast in any form so we obtained a CT non A/P to eval for pyelo, appy, divertix, other intra ab infection,etc. CT was neg for any acute process but UA was pos for UTI. Given her R CVA tenderness- likely pyelo, so treated here with vantin PO which she tolerated well. Upon re eval- her pain is controlled and she has no nausea, and is ambulating well without assistance. Plan to dc home for close PMD follow up with PFM for her incidental findings as well. -They verbally convey understanding and agreement of the signs, symptoms, diagnosis, treatment and prognosis and additionally agree to follow up as discussed. They understand that there are limitations to any evaluation, and that should the symptoms worsen or change that they need to return for further evaluation. All questions were answered, and we reviewed pertinent return precautions as seen in the discharge paperwork. Pt understood follow up instructions, and would return to the ED if any worsening or concerns. Madison Willett MD  8:14 PM 
 
 
I am the first provider for this patient. I reviewed the vital signs, available nursing notes, past medical history, past surgical history, family history and social history. Vital Signs-Reviewed the patient's vital signs. Pulse Oximetry Analysis -  98% on room air (Interpretation) normal 
 
Records Reviewed: Nursing Notes (Time of Review: 5:51 PM) Diagnosis Clinical Impression: 1. Pyelonephritis Disposition: DC home Follow-up Information Follow up With Details Comments Contact Info [facility] In 2 days For follow up [id number] [facility] 
[phone] [facility] EMERGENCY DEPT  If symptoms worsen [address] [place] [address] [address] Patient's Medications Start Taking CEFPODOXIME (VANTIN) 200 MG TABLET    Take 1 Tab by mouth two (2) times a day for 10 days. Continue Taking ASPIRIN DELAYED-RELEASE 81 MG TABLET    Take  by mouth daily. ATORVASTATIN (LIPITOR) 20 MG TABLET    TAKE ONE TABLET BY MOUTH DAILY FLUOXETINE (PROZAC) 20 MG TABLET    Take 3 tablets qd LISINOPRIL (PRINIVIL, ZESTRIL) 5 MG TABLET    Take 1 Tab by mouth daily. LORAZEPAM (ATIVAN) 1 MG TABLET    Take 1 Tab by mouth every eight (8) hours as needed for Anxiety. Max Daily Amount: 3 mg. METFORMIN (GLUCOPHAGE) 500 MG TABLET    Take 1 Tab by mouth two (2) times daily (with meals). PANTOPRAZOLE (PROTONIX) 40 MG TABLET    Take 1 Tab by mouth daily. These Medications have changed No medications on file Stop Taking No medications on file  
 
_______________________________ Attestations: 
Scribe Attestation Corine Cunningham acting as a scribe for and in the presence of Arcadio Odonnell MD     
September 03, 2018 at 5:51 PM 
    
Provider Attestation:     
I personally performed the services described in the documentation, reviewed the documentation, as recorded by the scribe in my presence, and it accurately and completely records my words and actions. September 03, 2018 at 5:51 PM - rAcadio Odonnell MD   
_______________________________

## 2018-09-04 RX ORDER — ATORVASTATIN CALCIUM 20 MG/1
TABLET, FILM COATED ORAL
Qty: 30 TAB | Refills: 0 | Status: SHIPPED | OUTPATIENT
Start: 2018-09-04

## 2018-09-04 NOTE — DISCHARGE INSTRUCTIONS
Kidney Infection: Care Instructions  Your Care Instructions    A kidney infection (pyelonephritis) is a type of urinary tract infection, or UTI. Most UTIs are bladder infections. Kidney infections tend to make people much sicker than bladder infections do. A kidney infection is also more serious because it can cause lasting damage if it is not treated quickly. Follow-up care is a key part of your treatment and safety. Be sure to make and go to all appointments, and call your doctor if you are having problems. It's also a good idea to know your test results and keep a list of the medicines you take. How can you care for yourself at home? · Take your antibiotics as directed. Do not stop taking them just because you feel better. You need to take the full course of antibiotics. · Drink plenty of water, enough so that your urine is light yellow or clear like water. This may help wash out bacteria that are causing the infection. If you have kidney, heart, or liver disease and have to limit fluids, talk with your doctor before you increase the amount of fluids you drink. · Urinate often. Try to empty your bladder each time. · To relieve pain, take a hot shower or lay a heating pad (set on low) over your lower belly. Never go to sleep with a heating pad in place. Put a thin cloth between the heating pad and your skin. To help prevent kidney infections  · Drink plenty of water each day. This helps you urinate often, which clears bacteria from your system. If you have kidney, heart, or liver disease and have to limit fluids, talk with your doctor before you increase the amount of fluids you drink. · Urinate when you have the urge. Do not hold your urine for a long time. Urinate before you go to sleep. · If you have symptoms of a bladder infection, such as burning when you urinate or having to urinate often, call your doctor so you can treat the problem before it gets worse.  If you do not treat a bladder infection quickly, it can spread to the kidney. · Men should keep the tip of the penis clean. If you are a woman, keep these ideas in mind:  · Urinate right after you have sex. · Change sanitary pads often. Avoid douches, feminine hygiene sprays, and other feminine hygiene products that have deodorants. · After going to the bathroom, wipe from front to back. When should you call for help? Call your doctor now or seek immediate medical care if:    · You have symptoms that a kidney infection is getting worse. These may include:  ¨ Pain or burning when you urinate. ¨ A frequent need to urinate without being able to pass much urine. ¨ Pain in the flank, which is just below the rib cage and above the waist on either side of the back. ¨ Blood in the urine. ¨ A fever.     · You are vomiting or nauseated.    Watch closely for changes in your health, and be sure to contact your doctor if:    · You do not get better as expected. Where can you learn more? Go to http://bruno-alicia.info/. Enter Y568 in the search box to learn more about \"Kidney Infection: Care Instructions. \"  Current as of: May 12, 2017  Content Version: 11.7  © 0157-8773 [employer]. Care instructions adapted under license by [employer] (which disclaims liability or warranty for this information). If you have questions about a medical condition or this instruction, always ask your healthcare professional. [place] [address] any warranty or liability for your use of this information.

## 2018-10-03 ENCOUNTER — OFFICE VISIT (OUTPATIENT)
Dept: PULMONOLOGY | Age: 68
End: 2018-10-03

## 2018-10-03 VITALS
TEMPERATURE: 98.5 F | OXYGEN SATURATION: 96 % | DIASTOLIC BLOOD PRESSURE: 80 MMHG | BODY MASS INDEX: 29.77 KG/M2 | HEIGHT: 69 IN | HEART RATE: 80 BPM | WEIGHT: 201 LBS | SYSTOLIC BLOOD PRESSURE: 120 MMHG | RESPIRATION RATE: 16 BRPM

## 2018-10-03 DIAGNOSIS — R06.00 DYSPNEA, UNSPECIFIED TYPE: Primary | ICD-10-CM

## 2018-10-03 NOTE — PATIENT INSTRUCTIONS
Continue exercising by walking without stopping starting at 3-1/2 minutes to 5 minutes up and then 3-1/2-5 minutes back and gradually increase the time you walk.   To receive conditioning benefit you should walk at least 4 times a week

## 2018-10-03 NOTE — PROGRESS NOTES
[facility]  Pulmonary, Critical Care, and Sleep Medicine      Chief complaint:  Dyspnea    HPI:    Shilpi Mares    is 79years old returns the office today for follow-up concerning dyspnea and relates she still has dyspnea at times but is usually associated with anxiety. She says she is doing well however and is active and denies having a problematic cough or chest pain. She also denies leg swelling      Allergies   Allergen Reactions    Iodine Anaphylaxis    Bee Venom Protein (Honey Bee) Hives     Current Outpatient Prescriptions   Medication Sig    atorvastatin (LIPITOR) 20 mg tablet TAKE ONE TABLET BY MOUTH DAILY    FLUoxetine (PROZAC) 20 mg tablet Take 3 tablets qd    metFORMIN (GLUCOPHAGE) 500 mg tablet Take 1 Tab by mouth two (2) times daily (with meals).  pantoprazole (PROTONIX) 40 mg tablet Take 1 Tab by mouth daily.  lisinopril (PRINIVIL, ZESTRIL) 5 mg tablet Take 1 Tab by mouth daily.  aspirin delayed-release 81 mg tablet Take  by mouth daily.  LORazepam (ATIVAN) 1 mg tablet Take 1 Tab by mouth every eight (8) hours as needed for Anxiety. Max Daily Amount: 3 mg. No current facility-administered medications for this visit. Past Medical History:   Diagnosis Date    Abnormal nuclear cardiac imaging test 05/11/2015    Low risk. No ischemia or prior infarction. No RWMA. EF 68%. Neg EKG on pharm stress test.    Agatston CAC score, <100 05/15/2015    Coronary calcium score 70.  Diabetes ([facility]     Diabetes mellitus ([facility]     GERD (gastroesophageal reflux disease)     History of echocardiogram 02/06/2013    EF 65%. No RWMA. Mod LVH. Gr 1 DDfx. RVSP 25 mmHg.  HX OTHER MEDICAL     anxiety, \"mitral valve prolapse\"    Hyperlipidemia     Hypertension     Psychiatric disorder     anxiety    RLE venous duplex 05/06/2015    Right leg:  No DVT.        Past Surgical History:   Procedure Laterality Date   [facility] tumor removal    HX CHOLECYSTECTOMY      HX GYN  1997    hysterectomy    HX OTHER SURGICAL      FATTY TISSUE REMOVED    HX TONSILLECTOMY       Social History     Social History    Marital status:      Spouse name: N/A    Number of children: N/A    Years of education: N/A     Occupational History    Not on file.      Social History Main Topics    Smoking status: Former Smoker     Packs/day: 1.00     Years: 30.00     Types: Cigarettes     Quit date: 10/19/2012    Smokeless tobacco: Former User    Alcohol use No      Comment: occasional    Drug use: No    Sexual activity: Yes     Partners: Male     Birth control/ protection: Surgical     Other Topics Concern    Caffeine Concern Yes     coffee daily 8oz    Sleep Concern No    Stress Concern No    Weight Concern Yes     increased weight    Exercise No    Seat Belt Yes     Social History Narrative     Family History   Problem Relation Age of Onset    Heart Disease Father     Heart Attack Father     Heart Disease Paternal Grandmother     Heart Disease Paternal Grandfather     Breast Cancer Mother     Cancer Mother        Review of systems:  She reports no fever chills poor appetite or weight loss but earlier she broke her collarbone and this has healed    Physical Exam:  Visit Vitals    /80 (BP 1 Location: Left arm, BP Patient Position: Sitting)    Pulse 80    Temp 98.5 °F (36.9 °C) (Oral)    Resp 16    Ht 5' 9\" (1.753 m)    Wt 91.2 kg (201 lb)    SpO2 96%  Comment: RA Rest    BMI 29.68 kg/m2       Well-developed well-nourished  HEENT: WNL  Exam: Supraclavicular cervical lymph nodes negative  Chest: Equal symmetrical expansion no dullness no wheezes rales rubs  Heart: Regular rhythm no gallop no murmur no JVD no peripheral edema  Extremities: No cyanosis or clubbing  Neurological: Alert and oriented    Labs:    O2 sat room air at rest 96%    Impression:     No evidence of significant cardiopulmonary disease from history physical exam    Plan:     Encourage the patient to exercise regularly  Follow-up on as needed basis    Deepa Yang MD , [facility]    CC: None     [address] [facility]  [facility], [address] [address]     P: [phone]     F: [phone]

## 2018-10-03 NOTE — PROGRESS NOTES
Ms. Rosario Winston has a reminder for a \"due or due soon\" health maintenance. I have asked that she contact her primary care provider for follow-up on this health maintenance. Chief Complaint   Patient presents with    Follow-up     1. Have you been to the ER, urgent care clinic since your last visit? Hospitalized since your last visit? No    2. Have you seen or consulted any other health care providers outside of the [address] [place] [address] since your last visit? Include any pap smears or colon screening.  No

## 2018-11-29 ENCOUNTER — HOSPITAL ENCOUNTER (OUTPATIENT)
Dept: MAMMOGRAPHY | Age: 68
Discharge: HOME | End: 2018-11-29
Attending: FAMILY MEDICINE
Payer: MEDICARE

## 2018-11-29 DIAGNOSIS — Z12.39 SCREENING BREAST EXAMINATION: ICD-10-CM

## 2018-11-29 PROCEDURE — 77067 SCR MAMMO BI INCL CAD: CPT

## 2018-12-09 RX ORDER — PANTOPRAZOLE SODIUM 40 MG/1
TABLET, DELAYED RELEASE ORAL
Qty: 90 TAB | Refills: 2 | Status: SHIPPED | OUTPATIENT
Start: 2018-12-09

## 2018-12-09 RX ORDER — FLUOXETINE HYDROCHLORIDE 20 MG/1
CAPSULE ORAL
Qty: 180 CAP | Refills: 2 | Status: SHIPPED | OUTPATIENT
Start: 2018-12-09 | End: 2019-03-06

## 2019-01-07 ENCOUNTER — HOSPITAL ENCOUNTER (OUTPATIENT)
Dept: VASCULAR SURGERY | Age: 69
Discharge: HOME | End: 2019-01-07
Attending: PODIATRIST
Payer: MEDICARE

## 2019-01-07 ENCOUNTER — HOSPITAL ENCOUNTER (OUTPATIENT)
Dept: ULTRASOUND IMAGING | Age: 69
Discharge: HOME | End: 2019-01-07
Attending: PODIATRIST
Payer: MEDICARE

## 2019-01-07 DIAGNOSIS — D49.2 TUMOR OF SOFT TISSUES: ICD-10-CM

## 2019-01-07 DIAGNOSIS — E11.51 TYPE II DIABETES MELLITUS WITH PERIPHERAL CIRCULATORY DISORDER (HCC): ICD-10-CM

## 2019-01-07 LAB
LEFT ABI: 1.1
LEFT ANTERIOR TIBIAL: 124 MMHG
LEFT ARM BP: 124 MMHG
LEFT CALF PRESSURE: 134 MMHG
LEFT POSTERIOR TIBIAL: 141 MMHG
RIGHT ABI: 1.09
RIGHT ANTERIOR TIBIAL: 126 MMHG
RIGHT ARM BP: 128 MMHG
RIGHT CALF PRESSURE: 133 MMHG
RIGHT POSTERIOR TIBIAL: 139 MMHG

## 2019-01-07 PROCEDURE — 76882 US LMTD JT/FCL EVL NVASC XTR: CPT

## 2019-01-07 PROCEDURE — 93923 UPR/LXTR ART STDY 3+ LVLS: CPT

## 2019-03-01 ENCOUNTER — HOSPITAL ENCOUNTER (OUTPATIENT)
Dept: PREADMISSION TESTING | Age: 69
Discharge: HOME | End: 2019-03-01
Payer: MEDICARE

## 2019-03-01 ENCOUNTER — HOSPITAL ENCOUNTER (OUTPATIENT)
Dept: GENERAL RADIOLOGY | Age: 69
Discharge: HOME | End: 2019-03-01
Payer: MEDICARE

## 2019-03-01 DIAGNOSIS — Z01.818 PRE-OP TESTING: ICD-10-CM

## 2019-03-01 DIAGNOSIS — D49.9 TUMOR: ICD-10-CM

## 2019-03-01 PROCEDURE — 71046 X-RAY EXAM CHEST 2 VIEWS: CPT

## 2019-03-01 PROCEDURE — 93005 ELECTROCARDIOGRAM TRACING: CPT

## 2019-03-03 LAB
ATRIAL RATE: 72 BPM
CALCULATED P AXIS, ECG09: 25 DEGREES
CALCULATED R AXIS, ECG10: -46 DEGREES
CALCULATED T AXIS, ECG11: 62 DEGREES
DIAGNOSIS, 93000: NORMAL
P-R INTERVAL, ECG05: 132 MS
Q-T INTERVAL, ECG07: 384 MS
QRS DURATION, ECG06: 84 MS
QTC CALCULATION (BEZET), ECG08: 420 MS
VENTRICULAR RATE, ECG03: 72 BPM

## 2019-03-06 RX ORDER — METFORMIN HYDROCHLORIDE 500 MG/1
500 TABLET ORAL EVERY EVENING
Status: ON HOLD | COMMUNITY
End: 2020-11-13

## 2019-03-08 ENCOUNTER — ANESTHESIA EVENT (OUTPATIENT)
Dept: SURGERY | Age: 69
End: 2019-03-08
Payer: MEDICARE

## 2019-03-11 ENCOUNTER — ANESTHESIA (OUTPATIENT)
Dept: SURGERY | Age: 69
End: 2019-03-11
Payer: MEDICARE

## 2019-03-11 ENCOUNTER — HOSPITAL ENCOUNTER (OUTPATIENT)
Age: 69
Setting detail: OUTPATIENT SURGERY
Discharge: HOME | End: 2019-03-11
Attending: PODIATRIST | Admitting: PODIATRIST
Payer: MEDICARE

## 2019-03-11 VITALS
SYSTOLIC BLOOD PRESSURE: 128 MMHG | OXYGEN SATURATION: 94 % | HEART RATE: 64 BPM | WEIGHT: 199.38 LBS | DIASTOLIC BLOOD PRESSURE: 79 MMHG | HEIGHT: 69 IN | TEMPERATURE: 97.1 F | BODY MASS INDEX: 29.53 KG/M2 | RESPIRATION RATE: 14 BRPM

## 2019-03-11 DIAGNOSIS — M79.671 RIGHT FOOT PAIN: Primary | ICD-10-CM

## 2019-03-11 LAB
GLUCOSE BLD STRIP.AUTO-MCNC: 129 MG/DL (ref 70–110)
GLUCOSE BLD STRIP.AUTO-MCNC: 163 MG/DL (ref 70–110)
GLUCOSE BLD STRIP.AUTO-MCNC: 176 MG/DL (ref 70–110)

## 2019-03-11 PROCEDURE — 74011250637 HC RX REV CODE- 250/637: Performed by: NURSE ANESTHETIST, CERTIFIED REGISTERED

## 2019-03-11 PROCEDURE — 76010000138 HC OR TIME 0.5 TO 1 HR: Performed by: PODIATRIST

## 2019-03-11 PROCEDURE — 77030031139 HC SUT VCRL2 J&J -A: Performed by: PODIATRIST

## 2019-03-11 PROCEDURE — 77030020782 HC GWN BAIR PAWS FLX 3M -B: Performed by: PODIATRIST

## 2019-03-11 PROCEDURE — 74011250636 HC RX REV CODE- 250/636: Performed by: PODIATRIST

## 2019-03-11 PROCEDURE — 74011250636 HC RX REV CODE- 250/636: Performed by: NURSE ANESTHETIST, CERTIFIED REGISTERED

## 2019-03-11 PROCEDURE — 77030032490 HC SLV COMPR SCD KNE COVD -B: Performed by: PODIATRIST

## 2019-03-11 PROCEDURE — 88304 TISSUE EXAM BY PATHOLOGIST: CPT

## 2019-03-11 PROCEDURE — 77030036687 HC SHOE PSTOP S2SG -A: Performed by: PODIATRIST

## 2019-03-11 PROCEDURE — 74011636637 HC RX REV CODE- 636/637: Performed by: NURSE ANESTHETIST, CERTIFIED REGISTERED

## 2019-03-11 PROCEDURE — 77030011265 HC ELECTRD BLD HEX COVD -A: Performed by: PODIATRIST

## 2019-03-11 PROCEDURE — 76060000032 HC ANESTHESIA 0.5 TO 1 HR: Performed by: PODIATRIST

## 2019-03-11 PROCEDURE — 77030020753 HC CUF TRNQT 1BLA STRY -B: Performed by: PODIATRIST

## 2019-03-11 PROCEDURE — 74011250636 HC RX REV CODE- 250/636

## 2019-03-11 PROCEDURE — 82962 GLUCOSE BLOOD TEST: CPT

## 2019-03-11 PROCEDURE — 77030011247 HC DRN WND KT TLS STRY -B: Performed by: PODIATRIST

## 2019-03-11 PROCEDURE — 76210000020 HC REC RM PH II FIRST 0.5 HR: Performed by: PODIATRIST

## 2019-03-11 PROCEDURE — 76210000006 HC OR PH I REC 0.5 TO 1 HR: Performed by: PODIATRIST

## 2019-03-11 RX ORDER — OXYCODONE AND ACETAMINOPHEN 5; 325 MG/1; MG/1
1 TABLET ORAL
Qty: 17 TAB | Refills: 0 | Status: SHIPPED | OUTPATIENT
Start: 2019-03-11 | End: 2019-03-14

## 2019-03-11 RX ORDER — SODIUM CHLORIDE, SODIUM LACTATE, POTASSIUM CHLORIDE, CALCIUM CHLORIDE 600; 310; 30; 20 MG/100ML; MG/100ML; MG/100ML; MG/100ML
75 INJECTION, SOLUTION INTRAVENOUS CONTINUOUS
Status: DISCONTINUED | OUTPATIENT
Start: 2019-03-12 | End: 2019-03-11 | Stop reason: HOSPADM

## 2019-03-11 RX ORDER — SODIUM CHLORIDE, SODIUM LACTATE, POTASSIUM CHLORIDE, CALCIUM CHLORIDE 600; 310; 30; 20 MG/100ML; MG/100ML; MG/100ML; MG/100ML
75 INJECTION, SOLUTION INTRAVENOUS CONTINUOUS
Status: DISCONTINUED | OUTPATIENT
Start: 2019-03-11 | End: 2019-03-11 | Stop reason: HOSPADM

## 2019-03-11 RX ORDER — NALOXONE HYDROCHLORIDE 0.4 MG/ML
0.2 INJECTION, SOLUTION INTRAMUSCULAR; INTRAVENOUS; SUBCUTANEOUS AS NEEDED
Status: DISCONTINUED | OUTPATIENT
Start: 2019-03-11 | End: 2019-03-11 | Stop reason: HOSPADM

## 2019-03-11 RX ORDER — SODIUM CHLORIDE 0.9 % (FLUSH) 0.9 %
5-40 SYRINGE (ML) INJECTION EVERY 8 HOURS
Status: DISCONTINUED | OUTPATIENT
Start: 2019-03-11 | End: 2019-03-11 | Stop reason: HOSPADM

## 2019-03-11 RX ORDER — ONDANSETRON 2 MG/ML
INJECTION INTRAMUSCULAR; INTRAVENOUS AS NEEDED
Status: DISCONTINUED | OUTPATIENT
Start: 2019-03-11 | End: 2019-03-11 | Stop reason: HOSPADM

## 2019-03-11 RX ORDER — HUMIDIFIER
EACH MISCELLANEOUS
Qty: 2 EACH | Refills: 0 | Status: SHIPPED | OUTPATIENT
Start: 2019-03-11 | End: 2020-11-16

## 2019-03-11 RX ORDER — MIDAZOLAM HYDROCHLORIDE 1 MG/ML
INJECTION, SOLUTION INTRAMUSCULAR; INTRAVENOUS AS NEEDED
Status: DISCONTINUED | OUTPATIENT
Start: 2019-03-11 | End: 2019-03-11 | Stop reason: HOSPADM

## 2019-03-11 RX ORDER — LIDOCAINE HYDROCHLORIDE 20 MG/ML
INJECTION, SOLUTION INFILTRATION; PERINEURAL AS NEEDED
Status: DISCONTINUED | OUTPATIENT
Start: 2019-03-11 | End: 2019-03-11 | Stop reason: HOSPADM

## 2019-03-11 RX ORDER — CEFAZOLIN SODIUM 2 G/50ML
2 SOLUTION INTRAVENOUS ONCE
Status: COMPLETED | OUTPATIENT
Start: 2019-03-11 | End: 2019-03-11

## 2019-03-11 RX ORDER — SODIUM CHLORIDE 0.9 % (FLUSH) 0.9 %
5-40 SYRINGE (ML) INJECTION AS NEEDED
Status: DISCONTINUED | OUTPATIENT
Start: 2019-03-11 | End: 2019-03-11 | Stop reason: HOSPADM

## 2019-03-11 RX ORDER — FAMOTIDINE 20 MG/1
20 TABLET, FILM COATED ORAL ONCE
Status: COMPLETED | OUTPATIENT
Start: 2019-03-11 | End: 2019-03-11

## 2019-03-11 RX ORDER — FENTANYL CITRATE 50 UG/ML
INJECTION, SOLUTION INTRAMUSCULAR; INTRAVENOUS AS NEEDED
Status: DISCONTINUED | OUTPATIENT
Start: 2019-03-11 | End: 2019-03-11 | Stop reason: HOSPADM

## 2019-03-11 RX ORDER — PROPOFOL 10 MG/ML
INJECTION, EMULSION INTRAVENOUS
Status: DISCONTINUED | OUTPATIENT
Start: 2019-03-11 | End: 2019-03-11 | Stop reason: HOSPADM

## 2019-03-11 RX ORDER — HYDROMORPHONE HYDROCHLORIDE 2 MG/ML
0.5 INJECTION, SOLUTION INTRAMUSCULAR; INTRAVENOUS; SUBCUTANEOUS
Status: DISCONTINUED | OUTPATIENT
Start: 2019-03-11 | End: 2019-03-11 | Stop reason: HOSPADM

## 2019-03-11 RX ORDER — INSULIN LISPRO 100 [IU]/ML
INJECTION, SOLUTION INTRAVENOUS; SUBCUTANEOUS ONCE
Status: COMPLETED | OUTPATIENT
Start: 2019-03-11 | End: 2019-03-11

## 2019-03-11 RX ORDER — PROPOFOL 10 MG/ML
INJECTION, EMULSION INTRAVENOUS AS NEEDED
Status: DISCONTINUED | OUTPATIENT
Start: 2019-03-11 | End: 2019-03-11 | Stop reason: HOSPADM

## 2019-03-11 RX ADMIN — CEFAZOLIN SODIUM 2 G: 2 SOLUTION INTRAVENOUS at 12:29

## 2019-03-11 RX ADMIN — ONDANSETRON 4 MG: 2 INJECTION INTRAMUSCULAR; INTRAVENOUS at 12:29

## 2019-03-11 RX ADMIN — FENTANYL CITRATE 50 MCG: 50 INJECTION, SOLUTION INTRAMUSCULAR; INTRAVENOUS at 13:02

## 2019-03-11 RX ADMIN — FAMOTIDINE 20 MG: 20 TABLET ORAL at 10:23

## 2019-03-11 RX ADMIN — FENTANYL CITRATE 50 MCG: 50 INJECTION, SOLUTION INTRAMUSCULAR; INTRAVENOUS at 12:37

## 2019-03-11 RX ADMIN — PROPOFOL 30 MG: 10 INJECTION, EMULSION INTRAVENOUS at 12:39

## 2019-03-11 RX ADMIN — MIDAZOLAM HYDROCHLORIDE 2 MG: 1 INJECTION, SOLUTION INTRAMUSCULAR; INTRAVENOUS at 12:29

## 2019-03-11 RX ADMIN — PROPOFOL 75 MCG/KG/MIN: 10 INJECTION, EMULSION INTRAVENOUS at 12:37

## 2019-03-11 RX ADMIN — INSULIN LISPRO 3 UNITS: 100 INJECTION, SOLUTION INTRAVENOUS; SUBCUTANEOUS at 11:30

## 2019-03-11 RX ADMIN — PROPOFOL 30 MG: 10 INJECTION, EMULSION INTRAVENOUS at 12:41

## 2019-03-11 RX ADMIN — SODIUM CHLORIDE, SODIUM LACTATE, POTASSIUM CHLORIDE, AND CALCIUM CHLORIDE 75 ML/HR: 600; 310; 30; 20 INJECTION, SOLUTION INTRAVENOUS at 10:23

## 2019-03-11 NOTE — DISCHARGE INSTRUCTIONS
Heel Pain: Care Instructions  Your Care Instructions  You can have heel pain from an injury or from everyday overuse, such as running or walking a lot. Plantar fasciitis is the most common cause of heel pain. In this condition, the bottom of your foot from the front of the heel to the base of the toes is sore and hard to walk on. Your heel can get better with rest, anti-inflammatory pain medicines, and stretching exercises. Follow-up care is a key part of your treatment and safety. Be sure to make and go to all appointments, and call your doctor if you are having problems. It's also a good idea to know your test results and keep a list of the medicines you take. How can you care for yourself at home? · Rest your feet often. Reduce your activity to a level that lets you avoid pain. If possible, do not run or walk on hard surfaces. · Take anti-inflammatory medicines to reduce heel pain. These include ibuprofen (Advil, Motrin) and naproxen (Aleve). Read and follow all instructions on the label. · Put ice or a cold pack on your heel for 10 to 20 minutes at a time. Try to do this every 1 to 2 hours for the next 3 days (when you are awake). Put a thin cloth between the ice and your skin. · If ice isn't helping after 2 or 3 days, try heat, such as a heating pad set on low. · If your doctor says it is okay, try these calf stretches. Tight calf muscles can cause heel pain or make it worse. ? Stand about 1 foot from a wall. Place the palms of both hands against the wall at chest level and lean forward against the wall. Put the leg you want to stretch about a step behind your other leg. Keep your back heel on the floor and bend your front knee until you feel a stretch in the back leg. Hold this position for 15 to 30 seconds. Repeat the exercise 2 to 4 times a session. Do 3 to 4 sessions a day. ? Sit down on the floor or a mat with your feet stretched in front of you.  Roll up a towel lengthwise, and loop it over the ball of your foot. Holding the towel at both ends, gently pull the towel toward you to stretch your foot. Hold this position for 15 to 30 seconds. Repeat the exercise 2 to 4 times a session. Do 3 to 4 sessions a day. · Wear a night splint if your doctor suggests it. A night splint holds your foot with the toes pointed up. This position gives the bottom of your foot a constant, gentle stretch. · Wear shoes with good arch support. Athletic shoes or shoes with a well-cushioned sole are good choices. · Try a heel lift, heel cup or shoe insert (orthotic) to help cushion your heel. You can buy these at many shoe stores. Use them in both shoes, even if only one foot hurts. · Maintain a healthy weight. This puts less strain on your feet. When should you call for help? Call your doctor now or seek immediate medical care if:    · You have heel pain with fever, redness, or warmth in your heel.     · You have numbness or tingling in your heel.    Watch closely for changes in your health, and be sure to contact your doctor if:    · You cannot put weight on the sore foot.     · Your heel pain lasts more than 2 weeks. Where can you learn more? Go to http://bruno-alicia.info/. Enter S299 in the search box to learn more about \"Heel Pain: Care Instructions. \"  Current as of: September 23, 2018  Content Version: 11.9  © 6403-7576 [employer]. Care instructions adapted under license by [employer] (which disclaims liability or warranty for this information). If you have questions about a medical condition or this instruction, always ask your healthcare professional. [place] [address] any warranty or liability for your use of this information. Skin Lesion Removal: What to Expect at Home  Your Recovery  After your procedure, you should not have much pain.  But some soreness, swelling, or bruising is normal. Your doctor may recommend over-the-counter medicines to help with any discomfort. Most people can return to their normal routine the same day of their procedure. How quickly your wound heals depends on the size of your wound and the type of procedure you had. Most wounds take 1 to 3 weeks to heal. If you had laser surgery, your skin may change color and then slowly return to its normal color. You may need only a bandage, or you may need stitches. If you had stitches, your doctor will probably remove them 5 to 14 days later. If you have the type of stitches that dissolve, they do not have to be removed. They will disappear on their own. This care sheet gives you a general idea about how long it will take for you to recover. But each person recovers at a different pace. Follow the steps below to get better as quickly as possible. How can you care for yourself at home? Activity    · For the first few days, try not to bump or knock your wound.     · Depending on where your wound is, you may need to avoid strenuous exercise for 2 weeks after the procedure or until your doctor says it is okay.     · If you have had a lesion removed from your face, do not use makeup near your wound until you have your stitches taken out.     · Ask your doctor when it is okay to shower, bathe, or swim. Medicines    · Your doctor will tell you if and when you can restart your medicines. He or she will also give you instructions about taking any new medicines.     · If you take blood thinners, such as warfarin (Coumadin), clopidogrel (Plavix), or aspirin, be sure to talk to your doctor. He or she will tell you if and when to start taking those medicines again. Make sure that you understand exactly what your doctor wants you to do.     · Be safe with medicines. Take pain medicines exactly as directed. ? If the doctor gave you a prescription medicine for pain, take it as prescribed.   ? If you are not taking a prescription pain medicine, ask your doctor if you can take an over-the-counter medicine.    Wound care    · If your doctor told you how to care for your incision, follow your doctor's instructions. If you did not get instructions, follow this general advice:  ? Keep the wound bandaged and dry for the first day. ? After the first 24 to 48 hours, wash around the wound with clean water 2 times a day. Don't use hydrogen peroxide or alcohol, which can slow healing. ? You may cover the wound with a thin layer of petroleum jelly, such as Vaseline, and a nonstick bandage. ? Apply more petroleum jelly and replace the bandage as needed.     · If you have stitches, you may get other instructions.     · If a scab forms, do not pull it off. Let it fall off on its own. Wounds heal faster if no scab forms. Washing the area every day and using petroleum jelly will help prevent a scab from forming.     · If the wound bleeds, put direct pressure on it with a clean cloth until the bleeding stops.     · If you had a growth \"frozen\" off, you may get a blister. Do not break it. Let it dry up on its own. It is common for the blister to fill with blood. You do not need to do anything about this, but if it becomes too painful, call your doctor.     · Avoid the sun until your stitches are removed. Follow-up care is a key part of your treatment and safety. Be sure to make and go to all appointments, and call your doctor if you are having problems. It's also a good idea to know your test results and keep a list of the medicines you take. When should you call for help? Call 911 anytime you think you may need emergency care.  For example, call if:    · You passed out (lost consciousness).     · You have severe trouble breathing.     · You have sudden chest pain and shortness of breath, or you cough up blood.    Call your doctor now or seek immediate medical care if:    · You have symptoms of a blood clot in your leg (called a deep vein thrombosis), such as:  ? Pain in the calf, back of the knee, thigh, or groin.  ? Redness and swelling in your leg or groin.     · You have signs of infection, such as:  ? Increased pain, swelling, warmth, or redness. ? Red streaks leading from the wound. ? Pus draining from the wound. ? A fever.     · You have pain that does not get better after you take pain medicine.     · You have loose stitches.    Watch closely for changes in your health, and be sure to contact your doctor if you have any problems. Where can you learn more? Go to http://bruno-alicia.info/. Enter Q228 in the search box to learn more about \"Skin Lesion Removal: What to Expect at Home. \"  Current as of: April 17, 2018  Content Version: 11.9  © 4064-8211 [employer]. Care instructions adapted under license by [employer] (which disclaims liability or warranty for this information). If you have questions about a medical condition or this instruction, always ask your healthcare professional. [place] [address] any warranty or liability for your use of this information. DISCHARGE SUMMARY from Nurse    PATIENT INSTRUCTIONS:    After general anesthesia or intravenous sedation, for 24 hours or while taking prescription Narcotics:  · Limit your activities  · Do not drive and operate hazardous machinery  · Do not make important personal or business decisions  · Do  not drink alcoholic beverages  · If you have not urinated within 8 hours after discharge, please contact your surgeon on call. Report the following to your surgeon:  · Excessive pain, swelling, redness or odor of or around the surgical area  · Temperature over 100.5  · Nausea and vomiting lasting longer than 4 hours or if unable to take medications  · Any signs of decreased circulation or nerve impairment to extremity: change in color, persistent  numbness, tingling, coldness or increase pain  · Any questions    *  Please give a list of your current medications to your Primary Care Provider.     * Please update this list whenever your medications are discontinued, doses are      changed, or new medications (including over-the-counter products) are added. *  Please carry medication information at all times in case of emergency situations. These are general instructions for a healthy lifestyle:    No smoking/ No tobacco products/ Avoid exposure to second hand smoke  Surgeon General's Warning:  Quitting smoking now greatly reduces serious risk to your health. Obesity, smoking, and sedentary lifestyle greatly increases your risk for illness    A healthy diet, regular physical exercise & weight monitoring are important for maintaining a healthy lifestyle    You may be retaining fluid if you have a history of heart failure or if you experience any of the following symptoms:  Weight gain of 3 pounds or more overnight or 5 pounds in a week, increased swelling in our hands or feet or shortness of breath while lying flat in bed. Please call your doctor as soon as you notice any of these symptoms; do not wait until your next office visit. Recognize signs and symptoms of STROKE:    F-face looks uneven    A-arms unable to move or move unevenly    S-speech slurred or non-existent    T-time-call 911 as soon as signs and symptoms begin-DO NOT go       Back to bed or wait to see if you get better-TIME IS BRAIN. Warning Signs of HEART ATTACK     Call 911 if you have these symptoms:   Chest discomfort. Most heart attacks involve discomfort in the center of the chest that lasts more than a few minutes, or that goes away and comes back. It can feel like uncomfortable pressure, squeezing, fullness, or pain.  Discomfort in other areas of the upper body. Symptoms can include pain or discomfort in one or both arms, the back, neck, jaw, or stomach.  Shortness of breath with or without chest discomfort.  Other signs may include breaking out in a cold sweat, nausea, or lightheadedness.   Don't wait more than five minutes to call 911 - MINUTES MATTER! Fast action can save your life. Calling 911 is almost always the fastest way to get lifesaving treatment. Emergency Medical Services staff can begin treatment when they arrive -- up to an hour sooner than if someone gets to the hospital by car. The discharge information has been reviewed with the patient and spouse. The patient and spouse verbalized understanding. Discharge medications reviewed with the patient and spouse and appropriate educational materials and side effects teaching were provided.   ___________________________________________________________________________________________________________________________________

## 2019-03-11 NOTE — ANESTHESIA POSTPROCEDURE EVALUATION
Procedure(s):  REMOVAL TUMOR/GROWTH RIGHT HEEL.     Anesthesia Post Evaluation      Multimodal analgesia: multimodal analgesia used between 6 hours prior to anesthesia start to PACU discharge  Patient location during evaluation: bedside  Patient participation: complete - patient participated  Level of consciousness: awake  Pain management: adequate  Airway patency: patent  Anesthetic complications: no  Cardiovascular status: stable  Respiratory status: acceptable  Hydration status: acceptable  Post anesthesia nausea and vomiting:  controlled      Visit Vitals  /79 (BP Patient Position: At rest)   Pulse 64   Temp 36.2 °C (97.1 °F)   Resp 14   Ht 5' 9\" (1.753 m)   Wt 90.4 kg (199 lb 6 oz)   SpO2 94%   BMI 29.44 kg/m²

## 2019-03-11 NOTE — BRIEF OP NOTE
BRIEF OPERATIVE NOTE    Date of Procedure: 3/11/2019   Preoperative Diagnosis: D49.2  Postoperative Diagnosis: D49.2    Procedure(s):  REMOVAL TUMOR/GROWTH RIGHT HEEL  Surgeon(s) and Role:     Charles Bermudez DPM - Primary         Surgical Assistant: Maikel Bates    Surgical Staff:  Circ-1: Gerda Carpenter RN  Scrub Tech-1: Clarita Roger  Surg Asst-1: Sima Reveal  Event Time In Time Out   Incision Start 1247    Incision Close       Anesthesia: MAC   Estimated Blood Loss: 0  Specimens:   ID Type Source Tests Collected by Time Destination   1 : RIGHT HEEL TUMOR Preservative Foot, Right  [facility] 1251 Pathology      Findings: tumor   Complications: none  Implants: * No implants in log *

## 2019-03-11 NOTE — H&P
H&P Update:  Dagmar Ferris was seen and examined. History and physical has been reviewed. The patient has been examined.  There have been no significant clinical changes since the completion of the originally dated History and Physical.    Signed By: Deepak Banda DPM     March 11, 2019 11:53 AM

## 2019-03-11 NOTE — OP NOTES
[address] [place] [address]   OPERATIVE REPORT    Name:  Kalpesh Shaver  MR#:   [id number]  :  1950  ACCOUNT #:  [de-identified]  DATE OF SERVICE:  2019    PREOPERATIVE DIAGNOSIS:  Tumor, right heel. POSTOPERATIVE DIAGNOSIS:  Tumor, right heel. PROCEDURE PERFORMED:  Excisional biopsy of subcutaneous tumor, 1.5 cm x 1 cm. SURGEON:  Susan Ruiz DPM    ASSISTANT:  _____    ANESTHESIA:  MAC.    COMPLICATIONS:  _____    SPECIMENS REMOVED:  _____    IMPLANTS:  _____    ESTIMATED BLOOD LOSS:  _____    PROCEDURE:  On 2019, the patient was placed on the operating room table in supine position. After adequate induction of MAC anesthesia, the right lower extremity was prepped and draped in the usual sterile fashion. Attention was directed to the right medial heel. There was a soft nodule subcutaneously and 2 semi-elliptical incisions were accomplished exposing the superficial fascia, then blunt dissection to the deeper fascia was accomplished, and then in the subcutaneous tissue, there was a well-encapsulated yellowish lipoma like lesion which was excised in toto and sent for pathology. Wound was inspected. No further lesion was noted. Wound was thoroughly irrigated. Small blood vessels were bovied as necessary. 3-0 Prolene closure and a compressive dressing. The patient tolerated the procedure and anesthesia well with vital signs stable throughout. The patient was transported to the recovery room in stable condition.         Halima Young DPM      PG/[id number]/  D:  2019 13:13  T:  2019 15:45  JOB #:  [id number]  CC:  Susan Ruiz DPM

## 2019-03-11 NOTE — ANESTHESIA PREPROCEDURE EVALUATION
Anesthetic History   No history of anesthetic complications            Review of Systems / Medical History  Patient summary reviewed and pertinent labs reviewed    Pulmonary  Within defined limits                 Neuro/Psych              Cardiovascular    Hypertension                   GI/Hepatic/Renal     GERD           Endo/Other    Diabetes: type 2         Other Findings              Physical Exam    Airway  Mallampati: II  TM Distance: 4 - 6 cm  Neck ROM: normal range of motion   Mouth opening: Normal     Cardiovascular    Rhythm: regular  Rate: normal         Dental    Dentition: Poor dentition     Pulmonary  Breath sounds clear to auscultation               Abdominal  GI exam deferred       Other Findings            Anesthetic Plan    ASA: 2  Anesthesia type: MAC and general - backup            Anesthetic plan and risks discussed with: Patient

## 2019-04-20 ENCOUNTER — HOSPITAL ENCOUNTER (EMERGENCY)
Age: 69
Discharge: HOME | End: 2019-04-20
Attending: EMERGENCY MEDICINE
Payer: MEDICARE

## 2019-04-20 VITALS
SYSTOLIC BLOOD PRESSURE: 128 MMHG | DIASTOLIC BLOOD PRESSURE: 87 MMHG | OXYGEN SATURATION: 100 % | RESPIRATION RATE: 16 BRPM | HEART RATE: 79 BPM | TEMPERATURE: 97.3 F

## 2019-04-20 DIAGNOSIS — R11.2 NON-INTRACTABLE VOMITING WITH NAUSEA, UNSPECIFIED VOMITING TYPE: Primary | ICD-10-CM

## 2019-04-20 DIAGNOSIS — R19.7 DIARRHEA, UNSPECIFIED TYPE: ICD-10-CM

## 2019-04-20 LAB
ALBUMIN SERPL-MCNC: 4 G/DL (ref 3.4–5)
ALBUMIN/GLOB SERPL: 1.3 {RATIO} (ref 0.8–1.7)
ALP SERPL-CCNC: 88 U/L (ref 45–117)
ALT SERPL-CCNC: 68 U/L (ref 13–56)
ANION GAP SERPL CALC-SCNC: 5 MMOL/L (ref 3–18)
AST SERPL-CCNC: 32 U/L (ref 15–37)
BASOPHILS # BLD: 0 K/UL (ref 0–0.1)
BASOPHILS NFR BLD: 0 % (ref 0–2)
BILIRUB SERPL-MCNC: 0.4 MG/DL (ref 0.2–1)
BUN SERPL-MCNC: 11 MG/DL (ref 7–18)
BUN/CREAT SERPL: 11 (ref 12–20)
CALCIUM SERPL-MCNC: 10.4 MG/DL (ref 8.5–10.1)
CHLORIDE SERPL-SCNC: 106 MMOL/L (ref 100–108)
CO2 SERPL-SCNC: 27 MMOL/L (ref 21–32)
CREAT SERPL-MCNC: 1 MG/DL (ref 0.6–1.3)
DIFFERENTIAL METHOD BLD: NORMAL
EOSINOPHIL # BLD: 0.1 K/UL (ref 0–0.4)
EOSINOPHIL NFR BLD: 1 % (ref 0–5)
ERYTHROCYTE [DISTWIDTH] IN BLOOD BY AUTOMATED COUNT: 12.9 % (ref 11.6–14.5)
GLOBULIN SER CALC-MCNC: 3.1 G/DL (ref 2–4)
GLUCOSE SERPL-MCNC: 142 MG/DL (ref 74–99)
HCT VFR BLD AUTO: 41.9 % (ref 35–45)
HGB BLD-MCNC: 14.6 G/DL (ref 12–16)
LIPASE SERPL-CCNC: 140 U/L (ref 73–393)
LYMPHOCYTES # BLD: 2.5 K/UL (ref 0.9–3.6)
LYMPHOCYTES NFR BLD: 36 % (ref 21–52)
MCH RBC QN AUTO: 29 PG (ref 24–34)
MCHC RBC AUTO-ENTMCNC: 34.8 G/DL (ref 31–37)
MCV RBC AUTO: 83.3 FL (ref 74–97)
MONOCYTES # BLD: 0.4 K/UL (ref 0.05–1.2)
MONOCYTES NFR BLD: 6 % (ref 3–10)
NEUTS SEG # BLD: 3.9 K/UL (ref 1.8–8)
NEUTS SEG NFR BLD: 57 % (ref 40–73)
PLATELET # BLD AUTO: 267 K/UL (ref 135–420)
PMV BLD AUTO: 9.3 FL (ref 9.2–11.8)
POTASSIUM SERPL-SCNC: 3.7 MMOL/L (ref 3.5–5.5)
PROT SERPL-MCNC: 7.1 G/DL (ref 6.4–8.2)
RBC # BLD AUTO: 5.03 M/UL (ref 4.2–5.3)
SODIUM SERPL-SCNC: 138 MMOL/L (ref 136–145)
TSH SERPL DL<=0.05 MIU/L-ACNC: 1.3 UIU/ML (ref 0.36–3.74)
WBC # BLD AUTO: 6.9 K/UL (ref 4.6–13.2)

## 2019-04-20 PROCEDURE — 96374 THER/PROPH/DIAG INJ IV PUSH: CPT

## 2019-04-20 PROCEDURE — 80053 COMPREHEN METABOLIC PANEL: CPT

## 2019-04-20 PROCEDURE — 74011250636 HC RX REV CODE- 250/636: Performed by: EMERGENCY MEDICINE

## 2019-04-20 PROCEDURE — 83690 ASSAY OF LIPASE: CPT

## 2019-04-20 PROCEDURE — 85025 COMPLETE CBC W/AUTO DIFF WBC: CPT

## 2019-04-20 PROCEDURE — 74011250637 HC RX REV CODE- 250/637: Performed by: EMERGENCY MEDICINE

## 2019-04-20 PROCEDURE — 84443 ASSAY THYROID STIM HORMONE: CPT

## 2019-04-20 PROCEDURE — 96361 HYDRATE IV INFUSION ADD-ON: CPT

## 2019-04-20 PROCEDURE — 99283 EMERGENCY DEPT VISIT LOW MDM: CPT

## 2019-04-20 RX ORDER — LOPERAMIDE HYDROCHLORIDE 2 MG/1
4 CAPSULE ORAL ONCE
Status: COMPLETED | OUTPATIENT
Start: 2019-04-20 | End: 2019-04-20

## 2019-04-20 RX ORDER — ONDANSETRON 4 MG/1
4 TABLET, FILM COATED ORAL
Qty: 8 TAB | Refills: 0 | Status: SHIPPED | OUTPATIENT
Start: 2019-04-20 | End: 2020-10-30

## 2019-04-20 RX ORDER — ONDANSETRON 2 MG/ML
4 INJECTION INTRAMUSCULAR; INTRAVENOUS
Status: COMPLETED | OUTPATIENT
Start: 2019-04-20 | End: 2019-04-20

## 2019-04-20 RX ORDER — LOPERAMIDE HYDROCHLORIDE 2 MG/1
2 CAPSULE ORAL
Qty: 12 CAP | Refills: 0 | Status: SHIPPED | OUTPATIENT
Start: 2019-04-20 | End: 2019-04-24

## 2019-04-20 RX ADMIN — SODIUM CHLORIDE 1000 ML: 900 INJECTION, SOLUTION INTRAVENOUS at 17:19

## 2019-04-20 RX ADMIN — ONDANSETRON 4 MG: 2 INJECTION INTRAMUSCULAR; INTRAVENOUS at 17:18

## 2019-04-20 RX ADMIN — LOPERAMIDE HYDROCHLORIDE 4 MG: 2 CAPSULE ORAL at 17:18

## 2019-04-20 NOTE — ED PROVIDER NOTES
EMERGENCY DEPARTMENT HISTORY AND PHYSICAL EXAM 
 
3:36 PM 
 
 
Date: 4/20/2019 Patient Name: Lázaro Villarreal 
 
History of Presenting Illness Chief Complaint Patient presents with  Vomiting  Diarrhea History Provided By: Patient Additional History (Context): Lázaro Villarreal is a 79 y.o. female with diabetes, hypertension, hyperlipidemia and And GERD who presents with complaint of nausea vomiting and diarrhea for the past 5 days. She states that he could not keep anything down. However the vomiting seems to be improving today. She had an episode of diarrhea upon arrival to the ED. Admits that she has not taken any medication for her symptoms. Denies any fever, but does have some abdominal discomfort. She states that she has not been on any antibiotics recently. No other complaints. PCP: Samson Colon MD 
 
 
 
Past History Past Medical History: 
Past Medical History:  
Diagnosis Date  Abnormal nuclear cardiac imaging test 05/11/2015 Low risk. No ischemia or prior infarction. No RWMA. EF 68%. Neg EKG on pharm stress test.  
 Agatston CAC score, <100 05/15/2015 Coronary calcium score 70.  Diabetes ([facility] [address]  Diabetes mellitus ([facility] [address]  GERD (gastroesophageal reflux disease)  History of echocardiogram 02/06/2013 EF 65%. No RWMA. Mod LVH. Gr 1 DDfx. RVSP 25 mmHg.  HX OTHER MEDICAL   
 anxiety, \"mitral valve prolapse\"  Hyperlipidemia  Hypertension  Psychiatric disorder   
 anxiety  RLE venous duplex   
 in her 25s Past Surgical History: 
Past Surgical History:  
Procedure Laterality Date  ABDOMEN SURGERY PROC UNLISTED  1999  
 tumor removal  
 HX CHOLECYSTECTOMY [address]  
 hysterectomy  HX OTHER SURGICAL    
 FATTY TISSUE REMOVED  HX TONSILLECTOMY Family History: 
Family History Problem Relation Age of Onset  Heart Disease Father  Heart Attack Father  Heart Disease Paternal Grandmother  Heart Disease Paternal Grandfather  Breast Cancer Mother  Cancer Mother Social History: 
Social History Tobacco Use  Smoking status: Former Smoker Packs/day: 1.00 Years: 30.00 Pack years: 30.00 Types: Cigarettes Last attempt to quit: 10/19/2012 Years since quittin.5  Smokeless tobacco: Former User Substance Use Topics  Alcohol use: No  
  Alcohol/week: 0.0 oz  
  Comment: occasional  
 Drug use: No  
 
 
Allergies: Allergies Allergen Reactions  Fish Containing Products Anaphylaxis and Other (comments) Heart stops  Iodine Anaphylaxis and Other (comments) Heart stops  Shellfish Derived Anaphylaxis and Other (comments) Heart stops  Bee Venom Protein (Honey Bee) Hives Review of Systems Review of Systems Constitutional: Negative for chills and fever. HENT: Negative for congestion, rhinorrhea, sore throat and trouble swallowing. Eyes: Negative for visual disturbance. Respiratory: Negative for cough, shortness of breath and wheezing. Cardiovascular: Negative for chest pain. Gastrointestinal: Positive for diarrhea, nausea and vomiting. Abdominal discomfort Endocrine: Negative for polyuria. Genitourinary: Negative for dysuria. Musculoskeletal: Negative for arthralgias and neck stiffness. Skin: Negative for pallor and rash. Neurological: Negative for dizziness, weakness, numbness and headaches. Hematological: Does not bruise/bleed easily. Psychiatric/Behavioral: Negative for confusion and dysphoric mood. All other systems reviewed and are negative. Physical Exam  
 
Visit Vitals /87 (BP 1 Location: Left arm, BP Patient Position: At rest) Pulse 79 Temp 97.3 °F (36.3 °C) Resp 16 SpO2 100% Physical Exam  
Constitutional: She is oriented to person, place, and time.  She appears well-developed and well-nourished. No distress. HENT:  
Head: Normocephalic and atraumatic. Mouth/Throat: No oropharyngeal exudate. Dry mucous membranes Eyes: Pupils are equal, round, and reactive to light. Conjunctivae are normal. No scleral icterus. Neck: Normal range of motion. Neck supple. Cardiovascular: Normal rate and intact distal pulses. Capillary refill < 3 seconds Pulmonary/Chest: Effort normal and breath sounds normal. No respiratory distress. She has no wheezes. Abdominal: Soft. Bowel sounds are normal. She exhibits no distension and no mass. There is tenderness (.  Diffuse generalized tenderness). There is no rebound and no guarding. Musculoskeletal: Normal range of motion. She exhibits no edema. Lymphadenopathy:  
  She has no cervical adenopathy. Neurological: She is alert and oriented to person, place, and time. No cranial nerve deficit. She exhibits normal muscle tone. Coordination normal.  
Skin: Skin is warm and dry. No rash noted. She is not diaphoretic. Psychiatric: She has a normal mood and affect. Her behavior is normal.  
Nursing note and vitals reviewed. Diagnostic Study Results Labs - Recent Results (from the past 12 hour(s)) CBC WITH AUTOMATED DIFF Collection Time: 04/20/19  5:12 PM  
Result Value Ref Range WBC 6.9 4.6 - 13.2 K/uL  
 RBC 5.03 4.20 - 5.30 M/uL  
 HGB 14.6 12.0 - 16.0 g/dL HCT 41.9 35.0 - 45.0 % MCV 83.3 74.0 - 97.0 FL  
 MCH 29.0 24.0 - 34.0 PG  
 MCHC 34.8 31.0 - 37.0 g/dL  
 RDW 12.9 11.6 - 14.5 % PLATELET 792 594 - 801 K/uL MPV 9.3 9.2 - 11.8 FL  
 NEUTROPHILS 57 40 - 73 % LYMPHOCYTES 36 21 - 52 % MONOCYTES 6 3 - 10 % EOSINOPHILS 1 0 - 5 % BASOPHILS 0 0 - 2 %  
 ABS. NEUTROPHILS 3.9 1.8 - 8.0 K/UL  
 ABS. LYMPHOCYTES 2.5 0.9 - 3.6 K/UL  
 ABS. MONOCYTES 0.4 0.05 - 1.2 K/UL  
 ABS. EOSINOPHILS 0.1 0.0 - 0.4 K/UL  
 ABS. BASOPHILS 0.0 0.0 - 0.1 K/UL  
 DF AUTOMATED METABOLIC PANEL, COMPREHENSIVE  
 Collection Time: 04/20/19  5:12 PM  
Result Value Ref Range Sodium 138 136 - 145 mmol/L Potassium 3.7 3.5 - 5.5 mmol/L Chloride 106 100 - 108 mmol/L  
 CO2 27 21 - 32 mmol/L Anion gap 5 3.0 - 18 mmol/L Glucose 142 (H) 74 - 99 mg/dL BUN 11 7.0 - 18 MG/DL Creatinine 1.00 0.6 - 1.3 MG/DL  
 BUN/Creatinine ratio 11 (L) 12 - 20 GFR est AA >60 >60 ml/min/1.73m2 GFR est non-AA 55 (L) >60 ml/min/1.73m2 Calcium 10.4 (H) 8.5 - 10.1 MG/DL Bilirubin, total 0.4 0.2 - 1.0 MG/DL  
 ALT (SGPT) 68 (H) 13 - 56 U/L  
 AST (SGOT) 32 15 - 37 U/L Alk. phosphatase 88 45 - 117 U/L Protein, total 7.1 6.4 - 8.2 g/dL Albumin 4.0 3.4 - 5.0 g/dL Globulin 3.1 2.0 - 4.0 g/dL A-G Ratio 1.3 0.8 - 1.7 LIPASE Collection Time: 04/20/19  5:12 PM  
Result Value Ref Range Lipase 140 73 - 393 U/L Radiologic Studies - No orders to display Medical Decision Making I am the first provider for this patient. I reviewed the vital signs, available nursing notes, past medical history, past surgical history, family history and social history. Vital Signs-Reviewed the patient's vital signs. Pulse Oximetry Analysis -  100 on room air (Interpretation) normal 
 
 
 
 
Records Reviewed: Nursing Notes and Old Medical Records (Time of Review: 3:36 PM) Provider Notes (Medical Decision Making): DDX: Metabolic, dehydration, gastroenteritis Abdominal exam fairly benign Check labs, give IV fluid, Zofran, loperamide MDM Medications  
sodium chloride 0.9 % bolus infusion 1,000 mL (1,000 mL IntraVENous New Bag 4/20/19 1719) ondansetron (ZOFRAN) injection 4 mg (4 mg IntraVENous Given 4/20/19 1718) loperamide (IMODIUM) capsule 4 mg (4 mg Oral Given 4/20/19 1718) ED Course: Progress Notes, Reevaluation, and Consults: 
No alarming labs TSH within normal limits I have reassessed the patient.  I have discussed the workup, results and plan with the patient and patient is in agreement. Patient is feeling better. Patient will be prescribed Zofran, loperamide. Patient was discharge in stable condition. Patient was given outpatient follow up. Patient is to return to emergency department if any new or worsening condition. Diagnosis Clinical Impression: 1. Non-intractable vomiting with nausea, unspecified vomiting type 2. Diarrhea, unspecified type Disposition: Discharged Follow-up Information Follow up With Specialties Details Why Contact Info Matt Rodriguez MD Family Practice Schedule an appointment as soon as possible for a visit in 2 days  [address] [place] [id number] [phone] [facility] EMERGENCY DEPT Emergency Medicine  As needed, If symptoms worsen [phone] 
[phone] Patient's Medications Start Taking LOPERAMIDE (IMODIUM) 2 MG CAPSULE    Take 1 Cap by mouth four (4) times daily as needed for Diarrhea for up to 4 days. ONDANSETRON HCL (ZOFRAN) 4 MG TABLET    Take 1 Tab by mouth every eight (8) hours as needed for Nausea. Continue Taking ASPIRIN DELAYED-RELEASE 81 MG TABLET    Take  by mouth daily. ATORVASTATIN (LIPITOR) 20 MG TABLET    TAKE ONE TABLET BY MOUTH DAILY CRUTCH MISC    Partial weight right FLUOXETINE (PROZAC) 20 MG TABLET    Take 3 tablets qd LISINOPRIL (PRINIVIL, ZESTRIL) 5 MG TABLET    Take 1 Tab by mouth daily. LORAZEPAM (ATIVAN) 1 MG TABLET    Take 1 Tab by mouth every eight (8) hours as needed for Anxiety. Max Daily Amount: 3 mg. METFORMIN (GLUCOPHAGE) 500 MG TABLET    Take 1 Tab by mouth two (2) times daily (with meals). METFORMIN (GLUCOPHAGE) 500 MG TABLET    Take 500 mg by mouth every evening. PANTOPRAZOLE (PROTONIX) 40 MG TABLET    TAKE ONE TABLET BY MOUTH DAILY These Medications have changed No medications on file Stop Taking No medications on file DO Mervin Rodriguez medical dictation software was used for portions of this report. Unintended transcription errors may occur. My signature above authenticates this document and my orders, the final   
diagnosis (es), discharge prescription (s), and instructions in the Epic   
record.

## 2019-04-20 NOTE — DISCHARGE INSTRUCTIONS
If you were prescribed any medication take as directed. Do not drive or use heavy equipment if prescribed narcotics. Follow up with your primary care physician or with specialist as directed. Return to the emergency room with any new or worsening conditions. Diarrhea: Care Instructions  Your Care Instructions    Diarrhea is loose, watery stools (bowel movements). The exact cause is often hard to find. Sometimes diarrhea is your body's way of getting rid of what caused an upset stomach. Viruses, food poisoning, and many medicines can cause diarrhea. Some people get diarrhea in response to emotional stress, anxiety, or certain foods. Almost everyone has diarrhea now and then. It usually isn't serious, and your stools will return to normal soon. The important thing to do is replace the fluids you have lost, so you can prevent dehydration. The doctor has checked you carefully, but problems can develop later. If you notice any problems or new symptoms, get medical treatment right away. Follow-up care is a key part of your treatment and safety. Be sure to make and go to all appointments, and call your doctor if you are having problems. It's also a good idea to know your test results and keep a list of the medicines you take. How can you care for yourself at home? · Watch for signs of dehydration, which means your body has lost too much water. Dehydration is a serious condition and should be treated right away. Signs of dehydration are:  ? Increasing thirst and dry eyes and mouth. ? Feeling faint or lightheaded. ? Darker urine, and a smaller amount of urine than normal.  · To prevent dehydration, drink plenty of fluids, enough so that your urine is light yellow or clear like water. Choose water and other caffeine-free clear liquids until you feel better. If you have kidney, heart, or liver disease and have to limit fluids, talk with your doctor before you increase the amount of fluids you drink.   · Begin eating small amounts of mild foods the next day, if you feel like it. ? Try yogurt that has live cultures of Lactobacillus. (Check the label.)  ? Avoid spicy foods, fruits, alcohol, and caffeine until 48 hours after all symptoms are gone. ? Avoid chewing gum that contains sorbitol. ? Avoid dairy products (except for yogurt with Lactobacillus) while you have diarrhea and for 3 days after symptoms are gone. · The doctor may recommend that you take over-the-counter medicine, such as loperamide (Imodium), if you still have diarrhea after 6 hours. Read and follow all instructions on the label. Do not use this medicine if you have bloody diarrhea, a high fever, or other signs of serious illness. Call your doctor if you think you are having a problem with your medicine. When should you call for help? Call 911 anytime you think you may need emergency care. For example, call if:    · You passed out (lost consciousness).     · Your stools are maroon or very bloody.    Call your doctor now or seek immediate medical care if:    · You are dizzy or lightheaded, or you feel like you may faint.     · Your stools are black and look like tar, or they have streaks of blood.     · You have new or worse belly pain.     · You have symptoms of dehydration, such as:  ? Dry eyes and a dry mouth. ? Passing only a little dark urine. ? Feeling thirstier than usual.     · You have a new or higher fever.    Watch closely for changes in your health, and be sure to contact your doctor if:    · Your diarrhea is getting worse.     · You see pus in the diarrhea.     · You are not getting better after 2 days (48 hours). Where can you learn more? Go to http://bruno-alicia.info/. Enter L449 in the search box to learn more about \"Diarrhea: Care Instructions. \"  Current as of: September 23, 2018  Content Version: 11.9  © 4772-1309 [employer], [employer].  Care instructions adapted under license by [employer] (which disclaims liability or warranty for this information). If you have questions about a medical condition or this instruction, always ask your healthcare professional. [place] [address] any warranty or liability for your use of this information. Patient Education        Nausea and Vomiting: Care Instructions  Your Care Instructions    When you are nauseated, you may feel weak and sweaty and notice a lot of saliva in your mouth. Nausea often leads to vomiting. Most of the time you do not need to worry about nausea and vomiting, but they can be signs of other illnesses. Two common causes of nausea and vomiting are stomach flu and food poisoning. Nausea and vomiting from viral stomach flu will usually start to improve within 24 hours. Nausea and vomiting from food poisoning may last from 12 to 48 hours. The doctor has checked you carefully, but problems can develop later. If you notice any problems or new symptoms, get medical treatment right away. Follow-up care is a key part of your treatment and safety. Be sure to make and go to all appointments, and call your doctor if you are having problems. It's also a good idea to know your test results and keep a list of the medicines you take. How can you care for yourself at home? · To prevent dehydration, drink plenty of fluids, enough so that your urine is light yellow or clear like water. Choose water and other caffeine-free clear liquids until you feel better. If you have kidney, heart, or liver disease and have to limit fluids, talk with your doctor before you increase the amount of fluids you drink. · Rest in bed until you feel better. · When you are able to eat, try clear soups, mild foods, and liquids until all symptoms are gone for 12 to 48 hours. Other good choices include dry toast, crackers, cooked cereal, and gelatin dessert, such as Jell-O. When should you call for help?   Call 911 anytime you think you may need emergency care. For example, call if:    · You passed out (lost consciousness).    Call your doctor now or seek immediate medical care if:    · You have symptoms of dehydration, such as:  ? Dry eyes and a dry mouth. ? Passing only a little dark urine. ? Feeling thirstier than usual.     · You have new or worsening belly pain.     · You have a new or higher fever.     · You vomit blood or what looks like coffee grounds.    Watch closely for changes in your health, and be sure to contact your doctor if:    · You have ongoing nausea and vomiting.     · Your vomiting is getting worse.     · Your vomiting lasts longer than 2 days.     · You are not getting better as expected. Where can you learn more? Go to http://bruno-alicia.info/. Enter 25 [id number] in the search box to learn more about \"Nausea and Vomiting: Care Instructions. \"  Current as of: September 23, 2018  Content Version: 11.9  © 9945-8914 [employer]. Care instructions adapted under license by [employer] (which disclaims liability or warranty for this information). If you have questions about a medical condition or this instruction, always ask your healthcare professional. [place] [address] any warranty or liability for your use of this information.

## 2020-10-29 ENCOUNTER — HOSPITAL ENCOUNTER (INPATIENT)
Age: 70
LOS: 1 days | Discharge: HOME | DRG: 303 | End: 2020-10-30
Attending: EMERGENCY MEDICINE | Admitting: FAMILY MEDICINE
Payer: MEDICARE

## 2020-10-29 ENCOUNTER — APPOINTMENT (OUTPATIENT)
Dept: GENERAL RADIOLOGY | Age: 70
DRG: 303 | End: 2020-10-29
Attending: PHYSICIAN ASSISTANT
Payer: MEDICARE

## 2020-10-29 DIAGNOSIS — R07.9 CHEST PAIN, UNSPECIFIED TYPE: Primary | ICD-10-CM

## 2020-10-29 DIAGNOSIS — I20.0 UNSTABLE ANGINA (HCC): ICD-10-CM

## 2020-10-29 LAB
ALBUMIN SERPL-MCNC: 3.9 G/DL (ref 3.4–5)
ALBUMIN/GLOB SERPL: 1.2 {RATIO} (ref 0.8–1.7)
ALP SERPL-CCNC: 80 U/L (ref 45–117)
ALT SERPL-CCNC: 34 U/L (ref 13–56)
ANION GAP SERPL CALC-SCNC: 7 MMOL/L (ref 3–18)
APPEARANCE UR: CLEAR
AST SERPL-CCNC: 25 U/L (ref 10–38)
BASOPHILS # BLD: 0 K/UL (ref 0–0.1)
BASOPHILS NFR BLD: 1 % (ref 0–2)
BILIRUB SERPL-MCNC: 0.6 MG/DL (ref 0.2–1)
BILIRUB UR QL: NEGATIVE
BNP SERPL-MCNC: 88 PG/ML (ref 0–900)
BUN SERPL-MCNC: 13 MG/DL (ref 7–18)
BUN/CREAT SERPL: 12 (ref 12–20)
CALCIUM SERPL-MCNC: 10.3 MG/DL (ref 8.5–10.1)
CHLORIDE SERPL-SCNC: 106 MMOL/L (ref 100–111)
CK MB CFR SERPL CALC: NORMAL % (ref 0–4)
CK MB CFR SERPL CALC: NORMAL % (ref 0–4)
CK MB SERPL-MCNC: <1 NG/ML (ref 5–25)
CK MB SERPL-MCNC: <1 NG/ML (ref 5–25)
CK SERPL-CCNC: 38 U/L (ref 26–192)
CK SERPL-CCNC: 59 U/L (ref 26–192)
CO2 SERPL-SCNC: 26 MMOL/L (ref 21–32)
COLOR UR: YELLOW
CREAT SERPL-MCNC: 1.08 MG/DL (ref 0.6–1.3)
DIFFERENTIAL METHOD BLD: NORMAL
EOSINOPHIL # BLD: 0.1 K/UL (ref 0–0.4)
EOSINOPHIL NFR BLD: 2 % (ref 0–5)
EPITH CASTS URNS QL MICRO: NORMAL /LPF (ref 0–5)
ERYTHROCYTE [DISTWIDTH] IN BLOOD BY AUTOMATED COUNT: 12.9 % (ref 11.6–14.5)
GLOBULIN SER CALC-MCNC: 3.2 G/DL (ref 2–4)
GLUCOSE SERPL-MCNC: 116 MG/DL (ref 74–99)
GLUCOSE UR STRIP.AUTO-MCNC: NEGATIVE MG/DL
HCT VFR BLD AUTO: 40.4 % (ref 35–45)
HGB BLD-MCNC: 13.9 G/DL (ref 12–16)
HGB UR QL STRIP: NEGATIVE
KETONES UR QL STRIP.AUTO: NEGATIVE MG/DL
LEUKOCYTE ESTERASE UR QL STRIP.AUTO: ABNORMAL
LYMPHOCYTES # BLD: 2.7 K/UL (ref 0.9–3.6)
LYMPHOCYTES NFR BLD: 35 % (ref 21–52)
MAGNESIUM SERPL-MCNC: 1.6 MG/DL (ref 1.6–2.6)
MCH RBC QN AUTO: 29.3 PG (ref 24–34)
MCHC RBC AUTO-ENTMCNC: 34.4 G/DL (ref 31–37)
MCV RBC AUTO: 85.2 FL (ref 74–97)
MONOCYTES # BLD: 0.5 K/UL (ref 0.05–1.2)
MONOCYTES NFR BLD: 6 % (ref 3–10)
NEUTS SEG # BLD: 4.4 K/UL (ref 1.8–8)
NEUTS SEG NFR BLD: 56 % (ref 40–73)
NITRITE UR QL STRIP.AUTO: NEGATIVE
PH UR STRIP: 5 [PH] (ref 5–8)
PLATELET # BLD AUTO: 295 K/UL (ref 135–420)
PMV BLD AUTO: 9.6 FL (ref 9.2–11.8)
POTASSIUM SERPL-SCNC: 4.7 MMOL/L (ref 3.5–5.5)
PROT SERPL-MCNC: 7.1 G/DL (ref 6.4–8.2)
PROT UR STRIP-MCNC: NEGATIVE MG/DL
RBC # BLD AUTO: 4.74 M/UL (ref 4.2–5.3)
RBC #/AREA URNS HPF: NORMAL /HPF (ref 0–5)
SODIUM SERPL-SCNC: 139 MMOL/L (ref 136–145)
SP GR UR REFRACTOMETRY: 1.01 (ref 1–1.03)
TROPONIN I SERPL-MCNC: <0.02 NG/ML (ref 0–0.04)
TROPONIN I SERPL-MCNC: <0.02 NG/ML (ref 0–0.04)
UROBILINOGEN UR QL STRIP.AUTO: 0.2 EU/DL (ref 0.2–1)
WBC # BLD AUTO: 7.7 K/UL (ref 4.6–13.2)
WBC URNS QL MICRO: NORMAL /HPF (ref 0–4)

## 2020-10-29 PROCEDURE — 65270000029 HC RM PRIVATE

## 2020-10-29 PROCEDURE — 80053 COMPREHEN METABOLIC PANEL: CPT

## 2020-10-29 PROCEDURE — 71045 X-RAY EXAM CHEST 1 VIEW: CPT

## 2020-10-29 PROCEDURE — 65660000000 HC RM CCU STEPDOWN

## 2020-10-29 PROCEDURE — 74011250637 HC RX REV CODE- 250/637: Performed by: PHYSICIAN ASSISTANT

## 2020-10-29 PROCEDURE — 83880 ASSAY OF NATRIURETIC PEPTIDE: CPT

## 2020-10-29 PROCEDURE — 81001 URINALYSIS AUTO W/SCOPE: CPT

## 2020-10-29 PROCEDURE — 82550 ASSAY OF CK (CPK): CPT

## 2020-10-29 PROCEDURE — 74011250637 HC RX REV CODE- 250/637: Performed by: NURSE PRACTITIONER

## 2020-10-29 PROCEDURE — 83735 ASSAY OF MAGNESIUM: CPT

## 2020-10-29 PROCEDURE — 93005 ELECTROCARDIOGRAM TRACING: CPT

## 2020-10-29 PROCEDURE — 85025 COMPLETE CBC W/AUTO DIFF WBC: CPT

## 2020-10-29 PROCEDURE — 99285 EMERGENCY DEPT VISIT HI MDM: CPT

## 2020-10-29 RX ORDER — LISINOPRIL 5 MG/1
5 TABLET ORAL
Status: COMPLETED | OUTPATIENT
Start: 2020-10-29 | End: 2020-10-29

## 2020-10-29 RX ORDER — LORAZEPAM 1 MG/1
1 TABLET ORAL
Status: COMPLETED | OUTPATIENT
Start: 2020-10-29 | End: 2020-10-30

## 2020-10-29 RX ORDER — GUAIFENESIN 100 MG/5ML
324 LIQUID (ML) ORAL
Status: COMPLETED | OUTPATIENT
Start: 2020-10-29 | End: 2020-10-29

## 2020-10-29 RX ORDER — LISINOPRIL 10 MG/1
5 TABLET ORAL
Status: DISCONTINUED | OUTPATIENT
Start: 2020-10-29 | End: 2020-10-29

## 2020-10-29 RX ADMIN — ASPIRIN 81 MG CHEWABLE TABLET 324 MG: 81 TABLET CHEWABLE at 16:09

## 2020-10-29 RX ADMIN — LISINOPRIL 5 MG: 5 TABLET ORAL at 23:44

## 2020-10-29 NOTE — ED NOTES
I performed a brief evaluation, including history, of the patient here in triage and I have determined that pt will need further treatment and evaluation from the main side ER physician. I have placed initial orders to help in expediting patients care. October 29, 2020 at 2:57 PM - DIANA Bullock      Intermittent sharp and aching left-sided CP that radiates to back, jaw and left arm. Assoc SOB. Cardiac hx includes mitral valve prolapse. Denies previous MI. Hx blood clot 40 years ago.

## 2020-10-29 NOTE — ED PROVIDER NOTES
EMERGENCY DEPARTMENT HISTORY AND PHYSICAL EXAM    3:10 PM      Date: 10/29/2020  Patient Name: Herbert Ames    History of Presenting Illness     Chief Complaint   Patient presents with    Chest Pain    Shortness of Breath    Nausea       History Provided By: Patient    Additional History (Context): Herbert Ames is a 78 y.o. female with hx of anxiety, HTN, GERD, DM, HLD who presents with complaint of aching left-sided chest pain that radiates to her jaw and back associated with nausea x 3 hours. Patient notes that the back pain is more sharp in nature. Patient notes pain waxes and wanes. Patient denies dizziness, diaphoresis, dyspnea, vomiting, orthopnea, leg edema. Denies history of cardiac disease, family history of cardiac disease, history of PE, hemoptysis, recent surgery or travel. Notes hx of DVT 40 years ago. Patient notes she does not have a cardiologist, last stress test was in 2017. Notes she takes 81 mg ASA daily. PMD: PFM    PCP: Chinmay Castro MD    Current Outpatient Medications   Medication Sig Dispense Refill    ondansetron hcl (ZOFRAN) 4 mg tablet Take 1 Tab by mouth every eight (8) hours as needed for Nausea. 8 Tab 0    Crutch misc Partial weight right 2 Each 0    metFORMIN (GLUCOPHAGE) 500 mg tablet Take 500 mg by mouth every evening.  pantoprazole (PROTONIX) 40 mg tablet TAKE ONE TABLET BY MOUTH DAILY 90 Tab 2    atorvastatin (LIPITOR) 20 mg tablet TAKE ONE TABLET BY MOUTH DAILY 30 Tab 0    FLUoxetine (PROZAC) 20 mg tablet Take 3 tablets qd (Patient taking differently: 60 mg. Take 3 tablets qd) 270 Tab 1    metFORMIN (GLUCOPHAGE) 500 mg tablet Take 1 Tab by mouth two (2) times daily (with meals). (Patient taking differently: Take 1,000 mg by mouth daily (with breakfast). ) 60 Tab 6    lisinopril (PRINIVIL, ZESTRIL) 5 mg tablet Take 1 Tab by mouth daily. 90 Tab 1    LORazepam (ATIVAN) 1 mg tablet Take 1 Tab by mouth every eight (8) hours as needed for Anxiety.  Max Daily Amount: 3 mg. 30 Tab 0    aspirin delayed-release 81 mg tablet Take  by mouth daily. Past History     Past Medical History:  Past Medical History:   Diagnosis Date    Abnormal nuclear cardiac imaging test 2015    Low risk. No ischemia or prior infarction. No RWMA. EF 68%. Neg EKG on pharm stress test.    Agatston CAC score, <100 05/15/2015    Coronary calcium score 70.  Chronic kidney disease     early stage kidney failure    Diabetes ([facility]     Diabetes mellitus ([facility]     GERD (gastroesophageal reflux disease)     crohn's disease    History of echocardiogram 2013    EF 65%. No RWMA. Mod LVH. Gr 1 DDfx. RVSP 25 mmHg.  HX OTHER MEDICAL     anxiety, \"mitral valve prolapse\"    Hyperlipidemia     Hypertension     Psychiatric disorder     anxiety    RLE venous duplex     in her 25s       Past Surgical History:  Past Surgical History:   Procedure Laterality Date    ABDOMEN SURGERY PROC UNLISTED      tumor removal    HX CHOLECYSTECTOMY      HX GYN      hysterectomy    HX OTHER SURGICAL      FATTY TISSUE REMOVED    HX TONSILLECTOMY         Family History:  Family History   Problem Relation Age of Onset    Heart Disease Father     Heart Attack Father     Heart Disease Paternal Grandmother     Heart Disease Paternal Grandfather     Breast Cancer Mother     Cancer Mother        Social History:  Social History     Tobacco Use    Smoking status: Former Smoker     Packs/day: 1.00     Years: 30.00     Pack years: 30.00     Types: Cigarettes     Last attempt to quit: 10/19/2012     Years since quittin.0    Smokeless tobacco: Former User   Substance Use Topics    Alcohol use: No     Alcohol/week: 0.0 standard drinks     Comment: occasional    Drug use: No       Allergies:   Allergies   Allergen Reactions    Fish Containing Products Anaphylaxis and Other (comments)     Heart stops    Iodine Anaphylaxis and Other (comments)     Heart stops    Shellfish Derived Anaphylaxis and Other (comments)     Heart stops    Bee Venom Protein (Honey Bee) Hives         Review of Systems       Review of Systems   Constitutional: Negative for chills and fever. Respiratory: Negative for shortness of breath. Cardiovascular: Positive for chest pain. Gastrointestinal: Positive for nausea. Negative for abdominal pain and vomiting. Musculoskeletal: Positive for back pain. Skin: Negative for rash. Neurological: Negative for weakness. All other systems reviewed and are negative. Physical Exam     Visit Vitals  BP (!) 140/82 (BP 1 Location: Left arm, BP Patient Position: At rest;Sitting)   Pulse 79   Temp 98 °F (36.7 °C)   Resp 20   Ht 5' 9\" (1.753 m)   Wt 81.6 kg (180 lb)   SpO2 98%   BMI 26.58 kg/m²         Physical Exam  Vitals signs and nursing note reviewed. Constitutional:       General: She is not in acute distress. Appearance: She is well-developed. She is not diaphoretic. HENT:      Head: Normocephalic and atraumatic. Neck:      Musculoskeletal: Normal range of motion and neck supple. Cardiovascular:      Rate and Rhythm: Normal rate and regular rhythm. Heart sounds: Normal heart sounds. No murmur. No friction rub. No gallop. Pulmonary:      Effort: Pulmonary effort is normal. No respiratory distress. Breath sounds: Normal breath sounds. No wheezing or rales. Chest:      Chest wall: No mass or tenderness. Abdominal:      Palpations: Abdomen is soft. Tenderness: There is no abdominal tenderness. Musculoskeletal: Normal range of motion. Skin:     General: Skin is warm. Findings: No rash. Neurological:      Mental Status: She is alert.            Diagnostic Study Results     Labs -  Recent Results (from the past 12 hour(s))   CARDIAC PANEL,(CK, CKMB & TROPONIN)    Collection Time: 10/29/20  3:20 PM   Result Value Ref Range    CK - MB <1.0 <3.6 ng/ml    CK-MB Index  0.0 - 4.0 %     CALCULATION NOT PERFORMED WHEN RESULT IS BELOW LINEAR LIMIT    CK 59 26 - 192 U/L    Troponin-I, QT <0.02 0.0 - 0.045 NG/ML   CBC WITH AUTOMATED DIFF    Collection Time: 10/29/20  3:20 PM   Result Value Ref Range    WBC 7.7 4.6 - 13.2 K/uL    RBC 4.74 4.20 - 5.30 M/uL    HGB 13.9 12.0 - 16.0 g/dL    HCT 40.4 35.0 - 45.0 %    MCV 85.2 74.0 - 97.0 FL    MCH 29.3 24.0 - 34.0 PG    MCHC 34.4 31.0 - 37.0 g/dL    RDW 12.9 11.6 - 14.5 %    PLATELET 982 103 - 910 K/uL    MPV 9.6 9.2 - 11.8 FL    NEUTROPHILS 56 40 - 73 %    LYMPHOCYTES 35 21 - 52 %    MONOCYTES 6 3 - 10 %    EOSINOPHILS 2 0 - 5 %    BASOPHILS 1 0 - 2 %    ABS. NEUTROPHILS 4.4 1.8 - 8.0 K/UL    ABS. LYMPHOCYTES 2.7 0.9 - 3.6 K/UL    ABS. MONOCYTES 0.5 0.05 - 1.2 K/UL    ABS. EOSINOPHILS 0.1 0.0 - 0.4 K/UL    ABS. BASOPHILS 0.0 0.0 - 0.1 K/UL    DF AUTOMATED     METABOLIC PANEL, COMPREHENSIVE    Collection Time: 10/29/20  3:20 PM   Result Value Ref Range    Sodium 139 136 - 145 mmol/L    Potassium 4.7 3.5 - 5.5 mmol/L    Chloride 106 100 - 111 mmol/L    CO2 26 21 - 32 mmol/L    Anion gap 7 3.0 - 18 mmol/L    Glucose 116 (H) 74 - 99 mg/dL    BUN 13 7.0 - 18 MG/DL    Creatinine 1.08 0.6 - 1.3 MG/DL    BUN/Creatinine ratio 12 12 - 20      GFR est AA >60 >60 ml/min/1.73m2    GFR est non-AA 50 (L) >60 ml/min/1.73m2    Calcium 10.3 (H) 8.5 - 10.1 MG/DL    Bilirubin, total 0.6 0.2 - 1.0 MG/DL    ALT (SGPT) 34 13 - 56 U/L    AST (SGOT) 25 10 - 38 U/L    Alk.  phosphatase 80 45 - 117 U/L    Protein, total 7.1 6.4 - 8.2 g/dL    Albumin 3.9 3.4 - 5.0 g/dL    Globulin 3.2 2.0 - 4.0 g/dL    A-G Ratio 1.2 0.8 - 1.7     MAGNESIUM    Collection Time: 10/29/20  3:20 PM   Result Value Ref Range    Magnesium 1.6 1.6 - 2.6 mg/dL   NT-PRO BNP    Collection Time: 10/29/20  3:20 PM   Result Value Ref Range    NT pro-BNP 88 0 - 900 PG/ML       Radiologic Studies -   XR CHEST PORT   Final Result   IMPRESSION:       No acute cardiopulmonary findings        Medical Decision Making   I am the first provider for this patient. I reviewed the vital signs, available nursing notes, past medical history, past surgical history, family history and social history. Vital Signs-Reviewed the patient's vital signs. Pulse Oximetry Analysis -  98% on room air     Cardiac Monitor:  Rate: 85  Rhythm:  Normal sinus rhythm     EKG: Interpreted by the EP. Time Interpreted: 1520   Rate: 74   Rhythm: normal sinus rhythm, nonspecific T wave changes   Interpretation: no evidence of ischemia   Comparison: 03/1/2019    Records Reviewed: Nursing Notes, Old Medical Records and Previous electrocardiograms (Time of Review: 3:10 PM)    ED Course: Progress Notes, Reevaluation, and Consults:  4:26 PM: Pt resting comfortably, no distress, watching TV. HEART score 6, due to hx +1,  EKG +1, age +4, RF +2, initial troponin +0, moderate risk of MACE. CONSULT NOTE:   4:44 PM  I spoke with Dr. Olga Mclain  Specialty: PFM Resident  Discussed pt's hx, disposition, and available diagnostic and imaging results. Would recommend cardiology consultation, repeat troponin, and then call PFM back. Written by Fina Patino PA-C    Paged cardiology, have not received a call back yet. PROGRESS NOTE:  6:00 PM  Patient care will be transferred to Yuridia Sosa NP. Discussed available diagnostic results and care plan at length. Pending repeat troponin, cardiology consultation, and PFM resident consultation. Written by Fina Patino PA-C    Provider Notes (Medical Decision Making): 71-year-old female with history of anxiety, hypertension, GERD, diabetes, hyperlipidemia who presents to the ED due to aching left-sided chest pain that radiates to her jaw and back associated with nausea. EKG without evidence of acute ischemia, initial troponin, BNP within normal limits. CXR without acute process. Pending repeat troponin, cardiology consultation, and PFM resident consultation. Diagnosis     Clinical Impression:   1.  Chest pain, unspecified type        Disposition: TBD     Follow-up Information    None          Patient's Medications   Start Taking    No medications on file   Continue Taking    ASPIRIN DELAYED-RELEASE 81 MG TABLET    Take  by mouth daily. ATORVASTATIN (LIPITOR) 20 MG TABLET    TAKE ONE TABLET BY MOUTH DAILY    CRUTCH MISC    Partial weight right    FLUOXETINE (PROZAC) 20 MG TABLET    Take 3 tablets qd    LISINOPRIL (PRINIVIL, ZESTRIL) 5 MG TABLET    Take 1 Tab by mouth daily. LORAZEPAM (ATIVAN) 1 MG TABLET    Take 1 Tab by mouth every eight (8) hours as needed for Anxiety. Max Daily Amount: 3 mg. METFORMIN (GLUCOPHAGE) 500 MG TABLET    Take 1 Tab by mouth two (2) times daily (with meals). METFORMIN (GLUCOPHAGE) 500 MG TABLET    Take 500 mg by mouth every evening. ONDANSETRON HCL (ZOFRAN) 4 MG TABLET    Take 1 Tab by mouth every eight (8) hours as needed for Nausea. PANTOPRAZOLE (PROTONIX) 40 MG TABLET    TAKE ONE TABLET BY MOUTH DAILY   These Medications have changed    No medications on file   Stop Taking    No medications on file       Dictation disclaimer:  Please note that this dictation was completed with [employer], the [employer] voice recognition software. Quite often unanticipated grammatical, syntax, homophones, and other interpretive errors are inadvertently transcribed by the computer software. Please disregard these errors. Please excuse any errors that have escaped final proofreading.

## 2020-10-30 ENCOUNTER — APPOINTMENT (OUTPATIENT)
Dept: NON INVASIVE DIAGNOSTICS | Age: 70
DRG: 303 | End: 2020-10-30
Attending: NURSE PRACTITIONER
Payer: MEDICARE

## 2020-10-30 VITALS
TEMPERATURE: 97.6 F | HEART RATE: 57 BPM | DIASTOLIC BLOOD PRESSURE: 78 MMHG | BODY MASS INDEX: 26.66 KG/M2 | OXYGEN SATURATION: 96 % | WEIGHT: 180 LBS | SYSTOLIC BLOOD PRESSURE: 158 MMHG | RESPIRATION RATE: 16 BRPM | HEIGHT: 69 IN

## 2020-10-30 LAB
ALBUMIN SERPL-MCNC: 3.6 G/DL (ref 3.4–5)
ALBUMIN/GLOB SERPL: 1.3 {RATIO} (ref 0.8–1.7)
ALP SERPL-CCNC: 71 U/L (ref 45–117)
ALT SERPL-CCNC: 28 U/L (ref 13–56)
ANION GAP SERPL CALC-SCNC: 7 MMOL/L (ref 3–18)
AST SERPL-CCNC: 18 U/L (ref 10–38)
ATRIAL RATE: 74 BPM
BASOPHILS # BLD: 0 K/UL (ref 0–0.1)
BASOPHILS NFR BLD: 0 % (ref 0–2)
BILIRUB SERPL-MCNC: 0.6 MG/DL (ref 0.2–1)
BUN SERPL-MCNC: 13 MG/DL (ref 7–18)
BUN/CREAT SERPL: 12 (ref 12–20)
CALCIUM SERPL-MCNC: 9.8 MG/DL (ref 8.5–10.1)
CALCULATED P AXIS, ECG09: 0 DEGREES
CALCULATED R AXIS, ECG10: -33 DEGREES
CALCULATED T AXIS, ECG11: 81 DEGREES
CHLORIDE SERPL-SCNC: 106 MMOL/L (ref 100–111)
CO2 SERPL-SCNC: 26 MMOL/L (ref 21–32)
CREAT SERPL-MCNC: 1.11 MG/DL (ref 0.6–1.3)
DIAGNOSIS, 93000: NORMAL
DIFFERENTIAL METHOD BLD: NORMAL
ECHO AO ROOT DIAM: 3.04 CM
ECHO LA AREA 4C: 14.42 CM2
ECHO LA VOL 2C: 28.78 ML (ref 22–52)
ECHO LA VOL 4C: 31.02 ML (ref 22–52)
ECHO LA VOL BP: 37.19 ML (ref 22–52)
ECHO LA VOL/BSA BIPLANE: 18.83 ML/M2 (ref 16–28)
ECHO LA VOLUME INDEX A2C: 14.57 ML/M2 (ref 16–28)
ECHO LA VOLUME INDEX A4C: 15.7 ML/M2 (ref 16–28)
ECHO LV E' LATERAL VELOCITY: 9.21 CM/S
ECHO LV E' SEPTAL VELOCITY: 7.92 CM/S
ECHO LV INTERNAL DIMENSION DIASTOLIC: 3.89 CM (ref 3.9–5.3)
ECHO LV INTERNAL DIMENSION SYSTOLIC: 2.53 CM
ECHO LV IVSD: 0.98 CM (ref 0.6–0.9)
ECHO LV MASS 2D: 135.9 G (ref 67–162)
ECHO LV MASS INDEX 2D: 68.8 G/M2 (ref 43–95)
ECHO LV POSTERIOR WALL DIASTOLIC: 1.18 CM (ref 0.6–0.9)
ECHO LVOT CARDIAC OUTPUT: 3.19 LITER/MINUTE
ECHO LVOT DIAM: 1.92 CM
ECHO LVOT PEAK GRADIENT: 3.2 MMHG
ECHO LVOT PEAK VELOCITY: 89.45 CM/S
ECHO LVOT SV: 53.7 ML
ECHO LVOT VTI: 18.49 CM
ECHO MV A VELOCITY: 89.7 CM/S
ECHO MV E DECELERATION TIME (DT): 194.19 MS
ECHO MV E VELOCITY: 62.49 CM/S
ECHO MV E/A RATIO: 0.7
ECHO MV E/E' LATERAL: 6.79
ECHO MV E/E' RATIO (AVERAGED): 7.34
ECHO MV E/E' SEPTAL: 7.89
ECHO RV TAPSE: 1.78 CM (ref 1.5–2)
ECHO TV REGURGITANT MAX VELOCITY: 236.6 CM/S
ECHO TV REGURGITANT PEAK GRADIENT: 22.39 MMHG
EOSINOPHIL # BLD: 0.1 K/UL (ref 0–0.4)
EOSINOPHIL NFR BLD: 1 % (ref 0–5)
ERYTHROCYTE [DISTWIDTH] IN BLOOD BY AUTOMATED COUNT: 13.1 % (ref 11.6–14.5)
GLOBULIN SER CALC-MCNC: 2.7 G/DL (ref 2–4)
GLUCOSE BLD STRIP.AUTO-MCNC: 131 MG/DL (ref 70–110)
GLUCOSE BLD STRIP.AUTO-MCNC: 148 MG/DL (ref 70–110)
GLUCOSE BLD STRIP.AUTO-MCNC: 166 MG/DL (ref 70–110)
GLUCOSE SERPL-MCNC: 144 MG/DL (ref 74–99)
HCT VFR BLD AUTO: 37.7 % (ref 35–45)
HGB BLD-MCNC: 12.6 G/DL (ref 12–16)
LVOT MG: 1.64 MMHG
LYMPHOCYTES # BLD: 2.8 K/UL (ref 0.9–3.6)
LYMPHOCYTES NFR BLD: 32 % (ref 21–52)
MAGNESIUM SERPL-MCNC: 1.6 MG/DL (ref 1.6–2.6)
MCH RBC QN AUTO: 28.5 PG (ref 24–34)
MCHC RBC AUTO-ENTMCNC: 33.4 G/DL (ref 31–37)
MCV RBC AUTO: 85.3 FL (ref 74–97)
MONOCYTES # BLD: 0.5 K/UL (ref 0.05–1.2)
MONOCYTES NFR BLD: 6 % (ref 3–10)
NEUTS SEG # BLD: 5.2 K/UL (ref 1.8–8)
NEUTS SEG NFR BLD: 61 % (ref 40–73)
P-R INTERVAL, ECG05: 150 MS
PHOSPHATE SERPL-MCNC: 2.9 MG/DL (ref 2.5–4.9)
PLATELET # BLD AUTO: 245 K/UL (ref 135–420)
PMV BLD AUTO: 9.5 FL (ref 9.2–11.8)
POTASSIUM SERPL-SCNC: 3.6 MMOL/L (ref 3.5–5.5)
PROT SERPL-MCNC: 6.3 G/DL (ref 6.4–8.2)
Q-T INTERVAL, ECG07: 336 MS
QRS DURATION, ECG06: 80 MS
QTC CALCULATION (BEZET), ECG08: 372 MS
RBC # BLD AUTO: 4.42 M/UL (ref 4.2–5.3)
SODIUM SERPL-SCNC: 139 MMOL/L (ref 136–145)
TROPONIN I SERPL-MCNC: <0.02 NG/ML (ref 0–0.04)
VENTRICULAR RATE, ECG03: 74 BPM
WBC # BLD AUTO: 8.7 K/UL (ref 4.6–13.2)

## 2020-10-30 PROCEDURE — 36415 COLL VENOUS BLD VENIPUNCTURE: CPT

## 2020-10-30 PROCEDURE — 85025 COMPLETE CBC W/AUTO DIFF WBC: CPT

## 2020-10-30 PROCEDURE — 84484 ASSAY OF TROPONIN QUANT: CPT

## 2020-10-30 PROCEDURE — 80053 COMPREHEN METABOLIC PANEL: CPT

## 2020-10-30 PROCEDURE — 74011250637 HC RX REV CODE- 250/637: Performed by: STUDENT IN AN ORGANIZED HEALTH CARE EDUCATION/TRAINING PROGRAM

## 2020-10-30 PROCEDURE — 74011250637 HC RX REV CODE- 250/637: Performed by: FAMILY MEDICINE

## 2020-10-30 PROCEDURE — 83735 ASSAY OF MAGNESIUM: CPT

## 2020-10-30 PROCEDURE — 74011636637 HC RX REV CODE- 636/637: Performed by: STUDENT IN AN ORGANIZED HEALTH CARE EDUCATION/TRAINING PROGRAM

## 2020-10-30 PROCEDURE — 93306 TTE W/DOPPLER COMPLETE: CPT | Performed by: INTERNAL MEDICINE

## 2020-10-30 PROCEDURE — 93306 TTE W/DOPPLER COMPLETE: CPT

## 2020-10-30 PROCEDURE — 84100 ASSAY OF PHOSPHORUS: CPT

## 2020-10-30 PROCEDURE — 93005 ELECTROCARDIOGRAM TRACING: CPT

## 2020-10-30 PROCEDURE — 74011250636 HC RX REV CODE- 250/636: Performed by: NURSE PRACTITIONER

## 2020-10-30 PROCEDURE — 99223 1ST HOSP IP/OBS HIGH 75: CPT | Performed by: INTERNAL MEDICINE

## 2020-10-30 PROCEDURE — 74011250637 HC RX REV CODE- 250/637: Performed by: NURSE PRACTITIONER

## 2020-10-30 PROCEDURE — 74011250636 HC RX REV CODE- 250/636: Performed by: STUDENT IN AN ORGANIZED HEALTH CARE EDUCATION/TRAINING PROGRAM

## 2020-10-30 PROCEDURE — 82962 GLUCOSE BLOOD TEST: CPT

## 2020-10-30 RX ORDER — PROMETHAZINE HYDROCHLORIDE 12.5 MG/1
12.5 TABLET ORAL
Status: DISCONTINUED | OUTPATIENT
Start: 2020-10-30 | End: 2020-10-30 | Stop reason: HOSPADM

## 2020-10-30 RX ORDER — ACETAMINOPHEN 650 MG/1
650 SUPPOSITORY RECTAL
Status: DISCONTINUED | OUTPATIENT
Start: 2020-10-30 | End: 2020-10-30 | Stop reason: HOSPADM

## 2020-10-30 RX ORDER — FAMOTIDINE 20 MG/1
20 TABLET, FILM COATED ORAL DAILY
Status: DISCONTINUED | OUTPATIENT
Start: 2020-10-30 | End: 2020-10-30 | Stop reason: HOSPADM

## 2020-10-30 RX ORDER — PANTOPRAZOLE SODIUM 40 MG/1
40 TABLET, DELAYED RELEASE ORAL EVERY 12 HOURS
Status: DISCONTINUED | OUTPATIENT
Start: 2020-10-30 | End: 2020-10-30 | Stop reason: HOSPADM

## 2020-10-30 RX ORDER — ATORVASTATIN CALCIUM 20 MG/1
20 TABLET, FILM COATED ORAL DAILY
Status: DISCONTINUED | OUTPATIENT
Start: 2020-10-30 | End: 2020-10-30 | Stop reason: HOSPADM

## 2020-10-30 RX ORDER — ACETAMINOPHEN 325 MG/1
650 TABLET ORAL
Status: DISCONTINUED | OUTPATIENT
Start: 2020-10-30 | End: 2020-10-30

## 2020-10-30 RX ORDER — DEXTROSE 50 % IN WATER (D50W) INTRAVENOUS SYRINGE
25-50 AS NEEDED
Status: DISCONTINUED | OUTPATIENT
Start: 2020-10-30 | End: 2020-10-30 | Stop reason: HOSPADM

## 2020-10-30 RX ORDER — ENOXAPARIN SODIUM 100 MG/ML
40 INJECTION SUBCUTANEOUS EVERY 24 HOURS
Status: DISCONTINUED | OUTPATIENT
Start: 2020-10-30 | End: 2020-10-30

## 2020-10-30 RX ORDER — LISINOPRIL 5 MG/1
5 TABLET ORAL ONCE
Status: COMPLETED | OUTPATIENT
Start: 2020-10-30 | End: 2020-10-30

## 2020-10-30 RX ORDER — FAMOTIDINE 20 MG/1
20 TABLET, FILM COATED ORAL DAILY
Status: ON HOLD | COMMUNITY
End: 2020-11-13

## 2020-10-30 RX ORDER — ENOXAPARIN SODIUM 100 MG/ML
80 INJECTION SUBCUTANEOUS EVERY 12 HOURS
Status: DISCONTINUED | OUTPATIENT
Start: 2020-10-30 | End: 2020-10-30 | Stop reason: HOSPADM

## 2020-10-30 RX ORDER — GUAIFENESIN 100 MG/5ML
81 LIQUID (ML) ORAL DAILY
Status: DISCONTINUED | OUTPATIENT
Start: 2020-10-30 | End: 2020-10-30

## 2020-10-30 RX ORDER — SODIUM CHLORIDE 0.9 % (FLUSH) 0.9 %
5-40 SYRINGE (ML) INJECTION AS NEEDED
Status: DISCONTINUED | OUTPATIENT
Start: 2020-10-30 | End: 2020-10-30 | Stop reason: HOSPADM

## 2020-10-30 RX ORDER — ACETAMINOPHEN 650 MG/1
650 SUPPOSITORY RECTAL
Status: DISCONTINUED | OUTPATIENT
Start: 2020-10-30 | End: 2020-10-30

## 2020-10-30 RX ORDER — POLYETHYLENE GLYCOL 3350 17 G/17G
17 POWDER, FOR SOLUTION ORAL DAILY PRN
Status: DISCONTINUED | OUTPATIENT
Start: 2020-10-30 | End: 2020-10-30 | Stop reason: HOSPADM

## 2020-10-30 RX ORDER — ACETAMINOPHEN 325 MG/1
650 TABLET ORAL
Status: DISCONTINUED | OUTPATIENT
Start: 2020-10-30 | End: 2020-10-30 | Stop reason: HOSPADM

## 2020-10-30 RX ORDER — NITROGLYCERIN 0.4 MG/1
0.4 TABLET SUBLINGUAL AS NEEDED
Status: DISCONTINUED | OUTPATIENT
Start: 2020-10-30 | End: 2020-10-30 | Stop reason: HOSPADM

## 2020-10-30 RX ORDER — MAGNESIUM SULFATE 100 %
4 CRYSTALS MISCELLANEOUS AS NEEDED
Status: DISCONTINUED | OUTPATIENT
Start: 2020-10-30 | End: 2020-10-30 | Stop reason: HOSPADM

## 2020-10-30 RX ORDER — METOPROLOL SUCCINATE 25 MG/1
12.5 TABLET, EXTENDED RELEASE ORAL DAILY
Qty: 15 TAB | Refills: 0 | Status: SHIPPED | OUTPATIENT
Start: 2020-10-30 | End: 2020-11-29

## 2020-10-30 RX ORDER — METOPROLOL TARTRATE 25 MG/1
25 TABLET, FILM COATED ORAL EVERY 12 HOURS
Status: DISCONTINUED | OUTPATIENT
Start: 2020-10-30 | End: 2020-10-30 | Stop reason: HOSPADM

## 2020-10-30 RX ORDER — LISINOPRIL 5 MG/1
5 TABLET ORAL DAILY
Status: DISCONTINUED | OUTPATIENT
Start: 2020-10-30 | End: 2020-10-30

## 2020-10-30 RX ORDER — LISINOPRIL 10 MG/1
10 TABLET ORAL DAILY
Status: DISCONTINUED | OUTPATIENT
Start: 2020-10-31 | End: 2020-10-30 | Stop reason: HOSPADM

## 2020-10-30 RX ORDER — INSULIN LISPRO 100 [IU]/ML
INJECTION, SOLUTION INTRAVENOUS; SUBCUTANEOUS
Status: DISCONTINUED | OUTPATIENT
Start: 2020-10-30 | End: 2020-10-30 | Stop reason: HOSPADM

## 2020-10-30 RX ORDER — ASPIRIN 81 MG/1
81 TABLET ORAL DAILY
Status: DISCONTINUED | OUTPATIENT
Start: 2020-10-30 | End: 2020-10-30 | Stop reason: HOSPADM

## 2020-10-30 RX ORDER — PAROXETINE HYDROCHLORIDE 20 MG/1
40 TABLET, FILM COATED ORAL DAILY
Status: DISCONTINUED | OUTPATIENT
Start: 2020-10-30 | End: 2020-10-30 | Stop reason: HOSPADM

## 2020-10-30 RX ORDER — NITROGLYCERIN 0.4 MG/1
0.4 TABLET SUBLINGUAL AS NEEDED
Qty: 3 TAB | Refills: 0 | Status: SHIPPED | OUTPATIENT
Start: 2020-10-30 | End: 2020-11-16

## 2020-10-30 RX ORDER — PAROXETINE HYDROCHLORIDE 40 MG/1
40 TABLET, FILM COATED ORAL DAILY
COMMUNITY
Start: 2021-08-19 | End: 2021-08-19

## 2020-10-30 RX ORDER — METOCLOPRAMIDE 10 MG/1
10 TABLET ORAL
Status: DISCONTINUED | OUTPATIENT
Start: 2020-10-30 | End: 2020-10-30 | Stop reason: HOSPADM

## 2020-10-30 RX ORDER — SODIUM CHLORIDE 0.9 % (FLUSH) 0.9 %
5-40 SYRINGE (ML) INJECTION EVERY 8 HOURS
Status: DISCONTINUED | OUTPATIENT
Start: 2020-10-30 | End: 2020-10-30 | Stop reason: HOSPADM

## 2020-10-30 RX ORDER — ONDANSETRON 2 MG/ML
4 INJECTION INTRAMUSCULAR; INTRAVENOUS
Status: DISCONTINUED | OUTPATIENT
Start: 2020-10-30 | End: 2020-10-30 | Stop reason: HOSPADM

## 2020-10-30 RX ORDER — IBUPROFEN 400 MG/1
400 TABLET ORAL
Status: DISCONTINUED | OUTPATIENT
Start: 2020-10-30 | End: 2020-10-30 | Stop reason: HOSPADM

## 2020-10-30 RX ADMIN — LISINOPRIL 5 MG: 5 TABLET ORAL at 12:03

## 2020-10-30 RX ADMIN — NITROGLYCERIN 0.5 INCH: 20 OINTMENT TOPICAL at 16:45

## 2020-10-30 RX ADMIN — METOCLOPRAMIDE 10 MG: 10 TABLET ORAL at 16:45

## 2020-10-30 RX ADMIN — ATORVASTATIN CALCIUM 20 MG: 20 TABLET, FILM COATED ORAL at 08:30

## 2020-10-30 RX ADMIN — ASPIRIN 81 MG: 81 TABLET ORAL at 10:08

## 2020-10-30 RX ADMIN — ENOXAPARIN SODIUM 40 MG: 40 INJECTION SUBCUTANEOUS at 10:08

## 2020-10-30 RX ADMIN — PAROXETINE HYDROCHLORIDE 40 MG: 20 TABLET, FILM COATED ORAL at 08:30

## 2020-10-30 RX ADMIN — PANTOPRAZOLE SODIUM 40 MG: 40 TABLET, DELAYED RELEASE ORAL at 08:30

## 2020-10-30 RX ADMIN — FAMOTIDINE 20 MG: 20 TABLET ORAL at 08:30

## 2020-10-30 RX ADMIN — METOPROLOL TARTRATE 25 MG: 25 TABLET, FILM COATED ORAL at 12:03

## 2020-10-30 RX ADMIN — LISINOPRIL 5 MG: 5 TABLET ORAL at 08:30

## 2020-10-30 RX ADMIN — LORAZEPAM 1 MG: 1 TABLET ORAL at 00:32

## 2020-10-30 RX ADMIN — INSULIN LISPRO 166 UNITS: 100 INJECTION, SOLUTION INTRAVENOUS; SUBCUTANEOUS at 16:49

## 2020-10-30 RX ADMIN — ENOXAPARIN SODIUM 80 MG: 80 INJECTION SUBCUTANEOUS at 17:52

## 2020-10-30 RX ADMIN — Medication 10 ML: at 06:09

## 2020-10-30 NOTE — H&P
Admission History and Physical  [facility]      Patient: Alysa Jones MRN: [id number]  CSN: [id number]    YOB: 1950  Age: 78 y.o. Sex: female      Admission Date: 10/29/2020       HPI:     Alysa Jones is a 78 y.o. female with PMH  HTN, HLD, T2DM, GERD/IBS, and depression/anxiety, now presenting with complaint of CP. She describes moderate intermittent retrosternal heavy pressure-like CP, with constant stabbing back pain and bilateral jaw soreness x yesterday afternoon. Sxs onset occurred while seated and making her dog's Halloween costume. Also experienced nausea and diaphoresis. The CP was similar to past episodes, but different from her chronic GERD or the chest discomfort she experiences when anxious. Denies F/C, vomiting, HA, lightheadedness, dizziness, SOB, cough, palpitations, ABD pain, edema, numbness, or tingling. ED Course (See objective for values/interpretations):  Labs obtained: CBC, CMP, UA, cardiac enzymes  Medications administered: ASA, lisinopril, lorazepam  Imaging obtained: CXR    Review of Systems   Constitutional: Positive for diaphoresis. Negative for chills and fever. HENT:        BL jaw soreness   Eyes: Negative for blurred vision and double vision. Respiratory: Negative for cough, shortness of breath and wheezing. Cardiovascular: Positive for chest pain. Negative for palpitations, orthopnea, claudication and leg swelling. Gastrointestinal: Positive for heartburn and nausea. Negative for abdominal pain and vomiting. Genitourinary: Positive for frequency. Negative for dysuria and urgency. Musculoskeletal: Positive for back pain. Negative for neck pain. Neurological: Positive for dizziness. Negative for weakness and headaches. Psychiatric/Behavioral: Negative for depression. The patient is nervous/anxious. Past Medical History:   Diagnosis Date    Abnormal nuclear cardiac imaging test 05/11/2015    Low risk.   No ischemia or prior infarction. No RWMA. EF 68%. Neg EKG on pharm stress test.    Agatston CAC score, <100 05/15/2015    Coronary calcium score 70.  Chronic kidney disease     early stage kidney failure    Diabetes [facility] [address]     Diabetes mellitus ([facility] [address]     GERD (gastroesophageal reflux disease)     crohn's disease    History of echocardiogram 2013    EF 65%. No RWMA. Mod LVH. Gr 1 DDfx. RVSP 25 mmHg.       HX OTHER MEDICAL     anxiety, \"mitral valve prolapse\"    Hyperlipidemia     Hypertension     Psychiatric disorder     anxiety    RLE venous duplex     in her 25s       Past Surgical History:   Procedure Laterality Date    ABDOMEN SURGERY PROC UNLISTED      tumor removal    HX CHOLECYSTECTOMY      HX GYN      hysterectomy    HX OTHER SURGICAL      FATTY TISSUE REMOVED    HX TONSILLECTOMY         Family History   Problem Relation Age of Onset    Heart Disease Father     Heart Attack Father     Heart Disease Paternal Grandmother     Heart Disease Paternal Grandfather     Breast Cancer Mother     Cancer Mother        Social History     Socioeconomic History    Marital status:      Spouse name: Not on file    Number of children: Not on file    Years of education: Not on file    Highest education level: Not on file   Tobacco Use    Smoking status: Former Smoker     Packs/day: 1.00     Years: 30.00     Pack years: 30.00     Types: Cigarettes     Last attempt to quit: 10/19/2012     Years since quittin.0    Smokeless tobacco: Former User   Substance and Sexual Activity    Alcohol use: No     Alcohol/week: 0.0 standard drinks     Comment: occasional    Drug use: No    Sexual activity: Yes     Partners: Male     Birth control/protection: Surgical   Other Topics Concern    Caffeine Concern Yes     Comment: coffee daily 8oz    Sleep Concern No    Stress Concern No    Weight Concern Yes     Comment: increased weight    Exercise No    Seat Belt Yes       Allergies Allergen Reactions    Fish Containing Products Anaphylaxis and Other (comments)     Heart stops    Iodine Anaphylaxis and Other (comments)     Heart stops    Shellfish Derived Anaphylaxis and Other (comments)     Heart stops    Bee Venom Protein (Honey Bee) Hives       Prior to Admission Medications   Prescriptions Last Dose Informant Patient Reported? Taking? Crutch misc   No No   Sig: Partial weight right   FLUoxetine (PROZAC) 20 mg tablet   No No   Sig: Take 3 tablets qd   Patient taking differently: 60 mg. Take 3 tablets qd   LORazepam (ATIVAN) 1 mg tablet   No No   Sig: Take 1 Tab by mouth every eight (8) hours as needed for Anxiety. Max Daily Amount: 3 mg.   aspirin delayed-release 81 mg tablet   Yes No   Sig: Take  by mouth daily. atorvastatin (LIPITOR) 20 mg tablet   No No   Sig: TAKE ONE TABLET BY MOUTH DAILY   lisinopril (PRINIVIL, ZESTRIL) 5 mg tablet   No No   Sig: Take 1 Tab by mouth daily. metFORMIN (GLUCOPHAGE) 500 mg tablet   No No   Sig: Take 1 Tab by mouth two (2) times daily (with meals). Patient taking differently: Take 1,000 mg by mouth daily (with breakfast). metFORMIN (GLUCOPHAGE) 500 mg tablet   Yes No   Sig: Take 500 mg by mouth every evening. ondansetron hcl (ZOFRAN) 4 mg tablet   No No   Sig: Take 1 Tab by mouth every eight (8) hours as needed for Nausea. pantoprazole (PROTONIX) 40 mg tablet   No No   Sig: TAKE ONE TABLET BY MOUTH DAILY      Facility-Administered Medications: None       Physical Exam:     Patient Vitals for the past 24 hrs:   Temp Pulse Resp BP SpO2   10/29/20 2300  65 13 (!) 172/76 95 %   10/29/20 2036     100 %   10/29/20 2030  65 17 (!) 190/76 99 %   10/29/20 2025 98 °F (36.7 °C) 74 20 (!) 183/71 99 %   10/29/20 2000  63 13 (!) 154/98 99 %   10/29/20 1456 98 °F (36.7 °C) 79 20 (!) 140/82 98 %       Physical Exam:   General:  AAOx3, NAD   HEENT: Conjunctiva pink, sclera anicteric. Moist mucous membranes. Thyroid not enlarged, no nodules.   No cervical, supraclavicular, occipital or submandibular lymphadenopathy. No other gross abnormalities present. CV:  RRR, no murmurs. No visible pulsations or thrills. RESP:  Unlabored breathing. Lungs clear to auscultation without adventitious breath sounds. Equal expansion bilaterally. ABD:  Soft, nontender, nondistended. BS (+). No hepatosplenomegaly. No suprapubic tenderness. MS:  No joint deformity. No atrophy. Neuro:  5/5 strength bilateral upper extremities and lower extremities. Sensation intact. Ext:  No edema. 2+ radial and dp pulses bilaterally. Skin:  No rashes, lesions, or ulcers. Good turgor. Chemistry Recent Labs     10/29/20  1520   *      K 4.7      CO2 26   BUN 13   CREA 1.08   CA 10.3*   MG 1.6   AGAP 7   BUCR 12   AP 80   TP 7.1   ALB 3.9   GLOB 3.2   AGRAT 1.2        CBC w/Diff Recent Labs     10/29/20  1520   WBC 7.7   RBC 4.74   HGB 13.9   HCT 40.4      GRANS 56   LYMPH 35   EOS 2        Liver Enzymes Protein, total   Date Value Ref Range Status   10/29/2020 7.1 6.4 - 8.2 g/dL Final     Albumin   Date Value Ref Range Status   10/29/2020 3.9 3.4 - 5.0 g/dL Final     Globulin   Date Value Ref Range Status   10/29/2020 3.2 2.0 - 4.0 g/dL Final     A-G Ratio   Date Value Ref Range Status   10/29/2020 1.2 0.8 - 1.7   Final     Alk. phosphatase   Date Value Ref Range Status   10/29/2020 80 45 - 117 U/L Final     Recent Labs     10/29/20  1520   TP 7.1   ALB 3.9   GLOB 3.2   AGRAT 1.2   AP 80        Lactic Acid No results found for: LAC  No results for input(s): LAC in the last 72 hours.      BNP No results found for: BNP, BNPP, XBNPT     Cardiac Enzymes Lab Results   Component Value Date/Time    CPK 38 10/29/2020 08:40 PM    CPK 59 10/29/2020 03:20 PM    CKMB <1.0 10/29/2020 08:40 PM    CKMB <1.0 10/29/2020 03:20 PM    CKND1  10/29/2020 08:40 PM     CALCULATION NOT PERFORMED WHEN RESULT IS BELOW LINEAR LIMIT    CKND1  10/29/2020 03:20 PM     CALCULATION NOT PERFORMED WHEN RESULT IS BELOW LINEAR LIMIT    Vu Ship <0.02 10/29/2020 08:40 PM    TROIQ <0.02 10/29/2020 03:20 PM        Coagulation No results for input(s): PTP, INR, APTT, INREXT in the last 72 hours. Thyroid  Lab Results   Component Value Date/Time    TSH 1.30 04/20/2019 05:12 PM          Lipid Panel Lab Results   Component Value Date/Time    Cholesterol, total 191 07/31/2018 09:39 AM    HDL Cholesterol 53 07/31/2018 09:39 AM    LDL, calculated 94.6 07/31/2018 09:39 AM    VLDL, calculated 43.4 07/31/2018 09:39 AM    Triglyceride 217 (H) 07/31/2018 09:39 AM    CHOL/HDL Ratio 3.6 07/31/2018 09:39 AM        ABG No results for input(s): PHI, PHI, POC2, PCO2I, PO2, PO2I, HCO3, HCO3I, FIO2, FIO2I in the last 72 hours. Urinalysis Lab Results   Component Value Date/Time    Color YELLOW 10/29/2020 08:10 PM    Appearance CLEAR 10/29/2020 08:10 PM    Specific gravity 1.007 10/29/2020 08:10 PM    pH (UA) 5.0 10/29/2020 08:10 PM    Protein Negative 10/29/2020 08:10 PM    Glucose Negative 10/29/2020 08:10 PM    Ketone Negative 10/29/2020 08:10 PM    Bilirubin Negative 10/29/2020 08:10 PM    Urobilinogen 0.2 10/29/2020 08:10 PM    Nitrites Negative 10/29/2020 08:10 PM    Leukocyte Esterase SMALL (A) 10/29/2020 08:10 PM    Epithelial cells 2+ 10/29/2020 08:10 PM    Bacteria 1+ (A) 09/03/2018 05:40 PM    WBC 4 to 10 10/29/2020 08:10 PM    RBC 0 to 3 10/29/2020 08:10 PM        Micro No results for input(s): SDES, CULT in the last 72 hours. No results for input(s): CULT in the last 72 hours. Imaging:  XR (Most Recent). Results from [place] encounter on 10/29/20   XR CHEST PORT    Narrative INDICATION: chest pain    TECHNIQUE: Portable chest radiograph    COMPARISON: March 1, 2019    FINDINGS: The lungs are adequately inflated. No focal consolidation, effusion or  pneumothorax. Heart size within normal limits. No acute osseous abnormality. Dextroconvex curvature of the thoracic spine. Impression IMPRESSION:     No acute cardiopulmonary findings        CT (Most Recent) Results from Hospital Encounter encounter on 09/03/18   CT ABD PELV WO CONT    Narrative EXAM: CT of the Abdomen and Pelvis without IV contrast    CLINICAL INDICATION: Abdominal pain especially in the right lower quadrant    TECHNIQUE: CT of the abdomen and pelvis. Sagittal and coronal reformations    All CT scans at this facility are performed using dose optimization technique as  appropriate to a performed exam, to include automated exposure control,  adjustment of the mA and/or kV according to patient size (including appropriate  matching for site specific examination) or use of iterative reconstruction  technique. IV CONTRAST: None    ENTERIC CONTRAST: None    COMPARISON: None    FINDINGS:   Limitations: The evaluation of the solid organs is limited due to the lack of IV  contrast.    Lower Chest: Mild scarring or atelectasis in the left lower lobe. No  consolidation or effusion appreciated. The heart and pericardium are  unremarkable. Peritoneum: No free air appreciated. No free fluid present. No fluid collections  present. Liver: The liver is severely fatty replaced. In the left lobe near the paris  hepatis, there is a 3.6 x 3 x 3.6 cm hypodensity which likely represents a cyst.    Biliary/Gallbladder: No intrahepatic or extrahepatic biliary ductal dilatation  appreciated. The gallbladder is surgically absent. Spleen: Unremarkable. Pancreas: No focal lesion appreciated. The pancreatic duct is unremarkable. No  peripancreatic inflammation or adenopathy. : The adrenal glands are unremarkable. No urolithiasis. No perinephric fat  stranding appreciated. No hydroureteronephrosis seen. No acute pathology in the  bladder. The uterus is surgically absent. GI: The stomach is unremarkable. The small bowel is without evidence of  obstruction. No small bowel wall thickening.  The mesentery is without  inflammation or adenopathy. The appendix is unremarkable. Multiple diverticula  are noted in the descending and sigmoid colon. No evidence of diverticulitis. Aorta and retroperitoneum: No aortic aneurysm seen. No periaortic adenopathy  seen. No fluid collections in the retroperitoneum appreciated. Abdominal wall/Inguinal: Unremarkable     Musculoskeletal: Multilevel degenerative disc disease and facet arthropathy. Impression IMPRESSION:   1. No acute pathology in the abdomen or pelvis. 2.  Diverticulosis without evidence of diverticulitis. Fatty infiltration of the liver with likely cyst in the left lobe. If clinical  concern, consider ultrasound for further evaluation. ECHO No results found for this or any previous visit. EKG Results for orders placed or performed in visit on 05/12/15   AMB POC EKG ROUTINE W/ 12 LEADS, INTER & REP     Status: None    Narrative    EKG: unchanged from previous tracings, normal sinus rhythm,  nonspecific ST and T waves changes, left axis deviation.           Recent Results (from the past 12 hour(s))   EKG, 12 LEAD, INITIAL    Collection Time: 10/29/20  3:09 PM   Result Value Ref Range    Ventricular Rate 76 BPM    Atrial Rate 76 BPM    P-R Interval 148 ms    QRS Duration 84 ms    Q-T Interval 348 ms    QTC Calculation (Bezet) 391 ms    Calculated P Axis -2 degrees    Calculated R Axis -32 degrees    Calculated T Axis -81 degrees    Diagnosis       Normal sinus rhythm  Left axis deviation  Nonspecific ST and T wave abnormality  Abnormal ECG  When compared with ECG of 01-MAR-2019 11:32,  ST now depressed in Inferior leads     CARDIAC PANEL,(CK, CKMB & TROPONIN)    Collection Time: 10/29/20  3:20 PM   Result Value Ref Range    CK - MB <1.0 <3.6 ng/ml    CK-MB Index  0.0 - 4.0 %     CALCULATION NOT PERFORMED WHEN RESULT IS BELOW LINEAR LIMIT    CK 59 26 - 192 U/L    Troponin-I, QT <0.02 0.0 - 0.045 NG/ML   CBC WITH AUTOMATED DIFF    Collection Time: 10/29/20  3:20 PM Result Value Ref Range    WBC 7.7 4.6 - 13.2 K/uL    RBC 4.74 4.20 - 5.30 M/uL    HGB 13.9 12.0 - 16.0 g/dL    HCT 40.4 35.0 - 45.0 %    MCV 85.2 74.0 - 97.0 FL    MCH 29.3 24.0 - 34.0 PG    MCHC 34.4 31.0 - 37.0 g/dL    RDW 12.9 11.6 - 14.5 %    PLATELET 305 407 - 493 K/uL    MPV 9.6 9.2 - 11.8 FL    NEUTROPHILS 56 40 - 73 %    LYMPHOCYTES 35 21 - 52 %    MONOCYTES 6 3 - 10 %    EOSINOPHILS 2 0 - 5 %    BASOPHILS 1 0 - 2 %    ABS. NEUTROPHILS 4.4 1.8 - 8.0 K/UL    ABS. LYMPHOCYTES 2.7 0.9 - 3.6 K/UL    ABS. MONOCYTES 0.5 0.05 - 1.2 K/UL    ABS. EOSINOPHILS 0.1 0.0 - 0.4 K/UL    ABS. BASOPHILS 0.0 0.0 - 0.1 K/UL    DF AUTOMATED     METABOLIC PANEL, COMPREHENSIVE    Collection Time: 10/29/20  3:20 PM   Result Value Ref Range    Sodium 139 136 - 145 mmol/L    Potassium 4.7 3.5 - 5.5 mmol/L    Chloride 106 100 - 111 mmol/L    CO2 26 21 - 32 mmol/L    Anion gap 7 3.0 - 18 mmol/L    Glucose 116 (H) 74 - 99 mg/dL    BUN 13 7.0 - 18 MG/DL    Creatinine 1.08 0.6 - 1.3 MG/DL    BUN/Creatinine ratio 12 12 - 20      GFR est AA >60 >60 ml/min/1.73m2    GFR est non-AA 50 (L) >60 ml/min/1.73m2    Calcium 10.3 (H) 8.5 - 10.1 MG/DL    Bilirubin, total 0.6 0.2 - 1.0 MG/DL    ALT (SGPT) 34 13 - 56 U/L    AST (SGOT) 25 10 - 38 U/L    Alk.  phosphatase 80 45 - 117 U/L    Protein, total 7.1 6.4 - 8.2 g/dL    Albumin 3.9 3.4 - 5.0 g/dL    Globulin 3.2 2.0 - 4.0 g/dL    A-G Ratio 1.2 0.8 - 1.7     MAGNESIUM    Collection Time: 10/29/20  3:20 PM   Result Value Ref Range    Magnesium 1.6 1.6 - 2.6 mg/dL   NT-PRO BNP    Collection Time: 10/29/20  3:20 PM   Result Value Ref Range    NT pro-BNP 88 0 - 900 PG/ML   URINALYSIS W/ RFLX MICROSCOPIC    Collection Time: 10/29/20  8:10 PM   Result Value Ref Range    Color YELLOW      Appearance CLEAR      Specific gravity 1.007 1.005 - 1.030      pH (UA) 5.0 5.0 - 8.0      Protein Negative NEG mg/dL    Glucose Negative NEG mg/dL    Ketone Negative NEG mg/dL    Bilirubin Negative NEG      Blood Negative NEG      Urobilinogen 0.2 0.2 - 1.0 EU/dL    Nitrites Negative NEG      Leukocyte Esterase SMALL (A) NEG     URINE MICROSCOPIC ONLY    Collection Time: 10/29/20  8:10 PM   Result Value Ref Range    WBC 4 to 10 0 - 4 /hpf    RBC 0 to 3 0 - 5 /hpf    Epithelial cells 2+ 0 - 5 /lpf   CARDIAC PANEL,(CK, CKMB & TROPONIN)    Collection Time: 10/29/20  8:40 PM   Result Value Ref Range    CK - MB <1.0 <3.6 ng/ml    CK-MB Index  0.0 - 4.0 %     CALCULATION NOT PERFORMED WHEN RESULT IS BELOW LINEAR LIMIT    CK 38 26 - 192 U/L    Troponin-I, QT <0.02 0.0 - 0.045 NG/ML       Assessment/Plan:   78 y.o. female with PMH HTN, HLD, T2DM, GERD/IBS, and depression/anxiety now admitted with CP. CP: DDx includes panic attack, ACS, MSK pain. Pt has hx of CP described as chest tightness due to panic attacks, but this episode is not similar. Normal echo and NucStress Test in 09/2018. Cardiac enzymes negative; trops negative x 2. EKG read as ST depression in inferior leads, but also appears to have artifact. Her sxs are concerning for ACS, but cardiac enzymes do not support that. Cardiology asked that pt be admitted for w/u.  - admit to tele w/ cardiac monitoring  - appreciate cardiology recs  - daily CBC, CMP, Mg, Phos  - repeat EKG  - sublingual nitro PRN  - continue home ASA 81  - VS per unit routine  - supplemental O2 for sats < 92%  - regular diet  - lovenox for DVT ppx  - pantoprazole for GI ppx  - PT/OT/CM    HTN/HLD:  - VS per unit routine  - continue home Lisinopril 5mg daily  - continue home atorvastatin 20mg daily    T2DM: HbA1c was: 7.4 in 07/27/2020  - hold home Metformin 500mg AM and 1000mg PM  - SSI  - Accuchecks  - hypoglycemia protocol    GERD/IBS: Followed by GI. Normal TSH, ESR and CBC in 09/2020. Recent colonoscopy 08/2020. Pt reported new rx, metoclopramide 20mg AC; but she was not sure of the drug or dose.    - continue home pantoprazole 40mg Q12H and famotidine 20mg daily  - day team f/u with pt's GI to clarify metoclopramide    Depression/Anxiety: Per clinic note \"Says that the stressors in life is the reason why she has depression and anxiety. Sometimes the anxiety leads to chest pain. \"  - continue home Paroxetine 40mg daily      Diet regular   DVT Prophylaxis pantoprazole   GI Prophylaxis lovenox   Code status Full   Disposition <2 MN     Point of Contact Londa Primrose  Relationship:   [phone]      Gloria Ford MD , PGY-1   [address]   Intern Pager: [phone]   October 29, 2020, 11:49 PM         Senior Resident History and Physical  [facility]    HPI:     Bisi Keys is a 78 y.o. female with PMH of HTN, CKD stage 3, T2DM, HLD, GERD, IBS, Anxiety/Depression, now with complaint of chest pain. Patient reports intermittent mid-chest pain characterized as a heavy tightness that radiates to left shoulder and bilateral jaw. Back pain is described as a constant sharp pain. Jaw pain described as pain similar to holding mouth open for prolonged dental procedure. Pain associated with nausea and diaphoresis. She is unsure about dizziness, but denies SOB, headache, racing heart beat, reflux symptoms. She has had chest pain in the past that is similar to current chest pain, but it has never radiated to back and chest.     Denies illicit drug use. Social drinker. Former cigarette smoker. ED course, ROS, PMH, medications, labs and imaging per intern note above. PMH, PSH, Family Hx, Social Hx, Home medications, and allergies as above in intern H&P. Physical Exam:      Visit Vitals  BP (!) 172/76   Pulse 65   Temp 98 °F (36.7 °C)   Resp 13   Ht 5' 9\" (1.753 m)   Wt 81.6 kg (180 lb)   SpO2 95%   BMI 26.58 kg/m²       Physical Exam:  Physical Exam  Constitutional:       General: She is not in acute distress. Appearance: She is not ill-appearing, toxic-appearing or diaphoretic. HENT:      Head: Atraumatic.    Neck:      Musculoskeletal: Normal range of motion and neck supple. Pulmonary:      Effort: Pulmonary effort is normal. No respiratory distress. Breath sounds: Normal breath sounds. No stridor. No wheezing, rhonchi or rales. Chest:      Chest wall: Tenderness present. Abdominal:      General: There is no distension. Palpations: Abdomen is soft. There is no mass. Tenderness: There is no abdominal tenderness. There is no guarding or rebound. Hernia: No hernia is present. Skin:     General: Skin is warm and dry. Capillary Refill: Capillary refill takes less than 2 seconds. Neurological:      Mental Status: She is alert. Labs Reviewed    Assessment/Plan:   78 y.o. female with PMH of HTN, CKD stage 3, T2DM, HLD, GERD, IBS, Anxiety/Depression, now admitted with ACS Rule-Out. Angina/ACS Rule-Out: Patient's symptoms have resolved. EKG showed NSR w/76 and no ST elevations or depressions. Troponin negative x2. CXR negative. Vitals within normal limits minus hypertension. She was given ASA 324mg while in ED. In past, had nuclear stress test in 2017 that was low risk. ECHO ins 2017 was also normal. Based on clinic notes, patient has intermittent chest pain thought to be from anxiety/panic attacks. - Admit to telemetry with cardiac monitoring   - Cardiology consulted, follow recs   - Repeat EKG in AM   - Nitro sublingual prn   - Continue home ASA 81mg daily   - Supplemental 02 if needed   - Consult to CM/PT/OT    Hypertension: Well controlled outpatient, but elevated on admission.   - Restart home Lisinopril 5mg daily    Remainder of plan and management of chronic conditions per intern note above     Pressley Schaumann.  Tyler Pineda MD, PGY-3  [address] [facility] Medicine  10/29/20 11:45 PM

## 2020-10-30 NOTE — ED NOTES
Progress note    Assumed care of patient at 1800 from [facility] [place]. Patient presented with chest pain and second troponin is pending. She planned a consultation with cardiology. In to talk to patient patient states chest pain started earlier today it was in the middle of her chest and radiated to her upper back. No shortness of breath was reported patient did have nausea at the time of the pain. No nausea reported at this time. Patient states the pain is still about a 2-3. No palpitations no dizziness no shortness of breath was reported. Patient denies any travel leg pain or any other symptoms. No fever has been reported. No cough and cold symptoms. Patient does not have a cardiac history but about 2 years ago had a cardiac stress test that was negative. She has not had any follow-up with cardiology. This point time patient is stable no shortness of breath chest pain is mild patient was given aspirin. Chest x-ray is within normal limits labs are all within normal limits first troponin is negative. I will repeat the troponin and then consult with cardiology. Patient is portion family resident patient. Patient second troponin is negative I discussed with Dr. Jossy Pires with cardiology and he states patient to be admitted to rule out and they will see patient while in the hospital.    Discussion with [facility] with  and she states she will come and see patient and admit to [facility] and Dr. Raymond Elias is the attending. Patient informed of admission to the hospital for cardiac evaluation and treatment. Patient has a little anxiety usually worse in the evening patient experiencing a little anxiety at this time. I will order Ativan p.o. for patient.     Vitals:  Patient Vitals for the past 12 hrs:   Temp Pulse Resp BP SpO2   10/29/20 2036     100 %   10/29/20 2030  65 17 (!) 190/76 99 %   10/29/20 2025 98 °F (36.7 °C) 74 20 (!) 183/71 99 % 10/29/20 1456 98 °F (36.7 °C) 79 20 (!) 140/82 98 %       Medications ordered:   Medications   aspirin chewable tablet 324 mg (324 mg Oral Given 10/29/20 1609)         Lab findings:  Recent Results (from the past 12 hour(s))   EKG, 12 LEAD, INITIAL    Collection Time: 10/29/20  3:09 PM   Result Value Ref Range    Ventricular Rate 76 BPM    Atrial Rate 76 BPM    P-R Interval 148 ms    QRS Duration 84 ms    Q-T Interval 348 ms    QTC Calculation (Bezet) 391 ms    Calculated P Axis -2 degrees    Calculated R Axis -32 degrees    Calculated T Axis -81 degrees    Diagnosis       Normal sinus rhythm  Left axis deviation  Nonspecific ST and T wave abnormality  Abnormal ECG  When compared with ECG of 01-MAR-2019 11:32,  ST now depressed in Inferior leads     CARDIAC PANEL,(CK, CKMB & TROPONIN)    Collection Time: 10/29/20  3:20 PM   Result Value Ref Range    CK - MB <1.0 <3.6 ng/ml    CK-MB Index  0.0 - 4.0 %     CALCULATION NOT PERFORMED WHEN RESULT IS BELOW LINEAR LIMIT    CK 59 26 - 192 U/L    Troponin-I, QT <0.02 0.0 - 0.045 NG/ML   CBC WITH AUTOMATED DIFF    Collection Time: 10/29/20  3:20 PM   Result Value Ref Range    WBC 7.7 4.6 - 13.2 K/uL    RBC 4.74 4.20 - 5.30 M/uL    HGB 13.9 12.0 - 16.0 g/dL    HCT 40.4 35.0 - 45.0 %    MCV 85.2 74.0 - 97.0 FL    MCH 29.3 24.0 - 34.0 PG    MCHC 34.4 31.0 - 37.0 g/dL    RDW 12.9 11.6 - 14.5 %    PLATELET 092 615 - 267 K/uL    MPV 9.6 9.2 - 11.8 FL    NEUTROPHILS 56 40 - 73 %    LYMPHOCYTES 35 21 - 52 %    MONOCYTES 6 3 - 10 %    EOSINOPHILS 2 0 - 5 %    BASOPHILS 1 0 - 2 %    ABS. NEUTROPHILS 4.4 1.8 - 8.0 K/UL    ABS. LYMPHOCYTES 2.7 0.9 - 3.6 K/UL    ABS. MONOCYTES 0.5 0.05 - 1.2 K/UL    ABS. EOSINOPHILS 0.1 0.0 - 0.4 K/UL    ABS.  BASOPHILS 0.0 0.0 - 0.1 K/UL    DF AUTOMATED     METABOLIC PANEL, COMPREHENSIVE    Collection Time: 10/29/20  3:20 PM   Result Value Ref Range    Sodium 139 136 - 145 mmol/L    Potassium 4.7 3.5 - 5.5 mmol/L    Chloride 106 100 - 111 mmol/L    CO2 26 21 - 32 mmol/L    Anion gap 7 3.0 - 18 mmol/L    Glucose 116 (H) 74 - 99 mg/dL    BUN 13 7.0 - 18 MG/DL    Creatinine 1.08 0.6 - 1.3 MG/DL    BUN/Creatinine ratio 12 12 - 20      GFR est AA >60 >60 ml/min/1.73m2    GFR est non-AA 50 (L) >60 ml/min/1.73m2    Calcium 10.3 (H) 8.5 - 10.1 MG/DL    Bilirubin, total 0.6 0.2 - 1.0 MG/DL    ALT (SGPT) 34 13 - 56 U/L    AST (SGOT) 25 10 - 38 U/L    Alk. phosphatase 80 45 - 117 U/L    Protein, total 7.1 6.4 - 8.2 g/dL    Albumin 3.9 3.4 - 5.0 g/dL    Globulin 3.2 2.0 - 4.0 g/dL    A-G Ratio 1.2 0.8 - 1.7     MAGNESIUM    Collection Time: 10/29/20  3:20 PM   Result Value Ref Range    Magnesium 1.6 1.6 - 2.6 mg/dL   NT-PRO BNP    Collection Time: 10/29/20  3:20 PM   Result Value Ref Range    NT pro-BNP 88 0 - 900 PG/ML   URINALYSIS W/ RFLX MICROSCOPIC    Collection Time: 10/29/20  8:10 PM   Result Value Ref Range    Color YELLOW      Appearance CLEAR      Specific gravity 1.007 1.005 - 1.030      pH (UA) 5.0 5.0 - 8.0      Protein Negative NEG mg/dL    Glucose Negative NEG mg/dL    Ketone Negative NEG mg/dL    Bilirubin Negative NEG      Blood Negative NEG      Urobilinogen 0.2 0.2 - 1.0 EU/dL    Nitrites Negative NEG      Leukocyte Esterase SMALL (A) NEG            X-Ray, CT or other radiology findings or impressions:  XR CHEST PORT   Final Result   IMPRESSION:       No acute cardiopulmonary findings            Progress notes, Consult notes or additional Procedure notes:     Discussed with Dr. Calvin Nunn with cardiology. Discussed with [facility] medicine resident. Disposition:    Diagnosis:   1. Chest pain, unspecified type        Disposition: Admitted        Patient's Medications   Start Taking    No medications on file   Continue Taking    ASPIRIN DELAYED-RELEASE 81 MG TABLET    Take  by mouth daily.     ATORVASTATIN (LIPITOR) 20 MG TABLET    TAKE ONE TABLET BY MOUTH DAILY    CRUTCH MISC    Partial weight right    FLUOXETINE (PROZAC) 20 MG TABLET    Take 3 tablets qd    LISINOPRIL (PRINIVIL, ZESTRIL) 5 MG TABLET    Take 1 Tab by mouth daily. LORAZEPAM (ATIVAN) 1 MG TABLET    Take 1 Tab by mouth every eight (8) hours as needed for Anxiety. Max Daily Amount: 3 mg. METFORMIN (GLUCOPHAGE) 500 MG TABLET    Take 1 Tab by mouth two (2) times daily (with meals). METFORMIN (GLUCOPHAGE) 500 MG TABLET    Take 500 mg by mouth every evening. ONDANSETRON HCL (ZOFRAN) 4 MG TABLET    Take 1 Tab by mouth every eight (8) hours as needed for Nausea. PANTOPRAZOLE (PROTONIX) 40 MG TABLET    TAKE ONE TABLET BY MOUTH DAILY   These Medications have changed    No medications on file   Stop Taking    No medications on file        ADMISSION NOTE:  11:50 PM  Patient is being admitted to the hospital by Dr Mary Valerio. The results of their tests and reasons for their admission have been discussed with them and/or available family. They convey agreement and understanding for the need to be admitted and for their admission diagnosis. Chaitanya Jennings ENP-C,FNP-C      Dragon voice recognition was used to generate this report, which may have resulted in some phonetic based errors in grammar and contents.  Even though attempts were made to correct all the mistakes, some may have been missed, and remained in the body of the document

## 2020-10-30 NOTE — PROGRESS NOTES
Reason for Admission:  Chest pain [R07.9]                 RUR Score:    8%            Plan for utilizing home health:    no                      Likelihood of Readmission:   LOW                         Transition of Care Plan:              Initial assessment completed with patient. Cognitive status of patient: oriented to time, place, person and situation. Face sheet information confirmed:  yes. The patient designates , Londa Primrose to participate in her discharge plan and to receive any needed information. This patient lives in a single family home with spouse. Patient is able to navigate steps as needed. Prior to hospitalization, patient was considered to be independent with ADLs/IADLS : yes . Patient has a current ACP document on file: no  The spouse will be available to transport patient home upon discharge. The patient already has Cane, and walker available in the home. Patient is not currently active with home health. Patient has not stayed in a skilled nursing facility or rehab. This patient is on dialysis :no    Currently, the discharge plan is Home. The patient states that she can obtain her medications from the pharmacy, and take her medications as directed. Patient's current insurance is Medicare, [employer] and        Care Management Interventions  PCP Verified by CM:  Yes  Mode of Transport at Discharge: [address] (CM Consult): Discharge Planning  Current Support Network: Lives with Spouse  Confirm Follow Up Transport: Family  The Patient and/or Patient Representative was Provided with a Choice of Provider and Agrees with the Discharge Plan?: No  Freedom of Choice List was Provided with Basic Dialogue that Supports the Patient's Individualized Plan of Care/Goals, Treatment Preferences and Shares the Quality Data Associated with the Providers?: No  Discharge Location  Discharge Placement: Home        Cruz Aguilar RN - Outcomes Manager  [phone]

## 2020-10-30 NOTE — DISCHARGE INSTRUCTIONS
Patient Education        Angina: Care Instructions  Your Care Instructions     You have a problem called angina. Angina happens when there is not enough blood flow to your heart muscle. Angina is a sign of coronary artery disease (CAD). CAD occurs when blood vessels that supply the heart become narrowed. Having CAD increases your risk of a heart attack. Chest pain or pressure is the most common symptom of angina. But some people have other symptoms, like:  · Pain, pressure, or a strange feeling in the back, neck, jaw, or upper belly, or in one or both shoulders or arms. · Shortness of breath. · Nausea or vomiting. · Lightheadedness or sudden weakness. · Fast or irregular heartbeat. Women are somewhat more likely than men to have angina symptoms like shortness of breath, nausea, and back or jaw pain. Angina can be dangerous. That's why it is important to pay attention to your symptoms. Know what is typical for you, learn how to control your symptoms, and understand when you need to get treatment. A change in your usual pattern of symptoms is an emergency. It may mean that you are having a heart attack. The doctor has checked you carefully, but problems can develop later. If you notice any problems or new symptoms, get medical treatment right away. Follow-up care is a key part of your treatment and safety. Be sure to make and go to all appointments, and call your doctor if you are having problems. It's also a good idea to know your test results and keep a list of the medicines you take. How can you care for yourself at home? Medicines    · If your doctor has given you nitroglycerin for angina symptoms, keep it with you at all times. If you have symptoms, sit down and rest, and take the first dose of nitroglycerin as directed. If your symptoms get worse or are not getting better within 5 minutes, call 911 right away. Stay on the phone.  The emergency  will give you further instructions.     · If your doctor advises it, take 1 low-dose aspirin a day to prevent heart attack.     · Be safe with medicines. Take your medicines exactly as prescribed. Call your doctor if you think you are having a problem with your medicine. You will get more details on the specific medicines your doctor prescribes. Lifestyle changes    · Do not smoke. If you need help quitting, talk to your doctor about stop-smoking programs and medicines. These can increase your chances of quitting for good.     · Eat a heart-healthy diet that is low in saturated fat and salt, and is high in fiber. Talk to your doctor or a dietitian about healthy eating.     · Stay at a healthy weight. Or lose weight if you need to. Activity    · Talk to your doctor about a level of activity that is safe for you.     · If an activity causes angina symptoms, stop and rest.   When should you call for help? Call 911 anytime you think you may need emergency care. For example, call if:    · You passed out (lost consciousness).     · You have symptoms of a heart attack. These may include:  ? Chest pain or pressure, or a strange feeling in the chest.  ? Sweating. ? Shortness of breath. ? Nausea or vomiting. ? Pain, pressure, or a strange feeling in the back, neck, jaw, or upper belly or in one or both shoulders or arms. ? Lightheadedness or sudden weakness. ? A fast or irregular heartbeat. After you call 911, the  may tell you to chew 1 adult-strength or 2 to 4 low-dose aspirin. Wait for an ambulance. Do not try to drive yourself.     · You have angina symptoms that do not go away with rest or are not getting better within 5 minutes after you take a dose of nitroglycerin. Call your doctor now if:    · Your angina symptoms seem worse but still follow your typical pattern. You can predict when symptoms will happen, but they may come on sooner, feel worse, or last longer.     · You feel dizzy or lightheaded, or you feel like you may faint.    Watch closely for changes in your health, and be sure to contact your doctor if you have any problems. Where can you learn more? Go to http://www.gray.com/  Enter H129 in the search box to learn more about \"Angina: Care Instructions. \"  Current as of: December 16, 2019               Content Version: 12.6  © 9072-8682 [employer]. Care instructions adapted under license by [employer] (which disclaims liability or warranty for this information). If you have questions about a medical condition or this instruction, always ask your healthcare professional. [place] [address] any warranty or liability for your use of this information. Patient Education        Chest Pain: Care Instructions  Your Care Instructions     There are many things that can cause chest pain. Some are not serious and will get better on their own in a few days. But some kinds of chest pain need more testing and treatment. Your doctor may have recommended a follow-up visit in the next 8 to 12 hours. If you are not getting better, you may need more tests or treatment. Even though your doctor has released you, you still need to watch for any problems. The doctor carefully checked you, but sometimes problems can develop later. If you have new symptoms or if your symptoms do not get better, get medical care right away. If you have worse or different chest pain or pressure that lasts more than 5 minutes or you passed out (lost consciousness), call 911 or seek other emergency help right away. A medical visit is only one step in your treatment. Even if you feel better, you still need to do what your doctor recommends, such as going to all suggested follow-up appointments and taking medicines exactly as directed. This will help you recover and help prevent future problems. How can you care for yourself at home? · Rest until you feel better. · Take your medicine exactly as prescribed.  Call your doctor if you think you are having a problem with your medicine. · Do not drive after taking a prescription pain medicine. When should you call for help? Call 911 if:     · You passed out (lost consciousness).     · You have severe difficulty breathing.     · You have symptoms of a heart attack. These may include:  ? Chest pain or pressure, or a strange feeling in your chest.  ? Sweating. ? Shortness of breath. ? Nausea or vomiting. ? Pain, pressure, or a strange feeling in your back, neck, jaw, or upper belly or in one or both shoulders or arms. ? Lightheadedness or sudden weakness. ? A fast or irregular heartbeat. After you call 911, the  may tell you to chew 1 adult-strength or 2 to 4 low-dose aspirin. Wait for an ambulance. Do not try to drive yourself. Call your doctor today if:     · You have any trouble breathing.     · Your chest pain gets worse.     · You are dizzy or lightheaded, or you feel like you may faint.     · You are not getting better as expected.     · You are having new or different chest pain. Where can you learn more? Go to http://www.Tweegee.com/  Enter A120 in the search box to learn more about \"Chest Pain: Care Instructions. \"  Current as of: June 26, 2019               Content Version: 12.6  © 2441-8887 [employer]. Care instructions adapted under license by [employer] (which disclaims liability or warranty for this information). If you have questions about a medical condition or this instruction, always ask your healthcare professional. [place] [address] any warranty or liability for your use of this information.

## 2020-10-30 NOTE — CONSULTS
[facility] - Consult Note    Date of  Admission: 10/29/2020  3:05 PM     Primary Care Physician:  Mayur Bean MD     Plan:       1. Unstable angina-continue to monitor for any ischemia symptoms. Added nitro paste TID and low dose bb. Start Lovenox SQ bid. Obtain Echo to assess lvf, vhd.  Negative troponin x3 MI has been r/o. ekg shows NSR w/o acute ischemic changes. Plans for Heart catheterization on Monday. Further recommendations per Dr. Armando Cardoso. Patient is allergic to Iodine. Needs  Pre-medications. 2. Hypertension- elevated need better BP control. Increased lisinopril. Added low dose bb   3. Hyperlipidemia- continue statin   4. Diabetes- well controlled A1c 7.1 medications per medical team   5. Family Hx of early CAD  10. Crohn's disease- per patient recently diagnosed   7. Former smoker     Patient is a 78 yr old female admitted with chest pain with both typical and atypical features-- complained of intermittent rest pain. Serial cardiac enzymes negative for MI.  ekg no ischemic changes. Ate fruits this am and also has severe iodine allergy- discussed with patient regarding further plan including inpatient stress test vs cardiac cath-- patient wishes to follow up as outpatient. Continue aspirin/ bb and statin. Advised to return to ER if chest pain recurs. XR Results (most recent):  Results from Hospital Encounter encounter on 10/29/20   XR CHEST PORT    Narrative INDICATION: chest pain    TECHNIQUE: Portable chest radiograph    COMPARISON: March 1, 2019    FINDINGS: The lungs are adequately inflated. No focal consolidation, effusion or  pneumothorax. Heart size within normal limits. No acute osseous abnormality. Dextroconvex curvature of the thoracic spine.       Impression IMPRESSION:     No acute cardiopulmonary findings            Assessment:     Hospital Problems  Date Reviewed: 3/11/2019          Codes Class Noted POA    * (Principal) Chest pain ICD-10-CM: R07.9  ICD-9-CM: 786.50  9/15/2017 Unknown                   History of Present Illness: This is a 78 y.o. female admitted for Chest pain [R07.9]. PMH  HTN, HLD, T2DM, GERD/IBS, and depression/anxiety, now presenting with complaint of CP. She describes moderate intermittent retrosternal heavy pressure-like CP, with constant stabbing back pain and bilateral jaw soreness that started yesterday afternoon. Sxs onset occurred while seated and making her dog's Halloween costume. Also experienced nausea and diaphoresis. The CP was similar to past episodes, but different from her chronic GERD or the chest discomfort she experiences when anxious. Reports Chronic CHURCH that is stable, cough, palpitations, ABD pain, edema, numbness, or tingling.             Past Medical History:     Past Medical History:   Diagnosis Date    Abnormal nuclear cardiac imaging test 2015    Low risk. No ischemia or prior infarction. No RWMA. EF 68%. Neg EKG on pharm stress test.    Agatston CAC score, <100 05/15/2015    Coronary calcium score 70.  Chronic kidney disease     early stage kidney failure    Diabetes [facility]     Diabetes mellitus ([facility]     GERD (gastroesophageal reflux disease)     crohn's disease    History of echocardiogram 2013    EF 65%. No RWMA. Mod LVH. Gr 1 DDfx. RVSP 25 mmHg.       HX OTHER MEDICAL     anxiety, \"mitral valve prolapse\"    Hyperlipidemia     Hypertension     Psychiatric disorder     anxiety    RLE venous duplex     in her 25s         Social History:     Social History     Socioeconomic History    Marital status:      Spouse name: Not on file    Number of children: Not on file    Years of education: Not on file    Highest education level: Not on file   Tobacco Use    Smoking status: Former Smoker     Packs/day: 1.00     Years: 30.00     Pack years: 30.00     Types: Cigarettes     Last attempt to quit: 10/19/2012     Years since quittin.0    Smokeless tobacco: Former User   Substance and Sexual Activity    Alcohol use: No     Alcohol/week: 0.0 standard drinks     Comment: occasional    Drug use: No    Sexual activity: Yes     Partners: Male     Birth control/protection: Surgical   Other Topics Concern    Caffeine Concern Yes     Comment: coffee daily 8oz    Sleep Concern No    Stress Concern No    Weight Concern Yes     Comment: increased weight    Exercise No    Seat Belt Yes        Family History:     Family History   Problem Relation Age of Onset    Heart Disease Father     Heart Attack Father     Heart Disease Paternal Grandmother     Heart Disease Paternal Grandfather     Breast Cancer Mother     Cancer Mother         Medications:      Allergies   Allergen Reactions    Fish Containing Products Anaphylaxis and Other (comments)     Heart stops    Iodine Anaphylaxis and Other (comments)     Heart stops    Shellfish Derived Anaphylaxis and Other (comments)     Heart stops    Bee Venom Protein (Honey Bee) Hives        Current Facility-Administered Medications   Medication Dose Route Frequency    sodium chloride (NS) flush 5-40 mL  5-40 mL IntraVENous Q8H    sodium chloride (NS) flush 5-40 mL  5-40 mL IntraVENous PRN    acetaminophen (TYLENOL) tablet 650 mg  650 mg Oral Q6H PRN    Or    acetaminophen (TYLENOL) suppository 650 mg  650 mg Rectal Q6H PRN    polyethylene glycol (MIRALAX) packet 17 g  17 g Oral DAILY PRN    promethazine (PHENERGAN) tablet 12.5 mg  12.5 mg Oral Q6H PRN    Or    ondansetron (ZOFRAN) injection 4 mg  4 mg IntraVENous Q6H PRN    ibuprofen (MOTRIN) tablet 400 mg  400 mg Oral Q4H PRN    aspirin delayed-release tablet 81 mg  81 mg Oral DAILY    atorvastatin (LIPITOR) tablet 20 mg  20 mg Oral DAILY    famotidine (PEPCID) tablet 20 mg  20 mg Oral DAILY    lisinopriL (PRINIVIL, ZESTRIL) tablet 5 mg  5 mg Oral DAILY    pantoprazole (PROTONIX) tablet 40 mg  40 mg Oral Q12H    PARoxetine (PAXIL) tablet 40 mg  40 mg Oral DAILY    enoxaparin (LOVENOX) injection 40 mg  40 mg SubCUTAneous Q24H    nitroglycerin (NITROSTAT) tablet 0.4 mg  0.4 mg SubLINGual PRN    insulin lispro (HUMALOG) injection   SubCUTAneous AC&HS    glucose chewable tablet 16 g  4 Tab Oral PRN    glucagon (GLUCAGEN) injection 1 mg  1 mg IntraMUSCular PRN    dextrose (D50W) injection syrg 12.5-25 g  25-50 mL IntraVENous PRN        Review Of Systems:         Constitutional: No fever, no chills, no weight loss, no night sweats   HEENT: No epistaxis, no nasal drainage, no difficulty in swallowing, no redness in eyes  Respiratory:  dyspnea on exertion  Cardiovascular: chest pain, chest pressure  Gastrointestinal: no abd pain, no vomiting, no diarrhea, no bleeding symptoms  Genitourinary: No urinary symptoms or hematuria  Integument/breast: No ulcers or rashes  Musculoskeletal: no muscle pain, no weakness  Neurological: No focal weakness, no seizures, no headaches  Behvioral/Psych: No anxiety, no depression             Physical Exam:     Visit Vitals  BP (!) 160/81 (BP 1 Location: Left arm, BP Patient Position: At rest)   Pulse 62   Temp 97.9 °F (36.6 °C)   Resp 16   Ht 5' 9\" (1.753 m)   Wt 81.6 kg (180 lb)   SpO2 97%   BMI 26.58 kg/m²     BP Readings from Last 3 Encounters:   10/30/20 (!) 160/81   04/20/19 128/87   03/11/19 128/79     Pulse Readings from Last 3 Encounters:   10/30/20 62   04/20/19 79   03/11/19 64     Wt Readings from Last 3 Encounters:   10/29/20 81.6 kg (180 lb)   03/11/19 90.4 kg (199 lb 6 oz)   10/03/18 91.2 kg (201 lb)       General:  alert, cooperative, no distress, appears stated age, moderately obese  Skin: Warm and dry, acyanotic, normal color. Head: Normocephalic, atraumatic. Eyes: Sclerae anicteric, conjunctivae without injection. Neck:  nontender, no nuchal rigidity, no masses, no stridor, no carotid bruit, no JVD  Lungs:  clear to auscultation bilaterally.    Heart:  regular rate and rhythm, S1, S2 normal, no murmur, click, rub or gallop  Abdomen:  abdomen is soft without significant tenderness, masses, organomegaly or guarding  Extremities:  extremities normal, atraumatic, no cyanosis or edema. Neurological: grossly intact. No focal abnormalities, moves all extremities well. Psychiatric Affect: The patient is awake, alert and oriented x3. Faye Antonio is interactive and appropriate. Data Review:     Recent Results (from the past 48 hour(s))   EKG, 12 LEAD, INITIAL    Collection Time: 10/29/20  3:09 PM   Result Value Ref Range    Ventricular Rate 74 BPM    Atrial Rate 74 BPM    P-R Interval 150 ms    QRS Duration 80 ms    Q-T Interval 336 ms    QTC Calculation (Bezet) 372 ms    Calculated P Axis 0 degrees    Calculated R Axis -33 degrees    Calculated T Axis 81 degrees    Diagnosis       Normal sinus rhythm  Left axis deviation  Nonspecific ST abnormality  Abnormal ECG  When compared with ECG of 01-MAR-2019 11:32,  No significant change was found  Confirmed by Melita Merino MD, Humphrey ([id number]) on 10/30/2020 8:10:53 AM     CARDIAC PANEL,(CK, CKMB & TROPONIN)    Collection Time: 10/29/20  3:20 PM   Result Value Ref Range    CK - MB <1.0 <3.6 ng/ml    CK-MB Index  0.0 - 4.0 %     CALCULATION NOT PERFORMED WHEN RESULT IS BELOW LINEAR LIMIT    CK 59 26 - 192 U/L    Troponin-I, QT <0.02 0.0 - 0.045 NG/ML   CBC WITH AUTOMATED DIFF    Collection Time: 10/29/20  3:20 PM   Result Value Ref Range    WBC 7.7 4.6 - 13.2 K/uL    RBC 4.74 4.20 - 5.30 M/uL    HGB 13.9 12.0 - 16.0 g/dL    HCT 40.4 35.0 - 45.0 %    MCV 85.2 74.0 - 97.0 FL    MCH 29.3 24.0 - 34.0 PG    MCHC 34.4 31.0 - 37.0 g/dL    RDW 12.9 11.6 - 14.5 %    PLATELET 379 575 - 770 K/uL    MPV 9.6 9.2 - 11.8 FL    NEUTROPHILS 56 40 - 73 %    LYMPHOCYTES 35 21 - 52 %    MONOCYTES 6 3 - 10 %    EOSINOPHILS 2 0 - 5 %    BASOPHILS 1 0 - 2 %    ABS. NEUTROPHILS 4.4 1.8 - 8.0 K/UL    ABS. LYMPHOCYTES 2.7 0.9 - 3.6 K/UL    ABS. MONOCYTES 0.5 0.05 - 1.2 K/UL    ABS.  EOSINOPHILS 0.1 0.0 - 0.4 K/UL ABS. BASOPHILS 0.0 0.0 - 0.1 K/UL    DF AUTOMATED     METABOLIC PANEL, COMPREHENSIVE    Collection Time: 10/29/20  3:20 PM   Result Value Ref Range    Sodium 139 136 - 145 mmol/L    Potassium 4.7 3.5 - 5.5 mmol/L    Chloride 106 100 - 111 mmol/L    CO2 26 21 - 32 mmol/L    Anion gap 7 3.0 - 18 mmol/L    Glucose 116 (H) 74 - 99 mg/dL    BUN 13 7.0 - 18 MG/DL    Creatinine 1.08 0.6 - 1.3 MG/DL    BUN/Creatinine ratio 12 12 - 20      GFR est AA >60 >60 ml/min/1.73m2    GFR est non-AA 50 (L) >60 ml/min/1.73m2    Calcium 10.3 (H) 8.5 - 10.1 MG/DL    Bilirubin, total 0.6 0.2 - 1.0 MG/DL    ALT (SGPT) 34 13 - 56 U/L    AST (SGOT) 25 10 - 38 U/L    Alk.  phosphatase 80 45 - 117 U/L    Protein, total 7.1 6.4 - 8.2 g/dL    Albumin 3.9 3.4 - 5.0 g/dL    Globulin 3.2 2.0 - 4.0 g/dL    A-G Ratio 1.2 0.8 - 1.7     MAGNESIUM    Collection Time: 10/29/20  3:20 PM   Result Value Ref Range    Magnesium 1.6 1.6 - 2.6 mg/dL   NT-PRO BNP    Collection Time: 10/29/20  3:20 PM   Result Value Ref Range    NT pro-BNP 88 0 - 900 PG/ML   URINALYSIS W/ RFLX MICROSCOPIC    Collection Time: 10/29/20  8:10 PM   Result Value Ref Range    Color YELLOW      Appearance CLEAR      Specific gravity 1.007 1.005 - 1.030      pH (UA) 5.0 5.0 - 8.0      Protein Negative NEG mg/dL    Glucose Negative NEG mg/dL    Ketone Negative NEG mg/dL    Bilirubin Negative NEG      Blood Negative NEG      Urobilinogen 0.2 0.2 - 1.0 EU/dL    Nitrites Negative NEG      Leukocyte Esterase SMALL (A) NEG     URINE MICROSCOPIC ONLY    Collection Time: 10/29/20  8:10 PM   Result Value Ref Range    WBC 4 to 10 0 - 4 /hpf    RBC 0 to 3 0 - 5 /hpf    Epithelial cells 2+ 0 - 5 /lpf   CARDIAC PANEL,(CK, CKMB & TROPONIN)    Collection Time: 10/29/20  8:40 PM   Result Value Ref Range    CK - MB <1.0 <3.6 ng/ml    CK-MB Index  0.0 - 4.0 %     CALCULATION NOT PERFORMED WHEN RESULT IS BELOW LINEAR LIMIT    CK 38 26 - 192 U/L    Troponin-I, QT <0.02 0.0 - 0.045 NG/ML   CBC WITH AUTOMATED DIFF    Collection Time: 10/30/20  3:16 AM   Result Value Ref Range    WBC 8.7 4.6 - 13.2 K/uL    RBC 4.42 4.20 - 5.30 M/uL    HGB 12.6 12.0 - 16.0 g/dL    HCT 37.7 35.0 - 45.0 %    MCV 85.3 74.0 - 97.0 FL    MCH 28.5 24.0 - 34.0 PG    MCHC 33.4 31.0 - 37.0 g/dL    RDW 13.1 11.6 - 14.5 %    PLATELET 725 860 - 658 K/uL    MPV 9.5 9.2 - 11.8 FL    NEUTROPHILS 61 40 - 73 %    LYMPHOCYTES 32 21 - 52 %    MONOCYTES 6 3 - 10 %    EOSINOPHILS 1 0 - 5 %    BASOPHILS 0 0 - 2 %    ABS. NEUTROPHILS 5.2 1.8 - 8.0 K/UL    ABS. LYMPHOCYTES 2.8 0.9 - 3.6 K/UL    ABS. MONOCYTES 0.5 0.05 - 1.2 K/UL    ABS. EOSINOPHILS 0.1 0.0 - 0.4 K/UL    ABS. BASOPHILS 0.0 0.0 - 0.1 K/UL    DF AUTOMATED     METABOLIC PANEL, COMPREHENSIVE    Collection Time: 10/30/20  3:16 AM   Result Value Ref Range    Sodium 139 136 - 145 mmol/L    Potassium 3.6 3.5 - 5.5 mmol/L    Chloride 106 100 - 111 mmol/L    CO2 26 21 - 32 mmol/L    Anion gap 7 3.0 - 18 mmol/L    Glucose 144 (H) 74 - 99 mg/dL    BUN 13 7.0 - 18 MG/DL    Creatinine 1.11 0.6 - 1.3 MG/DL    BUN/Creatinine ratio 12 12 - 20      GFR est AA 59 (L) >60 ml/min/1.73m2    GFR est non-AA 49 (L) >60 ml/min/1.73m2    Calcium 9.8 8.5 - 10.1 MG/DL    Bilirubin, total 0.6 0.2 - 1.0 MG/DL    ALT (SGPT) 28 13 - 56 U/L    AST (SGOT) 18 10 - 38 U/L    Alk.  phosphatase 71 45 - 117 U/L    Protein, total 6.3 (L) 6.4 - 8.2 g/dL    Albumin 3.6 3.4 - 5.0 g/dL    Globulin 2.7 2.0 - 4.0 g/dL    A-G Ratio 1.3 0.8 - 1.7     MAGNESIUM    Collection Time: 10/30/20  3:16 AM   Result Value Ref Range    Magnesium 1.6 1.6 - 2.6 mg/dL   PHOSPHORUS    Collection Time: 10/30/20  3:16 AM   Result Value Ref Range    Phosphorus 2.9 2.5 - 4.9 MG/DL   TROPONIN I    Collection Time: 10/30/20  3:16 AM   Result Value Ref Range    Troponin-I, QT <0.02 0.0 - 0.045 NG/ML   GLUCOSE, POC    Collection Time: 10/30/20  6:05 AM   Result Value Ref Range    Glucose (POC) 148 (H) 70 - 110 mg/dL         Intake/Output Summary (Last 24 hours) at 10/30/2020 1101  Last data filed at 10/30/2020 0900  Gross per 24 hour   Intake 0 ml   Output    Net 0 ml       Cardiographics:       EKG Results     Procedure 720 Value Units Date/Time    EKG, 12 LEAD, SUBSEQUENT [[id number]] Collected:  04/16/97 1523    Order Status:  Completed Updated:  10/30/20 0841     Ventricular Rate 65 BPM      Atrial Rate 65 BPM      P-R Interval 188 ms      QRS Duration 88 ms      Q-T Interval 398 ms      QTC Calculation (Bezet) 413 ms      Calculated P Axis 59 degrees      Calculated R Axis -31 degrees      Calculated T Axis 68 degrees      Diagnosis --     Normal sinus rhythm  Left axis deviation  Nonspecific T wave abnormality  Abnormal ECG  No previous ECGs available      EKG, 12 LEAD, INITIAL [[id number]] Collected:  10/29/20 1509    Order Status:  Completed Updated:  10/30/20 0811     Ventricular Rate 74 BPM      Atrial Rate 74 BPM      P-R Interval 150 ms      QRS Duration 80 ms      Q-T Interval 336 ms      QTC Calculation (Bezet) 372 ms      Calculated P Axis 0 degrees      Calculated R Axis -33 degrees      Calculated T Axis 81 degrees      Diagnosis --     Normal sinus rhythm  Left axis deviation  Nonspecific ST abnormality  Abnormal ECG  When compared with ECG of 01-MAR-2019 11:32,  No significant change was found  Confirmed by Kajal Castañeda MD, Moisés Ellis ([id number]) on 10/30/2020 8:10:53 AM                 Signed By: Malina BURDEN Phone [phone] supervised    October 30, 2020      I have independently evaluated and examined the patient. All relevant labs and testing data's are reviewed. Care plan discussed and updated after review.     Carlos Lu MD

## 2020-10-30 NOTE — ACP (ADVANCE CARE PLANNING)
Advance Care Planning     Advance Care Planning Clinical Specialist  Conversation Note      Date of ACP Conversation: 10/29/2020    Conversation Conducted with:   Patient with Decision Making Capacity    ACP Clinical Specialist: Peter Winston    *When Decision Maker makes decisions on behalf of the incapacitated patient: Sherryle Earls is asked to consider and make decisions based on patient values, known preferences, or best interests. Health Care Decision Maker:    Current Designated Health Care Decision Maker:   (If there is a valid [place] named in the [address] [place] [address] Makers\" box in the ACP activity, but it is not visible above, be sure to open that field and then select the health care decision maker relationship (ie \"primary\") in the blank space to the right of the name.) Validate  this information as still accurate & up-to-date; edit [place] field as needed.)    Note: Assess and validate information in current ACP documents, as indicated. If no Decision Maker listed above or available through scanned documents, then:    If no Authorized Decision Maker has previously been identified, then patient chooses [place]:  \"Who would you like to name as your primary health care decision-maker? \"    Name: Francis Ann   Relationship: son Phone number: [phone]  Lakisha Parker this person be reached easily? \" YES  \"Who would you like to name as your back-up decision maker? \"   Name: Kristi Grande  Relationship: son Phone number: [phone]  Lakisha Parker this person be reached easily? \" YES    Note: If the relationship of these Decision-Makers to the patient does NOT follow your state's Next of Kin hierarchy, recommend that patient complete ACP document that meets state-specific requirements to allow them to act on the patient's behalf when appropriate.     Peter Winston RN - Outcomes Manager  [phone]

## 2020-10-30 NOTE — PROGRESS NOTES
*ATTENTION:  This note has been created by a medical student for educational purposes only. Please do not refer to the content of this note for clinical decision-making, billing, or other purposes. Please see attending physicians note to obtain clinical information on this patient. *         Intern Progress Note  [facility]    *MEDICAL STUDENT NOTE FOR EDUCATIONAL PURPOSES ONLY. PLEASE DO NOT USE FOR PURPOSE OF MEDICAL SERVICES*       Patient: Domenic Palomino MRN: [id number]  CSN: [id number]    YOB: 1950  Age: 78 y.o. Sex: female    DOA: 10/29/2020 LOS:  LOS: 1 day                    Subjective:     Acute events: No acute events overnight. This morning pt describes waxing and waning retrosternal CP that feels like something \"sitting on chest.\" Pain radiates to L scapula and is about 3/10 at baseline and occassionally worsens to 8/10. Jaw pain resolved. Pt still endorses nausea without vomiting, dyspnea and headache localized to L side. Review of Systems   Respiratory: Positive for shortness of breath. Cardiovascular: Positive for chest pain. Gastrointestinal: Positive for constipation and nausea. Pt denies vision changes, indigestion, dizziness, LE edema and urinary sx. Objective:      Patient Vitals for the past 24 hrs:   Temp Pulse Resp BP SpO2   10/30/20 0746 97.9 °F (36.6 °C) 62 16 (!) 160/81 97 %   10/30/20 0219 97.1 °F (36.2 °C) 71 16 (!) 143/83 96 %   10/30/20 0100 98 °F (36.7 °C) 66 16 (!) 158/68 96 %   10/29/20 2300  65 13 (!) 172/76 95 %   10/29/20 2036     100 %   10/29/20 2030  65 17 (!) 190/76 99 %   10/29/20 2025 98 °F (36.7 °C) 74 20 (!) 183/71 99 %   10/29/20 2000  63 13 (!) 154/98 99 %   10/29/20 1456 98 °F (36.7 °C) 79 20 (!) 140/82 98 %       No intake or output data in the 24 hours ending 10/30/20 0841    Physical Exam:   General:  Alert and Responsive and in No acute distress. HEENT: Conjunctiva pink, sclera anicteric. EOMI.  Moist mucous membranes. Thyroid not enlarged, no nodules. No cervical, supraclavicular, occipital or submandibular lymphadenopathy. No other gross abnormalities present. CV:  RRR, no murmurs. No visible pulsations or thrills. No bruits auscultated in carotids bilaterally. RESP:  Unlabored breathing. Lungs clear to auscultation. no wheeze, rales, or rhonchi. Equal expansion bilaterally. ABD:  Soft, nontender. No hepatosplenomegaly. No epigastric tenderness. MS:  No joint deformity or instability. No atrophy. Neuro:  5/5 strength bilateral upper extremities. A+Ox3. Ext:  No LE  edema. 2+ radial pulses bilaterally. Skin:  No rashes, lesions, or ulcers. Good turgor. Lines: L PIV   Drains: n/a  Airway: n/a    Lab/Data Reviewed: All lab results for the last 24 hours reviewed. Scheduled Medications Reviewed:  Current Facility-Administered Medications   Medication Dose Route Frequency    sodium chloride (NS) flush 5-40 mL  5-40 mL IntraVENous Q8H    aspirin delayed-release tablet 81 mg  81 mg Oral DAILY    atorvastatin (LIPITOR) tablet 20 mg  20 mg Oral DAILY    famotidine (PEPCID) tablet 20 mg  20 mg Oral DAILY    lisinopriL (PRINIVIL, ZESTRIL) tablet 5 mg  5 mg Oral DAILY    pantoprazole (PROTONIX) tablet 40 mg  40 mg Oral Q12H    PARoxetine (PAXIL) tablet 40 mg  40 mg Oral DAILY    enoxaparin (LOVENOX) injection 40 mg  40 mg SubCUTAneous Q24H    insulin lispro (HUMALOG) injection   SubCUTAneous AC&HS         Imaging, microbiology, and EKG/Telemetry:  10/29 CXR: unremarkable  10/30 EKG: Normal sinu. L axis deviation. ST depression in leads 1&2 with non-specific ST abnormality in Anteroseptal and Anterolateral leads. No changes from previous EKG in March 2019    Assessment/Plan     Ms. Pete Arizmendi is a 79 yo female with pmhx of HTN and Hyperlipidemia who presented with radiating retrosternal CP with no evidence of positive cardiac markers or EKG changes.     -Chest Pain: Cardiology will take patient to cath lab today?    -Hypertension: Increased BP monitoring (leave cuff on continuously) if   systolic BP >887 consider lisinopril increase    -Hyperlipidemia: Consider ordering lipid panel unless we can access one since followed by Dr. Christel Kaufman     -GERD: well controlled with the Famotidine and Pantoprazole   Currently followed by GI for Crohns so contact to verify third medication that pt states she takes TID       Diet: NPO  DVT Prophylaxis: Lovenox 40 mg   Code Status: Full        Anna Motta, MS3  [address] [facility]  10/30/20 8:41 AM

## 2020-10-30 NOTE — PROGRESS NOTES
[facility] Medicine  Progress Note    Patient: Derrick Barnhart MRN: [id number]   SSN: [id number]  YOB: 1950   Age: 78 y.o. Sex: female      Admit Date: 10/29/2020    LOS: 1 day   Chief Complaint   Patient presents with    Chest Pain    Shortness of Breath    Nausea       Subjective:     Patient was seen at bedside. Continues to endorse dull sternal chest pain, rated 1/10 with radiation to left shoulder. Reports she ate a meal at 0200 in the ED and this morning ate her fruit cup. Denies anxiety but states most likely due to the Ativan she received in the ED. Was seen by cardiology and due to iodine allergy, she would need to be pre-medicated prior to cardio cath. Patient is agreeable and more comfortable staying inpatient given her chest pain symptoms. Review of Systems  Constitutional: Negative for fever, chills and diaphoresis. Eyes: Negative for blurred vision and double vision. Respiratory: Negative for cough and hemoptysis. Cardiovascular: POSITIVE for chest pain. Negative palpitations. Gastrointestinal: Negative for nausea and vomiting. Genitourinary: Negative for dysuria and hematuria. Musculoskeletal: Negative for myalgias and joint pain. Skin: Negative for itching and rash. Neurological: Negative for dizziness and headaches. Objective:     Visit Vitals  BP (!) 160/81 (BP 1 Location: Left arm, BP Patient Position: At rest)   Pulse 62   Temp 97.9 °F (36.6 °C)   Resp 16   Ht 5' 9\" (1.753 m)   Wt 81.6 kg (180 lb)   SpO2 97%   BMI 26.58 kg/m²       Physical Exam:   General:  Alert, AAOx3, Responsive and in No acute distress. HEENT: sclera anicteric. EOMI. Pharynx moist, nonerythematous. Moist mucous membranes. No other gross abnormalities appreciated. CV:  RRR w/o MRGs. No visible pulsations or thrills. No JVD  RESP:  Unlabored breathing. Lungs clear to auscultation. No wheeze, rales, or rhonchi. Equal expansion bilaterally. ABD:  Soft, nontender, nondistended.   No hepatosplenomegaly. No suprapubic tenderness. MS:  No joint deformity or atrophy. Neuro:  Cranial nerves grossly intact, grossly moving upper and lower extremities. Ext:  No edema. 2+ radial and dp pulses bilaterally. Skin:  No rashes, lesions, or ulcers. Good turgor. Intake and Output:  Current Shift: No intake/output data recorded. Last three shifts: No intake/output data recorded. Lab/Data Review:  Recent Results (from the past 12 hour(s))   CBC WITH AUTOMATED DIFF    Collection Time: 10/30/20  3:16 AM   Result Value Ref Range    WBC 8.7 4.6 - 13.2 K/uL    RBC 4.42 4.20 - 5.30 M/uL    HGB 12.6 12.0 - 16.0 g/dL    HCT 37.7 35.0 - 45.0 %    MCV 85.3 74.0 - 97.0 FL    MCH 28.5 24.0 - 34.0 PG    MCHC 33.4 31.0 - 37.0 g/dL    RDW 13.1 11.6 - 14.5 %    PLATELET 065 500 - 550 K/uL    MPV 9.5 9.2 - 11.8 FL    NEUTROPHILS 61 40 - 73 %    LYMPHOCYTES 32 21 - 52 %    MONOCYTES 6 3 - 10 %    EOSINOPHILS 1 0 - 5 %    BASOPHILS 0 0 - 2 %    ABS. NEUTROPHILS 5.2 1.8 - 8.0 K/UL    ABS. LYMPHOCYTES 2.8 0.9 - 3.6 K/UL    ABS. MONOCYTES 0.5 0.05 - 1.2 K/UL    ABS. EOSINOPHILS 0.1 0.0 - 0.4 K/UL    ABS. BASOPHILS 0.0 0.0 - 0.1 K/UL    DF AUTOMATED     METABOLIC PANEL, COMPREHENSIVE    Collection Time: 10/30/20  3:16 AM   Result Value Ref Range    Sodium 139 136 - 145 mmol/L    Potassium 3.6 3.5 - 5.5 mmol/L    Chloride 106 100 - 111 mmol/L    CO2 26 21 - 32 mmol/L    Anion gap 7 3.0 - 18 mmol/L    Glucose 144 (H) 74 - 99 mg/dL    BUN 13 7.0 - 18 MG/DL    Creatinine 1.11 0.6 - 1.3 MG/DL    BUN/Creatinine ratio 12 12 - 20      GFR est AA 59 (L) >60 ml/min/1.73m2    GFR est non-AA 49 (L) >60 ml/min/1.73m2    Calcium 9.8 8.5 - 10.1 MG/DL    Bilirubin, total 0.6 0.2 - 1.0 MG/DL    ALT (SGPT) 28 13 - 56 U/L    AST (SGOT) 18 10 - 38 U/L    Alk.  phosphatase 71 45 - 117 U/L    Protein, total 6.3 (L) 6.4 - 8.2 g/dL    Albumin 3.6 3.4 - 5.0 g/dL    Globulin 2.7 2.0 - 4.0 g/dL    A-G Ratio 1.3 0.8 - 1.7     MAGNESIUM    Collection Time: 10/30/20  3:16 AM   Result Value Ref Range    Magnesium 1.6 1.6 - 2.6 mg/dL   PHOSPHORUS    Collection Time: 10/30/20  3:16 AM   Result Value Ref Range    Phosphorus 2.9 2.5 - 4.9 MG/DL   TROPONIN I    Collection Time: 10/30/20  3:16 AM   Result Value Ref Range    Troponin-I, QT <0.02 0.0 - 0.045 NG/ML   GLUCOSE, POC    Collection Time: 10/30/20  6:05 AM   Result Value Ref Range    Glucose (POC) 148 (H) 70 - 110 mg/dL       RECENT RESULTS  MODALITY IMPRESSION   XR Results from Hospital Encounter encounter on 10/29/20   XR CHEST PORT    Narrative INDICATION: chest pain    TECHNIQUE: Portable chest radiograph    COMPARISON: March 1, 2019    FINDINGS: The lungs are adequately inflated. No focal consolidation, effusion or  pneumothorax. Heart size within normal limits. No acute osseous abnormality. Dextroconvex curvature of the thoracic spine. Impression IMPRESSION:     No acute cardiopulmonary findings      CT Results from Hospital Encounter encounter on 09/03/18   CT ABD PELV WO CONT    Narrative EXAM: CT of the Abdomen and Pelvis without IV contrast    CLINICAL INDICATION: Abdominal pain especially in the right lower quadrant    TECHNIQUE: CT of the abdomen and pelvis. Sagittal and coronal reformations    All CT scans at this facility are performed using dose optimization technique as  appropriate to a performed exam, to include automated exposure control,  adjustment of the mA and/or kV according to patient size (including appropriate  matching for site specific examination) or use of iterative reconstruction  technique. IV CONTRAST: None    ENTERIC CONTRAST: None    COMPARISON: None    FINDINGS:   Limitations: The evaluation of the solid organs is limited due to the lack of IV  contrast.    Lower Chest: Mild scarring or atelectasis in the left lower lobe. No  consolidation or effusion appreciated. The heart and pericardium are  unremarkable. Peritoneum: No free air appreciated.  No free fluid present. No fluid collections  present. Liver: The liver is severely fatty replaced. In the left lobe near the paris  hepatis, there is a 3.6 x 3 x 3.6 cm hypodensity which likely represents a cyst.    Biliary/Gallbladder: No intrahepatic or extrahepatic biliary ductal dilatation  appreciated. The gallbladder is surgically absent. Spleen: Unremarkable. Pancreas: No focal lesion appreciated. The pancreatic duct is unremarkable. No  peripancreatic inflammation or adenopathy. : The adrenal glands are unremarkable. No urolithiasis. No perinephric fat  stranding appreciated. No hydroureteronephrosis seen. No acute pathology in the  bladder. The uterus is surgically absent. GI: The stomach is unremarkable. The small bowel is without evidence of  obstruction. No small bowel wall thickening. The mesentery is without  inflammation or adenopathy. The appendix is unremarkable. Multiple diverticula  are noted in the descending and sigmoid colon. No evidence of diverticulitis. Aorta and retroperitoneum: No aortic aneurysm seen. No periaortic adenopathy  seen. No fluid collections in the retroperitoneum appreciated. Abdominal wall/Inguinal: Unremarkable     Musculoskeletal: Multilevel degenerative disc disease and facet arthropathy. Impression IMPRESSION:   1. No acute pathology in the abdomen or pelvis. 2.  Diverticulosis without evidence of diverticulitis. Fatty infiltration of the liver with likely cyst in the left lobe. If clinical  concern, consider ultrasound for further evaluation.         MRI Results from [place] encounter on 12/23/11   MRA NECK WITH AND WITHOUT CONTRAST    Narrative Ordering MD: Emely Doran DO  Signed By: Layton Alvarado DO  ** FINAL **  ---------------------------------------------------------------------  Procedure Date:  12/23/2011   Accession Number:  [id number]           Order No:   [id number]         Procedure:   MRV - MRA NECK W/WO CONTRAST CPT Code:   06642      Admit Diag:   LOSS OF VISION/HA/EYE CELESTE              Reason:   BLURRED VISION  INTERPRETATION:  MRA neck, without and with contrast  HISTORY: Migraine headaches, TIAs. COMPARISON: None. Technique: 2 D time of flight imaging was obtained from the   skullbase to the thoracic inlet before and following administration   of intravenous contrast (Magnevist, 20 mL). Postprocessed 3 the   reconstructions were created on a workstation. FINDINGS: The carotid and vertebral arteries are widely patent   bilaterally. There is no critical stenosis. Postprocessed 3 D   reconstructions were utilized to evaluate the tortuous vessels. Soft   tissues of the neck are unremarkable. IMPRESSION: Unremarkable cervical MRA and neck MRI; no vulnerable   plaque or critical stenosis identified. ULTRASOUND Results from [place] encounter on 01/07/19   US EXT NONVAS RT LTD    Impression IMPRESSION:    No abnormality demonstrated. Recommend follow-up MR        Cardiology Procedures/Testing:  MODALITY RESULTS   EKG Results for orders placed or performed during the hospital encounter of 10/29/20   EKG, 12 LEAD, INITIAL   Result Value Ref Range    Ventricular Rate 74 BPM    Atrial Rate 74 BPM    P-R Interval 150 ms    QRS Duration 80 ms    Q-T Interval 336 ms    QTC Calculation (Bezet) 372 ms    Calculated P Axis 0 degrees    Calculated R Axis -33 degrees    Calculated T Axis 81 degrees    Diagnosis       Normal sinus rhythm  Left axis deviation  Nonspecific ST abnormality  Abnormal ECG  When compared with ECG of 01-MAR-2019 11:32,  No significant change was found  Confirmed by Jeffrey Smith MD, Ana Osborne ([id number]) on 10/30/2020 8:10:53 AM     Results for orders placed or performed in visit on 05/12/15   AMB POC EKG ROUTINE W/ 12 LEADS, INTER & REP    Narrative    EKG: unchanged from previous tracings, normal sinus rhythm,  nonspecific ST and T waves changes, left axis deviation.          ECHO       Special Testing/Procedures:  MODALITY RESULTS   MICRO All Micro Results     None         UA Results for orders placed or performed in visit on 04/05/18   AMB POC URINALYSIS DIP STICK AUTO W/O MICRO     Status: None   Result Value Ref Range Status    Color (UA POC) Light Yellow  Final    Clarity (UA POC) Slightly Cloudy  Final    Glucose (UA POC) 2+ Negative Final    Bilirubin (UA POC) Negative Negative Final    Ketones (UA POC) Trace Negative Final    Specific gravity (UA POC) 1.020 1.001 - 1.035 Final    Blood (UA POC) 2+ Negative Final    pH (UA POC) 5.5 4.6 - 8.0 Final    Protein (UA POC) Negative Negative Final    Urobilinogen (UA POC) 0.2 mg/dL 0.2 - 1 Final    Nitrites (UA POC) Negative Negative Final    Leukocyte esterase (UA POC) 1+ Negative Final        Telemetry NONE   Oxygen NONE     Assessment and Plan:     78 y.o. female with PMH  HTN, HLD, T2DM, GERD/IBS, and depression/anxiety now admitted with chest pain. Chest pain: DDx includes panic attack, ACS, MSK pain. Pt has hx of CP described as chest tightness due to panic attacks, but this episode is not similar. Normal echo and NucStress Test in 09/2018. Cardiac enzymes negative; trops negative x 3. EKG read as ST depression in inferior leads, but also appears to have artifact. Her sxs are concerning for ACS, but cardiac enzymes do not support that. Cardiology asked that pt be admitted for w/u.  - admit to tele w/ cardiac monitoring  - daily CBC, CMP, Mg, Phos  - repeat EKG  - sublingual nitro PRN  - continue home ASA 81  - VS per unit routine  - supplemental O2 for sats < 92%  - regular diet  - lovenox for DVT ppx  - pantoprazole for GI ppx  - PT/OT/CM  - Cardio rec (10/20): given patient's iodine allergy, she will need pre-medication prior to cardiac catherization. Pre-medication includes prednisone, Pepcid and benadryl x2 days. Plan for cardiac cath on Monday, NPO Sunday night. Patient can stay inpatient or go home and return Monday for procedure.  Order echo and reconsider home discharge based on results. T2DM: HbA1c was: 7.4 in 07/27/2020  - hold home Metformin 500mg AM and 1000mg PM  - SSI  - Accuchecks  - hypoglycemia protocol    GERD/IBS:  Followed by GI. Normal TSH, ESR and CBC in 09/2020. Recent colonoscopy 08/2020. Pt reported new rx, metoclopramide 20mg AC; but she was not sure of the drug or dose. - continue home pantoprazole 40mg Q12H and famotidine 20mg daily  - day team f/u with pt's GI to clarify metoclopramide     Depression/Anxiety: Per clinic note \"Says that the stressors in life is the reason why she has depression and anxiety. Sometimes the anxiety leads to chest pain. \"  - continue home Paroxetine 40mg daily     HTN/HLD:   - VS per unit routine  - continue home Lisinopril 5mg daily  - continue home atorvastatin 20mg daily    Diet regular   DVT Prophylaxis pantoprazole   GI Prophylaxis lovenox   Code status Full   Disposition <2 MN      Point of Contact Genora Pool  Relationship:   [phone]       Urbano Monroe MD, PGY1   [address]   Intern Pager: [phone]   October 30, 2020, 9:22 AM

## 2020-10-30 NOTE — ED NOTES
TRANSFER - OUT REPORT:    Verbal report given to WILFREDO Whitehead on Ruiz Vasquez  being transferred to [facility] for routine progression of care       Report consisted of patients Situation, Background, Assessment and   Recommendations(SBAR). Information from the following report(s) SBAR was reviewed with the receiving nurse. Lines:   Peripheral IV 10/29/20 (Active)   Site Assessment Clean, dry, & intact 10/29/20 1520   Phlebitis Assessment 0 10/29/20 1520   Infiltration Assessment 0 10/29/20 1520   Dressing Status Clean, dry, & intact 10/29/20 1520   Dressing Type Transparent 10/29/20 1520   Hub Color/Line Status Pink;Flushed;Patent 10/29/20 1520   Action Taken Blood drawn 10/29/20 1520        Opportunity for questions and clarification was provided.       Patient transported with:   Hospital Transport

## 2020-10-30 NOTE — PROGRESS NOTES
Problem: Falls - Risk of  Goal: *Absence of Falls  Description: Document Edmonia Morning Fall Risk and appropriate interventions in the flowsheet.   10/30/2020 1809 by Beatriz Valencia RN  Outcome: Resolved/Met  10/30/2020 0749 by Beatriz Valencia RN  Outcome: Progressing Towards Goal  Note: Fall Risk Interventions:            Medication Interventions: Patient to call before getting OOB, Teach patient to arise slowly                   Problem: Patient Education: Go to Patient Education Activity  Goal: Patient/Family Education  10/30/2020 1809 by Beatriz Valencia RN  Outcome: Resolved/Met  10/30/2020 0749 by Beatriz Valencia RN  Outcome: Progressing Towards Goal     Problem: Patient Education: Go to Patient Education Activity  Goal: Patient/Family Education  10/30/2020 1809 by Beatriz Valencia RN  Outcome: Resolved/Met  10/30/2020 0749 by Beatriz Valencia RN  Outcome: Progressing Towards Goal     Problem: Patient Education: Go to Patient Education Activity  Goal: Patient/Family Education  10/30/2020 1809 by Beatriz Valencia RN  Outcome: Resolved/Met  10/30/2020 0749 by Beatriz Valencia RN  Outcome: Progressing Towards Goal     Problem: Unstable angina/NSTEMI: Day of Admission/Day 1  Goal: Off Pathway (Use only if patient is Off Pathway)  10/30/2020 1809 by Beatriz Valencia RN  Outcome: Resolved/Met  10/30/2020 0749 by Beatriz Valencia RN  Outcome: Progressing Towards Goal  Goal: Activity/Safety  10/30/2020 1809 by Beatriz Valencia RN  Outcome: Resolved/Met  10/30/2020 0749 by Beatriz Valencia RN  Outcome: Progressing Towards Goal  Goal: Consults, if ordered  10/30/2020 1809 by Beatriz Valencia RN  Outcome: Resolved/Met  10/30/2020 0749 by Beatriz Valencia RN  Outcome: Progressing Towards Goal  Goal: Diagnostic Test/Procedures  10/30/2020 1809 by Beatriz Valencia RN  Outcome: Resolved/Met  10/30/2020 0749 by Beatriz Valencia RN  Outcome: Progressing Towards Goal  Goal: Nutrition/Diet  10/30/2020 1809 by Jarrod Kincaid, RN  Outcome: Resolved/Met  10/30/2020 6309 by Jarrod Kincaid, RN  Outcome: Progressing Towards Goal  Goal: Discharge Planning  10/30/2020 1809 by Jarrod Kincaid, RN  Outcome: Resolved/Met  10/30/2020 0749 by Jarrod Kincaid, RN  Outcome: Progressing Towards Goal  Goal: Medications  10/30/2020 1809 by Jarrod Kincaid, RN  Outcome: Resolved/Met  10/30/2020 0749 by Jarrod Kincaid, RN  Outcome: Progressing Towards Goal  Goal: Respiratory  10/30/2020 1809 by Jarrod Kincaid, RN  Outcome: Resolved/Met  10/30/2020 0749 by Jarrod Kincaid, RN  Outcome: Progressing Towards Goal  Goal: Treatments/Interventions/Procedures  10/30/2020 1809 by Jarrod Kincaid, RN  Outcome: Resolved/Met  10/30/2020 0749 by Jarrod Kincaid, RN  Outcome: Progressing Towards Goal  Goal: Psychosocial  10/30/2020 1809 by Jarrod Kincaid, RN  Outcome: Resolved/Met  10/30/2020 0749 by Jarrod Kincaid, RN  Outcome: Progressing Towards Goal  Goal: *Hemodynamically stable  10/30/2020 1809 by Jarrod Kincaid, RN  Outcome: Resolved/Met  10/30/2020 0749 by Jarrod Kincaid, RN  Outcome: Progressing Towards Goal  Goal: *Optimal pain control at patient's stated goal  10/30/2020 1809 by Jarrod Kincaid, RN  Outcome: Resolved/Met  10/30/2020 0749 by Jarrod Kincaid, RN  Outcome: Progressing Towards Goal  Goal: *Lungs clear or at baseline  10/30/2020 1809 by Jarrod Kincaid, RN  Outcome: Resolved/Met  10/30/2020 0749 by Jarrod Kincaid, RN  Outcome: Progressing Towards Goal     Problem: Unstable angina/NSTEMI: Day 2  Goal: Off Pathway (Use only if patient is Off Pathway)  10/30/2020 1809 by Jarrod Kincaid, RN  Outcome: Resolved/Met  10/30/2020 0749 by Jarrod Kincaid, RN  Outcome: Progressing Towards Goal  Goal: Activity/Safety  10/30/2020 1809 by Jarrod Kincaid, RN  Outcome: Resolved/Met  10/30/2020 0749 by Jarrod Kincaid, RN  Outcome: Progressing Towards Goal  Goal: Consults, if ordered  10/30/2020 1809 by Justine Kahn, Kiera Ortiz, RN  Outcome: Resolved/Met  10/30/2020 0749 by Krishan Yates RN  Outcome: Progressing Towards Goal  Goal: Diagnostic Test/Procedures  10/30/2020 1809 by Krishan Yates, RN  Outcome: Resolved/Met  10/30/2020 0749 by Krishan Yates, RN  Outcome: Progressing Towards Goal  Goal: Nutrition/Diet  10/30/2020 1809 by Krishan Yates, RN  Outcome: Resolved/Met  10/30/2020 0749 by Krishan Yates, RN  Outcome: Progressing Towards Goal  Goal: Discharge Planning  10/30/2020 1809 by Krishan Yates, RN  Outcome: Resolved/Met  10/30/2020 0749 by Krishan Yates, RN  Outcome: Progressing Towards Goal  Goal: Medications  10/30/2020 1809 by Krishan Yates, RN  Outcome: Resolved/Met  10/30/2020 0749 by Krishan Yates RN  Outcome: Progressing Towards Goal  Goal: Respiratory  10/30/2020 1809 by Krishan Yates, RN  Outcome: Resolved/Met  10/30/2020 0749 by Krishan Yates RN  Outcome: Progressing Towards Goal  Goal: Treatments/Interventions/Procedures  10/30/2020 1809 by Krishan Yates, RN  Outcome: Resolved/Met  10/30/2020 0749 by Krishan Yates RN  Outcome: Progressing Towards Goal  Goal: Psychosocial  10/30/2020 1809 by Krishan Yates, RN  Outcome: Resolved/Met  10/30/2020 0749 by Krishan Yates RN  Outcome: Progressing Towards Goal  Goal: *Hemodynamically stable  10/30/2020 1809 by Krishan Yates, RN  Outcome: Resolved/Met  10/30/2020 0749 by Krishan Yates, RN  Outcome: Progressing Towards Goal  Goal: *Optimal pain control at patient's stated goal  10/30/2020 1809 by Krishan Yates, RN  Outcome: Resolved/Met  10/30/2020 0749 by Krishan Yates RN  Outcome: Progressing Towards Goal  Goal: *Lungs clear or at baseline  10/30/2020 1809 by Krishan Yates, RN  Outcome: Resolved/Met  10/30/2020 0749 by Krishan Yates RN  Outcome: Progressing Towards Goal     Problem: Unstable Angina/NSTEMI: Discharge Outcomes  Goal: *Hemodynamically stable  10/30/2020 1809 by Krishan Yates RN  Outcome: Resolved/Met  10/30/2020 0749 by Marciano Steward RN  Outcome: Progressing Towards Goal  Goal: *Stable cardiac rhythm  10/30/2020 1809 by Marciano Steward RN  Outcome: Resolved/Met  10/30/2020 0749 by Marciano Steward RN  Outcome: Progressing Towards Goal  Goal: *Lungs clear or at baseline  10/30/2020 1809 by Marciano Steward RN  Outcome: Resolved/Met  10/30/2020 0749 by Marciano Steward RN  Outcome: Progressing Towards Goal  Goal: *Optimal pain control at patient's stated goal  10/30/2020 1809 by Marciano Steward RN  Outcome: Resolved/Met  10/30/2020 0749 by Marciano Steward RN  Outcome: Progressing Towards Goal  Goal: *Identifies cardiac risk factors  10/30/2020 1809 by Marciano Steward RN  Outcome: Resolved/Met  10/30/2020 0749 by Marciano Steward RN  Outcome: Progressing Towards Goal  Goal: *Verbalizes home exercise program, activity guidelines, cardiac precautions  10/30/2020 1809 by Marciano Steward RN  Outcome: Resolved/Met  10/30/2020 0749 by Marciano Steward RN  Outcome: Progressing Towards Goal  Goal: *Verbalizes understanding and describes prescribed diet  10/30/2020 1809 by Marciano Steward RN  Outcome: Resolved/Met  10/30/2020 0749 by Marcinao Steward RN  Outcome: Progressing Towards Goal  Goal: *Verbalizes name, dosage, time, side effects, and number of days to continue medications  10/30/2020 1809 by Marciano Steward RN  Outcome: Resolved/Met  10/30/2020 0749 by Marciano Steward RN  Outcome: Progressing Towards Goal  Goal: *Anxiety reduced or absent  10/30/2020 1809 by Marciano Steward RN  Outcome: Resolved/Met  10/30/2020 0749 by Marciano Steward RN  Outcome: Progressing Towards Goal  Goal: *Understands and describes signs and symptoms to report to providers(Stroke Metric)  10/30/2020 1809 by Marciano Steward RN  Outcome: Resolved/Met  10/30/2020 0749 by Marciano Steward RN  Outcome: Progressing Towards Goal  Goal: *Describes follow-up/return visits to physicians  10/30/2020 1809 by Ekta Wong, RN  Outcome: Resolved/Met  10/30/2020 0749 by Ekta Wong RN  Outcome: Progressing Towards Goal  Goal: *Describes available resources and support systems  10/30/2020 1809 by Ekta Wong, RN  Outcome: Resolved/Met  10/30/2020 0749 by Ekta Wong RN  Outcome: Progressing Towards Goal  Goal: *Influenza immunization  10/30/2020 1809 by Ekta Wong, RN  Outcome: Resolved/Met  10/30/2020 0749 by Ekta Wong RN  Outcome: Progressing Towards Goal  Goal: *Pneumococcal immunization  10/30/2020 1809 by Ekta Wong, RN  Outcome: Resolved/Met  10/30/2020 0749 by Ekta Wong RN  Outcome: Progressing Towards Goal  Goal: *Describes smoking cessation resources  10/30/2020 1809 by Ekta Wong, RN  Outcome: Resolved/Met  10/30/2020 0749 by Ekta Wong RN  Outcome: Progressing Towards Goal     Problem: Hypertension  Goal: *Blood pressure within specified parameters  10/30/2020 1809 by Ekta Wong, RN  Outcome: Resolved/Met  10/30/2020 0749 by Ekta Wong RN  Outcome: Progressing Towards Goal  Goal: *Fluid volume balance  10/30/2020 1809 by Ekta Wong, RN  Outcome: Resolved/Met  10/30/2020 0749 by Ekta Wong RN  Outcome: Progressing Towards Goal  Goal: *Labs within defined limits  10/30/2020 1809 by Ekta Wong, RN  Outcome: Resolved/Met  10/30/2020 0749 by Ekta Wong RN  Outcome: Progressing Towards Goal     Problem: Patient Education: Go to Patient Education Activity  Goal: Patient/Family Education  10/30/2020 1809 by Ekta Wong, RN  Outcome: Resolved/Met  10/30/2020 0749 by Ekta Wong RN  Outcome: Progressing Towards Goal

## 2020-10-30 NOTE — ROUTINE PROCESS
Bedside shift change report given to [address] (oncoming nurse) by Lj Rodriguez RN (offgoing nurse). Report included the following information SBAR, Kardex, Procedure Summary, Intake/Output, MAR and Recent Results.

## 2020-10-30 NOTE — PROGRESS NOTES
Problem: Falls - Risk of  Goal: *Absence of Falls  Description: Document Tania Menjivar Fall Risk and appropriate interventions in the flowsheet.   Outcome: Progressing Towards Goal  Note: Fall Risk Interventions:            Medication Interventions: Patient to call before getting OOB, Teach patient to arise slowly                   Problem: Patient Education: Go to Patient Education Activity  Goal: Patient/Family Education  Outcome: Progressing Towards Goal     Problem: Patient Education: Go to Patient Education Activity  Goal: Patient/Family Education  Outcome: Progressing Towards Goal     Problem: Patient Education: Go to Patient Education Activity  Goal: Patient/Family Education  Outcome: Progressing Towards Goal     Problem: Unstable angina/NSTEMI: Day of Admission/Day 1  Goal: Off Pathway (Use only if patient is Off Pathway)  Outcome: Progressing Towards Goal  Goal: Activity/Safety  Outcome: Progressing Towards Goal  Goal: Consults, if ordered  Outcome: Progressing Towards Goal  Goal: Diagnostic Test/Procedures  Outcome: Progressing Towards Goal  Goal: Nutrition/Diet  Outcome: Progressing Towards Goal  Goal: Discharge Planning  Outcome: Progressing Towards Goal  Goal: Medications  Outcome: Progressing Towards Goal  Goal: Respiratory  Outcome: Progressing Towards Goal  Goal: Treatments/Interventions/Procedures  Outcome: Progressing Towards Goal  Goal: Psychosocial  Outcome: Progressing Towards Goal  Goal: *Hemodynamically stable  Outcome: Progressing Towards Goal  Goal: *Optimal pain control at patient's stated goal  Outcome: Progressing Towards Goal  Goal: *Lungs clear or at baseline  Outcome: Progressing Towards Goal     Problem: Unstable angina/NSTEMI: Day 2  Goal: Off Pathway (Use only if patient is Off Pathway)  Outcome: Progressing Towards Goal  Goal: Activity/Safety  Outcome: Progressing Towards Goal  Goal: Consults, if ordered  Outcome: Progressing Towards Goal  Goal: Diagnostic Test/Procedures  Outcome: Progressing Towards Goal  Goal: Nutrition/Diet  Outcome: Progressing Towards Goal  Goal: Discharge Planning  Outcome: Progressing Towards Goal  Goal: Medications  Outcome: Progressing Towards Goal  Goal: Respiratory  Outcome: Progressing Towards Goal  Goal: Treatments/Interventions/Procedures  Outcome: Progressing Towards Goal  Goal: Psychosocial  Outcome: Progressing Towards Goal  Goal: *Hemodynamically stable  Outcome: Progressing Towards Goal  Goal: *Optimal pain control at patient's stated goal  Outcome: Progressing Towards Goal  Goal: *Lungs clear or at baseline  Outcome: Progressing Towards Goal     Problem: Unstable Angina/NSTEMI: Discharge Outcomes  Goal: *Hemodynamically stable  Outcome: Progressing Towards Goal  Goal: *Stable cardiac rhythm  Outcome: Progressing Towards Goal  Goal: *Lungs clear or at baseline  Outcome: Progressing Towards Goal  Goal: *Optimal pain control at patient's stated goal  Outcome: Progressing Towards Goal  Goal: *Identifies cardiac risk factors  Outcome: Progressing Towards Goal  Goal: *Verbalizes home exercise program, activity guidelines, cardiac precautions  Outcome: Progressing Towards Goal  Goal: *Verbalizes understanding and describes prescribed diet  Outcome: Progressing Towards Goal  Goal: *Verbalizes name, dosage, time, side effects, and number of days to continue medications  Outcome: Progressing Towards Goal  Goal: *Anxiety reduced or absent  Outcome: Progressing Towards Goal  Goal: *Understands and describes signs and symptoms to report to providers(Stroke Metric)  Outcome: Progressing Towards Goal  Goal: *Describes follow-up/return visits to physicians  Outcome: Progressing Towards Goal  Goal: *Describes available resources and support systems  Outcome: Progressing Towards Goal  Goal: *Influenza immunization  Outcome: Progressing Towards Goal  Goal: *Pneumococcal immunization  Outcome: Progressing Towards Goal  Goal: *Describes smoking cessation resources  Outcome: Progressing Towards Goal     Problem: Hypertension  Goal: *Blood pressure within specified parameters  Outcome: Progressing Towards Goal  Goal: *Fluid volume balance  Outcome: Progressing Towards Goal  Goal: *Labs within defined limits  Outcome: Progressing Towards Goal     Problem: Patient Education: Go to Patient Education Activity  Goal: Patient/Family Education  Outcome: Progressing Towards Goal

## 2020-10-30 NOTE — PROGRESS NOTES
conducted an initial consultation and Spiritual Assessment for Bisi Keys, who is a 78 y.o.,female. Patient's Primary Language is: [place]. According to the patient's EMR Christianity Affiliation is: Samaria Spencer. The reason the Patient came to the hospital is:   Patient Active Problem List    Diagnosis Date Noted    Chest pain 09/15/2017    Advanced care planning/counseling discussion 12/07/2015    Essential hypertension 07/07/2015    Seasonal allergic rhinitis 06/09/2015    CHURCH (dyspnea on exertion) 05/12/2015    Chest pain, unspecified 02/05/2013    Hypercalcemia 02/05/2013        The  provided the following Interventions:  Initiated a relationship of care and support. Explored issues of denice, belief, spirituality and Confucianism/ritual needs while hospitalized. Listened empathetically. Provided chaplaincy education. Offered assurance of continued prayers on patient's behalf. Chart reviewed. The following outcomes where achieved:  Patient shared limited information about both their medical narrative and spiritual beliefs.  confirmed Patient's Christianity Affiliation. She considers herself a \"completed Protestant. \" She is Protestant and is also a member of a [employer]. Patient processed feeling about current hospitalization. Patient expressed gratitude for 's visit. Assessment:  Patient does not have any known Confucianism/cultural needs that will affect patient's preferences in health care. There are no known spiritual or Confucianism issues which require intervention at this time. Plan:  Chaplains will continue to follow and will provide pastoral care on as needed and as requested.  recommends bedside caregivers page the  on duty if patient shows signs of spiritual or emotional distress. Lara. Roshan Carter.  [address]   [phone]

## 2020-10-31 LAB
ATRIAL RATE: 64 BPM
CALCULATED P AXIS, ECG09: 54 DEGREES
CALCULATED R AXIS, ECG10: -31 DEGREES
CALCULATED T AXIS, ECG11: 72 DEGREES
DIAGNOSIS, 93000: NORMAL
P-R INTERVAL, ECG05: 204 MS
Q-T INTERVAL, ECG07: 394 MS
QRS DURATION, ECG06: 84 MS
QTC CALCULATION (BEZET), ECG08: 406 MS
VENTRICULAR RATE, ECG03: 64 BPM

## 2020-11-01 LAB
ATRIAL RATE: 65 BPM
CALCULATED P AXIS, ECG09: 59 DEGREES
CALCULATED R AXIS, ECG10: -31 DEGREES
CALCULATED T AXIS, ECG11: 68 DEGREES
DIAGNOSIS, 93000: NORMAL
P-R INTERVAL, ECG05: 188 MS
Q-T INTERVAL, ECG07: 398 MS
QRS DURATION, ECG06: 88 MS
QTC CALCULATION (BEZET), ECG08: 413 MS
VENTRICULAR RATE, ECG03: 65 BPM

## 2020-11-01 NOTE — DISCHARGE SUMMARY
[address] Medicine  Discharge Summary    Patient: Jose Rivero MRN: [id number]  CSN: [id number]    YOB: 1950  Age: 78 y.o. Sex: female      Admission Date: 10/29/2020 Discharge Date: 10/30/2020   Attending: No att. providers found PCP: Antoine Doty MD     ===================================================================    Reason for Admission: Chest pain [R07.9]    Discharge Diagnoses:   Chest pain    Important notes to PCP/ follow-up studies and evaluations   Patient needs to follow-up with Dr. Haylee Ortiz for assessment of chest pain. Pending labs and studies:  none    Operative Procedures:   none    Discharge Medications:     Discharge Medication List as of 10/30/2020  6:13 PM      START taking these medications    Details   nitroglycerin (NITROSTAT) 0.4 mg SL tablet 1 Tab by SubLINGual route as needed for Chest Pain for up to 3 doses. Up to 3 doses. , Print, Disp-3 Tab,R-0      metoprolol succinate (TOPROL-XL) 25 mg XL tablet Take 0.5 Tabs by mouth daily for 30 doses. Indications: prevention of anginal chest pain associated with coronary artery disease, Print, Disp-15 Tab,R-0         CONTINUE these medications which have NOT CHANGED    Details   famotidine (PEPCID) 20 mg tablet Take 20 mg by mouth daily. , Historical Med      PARoxetine (PAXIL) 40 mg tablet Take 40 mg by mouth daily. Indications: anxiousness associated with depression, Historical Med      MetFORMIN (GLUCOPHAGE) 500 mg tablet Take 500 mg by mouth every evening., Historical Med      pantoprazole (PROTONIX) 40 mg tablet TAKE ONE TABLET BY MOUTH DAILY, Normal, Disp-90 Tab, R-2      atorvastatin (LIPITOR) 20 mg tablet TAKE ONE TABLET BY MOUTH DAILY, Normal, Disp-30 Tab, R-0      MetFORMIN (GLUCOPHAGE) 500 mg tablet Take 1 Tab by mouth two (2) times daily (with meals). , Normal, Disp-60 Tab, R-6      lisinopril (PRINIVIL, ZESTRIL) 5 mg tablet Take 1 Tab by mouth daily. , Normal, Disp-90 Tab, R-1 Metoclopramide HCL 10 mg tablet Take 1 Tab by mouth, three times daily       aspirin delayed-release 81 mg tablet Take  by mouth daily. , Historical Med        Disposition: Home    Consultants:    cardiology    Brief Hospital Course (including pertinent history and physical findings)  Reason for admission: chest pain    78 y.o. female with PMH of hypertension, hyperlipidemia, type 2 diabetes, GERD/IBS, and depression/anxiety now admitted with chest pain.     Chest pain: DDx includes panic attack, acute coronary syndrome, muskeletal pain. Patient has a history of chest pain described as chest tightness due to panic attacks, but this episode is not similar. Previously has had normal echocaridogram and Nuclear Stress Test in 09/2018. On admission, cardiac enzymes negative (troponins were negative x 3).  EKG  Showed ST depression in inferior leads but appeared to have artifact. Repeat EKG showed Normal sinus rhythm with   Left axis deviation. Symptoms on admission were concerning  for ACS, but cardiac enzymes do not support that. Patient was admitted and cardiology consulted. Given patient's persistent chest pain, cardiology recomembed cardiac catherization. However, patient has an iodine allergy and required pre-medication with prednisone, Pepcid and benadryl  For 2 days  prior to the procedure. Premedication and cardiac catherization can be completed in outpatient setting and patient elected to follow-up with cardiology service outpatient.     Other concerns addressed during this admission:  1. T2DM: HbA1c was: 7.4 in 07/27/2020; patient on Metformin 500mg AM and 1000mg PM at home. Metformin was held and Humalog sliding scale used for glycemic control. 2. GERD/IBS:  Followed by GI. Normal TSH, ESR and CBC in 09/2020. Recent colonoscopy 08/2020. Continued home pantoprazole 40mg Q12H, famotidine 20mg daily, metoclopramide 10mg TID  3. Depression/Anxiety: Continued on home Paroxetine 40mg daily  4.  HTN/HLD: Continued on home Lisinopril 5mg and atorvastatin 20mg daily    CURRENT ADMISSION IMAGING RESULTS   Xr Chest Port    Result Date: 10/29/2020  IMPRESSION: No acute cardiopulmonary findings        Cardiology Procedures/Testing:  MODALITY RESULTS   EKG Results for orders placed or performed during the hospital encounter of 10/29/20   EKG, 12 LEAD, INITIAL   Result Value Ref Range    Ventricular Rate 74 BPM    Atrial Rate 74 BPM    P-R Interval 150 ms    QRS Duration 80 ms    Q-T Interval 336 ms    QTC Calculation (Bezet) 372 ms    Calculated P Axis 0 degrees    Calculated R Axis -33 degrees    Calculated T Axis 81 degrees    Diagnosis       Normal sinus rhythm  Left axis deviation  Nonspecific ST abnormality  Abnormal ECG  When compared with ECG of 01-MAR-2019 11:32,  No significant change was found  Confirmed by Alejandro Byrd MD, [facility] ([id number]) on 10/30/2020 8:10:53 AM     Results for orders placed or performed in visit on 05/12/15   AMB POC EKG ROUTINE W/ 12 LEADS, INTER & REP    Narrative    EKG: unchanged from previous tracings, normal sinus rhythm,  nonspecific ST and T waves changes, left axis deviation. ECHO 10/29/20   ECHO ADULT COMPLETE 10/30/2020 10/30/2020    Narrative · LV: Estimated LVEF is 55 - 60%. Visually measured ejection fraction. Normal cavity size and systolic function (ejection fraction normal). Mild   concentric hypertrophy. Wall motion: normal. Age-appropriate left   ventricular diastolic function. · PV: Mild pulmonic valve regurgitation is present. · AV: Mild aortic valve regurgitation is present. · PA: Pulmonary arterial systolic pressure is 25 mmHg. Signed by: Favian Bueno MD      Nuclear Medicine Results from [facility] Encounter encounter on 08/08/13   NM LUNG PERFUSION W VENT    Impression IMPRESSION:    Although examination is not normal, it is low probability for pulmonary  embolism. Conchetta Peaks    Results from [place] encounter on 02/05/13   TRANSCRIBED NUCLEAR MEDICINE      IR No results found for this or any previous visit.    CATH       Special Testing/Procedures:  MODALITY RESULTS   MICRO All Micro Results     None         ABG No results found for: PH, PHI, PCO2, PCO2I, PO2, PO2I, HCO3, HCO3I, FIO2, FIO2I   UA Results for orders placed or performed in visit on 04/05/18   AMB POC URINALYSIS DIP STICK AUTO W/O MICRO     Status: None   Result Value Ref Range Status    Color (UA POC) Light Yellow  Final    Clarity (UA POC) Slightly Cloudy  Final    Glucose (UA POC) 2+ Negative Final    Bilirubin (UA POC) Negative Negative Final    Ketones (UA POC) Trace Negative Final    Specific gravity (UA POC) 1.020 1.001 - 1.035 Final    Blood (UA POC) 2+ Negative Final    pH (UA POC) 5.5 4.6 - 8.0 Final    Protein (UA POC) Negative Negative Final    Urobilinogen (UA POC) 0.2 mg/dL 0.2 - 1 Final    Nitrites (UA POC) Negative Negative Final    Leukocyte esterase (UA POC) 1+ Negative Final      ENDO [unfilled]   [unfilled]      Laboratory Results:  LABORATORY RESULTS   HEMATOLOGY Lab Results   Component Value Date/Time    WBC 8.7 10/30/2020 03:16 AM    HGB 12.6 10/30/2020 03:16 AM    HCT 37.7 10/30/2020 03:16 AM    PLATELET 835 84/18/1646 03:16 AM    MCV 85.3 10/30/2020 03:16 AM       CHEMISTRIES Lab Results   Component Value Date/Time    Sodium 139 10/30/2020 03:16 AM    Potassium 3.6 10/30/2020 03:16 AM    Chloride 106 10/30/2020 03:16 AM    CO2 26 10/30/2020 03:16 AM    Anion gap 7 10/30/2020 03:16 AM    Glucose 144 (H) 10/30/2020 03:16 AM    BUN 13 10/30/2020 03:16 AM    Creatinine 1.11 10/30/2020 03:16 AM    BUN/Creatinine ratio 12 10/30/2020 03:16 AM    GFR est AA 59 (L) 10/30/2020 03:16 AM    GFR est non-AA 49 (L) 10/30/2020 03:16 AM    Calcium 9.8 10/30/2020 03:16 AM      HEPATIC FUNCTION Lab Results   Component Value Date/Time    Albumin 3.6 10/30/2020 03:16 AM    Bilirubin, direct <0.1 08/08/2013 02:20 PM    Bilirubin, total 0.6 10/30/2020 03:16 AM    Protein, total 6.3 (L) 10/30/2020 03:16 AM Globulin 2.7 10/30/2020 03:16 AM    A-G Ratio 1.3 10/30/2020 03:16 AM    ALT (SGPT) 28 10/30/2020 03:16 AM    Alk. phosphatase 71 10/30/2020 03:16 AM       LACTIC ACID No results found for: [facility]   CARDIAC PANEL Lab Results   Component Value Date/Time    CK 38 10/29/2020 08:40 PM    CK - MB <1.0 10/29/2020 08:40 PM    CK-MB Index  10/29/2020 08:40 PM     CALCULATION NOT PERFORMED WHEN RESULT IS BELOW LINEAR LIMIT    Troponin-I, QT <0.02 10/30/2020 03:16 AM      NT-proBNP Lab Results   Component Value Date/Time    NT pro-BNP 88 10/29/2020 03:20 PM    NT pro- 05/06/2015 04:00 PM      THYROID Lab Results   Component Value Date/Time    TSH 1.30 04/20/2019 05:12 PM      LIPID PANEL Lab Results   Component Value Date/Time    Cholesterol, total 191 07/31/2018 09:39 AM    HDL Cholesterol 53 07/31/2018 09:39 AM    LDL, calculated 94.6 07/31/2018 09:39 AM    VLDL, calculated 43.4 07/31/2018 09:39 AM    Triglyceride 217 (H) 07/31/2018 09:39 AM    CHOL/HDL Ratio 3.6 07/31/2018 09:39 AM         RISK CALCULATORS:  SCORE RESULT   ASCVD The ASCVD Risk score (Ck Stevens et al., 2013) failed to calculate for the following reasons:    ASCVD risk score not calculated    [id number]     HAS-BLED    READMISSION RISK SCORE Low Risk            8       Total Score        3 Has Seen PCP in Last 6 Months (Yes=3, No=0)    5 Pt. Coverage (Medicare=5 , Medicaid, or Self-Pay=4)        Criteria that do not apply:    . Living with Significant Other. Assisted Living. LTAC. SNF.  or   Rehab    Patient Length of Stay (>5 days = 3)    IP Visits Last 12 Months (1-3=4, 4=9, >4=11)    Charlson Comorbidity Score (Age + Comorbid Conditions)           Functional status and cognitive function:    Ambulates with: independently  Status: alert, cooperative, no distress, appears stated age  Condition: STABLE  Disposition: Home    Diet: General Diet    Code status and advanced care plan: Full    [facility]  [phone]     Patient Education:  Patient was educated on the following topics prior to discharge:  - Angina  - Chest pain    Follow-up:   Follow-up Information     Follow up With Specialties Details Why Contact Info    Favian Bueno MD Cardiology, Internal Medicine On 11/3/2020  [address] [facility] [phone] [facility]  Schedule an appointment as soon as possible for a visit in 1 week  [address], [address], [address]   Dandre Elder [address]  [address] [facility] [address]  [address]  [phone]            =================================================================    Cori Nelson MD, PGY-1   [address] [facility] Medicine   Intern Pager: [phone]   November 1, 2020, 12:53 PM

## 2020-11-04 NOTE — PROGRESS NOTES
Physician Progress Note      PATIENT:               Flavia Fischer  CSN #:                  [id number]  :                       1950  ADMIT DATE:       10/29/2020 3:05 PM  [address] DATE:        10/30/2020 6:36 PM  RESPONDING  PROVIDER #:        Murali GRECO MD          QUERY TEXT:    Pt admitted with chest pain   Pt noted to have GERD  on Pepcid   prior to admit. If possible, please document in progress notes and discharge summary if you are evaluating and/or treating any of the following: The medical record reflects the following:    Risk Factors: H/O  GERD in H&P    Clinical Indicators  troponins flat  CP was similar to past episodes, but different from her chronic GERD or the chest discomfort she experiences when anxious. ?EKG  Showed ST depression?in inferior leads but appeared to have artifact. Repeat EKG showed Normal sinus rhythm with  Left axis deviation. Symptoms on admission were concerning  for ACS, but cardiac enzymes do not support that    Treatment: card consult-  card cath prob will be done as outpt. Pepcid 20mg po qd    Thank you,    Fabiola Guidry RN  CCDS  x [phone]  Options provided:  -- Chest pain due to CAD with unstable angina  -- Chest pain due to GERD  -- Other - I will add my own diagnosis  -- Disagree - Not applicable / Not valid  -- Disagree - Clinically unable to determine / Unknown  -- Refer to Clinical Documentation Reviewer    PROVIDER RESPONSE TEXT:    This patient has CAD with unstable angina.     Query created by: Stephy Romero on 11/3/2020 4:27 PM      Electronically signed by:  Murali GRECO MD 2020 12:52 PM

## 2020-11-10 ENCOUNTER — OFFICE VISIT (OUTPATIENT)
Dept: CARDIOLOGY CLINIC | Age: 70
End: 2020-11-10
Payer: MEDICARE

## 2020-11-10 VITALS
SYSTOLIC BLOOD PRESSURE: 133 MMHG | DIASTOLIC BLOOD PRESSURE: 67 MMHG | HEART RATE: 68 BPM | TEMPERATURE: 97.2 F | WEIGHT: 186 LBS | HEIGHT: 69 IN | BODY MASS INDEX: 27.55 KG/M2 | OXYGEN SATURATION: 97 %

## 2020-11-10 DIAGNOSIS — I20.0 INTERMEDIATE CORONARY SYNDROME (HCC): Primary | ICD-10-CM

## 2020-11-10 DIAGNOSIS — E78.5 DYSLIPIDEMIA: ICD-10-CM

## 2020-11-10 DIAGNOSIS — I10 ESSENTIAL HYPERTENSION: ICD-10-CM

## 2020-11-10 DIAGNOSIS — E11.65 TYPE 2 DIABETES MELLITUS WITH HYPERGLYCEMIA, UNSPECIFIED WHETHER LONG TERM INSULIN USE (HCC): ICD-10-CM

## 2020-11-10 PROCEDURE — 3046F HEMOGLOBIN A1C LEVEL >9.0%: CPT | Performed by: INTERNAL MEDICINE

## 2020-11-10 PROCEDURE — G8510 SCR DEP NEG, NO PLAN REQD: HCPCS | Performed by: INTERNAL MEDICINE

## 2020-11-10 PROCEDURE — 3017F COLORECTAL CA SCREEN DOC REV: CPT | Performed by: INTERNAL MEDICINE

## 2020-11-10 PROCEDURE — G8399 PT W/DXA RESULTS DOCUMENT: HCPCS | Performed by: INTERNAL MEDICINE

## 2020-11-10 PROCEDURE — G8419 CALC BMI OUT NRM PARAM NOF/U: HCPCS | Performed by: INTERNAL MEDICINE

## 2020-11-10 PROCEDURE — G9899 SCRN MAM PERF RSLTS DOC: HCPCS | Performed by: INTERNAL MEDICINE

## 2020-11-10 PROCEDURE — 2022F DILAT RTA XM EVC RTNOPTHY: CPT | Performed by: INTERNAL MEDICINE

## 2020-11-10 PROCEDURE — G8428 CUR MEDS NOT DOCUMENT: HCPCS | Performed by: INTERNAL MEDICINE

## 2020-11-10 PROCEDURE — 1101F PT FALLS ASSESS-DOCD LE1/YR: CPT | Performed by: INTERNAL MEDICINE

## 2020-11-10 PROCEDURE — G8752 SYS BP LESS 140: HCPCS | Performed by: INTERNAL MEDICINE

## 2020-11-10 PROCEDURE — G8536 NO DOC ELDER MAL SCRN: HCPCS | Performed by: INTERNAL MEDICINE

## 2020-11-10 PROCEDURE — G8754 DIAS BP LESS 90: HCPCS | Performed by: INTERNAL MEDICINE

## 2020-11-10 PROCEDURE — 99214 OFFICE O/P EST MOD 30 MIN: CPT | Performed by: INTERNAL MEDICINE

## 2020-11-10 PROCEDURE — 1111F DSCHRG MED/CURRENT MED MERGE: CPT | Performed by: INTERNAL MEDICINE

## 2020-11-10 PROCEDURE — 1090F PRES/ABSN URINE INCON ASSESS: CPT | Performed by: INTERNAL MEDICINE

## 2020-11-10 RX ORDER — FAMOTIDINE 20 MG/1
20 TABLET, FILM COATED ORAL 2 TIMES DAILY
Qty: 5 TAB | Refills: 0 | Status: SHIPPED | OUTPATIENT
Start: 2020-11-10 | End: 2020-11-16

## 2020-11-10 RX ORDER — PREDNISONE 20 MG/1
20 TABLET ORAL 2 TIMES DAILY WITH MEALS
Qty: 5 TAB | Refills: 0 | Status: SHIPPED | OUTPATIENT
Start: 2020-11-10 | End: 2020-11-16

## 2020-11-10 RX ORDER — DIPHENHYDRAMINE HCL 25 MG
25 TABLET ORAL 3 TIMES DAILY
Qty: 7 TAB | Refills: 0 | Status: SHIPPED | OUTPATIENT
Start: 2020-11-10 | End: 2020-11-16

## 2020-11-11 NOTE — PROGRESS NOTES
HISTORY OF PRESENT ILLNESS  Taylor Mayo is a 78 y.o. female. Hospital Follow Up   The history is provided by the patient. This is a recurrent problem. The problem occurs daily. Associated symptoms include chest pain. Pertinent negatives include no abdominal pain, no headaches and no shortness of breath. Chest Pain (Angina)    The history is provided by the patient. This is a recurrent problem. The problem has been gradually worsening. The problem occurs daily. The pain is associated with exertion. The pain is present in the substernal region. The pain is at a severity of 3/10. The quality of the pain is described as tightness. The pain radiates to the left shoulder and left jaw. Pertinent negatives include no abdominal pain, no claudication, no cough, no diaphoresis, no dizziness, no fever, no headaches, no hemoptysis, no nausea, no orthopnea, no palpitations, no PND, no shortness of breath, no sputum production, no vomiting and no weakness. Review of Systems   Constitutional: Negative for chills, diaphoresis, fever and weight loss. HENT: Negative for ear pain and hearing loss. Eyes: Negative for blurred vision. Respiratory: Negative for cough, hemoptysis, sputum production, shortness of breath, wheezing and stridor. Cardiovascular: Positive for chest pain. Negative for palpitations, orthopnea, claudication, leg swelling and PND. Gastrointestinal: Negative for abdominal pain, heartburn, nausea and vomiting. Musculoskeletal: Negative for myalgias and neck pain. Skin: Negative for rash. Neurological: Negative for dizziness, tingling, tremors, focal weakness, loss of consciousness, weakness and headaches. Psychiatric/Behavioral: Negative for depression and suicidal ideas.      Family History   Problem Relation Age of Onset    Heart Disease Father     Heart Attack Father     Heart Disease Paternal Grandmother     Heart Disease Paternal Grandfather     Breast Cancer Mother     Cancer Mother        Past Medical History:   Diagnosis Date    Abnormal nuclear cardiac imaging test 2015    Low risk. No ischemia or prior infarction. No RWMA. EF 68%. Neg EKG on pharm stress test.    Agatston CAC score, <100 05/15/2015    Coronary calcium score 70.  Chronic kidney disease     early stage kidney failure    Diabetes ([facility] [address] [facility]     Diabetes mellitus ([facility]     GERD (gastroesophageal reflux disease)     crohn's disease    History of echocardiogram 2013    EF 65%. No RWMA. Mod LVH. Gr 1 DDfx. RVSP 25 mmHg.  HX OTHER MEDICAL     anxiety, \"mitral valve prolapse\"    Hyperlipidemia     Hypertension     Psychiatric disorder     anxiety    RLE venous duplex     in her 25s       Past Surgical History:   Procedure Laterality Date    ABDOMEN SURGERY PROC UNLISTED      tumor removal    HX CHOLECYSTECTOMY      HX GYN      hysterectomy    HX OTHER SURGICAL      FATTY TISSUE REMOVED    HX TONSILLECTOMY         Social History     Tobacco Use    Smoking status: Former Smoker     Packs/day: 1.00     Years: 30.00     Pack years: 30.00     Types: Cigarettes     Last attempt to quit: 10/19/2012     Years since quittin.0    Smokeless tobacco: Former User   Substance Use Topics    Alcohol use: No     Alcohol/week: 0.0 standard drinks     Comment: occasional       Allergies   Allergen Reactions    Fish Containing Products Anaphylaxis and Other (comments)     Heart stops    Iodine Anaphylaxis and Other (comments)     Heart stops    Shellfish Derived Anaphylaxis and Other (comments)     Heart stops    Bee Venom Protein (Honey Bee) Hives       Outpatient Medications Marked as Taking for the 11/10/20 encounter (Office Visit) with Kim Betancur MD   Medication Sig Dispense Refill    predniSONE (DELTASONE) 20 mg tablet Take 20 mg by mouth two (2) times daily (with meals) for 5 doses.  5 Tab 0    diphenhydrAMINE (Benadryl Allergy) 25 mg tablet Take 1 Tab by mouth three (3) times daily for 7 doses. 7 Tab 0    famotidine (PEPCID) 20 mg tablet Take 1 Tab by mouth two (2) times a day for 5 doses. 5 Tab 0    PARoxetine (PAXIL) 40 mg tablet Take 40 mg by mouth daily. Indications: anxiousness associated with depression      nitroglycerin (NITROSTAT) 0.4 mg SL tablet 1 Tab by SubLINGual route as needed for Chest Pain for up to 3 doses. Up to 3 doses. 3 Tab 0    metoprolol succinate (TOPROL-XL) 25 mg XL tablet Take 0.5 Tabs by mouth daily for 30 doses. Indications: prevention of anginal chest pain associated with coronary artery disease 15 Tab 0    metFORMIN (GLUCOPHAGE) 500 mg tablet Take 500 mg by mouth every evening.  pantoprazole (PROTONIX) 40 mg tablet TAKE ONE TABLET BY MOUTH DAILY (Patient taking differently: Take  by mouth every twelve (12) hours.) 90 Tab 2    atorvastatin (LIPITOR) 20 mg tablet TAKE ONE TABLET BY MOUTH DAILY 30 Tab 0    metFORMIN (GLUCOPHAGE) 500 mg tablet Take 1 Tab by mouth two (2) times daily (with meals). (Patient taking differently: Take 1,000 mg by mouth daily (with breakfast). ) 60 Tab 6    lisinopril (PRINIVIL, ZESTRIL) 5 mg tablet Take 1 Tab by mouth daily. 90 Tab 1    aspirin delayed-release 81 mg tablet Take  by mouth daily. Visit Vitals  /67 (BP 1 Location: Left arm, BP Patient Position: Sitting)   Pulse 68   Temp 97.2 °F (36.2 °C) (Temporal)   Ht 5' 9\" (1.753 m)   Wt 84.4 kg (186 lb)   SpO2 97%   BMI 27.47 kg/m²     Physical Exam   Constitutional: She is oriented to person, place, and time. She appears well-developed and well-nourished. HENT:   Head: Normocephalic and atraumatic. Eyes: Conjunctivae and EOM are normal. No scleral icterus. Neck: Normal range of motion. Neck supple. No JVD present. Cardiovascular: Normal rate, regular rhythm and normal heart sounds. Exam reveals no gallop and no friction rub. No murmur heard. Pulmonary/Chest: Effort normal and breath sounds normal. No stridor.  No respiratory distress. She has no wheezes. She has no rales. She exhibits no tenderness. Abdominal: She exhibits no distension. There is no abdominal tenderness. Musculoskeletal: Normal range of motion. General: No tenderness or edema. Lymphadenopathy:     She has no cervical adenopathy. Neurological: She is alert and oriented to person, place, and time. No cranial nerve deficit. Skin: No rash noted. No erythema. Psychiatric: She has a normal mood and affect. Her behavior is normal.     10/29/20   ECHO ADULT COMPLETE 10/30/2020 10/30/2020    Narrative · LV: Estimated LVEF is 55 - 60%. Visually measured ejection fraction. Normal cavity size and systolic function (ejection fraction normal). Mild   concentric hypertrophy. Wall motion: normal. Age-appropriate left   ventricular diastolic function. · PV: Mild pulmonic valve regurgitation is present. · AV: Mild aortic valve regurgitation is present. · PA: Pulmonary arterial systolic pressure is 25 mmHg. Signed by: Alla Dunn MD       ASSESSMENT and PLAN    ICD-10-CM ICD-9-CM    1. Intermediate coronary syndrome (HCC)  I20.0 411.1 CASE REQUEST CATH LAB   2. Essential hypertension  I10 401.9    3. Dyslipidemia  E78.5 272.4    4. Type 2 diabetes mellitus with hyperglycemia, unspecified whether long term insulin use (HCC)  E11.65 250.00      Orders Placed This Encounter    predniSONE (DELTASONE) 20 mg tablet     Sig: Take 20 mg by mouth two (2) times daily (with meals) for 5 doses. Dispense:  5 Tab     Refill:  0    diphenhydrAMINE (Benadryl Allergy) 25 mg tablet     Sig: Take 1 Tab by mouth three (3) times daily for 7 doses. Dispense:  7 Tab     Refill:  0    famotidine (PEPCID) 20 mg tablet     Sig: Take 1 Tab by mouth two (2) times a day for 5 doses. Dispense:  5 Tab     Refill:  0         current treatment plan is effective, no change in therapy.     Patient with multiple cardiac risk factors recently admitted to [facility] with chest pain concerning for unstable angina. Was offered [facility] - preferred outpatient workup-- continues to have on and off chest pain with radiation to left shoulder/ left jaw. Will proceed with [facility]. Has allergy to seafood. Continue aspirin/ BB and statin.

## 2020-11-13 ENCOUNTER — HOSPITAL ENCOUNTER (OUTPATIENT)
Age: 70
Setting detail: OUTPATIENT SURGERY
Discharge: HOME | End: 2020-11-13
Attending: INTERNAL MEDICINE | Admitting: INTERNAL MEDICINE
Payer: MEDICARE

## 2020-11-13 VITALS
RESPIRATION RATE: 6 BRPM | WEIGHT: 185 LBS | HEIGHT: 69 IN | OXYGEN SATURATION: 95 % | BODY MASS INDEX: 27.4 KG/M2 | DIASTOLIC BLOOD PRESSURE: 70 MMHG | HEART RATE: 56 BPM | SYSTOLIC BLOOD PRESSURE: 169 MMHG

## 2020-11-13 DIAGNOSIS — I20.0 INTERMEDIATE CORONARY SYNDROME (HCC): ICD-10-CM

## 2020-11-13 PROCEDURE — 99152 MOD SED SAME PHYS/QHP 5/>YRS: CPT | Performed by: INTERNAL MEDICINE

## 2020-11-13 PROCEDURE — 74011250636 HC RX REV CODE- 250/636

## 2020-11-13 PROCEDURE — 77030013797 HC KT TRNSDUC PRSSR EDWD -A: Performed by: INTERNAL MEDICINE

## 2020-11-13 PROCEDURE — C1894 INTRO/SHEATH, NON-LASER: HCPCS | Performed by: INTERNAL MEDICINE

## 2020-11-13 PROCEDURE — 77030015766: Performed by: INTERNAL MEDICINE

## 2020-11-13 PROCEDURE — 93458 L HRT ARTERY/VENTRICLE ANGIO: CPT | Performed by: INTERNAL MEDICINE

## 2020-11-13 PROCEDURE — 74011000250 HC RX REV CODE- 250: Performed by: INTERNAL MEDICINE

## 2020-11-13 PROCEDURE — 74011250637 HC RX REV CODE- 250/637

## 2020-11-13 PROCEDURE — 74011250636 HC RX REV CODE- 250/636: Performed by: INTERNAL MEDICINE

## 2020-11-13 PROCEDURE — 74011000636 HC RX REV CODE- 636: Performed by: INTERNAL MEDICINE

## 2020-11-13 PROCEDURE — 77030019569 HC BND COMPR RAD TERU -B: Performed by: INTERNAL MEDICINE

## 2020-11-13 RX ORDER — SODIUM CHLORIDE 0.9 % (FLUSH) 0.9 %
5-40 SYRINGE (ML) INJECTION AS NEEDED
Status: CANCELLED | OUTPATIENT
Start: 2020-11-13

## 2020-11-13 RX ORDER — GUAIFENESIN 100 MG/5ML
LIQUID (ML) ORAL
Status: COMPLETED
Start: 2020-11-13 | End: 2020-11-13

## 2020-11-13 RX ORDER — MIDAZOLAM HYDROCHLORIDE 1 MG/ML
INJECTION, SOLUTION INTRAMUSCULAR; INTRAVENOUS AS NEEDED
Status: DISCONTINUED | OUTPATIENT
Start: 2020-11-13 | End: 2020-11-13 | Stop reason: HOSPADM

## 2020-11-13 RX ORDER — SODIUM CHLORIDE 0.9 % (FLUSH) 0.9 %
5-40 SYRINGE (ML) INJECTION EVERY 8 HOURS
Status: CANCELLED | OUTPATIENT
Start: 2020-11-13

## 2020-11-13 RX ORDER — LIDOCAINE HYDROCHLORIDE 10 MG/ML
INJECTION, SOLUTION EPIDURAL; INFILTRATION; INTRACAUDAL; PERINEURAL AS NEEDED
Status: DISCONTINUED | OUTPATIENT
Start: 2020-11-13 | End: 2020-11-13 | Stop reason: HOSPADM

## 2020-11-13 RX ORDER — FENTANYL CITRATE 50 UG/ML
INJECTION, SOLUTION INTRAMUSCULAR; INTRAVENOUS AS NEEDED
Status: DISCONTINUED | OUTPATIENT
Start: 2020-11-13 | End: 2020-11-13 | Stop reason: HOSPADM

## 2020-11-13 RX ORDER — VERAPAMIL HYDROCHLORIDE 2.5 MG/ML
INJECTION, SOLUTION INTRAVENOUS AS NEEDED
Status: DISCONTINUED | OUTPATIENT
Start: 2020-11-13 | End: 2020-11-13 | Stop reason: HOSPADM

## 2020-11-13 RX ORDER — HEPARIN SODIUM 1000 [USP'U]/ML
INJECTION, SOLUTION INTRAVENOUS; SUBCUTANEOUS AS NEEDED
Status: DISCONTINUED | OUTPATIENT
Start: 2020-11-13 | End: 2020-11-13 | Stop reason: HOSPADM

## 2020-11-13 RX ADMIN — METHYLPREDNISOLONE SODIUM SUCCINATE 125 MG: 125 INJECTION, POWDER, FOR SOLUTION INTRAMUSCULAR; INTRAVENOUS at 12:15

## 2020-11-13 RX ADMIN — ASPIRIN: 81 TABLET, CHEWABLE ORAL at 12:39

## 2020-11-13 NOTE — PROGRESS NOTES
Bedside shift change report given to [facility], RN (oncoming nurse) by Dom Anderson RN (offgoing nurse). Report included the following information SBAR, Procedure Summary, MAR and Recent Results.

## 2020-11-13 NOTE — Clinical Note
TRANSFER - IN REPORT:     Verbal report received from: Ariella Sánchez RN. Report consisted of patient's Situation, Background, Assessment and   Recommendations(SBAR). Opportunity for questions and clarification was provided. Assessment completed upon patient's arrival to unit and care assumed. Patient transported with a Cardiac Cath Tech / Patient Care Tech.

## 2020-11-13 NOTE — Clinical Note
Right groin and right radial prepped with ChloraPrep and draped. Wet prep solution applied at: 1303. Wet prep solution dried at: 1306. Wet prep elapsed drying time: 3 mins.

## 2020-11-13 NOTE — Clinical Note
Contrast Dose Calculator:   Patient's age: 78.   Patient's sex: Female. Patient weight (kg) = 84. Creatinine level (mg/dL) = 1.11. Creatinine clearance (mL/min): 64.32. Contrast concentration (mg/mL) = 300. MACD = 300 mL. Max Contrast dose per Creatinine Cl calculator = 144.73 mL.

## 2020-11-13 NOTE — ROUTINE PROCESS
1040 Cath holding summary Patient escorted to cath holding from waiting area ambulatory, alert and oriented x 4, voicing no complaints. Changed into gown and placed on monitor. NPO since MN. Lab results, med rec and H&P reviewed on chart. PIV x 2 inserted without difficulty. 1239 TRANSFER - OUT REPORT: 
 
Verbal report given to Mainor (name) on Radha Monterrosough  being transferred to Cath Lab (unit) for ordered procedure Report consisted of patients Situation, Background, Assessment and  
Recommendations(SBAR). Information from the following report(s) SBAR, Kardex, Intake/Output, MAR, Med Rec Status and Pre Procedure Checklist was reviewed with the receiving nurse. Lines:  
Peripheral IV 11/13/20 Right Antecubital (Active) Site Assessment Clean, dry, & intact 11/13/20 1105 Phlebitis Assessment 0 11/13/20 1105 Infiltration Assessment 0 11/13/20 1105 Dressing Type Transparent 11/13/20 1105 Hub Color/Line Status Pink;Flushed;Patent 11/13/20 1105 Peripheral IV 11/13/20 Left Forearm (Active) Site Assessment Clean, dry, & intact 11/13/20 1105 Phlebitis Assessment 0 11/13/20 1105 Infiltration Assessment 0 11/13/20 1105 Dressing Status Clean, dry, & intact 11/13/20 1105 Dressing Type Transparent 11/13/20 1105 Hub Color/Line Status Blue;Flushed;Patent 11/13/20 1105 Opportunity for questions and clarification was provided. Patient transported with: 
 Tech 
 
1348 TRANSFER - IN REPORT: 
 
Verbal report received from [address] [place] [address] (name) on Radha Monterrosough  being received from Cath Lab (unit) for routine post - op Report consisted of patients Situation, Background, Assessment and  
Recommendations(SBAR). Information from the following report(s) SBAR, Kardex, Procedure Summary and MAR was reviewed with the receiving nurse. Opportunity for questions and clarification was provided. Assessment completed upon patients arrival to unit and care assumed. 1445 TR Band removed from R wrist. No bleeding or hematoma noted. Zach Maranda and tegaderm in place. Dressing is clean, dry, and intact. Patient has no complaint of pain at this time. Bedrest instructions discussed with patient, patient verbalized understanding. 1447 Bedside and Verbal shift change report given to Roque Manzano RN  (oncoming nurse) by Vi Salamanca RN  (offgoing nurse). Report included the following information SBAR, Kardex, Procedure Summary, MAR and Recent Results.

## 2020-11-13 NOTE — DISCHARGE INSTRUCTIONS
DISCHARGE SUMMARY from Nurse    PATIENT INSTRUCTIONS:    After general anesthesia or intravenous sedation, for 24 hours or while taking prescription Narcotics:  · Limit your activities  · Do not drive and operate hazardous machinery  · Do not make important personal or business decisions  · Do  not drink alcoholic beverages  · If you have not urinated within 8 hours after discharge, please contact your surgeon on call. Report the following to your surgeon:  · Excessive pain, swelling, redness or odor of or around the surgical area  · Temperature over 100.5  · Nausea and vomiting lasting longer than 4 hours or if unable to take medications  · Any signs of decreased circulation or nerve impairment to extremity: change in color, persistent  numbness, tingling, coldness or increase pain  · Any questions    What to do at Home:  Recommended activity: Activity as tolerated and no driving for today    *  Please give a list of your current medications to your Primary Care Provider. *  Please update this list whenever your medications are discontinued, doses are      changed, or new medications (including over-the-counter products) are added. *  Please carry medication information at all times in case of emergency situations. These are general instructions for a healthy lifestyle:    No smoking/ No tobacco products/ Avoid exposure to second hand smoke  Surgeon General's Warning:  Quitting smoking now greatly reduces serious risk to your health. Obesity, smoking, and sedentary lifestyle greatly increases your risk for illness    A healthy diet, regular physical exercise & weight monitoring are important for maintaining a healthy lifestyle    You may be retaining fluid if you have a history of heart failure or if you experience any of the following symptoms:  Weight gain of 3 pounds or more overnight or 5 pounds in a week, increased swelling in our hands or feet or shortness of breath while lying flat in bed. Please call your doctor as soon as you notice any of these symptoms; do not wait until your next office visit. The discharge information has been reviewed with the {PATIENT PARENT GUARDIAN:98242}. The {PATIENT PARENT GUARDIAN:88840} verbalized understanding. Discharge medications reviewed with the {Dishcarge meds reviewed KUHR:08641} and appropriate educational materials and side effects teaching were provided. ___________________________________________________________________________________________________________________________________          HEART CATHETERIZATION/ANGIOGRAPHY DISCHARGE INSTRUCTIONS    1. Check puncture site frequently for swelling or bleeding. If there is any bleeding, lie down and apply pressure over the area with a clean towel or washcloth. Notify your doctor for any redness, swelling, drainage, or oozing from the puncture site. Notify your doctor for any fever or chills. 2. If the extremity becomes cold, numb, or painful go to the Emergency Room. 3. Activity should be limited for the next 48 hours. Climb stairs as little as possible and avoid any stooping, bending, or strenuous activity for 48 hours. No heavy lifting (anything over 8 pounds) for 5 days. 4. You may resume your usual diet. Drink more fluids than usual.  5. Have a responsible person drive you home and stay with you for at least 24 hours after your heart catheterization/angiography. 6. You may remove bandage from your Right and Arm in 24 hours. You may shower in 24 hours. No tub baths, hot tubs, or swimming for 1 week. Do not place any lotions, creams, powders, or ointments over puncture site for 1 week. You may place a clean band-aid over the puncture site each day for 5 days. Change daily. 7. If you take Metformin, do not take it for 48 hours. 8. Ask your nurse when to restart any blood thinners. How can you care for yourself at home?   Activity  · Do not do strenuous exercise and do not lift, pull, or push anything heavy until your doctor says it is okay. This may be for a day or two. You can walk around the house and do light activity, such as cooking. · You may shower 24 to 48 hours after the procedure, if your doctor okays it. Pat the incision dry. Do not take a bath for 1 week, or until your doctor tells you it is okay. · If the catheter was placed in your groin, try not to walk up stairs for the first couple of days. · If the catheter was placed in your arm near your wrist, do not bend your wrist deeply for the first couple of days. Be careful using your hand to get into and out of a chair or bed. · If your doctor recommends it, get more exercise. Walking is a good choice. Bit by bit, increase the amount you walk every day. Try for at least 30 minutes on most days of the week. Diet  · Drink plenty of fluids to help your body flush out the dye. If you have kidney, heart, or liver disease and have to limit fluids, talk with your doctor before you increase the amount of fluids you drink. · Keep eating a heart-healthy diet that has lots of fruits, vegetables, and whole grains. If you have not been eating this way, talk to your doctor. You also may want to talk to a dietitian. This expert can help you to learn about healthy foods and plan meals. Medicines  · Your doctor will tell you if and when you can restart your medicines. He or she will also give you instructions about taking any new medicines. · If you take blood thinners, such as warfarin (Coumadin), clopidogrel (Plavix), or aspirin, be sure to talk to your doctor. He or she will tell you if and when to start taking those medicines again. Make sure that you understand exactly what your doctor wants you to do. · Your doctor may prescribe a blood-thinning medicine like aspirin or clopidogrel (Plavix).  It is very important that you take these medicines exactly as directed in order to keep the coronary artery open and reduce your risk of a heart attack. Be safe with medicines. Call your doctor if you think you are having a problem with your medicine. Care of the catheter site  · For the first 3 days, keep a bandage over the spot where the catheter was inserted. · Put ice or a cold pack on the area for 10 to 20 minutes at a time to help with soreness or swelling. Put a thin cloth between the ice and your skin. Sedation for a Medical Procedure: Care Instructions  Your Care Instructions  For a minor procedure or surgery, you will get a sedative to help you relax. This drug will make you sleepy. It is usually given in a vein (by IV). A shot may also be used to numb the area. If you had local anesthesia, you may feel some pain and discomfort as it wears off. If you have pain, don't be afraid to say so. Pain medicine works better if you take it before the pain gets bad. Common side effects from sedation include:  · Feeling sleepy. (Your doctors and nurses will make sure you are not too sleepy to go home.)  · Nausea and vomiting. This usually does not last long. · Feeling tired. Follow-up care is a key part of your treatment and safety. Be sure to make and go to all appointments, and call your doctor if you are having problems. It's also a good idea to know your test results and keep a list of the medicines you take. How can you care for yourself at home? Activity  · Don't do anything for 24 hours that requires attention to detail. It takes time for the medicine effects to completely wear off. · For your safety, you should not drive or operate any machinery that could be dangerous until the medicine wears off and you can think clearly and react easily. · Rest when you feel tired. Getting enough sleep will help you recover. Diet  · You can eat your normal diet, unless your doctor gives you other instructions. If your stomach is upset, try clear liquids and bland, low-fat foods like plain toast or rice.   · Drink plenty of fluids (unless your doctor tells you not to). · Don't drink alcohol for 24 hours. Medicines  · Be safe with medicines. Read and follow all instructions on the label. ¨ If the doctor gave you a prescription medicine for pain, take it as prescribed. ¨ If you are not taking a prescription pain medicine, ask your doctor if you can take an over-the-counter medicine. · If you think your pain medicine is making you sick to your stomach:  ¨ Take your medicine after meals (unless your doctor has told you not to). ¨ Ask your doctor for a different pain medicine. Follow-up care is a key part of your treatment and safety. Be sure to make and go to all appointments, and call your doctor if you are having problems. It's also a good idea to know your test results and keep a list of the medicines you take. When should you call for help? Call 911 anytime you think you may need emergency care. For example, call if:  · You passed out (lost consciousness). · You have severe trouble breathing. · You have sudden chest pain and shortness of breath, or you cough up blood. · You have symptoms of a heart attack. These may include:  ¨ Chest pain or pressure, or a strange feeling in the chest.  ¨ Sweating. ¨ Shortness of breath. ¨ Nausea or vomiting. ¨ Pain, pressure, or a strange feeling in the back, neck, jaw, or upper belly, or in one or both shoulders or arms. ¨ Lightheadedness or sudden weakness. ¨ A fast or irregular heartbeat. After you call 911, the  may tel you to chew 1 adult-strength or 2 to 4 low-dose aspirin. Wait for an ambulance. Do not try to drive yourself. · You have been diagnosed with angina, and you have symptoms that do not go away with rest or are not getting better within 5 minutes after you take a dose of nitroglycerin. Call your doctor now or seek immediate medical care if:  · You are bleeding from the area where the catheter was put in your artery.   · You have a fast-growing, painful lump at the catheter site.  · You have signs of infection, such as:  ¨ Increased pain, swelling, warmth, or redness. ¨ Red streaks leading from the catheter site. ¨ Pus draining from the catheter site. ¨ A fever. · Your leg or arm looks blue or feels cold, numb, or tingly. These are general instructions for a healthy lifestyle:    No smoking/ No tobacco products/ Avoid exposure to second hand smoke    Surgeon General's Warning:  Quitting smoking now greatly reduces serious risk to your health. Obesity, smoking, and sedentary lifestyle greatly increases your risk for illness    A healthy diet, regular physical exercise & weight monitoring are important for maintaining a healthy lifestyle    You may be retaining fluid if you have a history of heart failure or if you experience any of the following symptoms:  Weight gain of 3 pounds or more overnight or 5 pounds in a week, increased swelling in our hands or feet or shortness of breath while lying flat in bed. Please call your doctor as soon as you notice any of these symptoms; do not wait until your next office visit. Recognize signs and symptoms of STROKE:    F-face looks uneven    A-arms unable to move or move unevenly    S-speech slurred or non-existent    T-time-call 911 as soon as signs and symptoms begin-DO NOT go       Back to bed or wait to see if you get better-TIME IS BRAIN. Warning Signs of HEART ATTACK     Call 911 if you have these symptoms:   Chest discomfort. Most heart attacks involve discomfort in the center of the chest that lasts more than a few minutes, or that goes away and comes back. It can feel like uncomfortable pressure, squeezing, fullness, or pain.  Discomfort in other areas of the upper body. Symptoms can include pain or discomfort in one or both arms, the back, neck, jaw, or stomach.  Shortness of breath with or without chest discomfort.  Other signs may include breaking out in a cold sweat, nausea, or lightheadedness.   Don't wait more than five minutes to call 911 - MINUTES MATTER! Fast action can save your life. Calling 911 is almost always the fastest way to get lifesaving treatment. Emergency Medical Services staff can begin treatment when they arrive -- up to an hour sooner than if someone gets to the hospital by car. Cardiac Catheterization/Angiography Discharge Instructions    *Check the puncture site frequently for swelling or bleeding. If you see any bleeding, lie down and apply pressure over the area with a clean town or washcloth. Notify your doctor for any redness, swelling, drainage or oozing from the puncture site. Notify your doctor for any fever or chills. *If the leg or arm with the puncture becomes cold, numb or painful, call Dr. Jimy Linares at  [phone]. *Activity should be limited for the next 48 hours. Climb stairs as little as possible and avoid any stooping, bending or strenuous activity for 48 hours. No heavy lifting (anything over 10 pounds) for three days. *Do not drive for 48 hours. *You may resume your usual diet. Drink more fluids than usual.    *Have a responsible person drive you home and stay with you for at least 24 hours after your heart catheterization/angiography. *You may remove the bandage from your Right  Groin in 24 hours. You may shower in 24 hours. No tub baths, hot tubs or swimming for one week. Do not place any lotions, creams, powders, ointments over the puncture site for one week. You may place a clean band-aid over the puncture site each day for 5 days. Change this daily.

## 2020-11-13 NOTE — Clinical Note
TRANSFER - OUT REPORT:     Verbal report given to: Brunilda Da Silva RN. Report consisted of patient's Situation, Background, Assessment and   Recommendations(SBAR). Opportunity for questions and clarification was provided. Patient transported with a Cardiac Cath Tech / Patient Care Tech. Patient transported to: [address]

## 2020-12-18 ENCOUNTER — TRANSCRIBE ORDER (OUTPATIENT)
Dept: SCHEDULING | Age: 70
End: 2020-12-18

## 2020-12-18 DIAGNOSIS — Z12.31 VISIT FOR SCREENING MAMMOGRAM: Primary | ICD-10-CM

## 2020-12-18 DIAGNOSIS — Z78.0 MENOPAUSE: ICD-10-CM

## 2020-12-18 DIAGNOSIS — Z13.820 SPECIAL SCREENING FOR OSTEOPOROSIS: ICD-10-CM

## 2021-03-01 ENCOUNTER — HOSPITAL ENCOUNTER (OUTPATIENT)
Dept: MAMMOGRAPHY | Age: 71
Discharge: HOME | End: 2021-03-01
Attending: FAMILY MEDICINE
Payer: MEDICARE

## 2021-03-01 ENCOUNTER — HOSPITAL ENCOUNTER (OUTPATIENT)
Dept: BONE DENSITY | Age: 71
Discharge: HOME | End: 2021-03-01
Attending: FAMILY MEDICINE
Payer: MEDICARE

## 2021-03-01 DIAGNOSIS — Z13.820 SPECIAL SCREENING FOR OSTEOPOROSIS: ICD-10-CM

## 2021-03-01 DIAGNOSIS — Z12.31 VISIT FOR SCREENING MAMMOGRAM: ICD-10-CM

## 2021-03-01 DIAGNOSIS — Z78.0 MENOPAUSE: ICD-10-CM

## 2021-03-01 PROCEDURE — 77063 BREAST TOMOSYNTHESIS BI: CPT

## 2021-03-01 PROCEDURE — 77080 DXA BONE DENSITY AXIAL: CPT

## 2021-04-14 ENCOUNTER — APPOINTMENT (OUTPATIENT)
Dept: GENERAL RADIOLOGY | Age: 71
End: 2021-04-14
Attending: PHYSICIAN ASSISTANT
Payer: MEDICARE

## 2021-04-14 ENCOUNTER — HOSPITAL ENCOUNTER (EMERGENCY)
Age: 71
Discharge: HOME | End: 2021-04-14
Attending: EMERGENCY MEDICINE
Payer: MEDICARE

## 2021-04-14 VITALS
OXYGEN SATURATION: 100 % | SYSTOLIC BLOOD PRESSURE: 134 MMHG | TEMPERATURE: 98.9 F | HEART RATE: 86 BPM | RESPIRATION RATE: 16 BRPM | DIASTOLIC BLOOD PRESSURE: 76 MMHG

## 2021-04-14 DIAGNOSIS — Z20.822 SUSPECTED COVID-19 VIRUS INFECTION: Primary | ICD-10-CM

## 2021-04-14 LAB
ANION GAP SERPL CALC-SCNC: 3 MMOL/L (ref 3–18)
BASOPHILS # BLD: 0 K/UL (ref 0–0.1)
BASOPHILS NFR BLD: 1 % (ref 0–2)
BNP SERPL-MCNC: 56 PG/ML (ref 0–900)
BUN SERPL-MCNC: 16 MG/DL (ref 7–18)
BUN/CREAT SERPL: 14 (ref 12–20)
CALCIUM SERPL-MCNC: 10.1 MG/DL (ref 8.5–10.1)
CHLORIDE SERPL-SCNC: 106 MMOL/L (ref 100–111)
CO2 SERPL-SCNC: 28 MMOL/L (ref 21–32)
CREAT SERPL-MCNC: 1.15 MG/DL (ref 0.6–1.3)
DIFFERENTIAL METHOD BLD: ABNORMAL
EOSINOPHIL # BLD: 0.1 K/UL (ref 0–0.4)
EOSINOPHIL NFR BLD: 2 % (ref 0–5)
ERYTHROCYTE [DISTWIDTH] IN BLOOD BY AUTOMATED COUNT: 12.4 % (ref 11.6–14.5)
GLUCOSE SERPL-MCNC: 174 MG/DL (ref 74–99)
HCT VFR BLD AUTO: 42.5 % (ref 35–45)
HGB BLD-MCNC: 14.2 G/DL (ref 12–16)
LYMPHOCYTES # BLD: 1.1 K/UL (ref 0.9–3.6)
LYMPHOCYTES NFR BLD: 16 % (ref 21–52)
MCH RBC QN AUTO: 28.2 PG (ref 24–34)
MCHC RBC AUTO-ENTMCNC: 33.4 G/DL (ref 31–37)
MCV RBC AUTO: 84.3 FL (ref 74–97)
MONOCYTES # BLD: 0.5 K/UL (ref 0.05–1.2)
MONOCYTES NFR BLD: 7 % (ref 3–10)
NEUTS SEG # BLD: 5 K/UL (ref 1.8–8)
NEUTS SEG NFR BLD: 75 % (ref 40–73)
PLATELET # BLD AUTO: 267 K/UL (ref 135–420)
PMV BLD AUTO: 9.1 FL (ref 9.2–11.8)
POTASSIUM SERPL-SCNC: 4.5 MMOL/L (ref 3.5–5.5)
RBC # BLD AUTO: 5.04 M/UL (ref 4.2–5.3)
SODIUM SERPL-SCNC: 137 MMOL/L (ref 136–145)
TROPONIN I SERPL-MCNC: <0.02 NG/ML (ref 0–0.04)
WBC # BLD AUTO: 6.6 K/UL (ref 4.6–13.2)

## 2021-04-14 PROCEDURE — 80048 BASIC METABOLIC PNL TOTAL CA: CPT

## 2021-04-14 PROCEDURE — 83880 ASSAY OF NATRIURETIC PEPTIDE: CPT

## 2021-04-14 PROCEDURE — 84484 ASSAY OF TROPONIN QUANT: CPT

## 2021-04-14 PROCEDURE — 85025 COMPLETE CBC W/AUTO DIFF WBC: CPT

## 2021-04-14 PROCEDURE — 71046 X-RAY EXAM CHEST 2 VIEWS: CPT

## 2021-04-14 PROCEDURE — 99282 EMERGENCY DEPT VISIT SF MDM: CPT

## 2021-04-14 NOTE — ED PROVIDER NOTES
EMERGENCY DEPARTMENT HISTORY AND PHYSICAL EXAM  This was created with voice recognition software and transcription errors may be present. 5:08 PM  Date: 4/14/2021  Patient Name: Charlie Oropeza    History of Presenting Illness     Chief Complaint:    History Provided By:     HPI: Charlie Oropeza is a 78 y.o. female past medical history of early stage kidney disease diabetes hypertension hyperlipidemia who presented with fever chills and cough. Patient's  is currently hospitalized with Covid he is a heart transplant patient immunosuppressed he has been home with symptoms for the past few days and went to the [facility] 7 day and has been there since. Patient felt fine yesterday she developed symptoms this morning. Her son had Covid in number and recovered well. No chest pain no nausea no vomiting no diarrhea    PCP: Abdi Gil MD      Past History     Past Medical History:  Past Medical History:   Diagnosis Date    Abnormal nuclear cardiac imaging test 05/11/2015    Low risk. No ischemia or prior infarction. No RWMA. EF 68%. Neg EKG on pharm stress test.    Agatston CAC score, <100 05/15/2015    Coronary calcium score 70.  Chronic kidney disease     early stage kidney failure    Diabetes ([facility]     Diabetes mellitus ([facility]     GERD (gastroesophageal reflux disease)     crohn's disease    History of echocardiogram 02/06/2013    EF 65%. No RWMA. Mod LVH. Gr 1 DDfx. RVSP 25 mmHg.       HX OTHER MEDICAL     anxiety, \"mitral valve prolapse\"    Hyperlipidemia     Hypertension     Psychiatric disorder     anxiety    RLE venous duplex     in her 25s       Past Surgical History:  Past Surgical History:   Procedure Laterality Date    HX CHOLECYSTECTOMY      HX GYN  1997    hysterectomy    HX OTHER SURGICAL      FATTY TISSUE REMOVED    HX TONSILLECTOMY      NE ABDOMEN SURGERY PROC UNLISTED  1999    tumor removal       Family History:  Family History   Problem Relation Age of Onset    Heart Disease Father     Heart Attack Father     Heart Disease Paternal Grandmother     Heart Disease Paternal Grandfather     Breast Cancer Mother     Cancer Mother        Social History:  Social History     Tobacco Use    Smoking status: Former Smoker     Packs/day: 1.00     Years: 30.00     Pack years: 30.00     Types: Cigarettes     Quit date: 10/19/2012     Years since quittin.4    Smokeless tobacco: Former User   Substance Use Topics    Alcohol use: No     Alcohol/week: 0.0 standard drinks     Comment: occasional    Drug use: No       Allergies: Allergies   Allergen Reactions    Fish Containing Products Anaphylaxis and Other (comments)     Heart stops    Iodine Anaphylaxis and Other (comments)     Heart stops    Shellfish Derived Anaphylaxis and Other (comments)     Heart stops    Bee Venom Protein (Honey Bee) Hives       Review of Systems     Review of Systems   All other systems reviewed and are negative. 10 point review of systems otherwise negative unless noted in HPI. Physical Exam       Physical Exam  Constitutional:       Appearance: She is well-developed. HENT:      Head: Normocephalic and atraumatic. Eyes:      Pupils: Pupils are equal, round, and reactive to light. Neck:      Musculoskeletal: Normal range of motion and neck supple. Cardiovascular:      Rate and Rhythm: Normal rate and regular rhythm. Heart sounds: Normal heart sounds. No murmur. No friction rub. Pulmonary:      Effort: Pulmonary effort is normal. No respiratory distress. Breath sounds: Normal breath sounds. No wheezing. Abdominal:      General: There is no distension. Palpations: Abdomen is soft. Tenderness: There is no abdominal tenderness. There is no guarding or rebound. Musculoskeletal: Normal range of motion. Skin:     General: Skin is warm and dry. Neurological:      Mental Status: She is alert and oriented to person, place, and time.    Psychiatric:         Behavior: Behavior normal.         Thought Content: Thought content normal.         Diagnostic Study Results     Vital Signs There were no vitals taken for this visit. EKG:  Labs: See chemistry unremarkable chest x-ray is negative  Imaging:     Medical Decision Making     ED Course: Progress Notes, Reevaluation, and Consults:      Provider Notes (Medical Decision Making): 61-year-old female presents with likely Covid symptoms. [facility] and is [facility] those results we discussed further testing today but based on her symptoms we both agree that she likely has an she is going to quarantine regardless of what the results show given that she is going to quarantine regardless of the results and they are anticipated to be positive would not for do further testing today. We discussed return precautions including for worsening shortness of breath we also discussed potential treatments including the monoclonal antibody treatment patient would prefer not to do at this time I think is completely appropriate. Diagnosis     Clinical Impression:   1. Suspected COVID-19 virus infection        Disposition:    Patient's Medications   Start Taking    No medications on file   Continue Taking    ATORVASTATIN (LIPITOR) 20 MG TABLET    TAKE ONE TABLET BY MOUTH DAILY    LISINOPRIL (PRINIVIL, ZESTRIL) 5 MG TABLET    Take 1 Tab by mouth daily. METFORMIN (GLUCOPHAGE) 500 MG TABLET    Take 1 Tab by mouth two (2) times daily (with meals). PANTOPRAZOLE (PROTONIX) 40 MG TABLET    TAKE ONE TABLET BY MOUTH DAILY    PAROXETINE (PAXIL) 40 MG TABLET    Take 40 mg by mouth daily.  Indications: anxiousness associated with depression   These Medications have changed    No medications on file   Stop Taking    No medications on file

## 2021-04-14 NOTE — Clinical Note
[address] [place] [address] [address] [facility] EMERGENCY DEPT 
[address] [facility] [id number] [phone] Work/School Note Date: 4/14/2021 To Whom It May concern: 
 
Justice Sample was evaulated by the following provider(s): 
Attending Provider: Kam Cain MD.   Ted Loyd virus is suspected. Per the CDC guidelines we recommend home isolation until the following conditions are all met: 1. At least 10 days have passed since symptoms first appeared and 2. At least 24 hours have passed since last fever without the use of fever-reducing medications and 
3. Symptoms (e.g., cough, shortness of breath) have improved Sincerely, 
 
 
 
 
Nayana Sexton MD

## 2021-04-14 NOTE — ED NOTES
Dyspnea, chest tightness, lightheadedness, chills, myalgias. Pt notes exposure to COVID,  tested positive. I performed a brief evaluation, including history and physical, of the patient here in triage and I have determined that pt will need further treatment and evaluation from the main side ER physician. I have placed initial orders to help in expediting patients care.      April 14, 2021 at 3:48 PM - DIANA Sheth

## 2021-04-14 NOTE — ED TRIAGE NOTES
Pt reports SOB, HA, sore throat, cough and  lightheaded. Pt's  Covid positve; hospitalized on Monday.

## 2021-04-29 ENCOUNTER — HOSPITAL ENCOUNTER (EMERGENCY)
Age: 71
Discharge: HOME | End: 2021-04-30
Attending: EMERGENCY MEDICINE
Payer: MEDICARE

## 2021-04-29 ENCOUNTER — APPOINTMENT (OUTPATIENT)
Dept: GENERAL RADIOLOGY | Age: 71
End: 2021-04-29
Attending: EMERGENCY MEDICINE
Payer: MEDICARE

## 2021-04-29 DIAGNOSIS — J18.9 COMMUNITY ACQUIRED PNEUMONIA, UNSPECIFIED LATERALITY: ICD-10-CM

## 2021-04-29 DIAGNOSIS — R07.9 CHEST PAIN, UNSPECIFIED TYPE: Primary | ICD-10-CM

## 2021-04-29 LAB
ANION GAP SERPL CALC-SCNC: 9 MMOL/L (ref 3–18)
BASOPHILS # BLD: 0 K/UL (ref 0–0.1)
BASOPHILS NFR BLD: 0 % (ref 0–2)
BNP SERPL-MCNC: 56 PG/ML (ref 0–900)
BUN SERPL-MCNC: 16 MG/DL (ref 7–18)
BUN/CREAT SERPL: 13 (ref 12–20)
CALCIUM SERPL-MCNC: 10.3 MG/DL (ref 8.5–10.1)
CHLORIDE SERPL-SCNC: 104 MMOL/L (ref 100–111)
CK MB CFR SERPL CALC: NORMAL % (ref 0–4)
CK MB SERPL-MCNC: <1 NG/ML (ref 5–25)
CK SERPL-CCNC: 38 U/L (ref 26–192)
CO2 SERPL-SCNC: 22 MMOL/L (ref 21–32)
CREAT SERPL-MCNC: 1.2 MG/DL (ref 0.6–1.3)
DIFFERENTIAL METHOD BLD: ABNORMAL
EOSINOPHIL # BLD: 0 K/UL (ref 0–0.4)
EOSINOPHIL NFR BLD: 0 % (ref 0–5)
ERYTHROCYTE [DISTWIDTH] IN BLOOD BY AUTOMATED COUNT: 12.2 % (ref 11.6–14.5)
GLUCOSE SERPL-MCNC: 199 MG/DL (ref 74–99)
HCT VFR BLD AUTO: 38.2 % (ref 35–45)
HGB BLD-MCNC: 13.2 G/DL (ref 12–16)
LYMPHOCYTES # BLD: 2.1 K/UL (ref 0.9–3.6)
LYMPHOCYTES NFR BLD: 38 % (ref 21–52)
MCH RBC QN AUTO: 28 PG (ref 24–34)
MCHC RBC AUTO-ENTMCNC: 34.6 G/DL (ref 31–37)
MCV RBC AUTO: 80.9 FL (ref 74–97)
MONOCYTES # BLD: 0.1 K/UL (ref 0.05–1.2)
MONOCYTES NFR BLD: 1 % (ref 3–10)
NEUTS SEG # BLD: 3.3 K/UL (ref 1.8–8)
NEUTS SEG NFR BLD: 61 % (ref 40–73)
PLATELET # BLD AUTO: 385 K/UL (ref 135–420)
PLATELET COMMENTS,PCOM: ABNORMAL
PMV BLD AUTO: 9.7 FL (ref 9.2–11.8)
POTASSIUM SERPL-SCNC: 3.8 MMOL/L (ref 3.5–5.5)
RBC # BLD AUTO: 4.72 M/UL (ref 4.2–5.3)
RBC MORPH BLD: ABNORMAL
SODIUM SERPL-SCNC: 135 MMOL/L (ref 136–145)
TROPONIN I SERPL-MCNC: <0.02 NG/ML (ref 0–0.04)
WBC # BLD AUTO: 5.5 K/UL (ref 4.6–13.2)

## 2021-04-29 PROCEDURE — 99285 EMERGENCY DEPT VISIT HI MDM: CPT

## 2021-04-29 PROCEDURE — 85025 COMPLETE CBC W/AUTO DIFF WBC: CPT

## 2021-04-29 PROCEDURE — 93005 ELECTROCARDIOGRAM TRACING: CPT

## 2021-04-29 PROCEDURE — 96374 THER/PROPH/DIAG INJ IV PUSH: CPT

## 2021-04-29 PROCEDURE — 83880 ASSAY OF NATRIURETIC PEPTIDE: CPT

## 2021-04-29 PROCEDURE — 96375 TX/PRO/DX INJ NEW DRUG ADDON: CPT

## 2021-04-29 PROCEDURE — 82553 CREATINE MB FRACTION: CPT

## 2021-04-29 PROCEDURE — 71045 X-RAY EXAM CHEST 1 VIEW: CPT

## 2021-04-29 PROCEDURE — 85379 FIBRIN DEGRADATION QUANT: CPT

## 2021-04-29 PROCEDURE — 80048 BASIC METABOLIC PNL TOTAL CA: CPT

## 2021-04-30 ENCOUNTER — APPOINTMENT (OUTPATIENT)
Dept: CT IMAGING | Age: 71
End: 2021-04-30
Attending: EMERGENCY MEDICINE
Payer: MEDICARE

## 2021-04-30 VITALS
HEART RATE: 68 BPM | BODY MASS INDEX: 25.1 KG/M2 | SYSTOLIC BLOOD PRESSURE: 133 MMHG | TEMPERATURE: 97.3 F | DIASTOLIC BLOOD PRESSURE: 67 MMHG | RESPIRATION RATE: 18 BRPM | OXYGEN SATURATION: 98 % | WEIGHT: 170 LBS

## 2021-04-30 LAB
ATRIAL RATE: 83 BPM
CALCULATED P AXIS, ECG09: -17 DEGREES
CALCULATED R AXIS, ECG10: -41 DEGREES
CALCULATED T AXIS, ECG11: 77 DEGREES
D DIMER PPP FEU-MCNC: 1.48 UG/ML(FEU)
DIAGNOSIS, 93000: NORMAL
P-R INTERVAL, ECG05: 166 MS
Q-T INTERVAL, ECG07: 350 MS
QRS DURATION, ECG06: 80 MS
QTC CALCULATION (BEZET), ECG08: 411 MS
TROPONIN I SERPL-MCNC: <0.02 NG/ML (ref 0–0.04)
VENTRICULAR RATE, ECG03: 83 BPM

## 2021-04-30 PROCEDURE — 71275 CT ANGIOGRAPHY CHEST: CPT

## 2021-04-30 PROCEDURE — 84484 ASSAY OF TROPONIN QUANT: CPT

## 2021-04-30 PROCEDURE — 74011250636 HC RX REV CODE- 250/636: Performed by: EMERGENCY MEDICINE

## 2021-04-30 PROCEDURE — 74011250636 HC RX REV CODE- 250/636: Performed by: PHYSICIAN ASSISTANT

## 2021-04-30 PROCEDURE — 74011000636 HC RX REV CODE- 636: Performed by: EMERGENCY MEDICINE

## 2021-04-30 RX ORDER — DOXYCYCLINE HYCLATE 100 MG
100 TABLET ORAL 2 TIMES DAILY
Qty: 14 TAB | Refills: 0 | Status: SHIPPED | OUTPATIENT
Start: 2021-04-30 | End: 2021-05-07

## 2021-04-30 RX ORDER — LORAZEPAM 2 MG/ML
1 INJECTION INTRAMUSCULAR
Status: COMPLETED | OUTPATIENT
Start: 2021-04-30 | End: 2021-04-30

## 2021-04-30 RX ORDER — DIPHENHYDRAMINE HYDROCHLORIDE 50 MG/ML
50 INJECTION, SOLUTION INTRAMUSCULAR; INTRAVENOUS ONCE
Status: COMPLETED | OUTPATIENT
Start: 2021-04-30 | End: 2021-04-30

## 2021-04-30 RX ADMIN — METHYLPREDNISOLONE SODIUM SUCCINATE 40 MG: 40 INJECTION, POWDER, FOR SOLUTION INTRAMUSCULAR; INTRAVENOUS at 03:35

## 2021-04-30 RX ADMIN — IOPAMIDOL 78 ML: 755 INJECTION, SOLUTION INTRAVENOUS at 08:24

## 2021-04-30 RX ADMIN — DIPHENHYDRAMINE HYDROCHLORIDE 50 MG: 50 INJECTION, SOLUTION INTRAMUSCULAR; INTRAVENOUS at 06:45

## 2021-04-30 RX ADMIN — LORAZEPAM 1 MG: 2 INJECTION INTRAMUSCULAR; INTRAVENOUS at 01:16

## 2021-04-30 NOTE — ED TRIAGE NOTES
Dyspnea with exertion over the last 2 days worsening today to dyspnea at rest.  Intermittent chest pain all day.

## 2021-04-30 NOTE — ED PROVIDER NOTES
EMERGENCY DEPARTMENT HISTORY AND PHYSICAL EXAM    Date: 4/29/2021  Patient Name: Jorge Luis Doherty    History of Presenting Illness     Chief Complaint   Patient presents with    Shortness of Breath         History Provided By: Patient    Chief Complaint: Shortness of breath, dyspnea  Duration: Gradually increasing over the past couple days  Timing: Gradually worsening  Location: Center of chest  Quality: \"Like I cannot breathe\"  Severity: Severe  Modifying Factors: Worse when trying to walk long distances  Associated Symptoms: none       Additional History (Context): Jorge Luis Doherty is a 78 y.o. female with a history of hypertension, hyperlipidemia, anxiety and recent Covid who presents today for issues listed above. Patient states that she was positive for Covid roughly around 14 April. States all of her other symptoms have resolved however the shortness of breath has remained and worsened. Patient reports he had a negative cath this past fall. Patient reports she did have a long smoking history however has not smoked a cigarette in roughly 10 years. Denies history of PE but reports a history of DVTs and is not on any blood thinners. PCP: Gaby Ford MD    Current Facility-Administered Medications   Medication Dose Route Frequency Provider Last Rate Last Admin    methylPREDNISolone (PF) (SOLU-MEDROL) injection 40 mg  40 mg IntraVENous NOW Kelly Erwin MD        diphenhydrAMINE (BENADRYL) injection 50 mg  50 mg IntraVENous ONCE Kelly Erwin MD         Current Outpatient Medications   Medication Sig Dispense Refill    PARoxetine (PAXIL) 40 mg tablet Take 40 mg by mouth daily.  Indications: anxiousness associated with depression      pantoprazole (PROTONIX) 40 mg tablet TAKE ONE TABLET BY MOUTH DAILY (Patient taking differently: Take  by mouth every twelve (12) hours.) 90 Tab 2    atorvastatin (LIPITOR) 20 mg tablet TAKE ONE TABLET BY MOUTH DAILY 30 Tab 0    metFORMIN (GLUCOPHAGE) 500 mg tablet Take 1 Tab by mouth two (2) times daily (with meals). (Patient taking differently: Take 1,000 mg by mouth daily (with breakfast). ) 60 Tab 6    lisinopril (PRINIVIL, ZESTRIL) 5 mg tablet Take 1 Tab by mouth daily. 80 Tab 1       Past History     Past Medical History:  Past Medical History:   Diagnosis Date    Abnormal nuclear cardiac imaging test 2015    Low risk. No ischemia or prior infarction. No RWMA. EF 68%. Neg EKG on pharm stress test.    Agatston CAC score, <100 05/15/2015    Coronary calcium score 70.  Chronic kidney disease     early stage kidney failure    Diabetes [facility]     Diabetes mellitus ([facility]     GERD (gastroesophageal reflux disease)     crohn's disease    History of echocardiogram 2013    EF 65%. No RWMA. Mod LVH. Gr 1 DDfx. RVSP 25 mmHg.  HX OTHER MEDICAL     anxiety, \"mitral valve prolapse\"    Hyperlipidemia     Hypertension     Psychiatric disorder     anxiety    RLE venous duplex     in her 25s       Past Surgical History:  Past Surgical History:   Procedure Laterality Date    HX CHOLECYSTECTOMY      HX GYN      hysterectomy    HX OTHER SURGICAL      FATTY TISSUE REMOVED    HX TONSILLECTOMY      FL ABDOMEN SURGERY PROC UNLISTED      tumor removal       Family History:  Family History   Problem Relation Age of Onset    Heart Disease Father     Heart Attack Father     Heart Disease Paternal Grandmother     Heart Disease Paternal Grandfather     Breast Cancer Mother     Cancer Mother        Social History:  Social History     Tobacco Use    Smoking status: Former Smoker     Packs/day: 1.00     Years: 30.00     Pack years: 30.00     Types: Cigarettes     Quit date: 10/19/2012     Years since quittin.5    Smokeless tobacco: Former User   Substance Use Topics    Alcohol use: No     Alcohol/week: 0.0 standard drinks     Comment: occasional    Drug use: No       Allergies:   Allergies   Allergen Reactions    Fish Containing Products Anaphylaxis and Other (comments)     Heart stops    Iodine Anaphylaxis and Other (comments)     Heart stops    Shellfish Derived Anaphylaxis and Other (comments)     Heart stops    Bee Venom Protein (Honey Bee) Hives         Review of Systems   Review of Systems   Constitutional: Negative for chills and fever. HENT: Negative for congestion, rhinorrhea and sore throat. Respiratory: Positive for shortness of breath. Negative for cough. Cardiovascular: Negative for chest pain. Gastrointestinal: Negative for abdominal pain, blood in stool, constipation, diarrhea, nausea and vomiting. Genitourinary: Negative for dysuria, frequency and hematuria. Musculoskeletal: Negative for back pain and myalgias. Skin: Negative for rash and wound. Neurological: Negative for dizziness and headaches. All other systems reviewed and are negative. All Other Systems Negative  Physical Exam     Vitals:    04/29/21 2237   BP: 128/78   Pulse: 86   Resp: 16   Temp: 97.3 °F (36.3 °C)   SpO2: 100%   Weight: 77.1 kg (170 lb)     Physical Exam  Vitals signs and nursing note reviewed. Constitutional:       General: She is not in acute distress. Appearance: She is well-developed. She is not diaphoretic. HENT:      Head: Normocephalic and atraumatic. Eyes:      Conjunctiva/sclera: Conjunctivae normal.   Neck:      Musculoskeletal: Normal range of motion and neck supple. Cardiovascular:      Rate and Rhythm: Normal rate and regular rhythm. Heart sounds: Normal heart sounds. Pulmonary:      Effort: Pulmonary effort is normal. No respiratory distress. Breath sounds: Normal breath sounds. No stridor, decreased air movement or transmitted upper airway sounds. No decreased breath sounds. Comments: Speaking in full sentences, 100% on room air  Chest:      Chest wall: No tenderness. Abdominal:      General: Bowel sounds are normal. There is no distension. Palpations: Abdomen is soft. Tenderness: There is no abdominal tenderness. There is no guarding or rebound. Musculoskeletal:         General: No deformity. Skin:     General: Skin is warm and dry. Neurological:      Mental Status: She is alert and oriented to person, place, and time. Deep Tendon Reflexes: Reflexes are normal and symmetric. Diagnostic Study Results     Labs -     Recent Results (from the past 12 hour(s))   EKG, 12 LEAD, INITIAL    Collection Time: 04/29/21 10:32 PM   Result Value Ref Range    Ventricular Rate 83 BPM    Atrial Rate 83 BPM    P-R Interval 166 ms    QRS Duration 80 ms    Q-T Interval 350 ms    QTC Calculation (Bezet) 411 ms    Calculated P Axis -17 degrees    Calculated R Axis -41 degrees    Calculated T Axis 77 degrees    Diagnosis       Normal sinus rhythm  Left axis deviation  Nonspecific ST and T wave abnormality  Abnormal ECG  When compared with ECG of 30-OCT-2020 17:49,  No significant change was found     CBC WITH AUTOMATED DIFF    Collection Time: 04/29/21 10:50 PM   Result Value Ref Range    WBC 5.5 4.6 - 13.2 K/uL    RBC 4.72 4.20 - 5.30 M/uL    HGB 13.2 12.0 - 16.0 g/dL    HCT 38.2 35.0 - 45.0 %    MCV 80.9 74.0 - 97.0 FL    MCH 28.0 24.0 - 34.0 PG    MCHC 34.6 31.0 - 37.0 g/dL    RDW 12.2 11.6 - 14.5 %    PLATELET 311 932 - 453 K/uL    MPV 9.7 9.2 - 11.8 FL    NEUTROPHILS 61 40 - 73 %    LYMPHOCYTES 38 21 - 52 %    MONOCYTES 1 (L) 3 - 10 %    EOSINOPHILS 0 0 - 5 %    BASOPHILS 0 0 - 2 %    ABS. NEUTROPHILS 3.3 1.8 - 8.0 K/UL    ABS. LYMPHOCYTES 2.1 0.9 - 3.6 K/UL    ABS. MONOCYTES 0.1 0.05 - 1.2 K/UL    ABS. EOSINOPHILS 0.0 0.0 - 0.4 K/UL    ABS.  BASOPHILS 0.0 0.0 - 0.1 K/UL    DF MANUAL      PLATELET COMMENTS ADEQUATE PLATELETS      RBC COMMENTS NORMOCYTIC, NORMOCHROMIC     METABOLIC PANEL, BASIC    Collection Time: 04/29/21 10:50 PM   Result Value Ref Range    Sodium 135 (L) 136 - 145 mmol/L    Potassium 3.8 3.5 - 5.5 mmol/L    Chloride 104 100 - 111 mmol/L    CO2 22 21 - 32 mmol/L    Anion gap 9 3.0 - 18 mmol/L    Glucose 199 (H) 74 - 99 mg/dL    BUN 16 7.0 - 18 MG/DL    Creatinine 1.20 0.6 - 1.3 MG/DL    BUN/Creatinine ratio 13 12 - 20      GFR est AA 54 (L) >60 ml/min/1.73m2    GFR est non-AA 45 (L) >60 ml/min/1.73m2    Calcium 10.3 (H) 8.5 - 10.1 MG/DL   CARDIAC PANEL,(CK, CKMB & TROPONIN)    Collection Time: 04/29/21 10:50 PM   Result Value Ref Range    CK - MB <1.0 <3.6 ng/ml    CK-MB Index  0.0 - 4.0 %     CALCULATION NOT PERFORMED WHEN RESULT IS BELOW LINEAR LIMIT    CK 38 26 - 192 U/L    Troponin-I, QT <0.02 0.0 - 0.045 NG/ML   NT-PRO BNP    Collection Time: 04/29/21 10:50 PM   Result Value Ref Range    NT pro-BNP 56 0 - 900 PG/ML   D DIMER    Collection Time: 04/29/21 10:50 PM   Result Value Ref Range    D DIMER 1.48 (H) <0.46 ug/ml(FEU)       Radiologic Studies -   XR CHEST PORT    (Results Pending)   CTA CHEST W OR W WO CONT    (Results Pending)     CT Results  (Last 48 hours)    None        CXR Results  (Last 48 hours)    None            Medical Decision Making   I am the first provider for this patient. I reviewed the vital signs, available nursing notes, past medical history, past surgical history, family history and social history. Vital Signs-Reviewed the patient's vital signs. Records Reviewed: Nursing Notes and Old Medical Records     Procedures: None   Procedures    Provider Notes (Medical Decision Making):     Differential: PE, ACS, arrhythmia, pneumonia, Covid, anxiety/panic attack, GERD      Plan: We will order full cardiac work-up. Will order D-dimer to determine if patient will need a CTA/VQ scan.    1:54 AM patient is very irate, tearful and crying screaming that she wants to leave. Stating that I am the worst physician assistant she is ever met. Patient initially signed AMA paperwork however after discussing her D-dimer and my high clinical suspicion for PE patient is agreeable to staying.   Patient did receive her Ativan and has calmed down since.    2:03 AM : Pt care transferred to Dr. Amaury Monique  ,ED provider. History of patient complaint(s), available diagnostic reports and current treatment plan has been discussed thoroughly. Bedside rounding on patient occured : no . Intended disposition of patient : To be determined  Pending diagnostics reports and/or labs (please list): CTA and pretreatment      MED RECONCILIATION:  Current Facility-Administered Medications   Medication Dose Route Frequency    methylPREDNISolone (PF) (SOLU-MEDROL) injection 40 mg  40 mg IntraVENous NOW    diphenhydrAMINE (BENADRYL) injection 50 mg  50 mg IntraVENous ONCE     Current Outpatient Medications   Medication Sig    PARoxetine (PAXIL) 40 mg tablet Take 40 mg by mouth daily. Indications: anxiousness associated with depression    pantoprazole (PROTONIX) 40 mg tablet TAKE ONE TABLET BY MOUTH DAILY (Patient taking differently: Take  by mouth every twelve (12) hours.)    atorvastatin (LIPITOR) 20 mg tablet TAKE ONE TABLET BY MOUTH DAILY    metFORMIN (GLUCOPHAGE) 500 mg tablet Take 1 Tab by mouth two (2) times daily (with meals). (Patient taking differently: Take 1,000 mg by mouth daily (with breakfast). )    lisinopril (PRINIVIL, ZESTRIL) 5 mg tablet Take 1 Tab by mouth daily. Disposition:  TBD      Diagnosis     Clinical Impression:   1. Chest pain, unspecified type          \"Please note that this dictation was completed with [employer], the computer voice recognition software. Quite often unanticipated grammatical, syntax, homophones, and other interpretive errors are inadvertently transcribed by the computer software. Please disregard these errors. Please excuse any errors that have escaped final proofreading. \"

## 2021-04-30 NOTE — ED NOTES
Pt expressing desire to leave ER. Spoke with Kristal ORTIZ about desire to leave with myself present, PA educated pt about concern on leaving and safety. Patient signing paperwork with PA. IV removed after ativan injection. Pt still expressing desire to leave hospital after education.

## 2021-04-30 NOTE — ED NOTES
Assumed care for the patient from [address] [place] [address] pending CTA. I discussed with the patient different options between a CTA and VQ scan she prefers to get the CTA. She had I done contrast for her coronary angiogram and she was pretreated for it. Confirmed with CT tech the protocol for pretreatment, the radiologist is recommending a 4-hour protocol instead of a 1 hour protocol which has been ordered. Solu-Medrol 40 mg 4 hours prior to CT and Benadryl 50 mg 1 hour prior to CT.

## 2021-04-30 NOTE — ED NOTES
Bedside shift change report given to Melvin North RN (oncoming nurse) by Bill Lamas RN (offgoing nurse). Report included the following information SBAR, ED Summary and MAR.

## 2021-04-30 NOTE — ED NOTES
Assumed care pt. Established 20 G in [facility], started protocol for CTA and allergy.

## 2021-04-30 NOTE — ED NOTES
Patient turned over to me Dr. Marciano Anglin she is a 30-year-old female who I actually saw on the 14th for concerns of Covid. She presents today with continuing pleuritic chest pain although her other symptoms have improved     CBC is normal no shift no bands  D-dimer was elevated  Troponin is negative x2 chemistry otherwise unremarkable mild increase in calcium creatinine at baseline       IMPRESSION     1. No convincing CT evidence of pulmonary embolism.     2.  Patchy peripheral airspace infiltrations in bilateral upper lobes. Bibasilar  patchy consolidations also seen, more likely pneumonia than atelectasis.     3. Mild mediastinal and bilateral hilar adenopathy.     4.  Incidental noted is a large hepatic cyst in left lobe.     Thank you for your referral.       CTA with no PE but notes patchy peripheral airspace in bilateral upper lobes were discussed with patient for doxycycline and basis based on her symptoms then likely follow-up with her PCP    The patient stated she did not want to start antibiotics she has been feeling better she denies any fevers or chills she has very minimal cough overall she is not feeling improved compared to prior however she states she did worsen last night especially with the pain with breathing so will initiate antibiotics and have her referred to PFM

## 2021-05-07 ENCOUNTER — TRANSCRIBE ORDER (OUTPATIENT)
Dept: SCHEDULING | Age: 71
End: 2021-05-07

## 2021-05-07 DIAGNOSIS — R06.02 SHORTNESS OF BREATH AT REST: Primary | ICD-10-CM

## 2021-05-14 ENCOUNTER — HOSPITAL ENCOUNTER (OUTPATIENT)
Dept: RESPIRATORY THERAPY | Age: 71
Discharge: HOME | End: 2021-05-14
Attending: FAMILY MEDICINE
Payer: MEDICARE

## 2021-05-14 DIAGNOSIS — R06.02 SHORTNESS OF BREATH AT REST: ICD-10-CM

## 2021-05-14 PROCEDURE — 94060 EVALUATION OF WHEEZING: CPT

## 2021-05-14 PROCEDURE — 94729 DIFFUSING CAPACITY: CPT

## 2021-05-14 PROCEDURE — 94727 GAS DIL/WSHOT DETER LNG VOL: CPT | Performed by: INTERNAL MEDICINE

## 2021-05-14 PROCEDURE — 94729 DIFFUSING CAPACITY: CPT | Performed by: INTERNAL MEDICINE

## 2021-05-14 PROCEDURE — 94060 EVALUATION OF WHEEZING: CPT | Performed by: INTERNAL MEDICINE

## 2021-05-14 PROCEDURE — 94726 PLETHYSMOGRAPHY LUNG VOLUMES: CPT

## 2021-05-20 PROBLEM — E66.9 OBESITY (BMI 30-39.9): Status: ACTIVE | Noted: 2021-05-20

## 2021-05-20 PROBLEM — R35.89 POLYURIA: Status: ACTIVE | Noted: 2021-05-20

## 2021-05-20 PROBLEM — R11.2 NAUSEA AND VOMITING: Status: ACTIVE | Noted: 2021-05-20

## 2021-05-20 PROBLEM — R40.0 DAYTIME SLEEPINESS: Status: ACTIVE | Noted: 2021-05-20

## 2021-05-20 PROBLEM — E11.65 POORLY CONTROLLED DIABETES MELLITUS (HCC): Status: ACTIVE | Noted: 2021-05-20

## 2021-05-20 PROBLEM — L98.9 SKIN LESION: Status: ACTIVE | Noted: 2021-05-20

## 2021-05-20 PROBLEM — R35.0 URINE FREQUENCY: Status: ACTIVE | Noted: 2021-05-20

## 2021-05-20 PROBLEM — E78.5 HYPERLIPIDEMIA: Status: ACTIVE | Noted: 2021-05-20

## 2021-05-20 PROBLEM — F32.A ANXIETY AND DEPRESSION: Status: ACTIVE | Noted: 2021-05-20

## 2021-05-20 PROBLEM — M62.89 PELVIC FLOOR DYSFUNCTION: Status: ACTIVE | Noted: 2021-05-20

## 2021-05-20 PROBLEM — R10.13 EPIGASTRIC DISCOMFORT: Status: ACTIVE | Noted: 2021-05-20

## 2021-05-20 PROBLEM — R76.8 HELICOBACTER PYLORI AB+: Status: ACTIVE | Noted: 2021-05-20

## 2021-05-20 PROBLEM — H57.9 LEFT EYE COMPLAINT: Status: ACTIVE | Noted: 2021-05-20

## 2021-05-20 PROBLEM — G47.30 INSOMNIA WITH SLEEP APNEA: Status: ACTIVE | Noted: 2021-05-20

## 2021-05-20 PROBLEM — R23.2 VASOMOTOR FLUSHING: Status: ACTIVE | Noted: 2021-05-20

## 2021-05-20 PROBLEM — R06.83 SNORING: Status: ACTIVE | Noted: 2021-05-20

## 2021-05-20 PROBLEM — D21.20 FIBROMA OF FOOT: Status: ACTIVE | Noted: 2021-05-20

## 2021-05-20 PROBLEM — G47.00 INSOMNIA WITH SLEEP APNEA: Status: ACTIVE | Noted: 2021-05-20

## 2021-05-20 PROBLEM — T78.00XA ANAPHYLAXIS DUE TO FOOD: Status: ACTIVE | Noted: 2021-05-20

## 2021-05-20 PROBLEM — K58.9 IRRITABLE BOWEL SYNDROME (IBS): Status: ACTIVE | Noted: 2021-05-20

## 2021-05-20 PROBLEM — L91.8 SKIN TAG: Status: ACTIVE | Noted: 2021-05-20

## 2021-05-20 PROBLEM — F41.9 ANXIETY AND DEPRESSION: Status: ACTIVE | Noted: 2021-05-20

## 2021-05-20 PROBLEM — R35.1 NOCTURIA MORE THAN TWICE PER NIGHT: Status: ACTIVE | Noted: 2021-05-20

## 2021-05-20 PROBLEM — R82.90 ABNORMAL URINE ODOR: Status: ACTIVE | Noted: 2021-05-20

## 2021-05-20 PROBLEM — N89.8 VAGINAL ODOR: Status: ACTIVE | Noted: 2021-05-20

## 2021-05-20 PROBLEM — K29.70 GASTRITIS: Status: ACTIVE | Noted: 2021-05-20

## 2021-05-20 PROBLEM — Z13.9 SCREENING DUE: Status: ACTIVE | Noted: 2021-05-20

## 2021-05-20 PROBLEM — N18.30 CKD (CHRONIC KIDNEY DISEASE) STAGE 3, GFR 30-59 ML/MIN (HCC): Status: ACTIVE | Noted: 2021-05-20

## 2021-05-20 PROBLEM — Z78.0 POST-MENOPAUSAL: Status: ACTIVE | Noted: 2021-05-20

## 2021-05-20 PROBLEM — Z01.818 PREOPERATIVE CLEARANCE: Status: ACTIVE | Noted: 2021-05-20

## 2021-05-20 PROBLEM — R42 VERTIGO: Status: ACTIVE | Noted: 2021-05-20

## 2021-05-21 ENCOUNTER — OFFICE VISIT (OUTPATIENT)
Dept: PULMONOLOGY | Age: 71
End: 2021-05-21
Payer: MEDICARE

## 2021-05-21 VITALS
SYSTOLIC BLOOD PRESSURE: 139 MMHG | DIASTOLIC BLOOD PRESSURE: 74 MMHG | TEMPERATURE: 95.9 F | HEART RATE: 80 BPM | HEIGHT: 69 IN | BODY MASS INDEX: 25.1 KG/M2

## 2021-05-21 DIAGNOSIS — R06.09 DYSPNEA ON EXERTION: ICD-10-CM

## 2021-05-21 DIAGNOSIS — Z87.891 PERSONAL HISTORY OF TOBACCO USE, PRESENTING HAZARDS TO HEALTH: ICD-10-CM

## 2021-05-21 DIAGNOSIS — R91.8 PULMONARY INFILTRATES: ICD-10-CM

## 2021-05-21 DIAGNOSIS — U09.9 POST-COVID SYNDROME: Primary | ICD-10-CM

## 2021-05-21 DIAGNOSIS — J44.9 COPD, GROUP B, BY GOLD 2017 CLASSIFICATION (HCC): ICD-10-CM

## 2021-05-21 PROCEDURE — G9899 SCRN MAM PERF RSLTS DOC: HCPCS | Performed by: INTERNAL MEDICINE

## 2021-05-21 PROCEDURE — G8754 DIAS BP LESS 90: HCPCS | Performed by: INTERNAL MEDICINE

## 2021-05-21 PROCEDURE — G9717 DOC PT DX DEP/BP F/U NT REQ: HCPCS | Performed by: INTERNAL MEDICINE

## 2021-05-21 PROCEDURE — G8427 DOCREV CUR MEDS BY ELIG CLIN: HCPCS | Performed by: INTERNAL MEDICINE

## 2021-05-21 PROCEDURE — G8399 PT W/DXA RESULTS DOCUMENT: HCPCS | Performed by: INTERNAL MEDICINE

## 2021-05-21 PROCEDURE — 1090F PRES/ABSN URINE INCON ASSESS: CPT | Performed by: INTERNAL MEDICINE

## 2021-05-21 PROCEDURE — G8536 NO DOC ELDER MAL SCRN: HCPCS | Performed by: INTERNAL MEDICINE

## 2021-05-21 PROCEDURE — 1101F PT FALLS ASSESS-DOCD LE1/YR: CPT | Performed by: INTERNAL MEDICINE

## 2021-05-21 PROCEDURE — 3017F COLORECTAL CA SCREEN DOC REV: CPT | Performed by: INTERNAL MEDICINE

## 2021-05-21 PROCEDURE — 99204 OFFICE O/P NEW MOD 45 MIN: CPT | Performed by: INTERNAL MEDICINE

## 2021-05-21 PROCEDURE — G8752 SYS BP LESS 140: HCPCS | Performed by: INTERNAL MEDICINE

## 2021-05-21 PROCEDURE — G8419 CALC BMI OUT NRM PARAM NOF/U: HCPCS | Performed by: INTERNAL MEDICINE

## 2021-05-21 RX ORDER — FAMOTIDINE 20 MG/1
20 TABLET, FILM COATED ORAL DAILY
COMMUNITY
Start: 2021-08-19 | End: 2021-08-19

## 2021-05-21 RX ORDER — TIOTROPIUM BROMIDE AND OLODATEROL 3.124; 2.736 UG/1; UG/1
2 SPRAY, METERED RESPIRATORY (INHALATION) DAILY
Qty: 3 INHALER | Refills: 3 | Status: SHIPPED | OUTPATIENT
Start: 2021-05-21

## 2021-05-21 RX ORDER — ALBUTEROL SULFATE 90 UG/1
1-2 AEROSOL, METERED RESPIRATORY (INHALATION)
Qty: 1 INHALER | Refills: 5 | Status: SHIPPED | OUTPATIENT
Start: 2021-05-21

## 2021-05-21 NOTE — PROGRESS NOTES
Tesfaye Baxter presents today for   Chief Complaint   Patient presents with   [facility] Referral / Consult     referred by Dr. Lien Isaac for COPD       Is someone accompanying this pt? No    Is the patient using any DME equipment during OV? No    -DME Company N/A    Depression Screening:  3 most recent PHQ Screens 5/21/2021   PHQ Not Done -   Little interest or pleasure in doing things Not at all   Feeling down, depressed, irritable, or hopeless Not at all   Total Score PHQ 2 0       Learning Assessment:  Learning Assessment 6/9/2015   PRIMARY LEARNER Patient   PRIMARY LANGUAGE ENGLISH   LEARNER PREFERENCE PRIMARY DEMONSTRATION   ANSWERED BY sergio su   RELATIONSHIP SELF       Abuse Screening:  Abuse Screening Questionnaire 5/21/2021   Do you ever feel afraid of your partner? N   Are you in a relationship with someone who physically or mentally threatens you? N   Is it safe for you to go home? Y       Fall Risk  Fall Risk Assessment, last 12 mths 5/21/2021   Able to walk? Yes   Fall in past 12 months? 0   Do you feel unsteady? 0   Are you worried about falling 0         Coordination of Care:  1. Have you been to the ER, urgent care clinic since your last visit? Hospitalized since your last visit? Yes; Where: [facility] & [facility] , When: 4/14/2021-sore throat, cough, SOB, H/A & COVID testing, 4/29/2021-chest pain & CAP & 5/8/2021-SOB & History of COVID-19     2. Have you seen or consulted any other health care providers outside of the [address] [place] [address] since your last visit? Include any pap smears or colon screening. Yes. Dr. Griselda Bandy, PCP & referring provider    Declined Influenza vaccine & COVID vaccine.

## 2021-05-21 NOTE — LETTER
5/22/2021 Patient: Sri Mckeon  
YOB: 1950 Date of Visit: 5/21/2021 Valente Teran [address] [place] [address]. [address] [place], [place] [id number] [address] [employer] Dear Emeterio Umaña MD, Thank you for referring Ms. Nahum Jason to [address] [place] [address] for evaluation. My notes for this consultation are attached. If you have questions, please do not hesitate to call me. I look forward to following your patient along with you. Sincerely, Floyd Joseph MD

## 2021-05-21 NOTE — PROGRESS NOTES
[address] [place] [address]  [phone]    [facility]  Pulmonary, Critical Care, and Sleep Medicine    Pulmonary Office Initial Consultation  Name: Juan R Devine 78 y.o. female  MRN: [id number]  : 1950  Service Date: 21    Referring Provider: Susan Mares, [facility] [address] [facility],  [address]  Chief Complaint:   Chief Complaint   Patient presents with   Salzaar Referral / Consult     referred by Dr. Tyesha Amado for COPD         History of Present Illness:  Juan R Devine is a 78 y.o. female, who presents to Pulmonary clinic referred for abnormal CT scan. Pt reports getting COVID-19 infection in early April. Pt reports her symptoms of fatigue and malaise. Pt reports she went to [facility] twice and then [place] [id number]. Pt reports her symptoms each time were severe dyspnea. Pt reports that she got COVID from her . She reports that he is a heart transplant patient -- he was vaccinated 6 weeks prior to getting infected. She reports his illness was more severe. Patient reports that since getting the infection, she had issues with ongoing dyspnea/shortness of breath. Pt reports that her symptoms got suddenly better when she went to her chiropractor earlier this week. Pt reports that she has dyspnea that is episodic, when she talks a lot or walks. Pt rpeorts dyspnea with mild exertion -- walking around house. Pt reports milder symptoms prior to getting covid, but worsened after COVID  No issues with chronic cough  No use of inhalers  Quit smoking: 10/2011, 1PPD years for 35 years  Occ Hx:  Retired   Denies angina, orthopnea      Past Medical History:   Diagnosis Date    Abnormal nuclear cardiac imaging test 2015    Low risk. No ischemia or prior infarction. No RWMA. EF 68%. Neg EKG on pharm stress test.    Agatston CAC score, <100 05/15/2015    Coronary calcium score 70.       Chronic kidney disease     early stage kidney failure    Diabetes ([facility] [address]     Diabetes mellitus ([facility]     GERD (gastroesophageal reflux disease)     crohn's disease    History of echocardiogram 2013    EF 65%. No RWMA. Mod LVH. Gr 1 DDfx. RVSP 25 mmHg.       HX OTHER MEDICAL     anxiety, \"mitral valve prolapse\"    Hyperlipidemia     Hypertension     Psychiatric disorder     anxiety    RLE venous duplex     in her 25s     Past Surgical History:   Procedure Laterality Date    HX CHOLECYSTECTOMY      HX GYN  1997    hysterectomy    HX OTHER SURGICAL      FATTY TISSUE REMOVED    HX TONSILLECTOMY      [place] ABDOMEN SURGERY [address]    tumor removal     Family History   Problem Relation Age of Onset    Heart Disease Father     Heart Attack Father     Heart Disease Paternal Grandmother     Heart Disease Paternal Grandfather     Breast Cancer Mother     Cancer Mother      Social History     Socioeconomic History    Marital status:      Spouse name: Not on file    Number of children: Not on file    Years of education: Not on file    Highest education level: Not on file   Occupational History    Not on file   Tobacco Use    Smoking status: Former Smoker     Packs/day: 1.00     Years: 30.00     Pack years: 30.00     Types: Cigarettes     Quit date: 10/19/2012     Years since quittin.5    Smokeless tobacco: Former User   Vaping Use    Vaping Use: Never used   Substance and Sexual Activity    Alcohol use: No     Alcohol/week: 0.0 standard drinks     Comment: occasional    Drug use: No    Sexual activity: Yes     Partners: Male     Birth control/protection: Surgical   Other Topics Concern     Service Not Asked    Blood Transfusions Not Asked    Caffeine Concern Yes     Comment: coffee daily 8oz    Occupational Exposure Not Asked    Hobby Hazards Not Asked    Sleep Concern No    Stress Concern No    Weight Concern Yes     Comment: increased weight    Special Diet Not Asked    Back Care Not Asked  Exercise No    Bike Helmet Not Asked    Seat Belt Yes    Self-Exams Not Asked   Social History Narrative    Not on file     Social Determinants of Health     Financial Resource Strain:     Difficulty of Paying Living Expenses:    Food Insecurity:     Worried About Running Out of Food in the Last Year:     [address] in the Last Year:    Transportation Needs:     Lack of Transportation (Medical):  Lack of Transportation (Non-Medical):    Physical Activity:     Days of Exercise per Week:     Minutes of Exercise per Session:    Stress:     Feeling of Stress :    Social Connections:     Frequency of Communication with Friends and Family:     Frequency of Social Gatherings with Friends and Family:     Attends Sikh Services:     Active Member of Clubs or Organizations:     Attends Club or Organization Meetings:     Marital Status:    Intimate Partner Violence:     Fear of Current or Ex-Partner:     Emotionally Abused:     Physically Abused:     Sexually Abused: Allergies   Allergen Reactions    Fish Containing Products Anaphylaxis and Other (comments)     Heart stops    Iodine Anaphylaxis and Other (comments)     Heart stops    Shellfish Derived Anaphylaxis and Other (comments)     Heart stops    Bee Venom Protein (Honey Bee) Hives     Prior to Admission medications    Medication Sig Start Date End Date Taking? Authorizing Provider   PARoxetine (PAXIL) 40 mg tablet Take 40 mg by mouth daily. Indications: anxiousness associated with depression    Provider, Historical   pantoprazole (PROTONIX) 40 mg tablet TAKE ONE TABLET BY MOUTH DAILY  Patient taking differently: Take  by mouth every twelve (12) hours. 12/9/18   Luis Enrique Muñiz MD   atorvastatin (LIPITOR) 20 mg tablet TAKE ONE TABLET BY MOUTH DAILY 9/4/18   Ziyad Leyva NP   metFORMIN (GLUCOPHAGE) 500 mg tablet Take 1 Tab by mouth two (2) times daily (with meals).   Patient taking differently: Take 1,000 mg by mouth daily (with breakfast). 11/7/17   Dontrell Pedroza MD   lisinopril (PRINIVIL, ZESTRIL) 5 mg tablet Take 1 Tab by mouth daily. 8/23/17   Jey Leyva NP     Immunization History   Administered Date(s) Administered    Pneumococcal Conjugate (PCV-13) 08/23/2017    Tdap 12/03/2015       Review of Systems:  A complete review of systems was performed as stated in the HPI, all others are negative. Objective:    Physical Exam:  /74 (BP 1 Location: Left upper arm, BP Patient Position: Sitting, BP Cuff Size: Adult)   Pulse 80   Temp (!) 95.9 °F (35.5 °C) (Oral) Comment: was drinking cold water  Ht 5' 9\" (1.753 m)   BMI 25.10 kg/m²   Vitals were personally reviewed  Gen: no acute distress, pleasant and cooperative, sitting up in chair, ambulates without difficulty  HEENT: normocephalic/atraumatic, no ocular drainage, EOMI, no scleral icterus, nasal bridge midline, unable to assess nasal and oral cavities due to patient wearing mask in the setting of COVID-19 pandemic  Neck: supple, trachea midline, no JVD, no cervical and supraclavicular adenopathy  CVS: regular rate rhythm, S1/S2, no murmurs/rubs/gallops  Lungs: good air entry B/L, no wheezes/rales/rhonchi  Ext: no pitting edema B/L, no peripheral cyanosis or clubbing      Labs:   I have reviewed the patient's available labs  Lab Results   Component Value Date/Time    WBC 5.5 04/29/2021 10:50 PM    HGB 13.2 04/29/2021 10:50 PM    HCT 38.2 04/29/2021 10:50 PM    PLATELET 842 57/12/4713 10:50 PM    MCV 80.9 04/29/2021 10:50 PM     Lab Results   Component Value Date/Time    Sodium 135 (L) 04/29/2021 10:50 PM    Potassium 3.8 04/29/2021 10:50 PM    Chloride 104 04/29/2021 10:50 PM    CO2 22 04/29/2021 10:50 PM    Anion gap 9 04/29/2021 10:50 PM    Glucose 199 (H) 04/29/2021 10:50 PM    BUN 16 04/29/2021 10:50 PM    Creatinine 1.20 04/29/2021 10:50 PM    BUN/Creatinine ratio 13 04/29/2021 10:50 PM    GFR est AA 54 (L) 04/29/2021 10:50 PM    GFR est non-AA 45 (L) 04/29/2021 10:50 PM    Calcium 10.3 (H) 04/29/2021 10:50 PM    Bilirubin, total 0.6 10/30/2020 03:16 AM    Alk. phosphatase 71 10/30/2020 03:16 AM    Protein, total 6.3 (L) 10/30/2020 03:16 AM    Albumin 3.6 10/30/2020 03:16 AM    Globulin 2.7 10/30/2020 03:16 AM    A-G Ratio 1.3 10/30/2020 03:16 AM    ALT (SGPT) 28 10/30/2020 03:16 AM    AST (SGOT) 18 10/30/2020 03:16 AM       Outside records reviewed in clinic as follows:  -Last progress note by Dr. Fany Chairez from 5/6/2021, reports the patient was having shortness of breath, had COVID-19 approximately 3 weeks ago, seen in the ER, CTA showed some patchy airspace infiltrations. Patient also had PFT ordered. Imaging:  I have personally reviewed patient's imaging as follows: CTA chest with and without contrast from 4/30/2021 shows some patchy groundglass opacities in bilateral midlung zones laterally, otherwise clear without consolidation, effusion, mass. Official report per radiology:  CT Results (most recent):  Results from Hospital Encounter encounter on 04/29/21    CTA CHEST W OR W WO CONT    Narrative  CT chest with contrast for PE    HISTORY: Covid infection    COMPARISON: None. TECHNIQUE: Dynamic spiral scan through the chest is obtained from the thoracic  inlet to the diaphragm after dynamic nonionic IV contrast administration  per PE  protocol. Coronal and sagittal MIP computer reconstructions are also obtained  for better visualization of the integrity of pulmonary arteries in 3D dimension,  particularly for lobar/interlobar arterial branches and to minimize radiation  dose. All CT scans at this facility performed using dose optimization techniques as  appreciated to a performed exam, to include automated exposure control,  adjustment of the mA and or KU according to patient size (including appropriate  matching for site specific examination), or use of iterative reconstruction  technique.     FINDINGS:    PULMONARY ARTERIES: The contrast bolus is adequate. The right and left mainstem  pulmonary arteries and their branches appear patent without convincing evidence  of intraluminal filling defect identified to suggest pulmonary embolism. Borderline pulmonary mainstem artery dilatation. AORTA AND VASCULAR STRUCTURES: No aortic aneurysm or dissection. Unremarkable  great vessels. Heart: The heart is normal in size. No pericardial effusion. No significant  coronary artery calcifications. LUNG PARENCHYMA: Patchy airspace opacities identified at lateral periphery of  bilateral upper lobes, greater on the right patchy consolidations also present  at posterior bilateral lower lobes. No pneumothorax. Patent airway. IMAGED THYROID: Unremarkable. MEDIASTINUM: Mild mediastinal and bilateral hilar adenopathy. PLEURAL SPACES AND CHEST WALL: Mild pleural thickening without pleural effusion. VISUALIZED UPPER ABDOMEN: There is sharply defined cystic lesion in the left  lobe in segment Arin and IVb, measuring 4.3 x 3.4 x 4.1 cm.    OSSEOUS STRUCTURES: Unremarkable. Impression  1. No convincing CT evidence of pulmonary embolism. 2.  Patchy peripheral airspace infiltrations in bilateral upper lobes. Bibasilar  patchy consolidations also seen, more likely pneumonia than atelectasis. 3. Mild mediastinal and bilateral hilar adenopathy. 4. Incidental noted is a large hepatic cyst in left lobe.     Thank you for your referral.        PFTs:  I have reviewed the patient's PFTs from 5/14/2021, spirometry is normal without BD response, lung volumes show a mild restriction, diffusion capacity is moderately reduced    TTE:  I have reviewed the patient's TTE results  Results for orders placed or performed during the hospital encounter of 09/15/17   2D ECHO COMPLETE ADULT (TTE) W OR WO CONTR     Status: None    [address]   [address], [address] [phone]    Transthoracic Echocardiogram    Patient: Leno Samson Sandra Bullard  MRN: [id number]  ACCT #: [de-identified]  : 1950  Age: 74 years  Gender: Female  Height: 69 in  Weight: 201.5 lb  BSA: 2.07 m-sq  BP: 139 / 74 mmHg  Study date: 18-Sep-2017  Status: Routine  Location: [facility] #: [id number]    Allergies: IODINE    Referring_Ordering Physician:  Yolanda García. Arthur Vera PA-C  Interpreting Group:  [address] [place] [address]  Interpreting Physician:  Colin Gibson MD  Technologist:  Kaveh Henley, [facility], [facility]    IMPRESSIONS:  A clinically significant myopathic process is ruled out. There was no   significant valvular heart disease. SUMMARY:  Procedure information: This was a technically difficult study. Intravenous   contrast (Definity) was administered. Left ventricle: Systolic function was normal by visual assessment. Ejection   fraction was estimated to be 60 %. No  obvious wall motion abnormalities identified in the views obtained. Wall   thickness was at the upper limits of normal.    COMPARISONS:  There has been no significant change. Comparison was made with the previous   study of 2013. INDICATIONS: Chest pain. HISTORY: Prior history: Risk factors: hypertension. PROCEDURE: This was a routine study. The study included complete 2D imaging,   M-mode, complete spectral Doppler, and  color Doppler. The heart rate was 69 bpm, at the start of the study. Systolic   blood pressure was 139 mmHg, at the start  of the study. Diastolic blood pressure was 74 mmHg, at the start of the   study. Images were obtained from the  parasternal, apical, subcostal, and suprasternal notch acoustic windows. Intravenous contrast (Definity) was  administered. This was a technically difficult study. LEFT VENTRICLE: Size was normal. Systolic function was normal by visual   assessment. Ejection fraction was estimated to  be 60 %. No obvious wall motion abnormalities identified in the views   obtained. Wall thickness was at the upper limits  of normal. Not well visualized.  DOPPLER: Left ventricular diastolic function   parameters were normal for the patient's  age. RIGHT VENTRICLE: The size was normal. Systolic function was normal. DOPPLER:   Systolic pressure was within the normal  range. Estimated peak pressure was 23 mmHg. LEFT ATRIUM: Size was normal. Not well visualized. RIGHT ATRIUM: Size was normal. Not well visualized. MITRAL VALVE: Normal valve structure. DOPPLER: There was no evidence for   stenosis. There was no significant  regurgitation. AORTIC VALVE: The valve was probably trileaflet. Leaflets exhibited good   mobility. DOPPLER: There was no stenosis. There was mild regurgitation. TRICUSPID VALVE: Not well visualized. Normal valve structure. DOPPLER: There   was no evidence for tricuspid stenosis. There was trivial regurgitation. Right atrial pressure estimate: 3 mmHg. PULMONIC VALVE: Not well visualized, but normal Doppler findings. AORTA: The root exhibited normal size. SYSTEMIC VEINS: IVC: The inferior vena cava was not well visualized. PERICARDIUM: Insignificant pericardial effusion and/or pericardial fat was   present.     SYSTEM MEASUREMENT TABLES    2D  Ao Diam: 2.98 cm  IVSd: 1.15 cm  LVIDd: 3.92 cm  LVIDs: 2.9 cm  LVPWd: 1.06 cm  SV(Teich): 34.41 ml  EDV(Teich): 66.52 ml  ESV(Cube): 24.29 ml  ESV(Teich): 32.11 ml  LAAs A2C: 8.1 cm2  LAAs A4C: 11.34 cm2  LAESV A-L A2C: 12.8 ml  LAESV A-L A4C: 28.55 ml  LAESV MOD A2C: 12.14 ml  LAESV MOD A4C: 27.44 ml  LAESV(A-L): 20.41 ml  LALs A2C: 4.36 cm  LALs A4C: 3.82 cm  LVOT Diam: 1.85 cm    CW  TR Vmax: 2.22 m/s  IVRT: 113.03 ms  TR maxP.7 mmHG    MM  Tapse: 1.56 cm    PW  LVOT VTI: 18.47 cm  LVOT Vmax: 1.06 m/s  LVOT Vmean: 0.71 m/s  MV A [place]: 0.66 m/s  MV Dec [place]: 1.64 m/s2  MV DecT: 297.21 ms  MV E [place]: 0.49 m/s  MV E/A Ratio: 0.73  LVOT maxP.5 mmHG  LVOT meanP.29 mmHG  LVSI Dopp: 23.92 ml/m2  LVSV Dopp: 49.75 ml  Lateral E': 0.07 m/s  Lateral E/E': 6.54    Prepared and E-signed by    Colin Mckeon MD  Signed 18-Sep-2017 14:08:23       10/29/20    ECHO ADULT COMPLETE 10/30/2020 10/30/2020    Interpretation Summary  · LV: Estimated LVEF is 55 - 60%. Visually measured ejection fraction. Normal cavity size and systolic function (ejection fraction normal). Mild concentric hypertrophy. Wall motion: normal. Age-appropriate left ventricular diastolic function. · PV: Mild pulmonic valve regurgitation is present. · AV: Mild aortic valve regurgitation is present. · PA: Pulmonary arterial systolic pressure is 25 mmHg. Signed by: Lizz Montoya MD on 10/30/2020  8:50 PM        Assessment and Plan:  78 y.o. female with:    Impression:  1. Post Covid syndrome: Patient had COVID-19 infection, test positive on April 13. Patient had 3 emergency room visits since then with dyspnea. CT chest done at the end of April showed patchy interstitial infiltrates, this is consistent with atypical pneumonia, likely COVID-19  2. Pulmonary infiltrates: Noted above  3. COPD, gold risk category B  4. History of tobacco use: Quit smoking in October 2011, prior 1 pack/day smoker for approximately 35 years    Plan:  -Repeat CT chest to evaluate for resolution of infiltrates, to be done in 3 months. Advised patient that once her CT scan shows improvement, she will require annual lung cancer screening for approximately 5 more years  -Given symptoms of ongoing dyspnea post Covid, will refer patient to pulmonary rehab  -Start Stiolto Respimat 2 puffs once daily, sample given in clinic  -Start albuterol HFA 1-2puffs q4-6h PRN.   Counseled patient that this is their rescue inhaler and to carry with them at all times.  -Counseled patient on proper inhaler technique  -Advised patient to remain active  -Immunizations reviewed, patient declines Covid vaccination  -Counseled patient regarding lifestyle precautions in COVID-19 pandemic including wearing mask in public and confined spaces, social/physical distancing, frequent hand hygiene, etc.  Advised pt to receive COVID-19 vaccination when possible (pt declined -- risks explained)     Follow-up and Dispositions    · Return in about 11 weeks (around 8/6/2021).        Orders Placed This Encounter    CT CHEST WO CONT    REFERRAL TO PULMONARY REHAB Refer to [facility]    tiotropium-olodateroL (Stiolto Respimat) 2.5-2.5 mcg/actuation inhaler    tiotropium-olodateroL (STIOLTO RESPIMAT) 2.5-2.5 mcg/actuation inhaler    albuterol (PROVENTIL HFA, VENTOLIN HFA, PROAIR HFA) 90 mcg/actuation inhaler       Donald Lewis MD/MPH     Pulmonary, Critical Care Medicine  [facility]

## 2021-06-02 ENCOUNTER — TELEPHONE (OUTPATIENT)
Dept: PULMONOLOGY | Age: 71
End: 2021-06-02

## 2021-06-02 NOTE — TELEPHONE ENCOUNTER
Pulmonary Rehab called patient and left a message about the program. Additional attempts at contact will be made.     Thank you,  Bradley Ruiz

## 2021-06-02 NOTE — TELEPHONE ENCOUNTER
Pulmonary Rehab received a call from patient and she said she was interested in the program. She wants to see what her insurance covers. Will follow up with benefit information.     Thank you,  Roma Solomon

## 2021-06-19 PROBLEM — Z01.818 PREOPERATIVE CLEARANCE: Status: RESOLVED | Noted: 2021-05-20 | Resolved: 2021-06-19

## 2021-06-28 ENCOUNTER — TELEPHONE (OUTPATIENT)
Dept: PULMONOLOGY | Age: 71
End: 2021-06-28

## 2021-06-28 NOTE — TELEPHONE ENCOUNTER
Pulmonary Rehab called patient and left a message about the program and her benefits. Additional attempts at contact will be made.     Thank you,  Mat Galloway

## 2021-07-09 ENCOUNTER — HOSPITAL ENCOUNTER (OUTPATIENT)
Dept: PULMONOLOGY | Age: 71
Discharge: HOME | End: 2021-07-09
Payer: MEDICARE

## 2021-07-09 PROCEDURE — 94618 PULMONARY STRESS TESTING: CPT

## 2021-07-09 NOTE — PROGRESS NOTES
Pulmonary/RT Daily Note     Visit # 1/36       Jojo Hearn 78 y.o. presented today for pulmonary rehab. She has a history of  COPD, post COVID Syndrome, CHURCH, DM. Jojo Hearn has a target heart rate of . She tolerated the exercise session well. Patient states RPE for session today was 14 and RPD was a 3. Her pain level upon finishing exercise session is a 0 Today the pt did:  6 MWT    Physician available for emergencies:      Capo Oliva 7/9/2021 5:04 p.m.

## 2021-07-09 NOTE — PROGRESS NOTES
Outpatient Behavioral Health Evaluation Order    Gali Weathers is a 78y.o. year old female with Other nonspecific abnormal finding of lung field [R91.8]  Personal history of tobacco use, presenting hazards to health [Z87.891]  COPD, group B, by GOLD 2017 classification ([facility] [address] [J44.9]  Post-COVID syndrome [B94.8]  Dyspnea on exertion [R06.00]. She is enrolled in pulmonary rehabilitation and the treating clinician believes the patient is demonstrating signs of anxiety and depression. She states she's already currently seeing a counselor at this time. Sulema Hercules's admitting PHQ-9 depression score was 15 which is considered moderately severe.          Thank you

## 2021-07-09 NOTE — PROGRESS NOTES
Summary Report   Francena Severs  Pulmonary Tx Plan  Description: Female : 1950   Pulmonary ITP     [place] INTAKE from 2021 in [facility] PUL[facility]ARY REHAB   Co-morbidities Diabetes   Oxygen Use No   ITP Visit Type Initial Assessment   1st Date of Exercise 21   ITP Next Review Date 21   Visit #/Total Visits    Pulmonary ITP Exercise, Psychosocial, Tobacco, Education, Nutrition   Total Score    Stages of Change Action   Test Six minute walk test   Distance Walked in  ft   Distance Walked (ft) 1088 ft   Peak HR 89   Peak /86   Peak RPE 14   Peak Mets 2.6   O2 Saturation 96-98   Stops 0   SPO2 Range 96-98   BMI (calculated)    Mode Track, Treadmill, Bike, Stepper, Ergometer, Elliptical   Frequency per week 2 times   Duration per session 60 min   Intensity  METS       3-5   Progression slow-moderate   O2 Sat (%)    O2 Device    O2 Flow Rate (L/min) 0 l/min   Symptoms with Exercise Shortness of breath   Target Heart Rate    Resistance Training Yes   Assisted Devices None   Resting /86   Peak /78   Is BP WDL?  Yes   Target Goal(s) BP < 140/90 or < 130/80, if DM or CKD, Aerobic activity 30 + minutes/day  5 days/week, Home activity   Consults PCP   Currently Taking Psychotropic Meds Yes   Target Goal(s) Maximizes coping skills, Positive support group   Uses Stress Mgmt Techniques Yes   Stages of Change Maintenance   Tobacco Use No   Quit Greater than 6 month   Date Started 71   Date Quit 10/07/11   # Cigarette Smoked/Day 1 pk   Smokeless Tobacco Use No   Smoking Cessation Referral No   Tobacco Adjunct No   Resource Information Provided No   HbA1C 6.9   HbA1C Date 21   Date Lipids Drawn 18   Total 191   HDL 53   LDL 94.6   Triglycerides 217   Weight  83 kg (183 lb)   Height  5' 9\" (1.753 m)   BMI 27.08   Waist Circumference  41 in   Alcohol Special   Type wine, vokha   Amount 1-0 cups   Rate Your Plate Total Score 46   Dietitian Consult No   Nurse/Patient Discussion Yes   Supplemental Nutrition n   Nutrition Class No   Diabetes Education Referral No   Lipid Clinic Referral No   Weight Management Referral No   Education Signs/Symptoms Hypo/Hyperglycemia, Healthy eating, Supplemental dietary needs, Nutrition and lung disease, Special diet   Target Goals Weight loss of 5-10%, LDL-C less than 70 for high risk, Non HDL-C less than 130   Learning Barrier Ready to learn   Education Tobacco triggers, CAD, Risk factors, Med Compliance, Signs/Symptoms of Angina , Breaking the Dyspnea Cycle, Pulmonary Medications, Pulmonary A&P, lung/gas exchange, Heart/Lung association, Activity of Daily Living, Nebulizer Use   Target Goals Correct demonstration of breathing techniques, Correct demonstration of MDI use and care         MD Signature: _______________________________________________        Date/Time:  _______________________________        Pharmacist Signature:_________________________________________        Date/Time: _______________________________        Patient name: Jojo Hearn : 1950         Goals Comments   1. Be more comfortable without becoming very SOB within the next 30 days [x] initial  [] met                  [] not met  [] progressing     2. COPD and education [x] initial  [] met                  [] not met  [] progressing    3. Change the bed sheets without needing help within 60 days [x] initial  [] met                  [] not met  [] progressing    4.  Pace herself and save energy when exerting within 60 days   [x] initial  [] met                  [] not met  [] progressing        Frequency / Duration: Patient to be seen 2 times per week for 18 weeks:          Capo Oliva 2021 8:39 AM

## 2021-07-09 NOTE — PROGRESS NOTES
Marco Mena 78y.o. year old female with Other nonspecific abnormal finding of lung field [R91.8]  Personal history of tobacco use, presenting hazards to health [Z87.891]  COPD, group B, by GOLD 2017 classification ([facility] [address] [J44.9]  Post-COVID syndrome [B94.8]  Dyspnea on exertion [R06.00]        Social  History & Family Component    Living Situation:      LIVES:  With spouse      Does patient have family/friends able to provide support  [x]  Yes  []  No     What type of home does patient live in? [x] one level   []   one level with basement      []   2 story    # of stairs to enter home:  two   # of stairs in home:   one   []  other:     Does patient have a yard? []  No   [x] Yes    If yes, can patient care for yard? [x]  Yes []  No   If no, does patient require assistance to care for yard? [x]  Yes   []  No cutting grass, planting    Transportation Resources:  Can patient drive themselves? [x]  Yes   []   No   If no, who is principle ? Does patient need referral for community resources (i.e., [place] [facility]os, etc.)      []  No  []  Yes, which option?  Services/[place] [employer]/Visual Impaired: []  Yes   [x]   No   If yes, describe:     Occupation: Retired     Education/Grade Level: college  Occupational exposures: none     alf/disability date: retired 2017    Psychosocial Assessment Tools    COPD Assessment Test (CAT) Score: 24  Date of Test: 7/9/2021  Impact Level: high    CAT measures COPD health status and quantifies the impact of COPD on patients life  and how this changes over time  Score and Impact Level: >30 = very high; > 20 = high; 10 - 20 = medium; <10 = low    PHQ-9 Score: 15  Date of Test: 7/9/2021    Scale includes 9 depression questions including 1 suicidal ideation and a 10th question asking about work, home, and socialization  Each item on the questionnaire is scored from 0-3 and this means that a person can score between 0 and 27 for depression.    Results5-9 minimal symptoms, 10-14 minor depression, 15-19 major depression, moderately severe, >20 major depression, severe.      Assessment:    Limited participation in family and community activities [x] No [] Yes      Limited coping strategies [x] No [] Yes      Inappropriate over dependence on family and friends [x] No [] Yes      Stressors [] No [x] Yes  If yes, please describe: family stressors, five children and      Able to relax or perform leisure activities [] No [x] Yes   Describe leisure activities: planting, cleaning the yard, shopping, bowling    Able to participate in former interests/hobbies: [x] No [] Yes   Describe hobbies: staining, painting and refurbishing furniture    Able to do yard/household activities: [] No [x] Yes   Describe activities: mowing the lawn    What is most difficult household chore?: changing the sheets on the bed, pulling clothes from the washer to the dryer (back)  What is easiest household chore?: laundry    Current mood assessed: [] No [x] Yes   Description of present mood: []  Worried  []   Sad []    []  Depressed []   Impatient  []   Frustrated  []   Anxious  []   Contented  [x]   Cheerful  [x]   Happy []   Other:    Needs referral to referring MD for follow up: [x] No [] Yes       Aspects of family & home situation that may impact progress in Pulmonary Rehabilitation:      Barrier(s) to learning:  None     [x]  Family & home situation will enhance Pulmonary Rehabilitation, patient is a good candidate     []  Family & home situation will hinder Pulmonary Rehabilitation but patient will be accepted into the program.     ______________________________________________________________________           PLAN:    See ITP for Plan and Interventions during the comprehensive pulmonary rehabilitation program

## 2021-07-19 ENCOUNTER — TELEPHONE (OUTPATIENT)
Dept: PULMONOLOGY | Age: 71
End: 2021-07-19

## 2021-07-19 NOTE — TELEPHONE ENCOUNTER
Called patient to inform her to have CT scan done prior to her follow up appointment on 8/19/2021. Advised her to call central scheduling ASAP at [phone]. Patient verbalized understanding.

## 2021-07-23 ENCOUNTER — HOSPITAL ENCOUNTER (OUTPATIENT)
Dept: CT IMAGING | Age: 71
Discharge: HOME | End: 2021-07-23
Attending: INTERNAL MEDICINE
Payer: MEDICARE

## 2021-07-23 DIAGNOSIS — U09.9 POST-COVID SYNDROME: ICD-10-CM

## 2021-07-23 DIAGNOSIS — R91.8 PULMONARY INFILTRATES: ICD-10-CM

## 2021-07-23 DIAGNOSIS — J44.9 COPD, GROUP B, BY GOLD 2017 CLASSIFICATION (HCC): ICD-10-CM

## 2021-07-23 DIAGNOSIS — R06.09 DYSPNEA ON EXERTION: ICD-10-CM

## 2021-07-23 DIAGNOSIS — Z87.891 PERSONAL HISTORY OF TOBACCO USE, PRESENTING HAZARDS TO HEALTH: ICD-10-CM

## 2021-07-23 PROCEDURE — 71250 CT THORAX DX C-: CPT

## 2021-08-03 ENCOUNTER — HOSPITAL ENCOUNTER (OUTPATIENT)
Dept: PULMONOLOGY | Age: 71
Discharge: HOME | End: 2021-08-03
Payer: MEDICARE

## 2021-08-03 PROCEDURE — G0237 THERAPEUTIC PROCD STRG ENDUR: HCPCS

## 2021-08-03 NOTE — PROGRESS NOTES
Pulmonary/RT Daily Note     Visit #        Courtney Talbot 73 y.o. presented today for pulmonary rehab. She stated that there have been no changes in medication or health since last visit. Education was provided spacer use/purpose, copd and breathing technique and MDI use. She has diabetic neuropathy, she was given information on education classes. Courtney Talbot has a target heart rate of . She tolerated the exercise session well. Patient states RPE for session today was 13 and RPD was a 3. Her pain level upon finishing exercise session is a 0.  Today the pt did:    Nustep: 15 min level 4  Breathing retrainin min                 Education: 20 min copd/MDI & spacer      Physician available for emergencies: Teresita Powers 8/3/2021 8:59 AM

## 2021-08-03 NOTE — PROGRESS NOTES
Summary Report   Neftali Gamez  Pulmonary Tx Plan  Description: Female : 1950   Pulmonary ITP     RESP 1:1 from 8/3/2021 in [facility]   Co-morbidities Diabetes  [diabetic neuropathy]   Oxygen Use No   ITP Visit Type Re-Assessment   1st Date of Exercise 21   ITP Next Review Date 21   Visit #/Total Visits 2/36   Pulmonary ITP Exercise, Psychosocial, Tobacco, Education, Nutrition   Total Score    Stages of Change Action   Test Six minute walk test   Distance Walked in  ft   Distance Walked (ft) 1088 ft   Peak HR 89   Peak /86   Peak RPE 14   Peak Mets 2.6   O2 Saturation 96-98   Stops 0   SPO2 Range 96-98   Mode Track, Treadmill, Bike, Stepper, Ergometer, Elliptical   Frequency per week 2 times   Duration per session 60 min   Intensity  METS       3-5   Progression slow-moderate   O2 Sat (%)    O2 Device    O2 Flow Rate (L/min)    Symptoms with Exercise Shortness of breath   Target Heart Rate    Resistance Training Yes   Assisted Devices None   Resting /86   Peak /78   Is BP WDL?  Yes   Target Goal(s) BP < 140/90 or < 130/80, if DM or CKD, Aerobic activity 30 + minutes/day  5 days/week, Home activity   Interventions PCP notified   Consults    Currently Taking Psychotropic Meds Yes   Target Goal(s) Maximizes coping skills, Positive support group   Uses Stress Mgmt Techniques Yes   Stages of Change Maintenance   Tobacco Use No   Quit Greater than 6 month   Date Started 73   Date Quit 10/07/11   # Cigarette Smoked/Day 1 pk   Smokeless Tobacco Use No   Smoking Cessation Referral No   Tobacco Adjunct Yes   Resource Information Provided No   Target Goal Complete cessation of tobacco use   Diabetes Yes   HbA1C 6.9   HbA1C Date 21   BG at Home No  [she doesn't take at home]   Date Lipids Drawn 18   Total 191   HDL 53   LDL 94.6   Triglycerides 217   Weight  83 kg (183 lb)   Height  5' 9\" (1.753 m)   BMI 27.08   Waist Circumference  41 in   Alcohol Special   Type wine, vokha   Amount 1-0 cups   Nutrition Assessment Tool RYP   Rate Your Plate Total Score    Dietitian Consult No   Nurse/Patient Discussion Yes   Supplemental Nutrition n   Nutrition Class No   Diabetes Education Referral No   Lipid Clinic Referral No   Weight Management Referral No   Education Signs/Symptoms Hypo/Hyperglycemia, DM & CAD relationship, Healthy eating, Nutrition and lung disease, Supplemental dietary needs, High fiber diet, Low sodium diet   Target Goals Weight loss of 5-10%, LDL-C less than 70 for high risk, Non HDL-C less than 130   Learning Barrier Ready to learn   Education Tobacco triggers, CAD, Med Compliance, Signs/Symptoms of Angina , Pulmonary Medications, Breaking the Dyspnea Cycle, Heart/Lung association, Pulmonary A&P, lung/gas exchange, Activity of Daily Living, Nebulizer Use, Preventing Infection, Bronchial Hygiene/controlled cough, Signs/Symptoms Hypo/hyperglycemia, DM & lung disease   Target Goals Correct demonstration of breathing techniques, Correct demonstration of MDI use and care         MD Signature: _______________________________________________        Date/Time:  _______________________________        Pharmacist Signature:_________________________________________        Date/Time: _______________________________    GOALS    Goals Comments   1. Be more comfortable without becoming very SOB within the next 30 days []? initial  []? met                             []? not met  []? progressing  N/A  unable to assess as this is her 2nd visit, pt has been of town   2. COPD and education []? initial  []? met                             []? not met  []? progressing  N/A   unable to assess pt has been of town   3. Change the bed sheets without needing help within 60 days []? initial  []? met                             []? not met  []? progressing  N/A   unable to assess pt has been of town   4. Pace herself and save energy when exerting within 60 days    []? initial  []? met                             []? not met  []?  progressing  N/A   unable to assess pt has been of town

## 2021-08-05 ENCOUNTER — HOSPITAL ENCOUNTER (OUTPATIENT)
Dept: PULMONOLOGY | Age: 71
Discharge: HOME | End: 2021-08-05
Payer: MEDICARE

## 2021-08-05 PROCEDURE — G0237 THERAPEUTIC PROCD STRG ENDUR: HCPCS

## 2021-08-05 NOTE — PROGRESS NOTES
Pulmonary/RT Daily Note     Visit # 3/36       Fredy Postal 73 y.o. presented today for pulmonary rehab. She stated that there have been no changes in medication or health since last visit. Fredy Postal has a target heart rate of . She tolerated the exercise session well. Patient states RPE for session today was *** and RPD was a ***. {HIS/HER:[id number]} pain level upon finishing exercise session is a * Today the pt did:    Nustep: 20 min level 4-5  Arm bike:  Weights  Resistance bands:  Walking:  Breathing retraining: 15 min  Stretches:   Bodyweight exercises:                       Physician available for emergencies:Herbert Membreno 8/5/2021 9:03 AM

## 2021-08-05 NOTE — PROGRESS NOTES
Pulmonary/RT Daily Note     Visit # 3       Dick Park 73 y.o. presented today for pulmonary rehab. She stated that there have been no changes in medication or health since last visit. Dick Park has a target heart rate of . She tolerated the exercise session well. Patient states RPE for session today was 14 and RPD was a 4.  Her pain level upon finishing exercise session is a 0 Today the pt did:    Nustep: 20 min level 4-5  Arm bike: 8 min 0-5 montalvo  Breathing retrainin min  Stretches: 7 min               Physician available for emergencies:Herbert Berg 2021 9:03 AM

## 2021-08-16 ENCOUNTER — HOSPITAL ENCOUNTER (OUTPATIENT)
Dept: PULMONOLOGY | Age: 71
Discharge: HOME | End: 2021-08-16
Payer: MEDICARE

## 2021-08-16 PROCEDURE — G0239 OTH RESP PROC, GROUP: HCPCS

## 2021-08-16 PROCEDURE — G0237 THERAPEUTIC PROCD STRG ENDUR: HCPCS

## 2021-08-18 ENCOUNTER — HOSPITAL ENCOUNTER (OUTPATIENT)
Dept: PULMONOLOGY | Age: 71
Discharge: HOME | End: 2021-08-18
Payer: MEDICARE

## 2021-08-18 PROCEDURE — G0239 OTH RESP PROC, GROUP: HCPCS

## 2021-08-18 PROCEDURE — G0237 THERAPEUTIC PROCD STRG ENDUR: HCPCS

## 2021-08-18 NOTE — PROGRESS NOTES
Pulmonary/RT Daily Note     Visit # 5/36       Fredy Postal 73 y.o. presented today for pulmonary rehab. She stated that there have been no changes in medication or health since last visit. Fredy Postal has a target heart rate of . She tolerated the exercise session well. Patient states RPE for session today was 15 and RPD was a 5. Her pain level upon finishing exercise session is a 0.  Today the pt did:    Nustep:20 min level 4-5  Arm bike: 15 min 10 montalvo  Walking:10 min  Breathing retraining:10 min                Physician available for emergencies: Lisy Hyman 8/18/2021 11:14 AM

## 2021-08-19 ENCOUNTER — OFFICE VISIT (OUTPATIENT)
Dept: PULMONOLOGY | Age: 71
End: 2021-08-19
Payer: MEDICARE

## 2021-08-19 VITALS
OXYGEN SATURATION: 100 % | SYSTOLIC BLOOD PRESSURE: 121 MMHG | HEART RATE: 82 BPM | DIASTOLIC BLOOD PRESSURE: 83 MMHG | RESPIRATION RATE: 18 BRPM | TEMPERATURE: 97.2 F | BODY MASS INDEX: 26.84 KG/M2 | HEIGHT: 69 IN | WEIGHT: 181.2 LBS

## 2021-08-19 DIAGNOSIS — Z87.891 PERSONAL HISTORY OF TOBACCO USE, PRESENTING HAZARDS TO HEALTH: ICD-10-CM

## 2021-08-19 DIAGNOSIS — R06.02 SHORTNESS OF BREATH: ICD-10-CM

## 2021-08-19 DIAGNOSIS — J44.9 COPD, GROUP B, BY GOLD 2017 CLASSIFICATION (HCC): Primary | ICD-10-CM

## 2021-08-19 PROCEDURE — 1090F PRES/ABSN URINE INCON ASSESS: CPT | Performed by: INTERNAL MEDICINE

## 2021-08-19 PROCEDURE — G9899 SCRN MAM PERF RSLTS DOC: HCPCS | Performed by: INTERNAL MEDICINE

## 2021-08-19 PROCEDURE — G8754 DIAS BP LESS 90: HCPCS | Performed by: INTERNAL MEDICINE

## 2021-08-19 PROCEDURE — 1101F PT FALLS ASSESS-DOCD LE1/YR: CPT | Performed by: INTERNAL MEDICINE

## 2021-08-19 PROCEDURE — G8752 SYS BP LESS 140: HCPCS | Performed by: INTERNAL MEDICINE

## 2021-08-19 PROCEDURE — G8419 CALC BMI OUT NRM PARAM NOF/U: HCPCS | Performed by: INTERNAL MEDICINE

## 2021-08-19 PROCEDURE — G8427 DOCREV CUR MEDS BY ELIG CLIN: HCPCS | Performed by: INTERNAL MEDICINE

## 2021-08-19 PROCEDURE — G8536 NO DOC ELDER MAL SCRN: HCPCS | Performed by: INTERNAL MEDICINE

## 2021-08-19 PROCEDURE — 99214 OFFICE O/P EST MOD 30 MIN: CPT | Performed by: INTERNAL MEDICINE

## 2021-08-19 PROCEDURE — G9717 DOC PT DX DEP/BP F/U NT REQ: HCPCS | Performed by: INTERNAL MEDICINE

## 2021-08-19 PROCEDURE — 3017F COLORECTAL CA SCREEN DOC REV: CPT | Performed by: INTERNAL MEDICINE

## 2021-08-19 PROCEDURE — G8399 PT W/DXA RESULTS DOCUMENT: HCPCS | Performed by: INTERNAL MEDICINE

## 2021-08-19 NOTE — PROGRESS NOTES
[address] [place] [address]  [phone]    [facility]  Pulmonary, Critical Care, and Sleep Medicine    Pulmonary Office F/U  Name: Colin Serrano 73 y.o. female  MRN: [id number]  : 1950  Service Date: 21    Chief Complaint:   Chief Complaint   Patient presents with    COPD     follow up from 2021    Other     pulmonary infiltrates, post-COVID syndrome, history of tobacco use, CHURCH    Results     Ct 2021         History of Present Illness:  Colin Serrano is a 73 y.o. female, who presents to Pulmonary clinic for follow-up of COPD. Patient was last seen in our clinic on 2021. In the interval, patient reports she was not able to get her inhalers filled properly. Patient reports that she was only able to get Albuterol filled. Not filled Stiolto due to cost  Having lots of issues with Crohn's disease  Still has severe dyspnea -- unable to go 2 blocks. Pt going to pulmonary rehab  Denies any exacerbations in the interval.      Past Medical History:   Diagnosis Date    Abnormal nuclear cardiac imaging test 2015    Low risk. No ischemia or prior infarction. No RWMA. EF 68%. Neg EKG on pharm stress test.    Agatston CAC score, <100 05/15/2015    Coronary calcium score 70.  Chronic kidney disease     early stage kidney failure    [facility]     Diabetes mellitus ([facility]     GERD (gastroesophageal reflux disease)     crohn's disease    History of echocardiogram 2013    EF 65%. No RWMA. Mod LVH. Gr 1 DDfx. RVSP 25 mmHg.       HX OTHER MEDICAL     anxiety, \"mitral valve prolapse\"    Hyperlipidemia     Hypertension     Psychiatric disorder     anxiety    RLE venous duplex     in her 25s     Past Surgical History:   Procedure Laterality Date    HX CHOLECYSTECTOMY      HX GYN      hysterectomy    HX OTHER SURGICAL      FATTY TISSUE REMOVED    HX TONSILLECTOMY      MS ABDOMEN SURGERY [facility] tumor removal     Family History   Problem Relation Age of Onset    Heart Disease Father     Heart Attack Father     Heart Disease Paternal Grandmother     Heart Disease Paternal Grandfather     Breast Cancer Mother     Cancer Mother      Social History     Socioeconomic History    Marital status:      Spouse name: Not on file    Number of children: Not on file    Years of education: Not on file    Highest education level: Not on file   Occupational History    Not on file   Tobacco Use    Smoking status: Former Smoker     Packs/day: 1.00     Years: 30.00     Pack years: 30.00     Types: Cigarettes     Quit date: 10/19/2012     Years since quittin.8    Smokeless tobacco: Former User   Vaping Use    Vaping Use: Never used   Substance and Sexual Activity    Alcohol use: No     Alcohol/week: 0.0 standard drinks     Comment: occasional    Drug use: No    Sexual activity: Yes     Partners: Male     Birth control/protection: Surgical   Other Topics Concern     Service Not Asked    Blood Transfusions Not Asked    Caffeine Concern Yes     Comment: coffee daily 8oz    Occupational Exposure Not Asked    Hobby Hazards Not Asked    Sleep Concern No    Stress Concern No    Weight Concern Yes     Comment: increased weight    Special Diet Not Asked    Back Care Not Asked    Exercise No    Bike Helmet Not Asked    Seat Belt Yes    Self-Exams Not Asked   Social History Narrative    Not on file     Social Determinants of Health     Financial Resource Strain:     Difficulty of Paying Living Expenses:    Food Insecurity:     Worried About Running Out of Food in the Last Year:     Ran Out of Food in the Last Year:    Transportation Needs:     Lack of Transportation (Medical):      Lack of Transportation (Non-Medical):    Physical Activity:     Days of Exercise per Week:     Minutes of Exercise per Session:    Stress:     Feeling of Stress :    Social Connections:     Frequency of Communication with Friends and Family:     Frequency of Social Gatherings with Friends and Family:     Attends Restorationism Services:     Active Member of Clubs or Organizations:     Attends Club or Organization Meetings:     Marital Status:    Intimate Partner Violence:     Fear of Current or Ex-Partner:     Emotionally Abused:     Physically Abused:     Sexually Abused: Allergies   Allergen Reactions    Fish Containing Products Anaphylaxis and Other (comments)     Heart stops    Iodine Anaphylaxis and Other (comments)     Heart stops    Shellfish Derived Anaphylaxis and Other (comments)     Heart stops    Bee Venom Protein (Honey Bee) Hives     Prior to Admission medications    Medication Sig Start Date End Date Taking? Authorizing Provider   famotidine (PEPCID) 20 mg tablet Take 20 mg by mouth daily. 8/19/21 8/19/21 Yes Provider, Historical   tiotropium-olodateroL (Stiolto Respimat) 2.5-2.5 mcg/actuation inhaler Take 2 Puffs by inhalation daily. Please load and prime for patient 5/21/21  Yes Pedro Pablo Oropeza MD   albuterol (PROVENTIL HFA, VENTOLIN HFA, PROAIR HFA) 90 mcg/actuation inhaler Take 1-2 Puffs by inhalation every four (4) hours as needed for Wheezing or Shortness of Breath. 5/21/21  Yes Pedro Pablo Oropeza MD   pantoprazole (PROTONIX) 40 mg tablet TAKE ONE TABLET BY MOUTH DAILY  Patient taking differently: Take 40 mg by mouth every twelve (12) hours. 12/9/18  Yes Linette Spencer MD   atorvastatin (LIPITOR) 20 mg tablet TAKE ONE TABLET BY MOUTH DAILY  Patient taking differently: Take 20 mg by mouth daily. 9/4/18  Yes Kayli Leyva NP   metFORMIN (GLUCOPHAGE) 500 mg tablet Take 1 Tab by mouth two (2) times daily (with meals). Patient taking differently: Take 1,000 mg by mouth two (2) times daily (with meals). 11/7/17  Yes Linette Spencer MD   lisinopril (PRINIVIL, ZESTRIL) 5 mg tablet Take 1 Tab by mouth daily.  8/23/17  Yes Kayli Leyva NP   PARoxetine (PAXIL) 40 mg tablet Take 40 mg by mouth daily. Indications: anxiousness associated with depression  Patient not taking: Reported on 8/19/2021 8/19/21 8/19/21  Provider, Historical     Immunization History   Administered Date(s) Administered    Pneumococcal Conjugate (PCV-13) 08/23/2017    Tdap 12/03/2015       Review of Systems:  A complete review of systems was performed as stated in the HPI, all others are negative. Objective:    Physical Exam:  /83 (BP 1 Location: Left upper arm, BP Patient Position: Sitting, BP Cuff Size: Adult long)   Pulse 82   Temp 97.2 °F (36.2 °C) (Temporal)   Resp 18   Ht 5' 9\" (1.753 m)   Wt 82.2 kg (181 lb 3.2 oz)   SpO2 100%   BMI 26.76 kg/m²   Vitals were personally reviewed  Gen: no acute distress, pleasant and cooperative, sitting up in chair, ambulates without difficulty  HEENT: normocephalic/atraumatic, no ocular drainage, EOMI, no scleral icterus, nasal bridge midline, unable to assess nasal and oral cavities due to patient wearing mask in the setting of COVID-19 pandemic  Neck: supple, trachea midline, no JVD  CVS: regular rate rhythm, S1/S2, no murmurs/rubs/gallops  Lungs: good air entry B/L, no wheezes/rales/rhonchi  Ext: no pitting edema B/L, no peripheral cyanosis or clubbing      Labs:   I have reviewed the patient's available labs  Lab Results   Component Value Date/Time    WBC 5.5 04/29/2021 10:50 PM    HGB 13.2 04/29/2021 10:50 PM    HCT 38.2 04/29/2021 10:50 PM    PLATELET 194 21/98/3874 10:50 PM    MCV 80.9 04/29/2021 10:50 PM     Lab Results   Component Value Date/Time    Sodium 135 (L) 04/29/2021 10:50 PM    Potassium 3.8 04/29/2021 10:50 PM    Chloride 104 04/29/2021 10:50 PM    CO2 22 04/29/2021 10:50 PM    Anion gap 9 04/29/2021 10:50 PM    Glucose 199 (H) 04/29/2021 10:50 PM    BUN 16 04/29/2021 10:50 PM    Creatinine 1.20 04/29/2021 10:50 PM    BUN/Creatinine ratio 13 04/29/2021 10:50 PM    GFR est AA 54 (L) 04/29/2021 10:50 PM    GFR est non-AA 45 (L) 04/29/2021 10:50 PM Calcium 10.3 (H) 2021 10:50 PM    Bilirubin, total 0.6 10/30/2020 03:16 AM    Alk. phosphatase 71 10/30/2020 03:16 AM    Protein, total 6.3 (L) 10/30/2020 03:16 AM    Albumin 3.6 10/30/2020 03:16 AM    Globulin 2.7 10/30/2020 03:16 AM    A-G Ratio 1.3 10/30/2020 03:16 AM    ALT (SGPT) 28 10/30/2020 03:16 AM    AST (SGOT) 18 10/30/2020 03:16 AM       Imaging:  I have personally reviewed patient's imaging as follows: CT chest without contrast from 2021 shows scattered centrilobular emphysematous changes, worse in upper lobes, as well as some linear atelectasis in lingula and right middle lobe. No consolidations, infiltrates, masses, effusions were seen. Compared to previous CT, infiltrates have resolved. Official report per radiology:  CT Results (most recent):  Results from Abstract encounter on 21    CTA CHEST W OR W WO CONT    PFTs:  I have reviewed the patient's PFTs from 2021, spirometry is normal without BD response, lung volumes show a mild restriction, diffusion capacity is moderately reduced    TTE:  I have reviewed the patient's TTE results  Results for orders placed or performed during the hospital encounter of 09/15/17   2D ECHO COMPLETE ADULT (TTE) W OR WO CONTR     Status: None    [address]   [address] [facility], [address] [phone]    Transthoracic Echocardiogram    Patient: Brice Mike  MRN: [id number]  [address]  #: [de-identified]  : 1950  Age: 74 years  Gender: Female  Height: 69 in  Weight: 201.5 lb  BSA: 2.07 m-sq  BP: 139 / 74 mmHg  Study date: 18-Sep-2017  Status: Routine  Location: [facility] [address] #: [phone]    Allergies: IODINE    Referring_Ordering Physician:  Poornima Costello. Marylu Epstein PA-C  Interpreting Group:  [address] [place] [address]  Interpreting Physician:  Colin Perez MD  Technologist:  AMALIA Lopez, RDCS    IMPRESSIONS:  A clinically significant myopathic process is ruled out.  There was no   significant valvular heart disease. SUMMARY:  Procedure information: This was a technically difficult study. Intravenous   contrast (Definity) was administered. Left ventricle: Systolic function was normal by visual assessment. Ejection   fraction was estimated to be 60 %. No  obvious wall motion abnormalities identified in the views obtained. Wall   thickness was at the upper limits of normal.    COMPARISONS:  There has been no significant change. Comparison was made with the previous   study of 06-Feb-2013. INDICATIONS: Chest pain. HISTORY: Prior history: Risk factors: hypertension. PROCEDURE: This was a routine study. The study included complete 2D imaging,   M-mode, complete spectral Doppler, and  color Doppler. The heart rate was 69 bpm, at the start of the study. Systolic   blood pressure was 139 mmHg, at the start  of the study. Diastolic blood pressure was 74 mmHg, at the start of the   study. Images were obtained from the  parasternal, apical, subcostal, and suprasternal notch acoustic windows. Intravenous contrast (Definity) was  administered. This was a technically difficult study. LEFT VENTRICLE: Size was normal. Systolic function was normal by visual   assessment. Ejection fraction was estimated to  be 60 %. No obvious wall motion abnormalities identified in the views   obtained. Wall thickness was at the upper limits  of normal. Not well visualized. DOPPLER: Left ventricular diastolic function   parameters were normal for the patient's  age. RIGHT VENTRICLE: The size was normal. Systolic function was normal. DOPPLER:   Systolic pressure was within the normal  range. Estimated peak pressure was 23 mmHg. LEFT ATRIUM: Size was normal. Not well visualized. RIGHT ATRIUM: Size was normal. Not well visualized. MITRAL VALVE: Normal valve structure. DOPPLER: There was no evidence for   stenosis. There was no significant  regurgitation. AORTIC VALVE: The valve was probably trileaflet.  Leaflets exhibited good   mobility. DOPPLER: There was no stenosis. There was mild regurgitation. TRICUSPID VALVE: Not well visualized. Normal valve structure. DOPPLER: There   was no evidence for tricuspid stenosis. There was trivial regurgitation. Right atrial pressure estimate: 3 mmHg. PULMONIC VALVE: Not well visualized, but normal Doppler findings. AORTA: The root exhibited normal size. SYSTEMIC VEINS: IVC: The inferior vena cava was not well visualized. PERICARDIUM: Insignificant pericardial effusion and/or pericardial fat was   present. SYSTEM MEASUREMENT TABLES    2D  Ao Diam: 2.98 cm  IVSd: 1.15 cm  LVIDd: 3.92 cm  LVIDs: 2.9 cm  LVPWd: 1.06 cm  SV(Teich): 34.41 ml  EDV(Teich): 66.52 ml  ESV(Cube): 24.29 ml  ESV(Teich): 32.11 ml  LAAs A2C: 8.1 cm2  LAAs A4C: 11.34 cm2  LAESV A-L A2C: 12.8 ml  LAESV A-L A4C: 28.55 ml  LAESV MOD A2C: 12.14 ml  LAESV MOD A4C: 27.44 ml  LAESV(A-L): 20.41 ml  LALs A2C: 4.36 cm  LALs A4C: 3.82 cm  LVOT Diam: 1.85 cm    CW  TR Vmax: 2.22 m/s  IVRT: 113.03 ms  TR maxP.7 mmHG    MM  Tapse: 1.56 cm    PW  LVOT VTI: 18.47 cm  LVOT Vmax: 1.06 m/s  LVOT Vmean: 0.71 m/s  MV A [place]: 0.66 m/s  MV Dec [place]: 1.64 m/s2  MV DecT: 297.21 ms  MV E [place]: 0.49 m/s  MV E/A Ratio: 0.73  LVOT maxP.5 mmHG  LVOT meanP.29 mmHG  LVSI Dopp: 23.92 ml/m2  LVSV Dopp: 49.75 ml  Lateral E': 0.07 m/s  Lateral E/E': 6.54    Prepared and E-signed by    Colin Chapman MD  Signed 18-Sep-2017 14:08:23       10/29/20    ECHO ADULT COMPLETE 10/30/2020 10/30/2020    Interpretation Summary  · LV: Estimated LVEF is 55 - 60%. Visually measured ejection fraction. Normal cavity size and systolic function (ejection fraction normal). Mild concentric hypertrophy. Wall motion: normal. Age-appropriate left ventricular diastolic function. · PV: Mild pulmonic valve regurgitation is present. · AV: Mild aortic valve regurgitation is present. · PA: Pulmonary arterial systolic pressure is 25 mmHg.     Signed by: Stefan Fernando MD on 10/30/2020  8:50 PM        Assessment and Plan:  73 y.o. female with:    Impression:  1. COVID-19 pneumonia: Patient had COVID-19 infection, test positive on April 13. Patient had 3 emergency room visits since then with dyspnea. CT chest done at the end of April showed patchy interstitial infiltrates, this is consistent with atypical pneumonia, likely COVID-19. Repeat CT scan from 7/23/2021 shows resolution of infiltrates  2. Pulmonary infiltrates: Resolved on most recent CT  3. COPD, gold risk category B  4. History of tobacco use: Quit smoking in October 2011, prior 1 pack/day smoker for approximately 35 years  5. Declines COVID-19 vaccination  6. Dyspnea on exertion/shortness of breath: Due to above    Plan:  -Reviewed CT imaging with the patient. Advised patient to obtain lung cancer screening next year, planned for July 2022. Shared decision making performed. Counseled patient have this performed annually for another 5 years which would complete 15 years post smoking abstinence  -Reviewed formulary with patient, advised that we will prescribe any LAMA/LABA combination inhaler, handout provided to patient. Advised her to discuss with her insurance company.  -Continue albuterol HFA 1-2puffs q4-6h PRN. Counseled patient that this is their rescue inhaler and to carry with them at all times.  -Counseled patient on proper inhaler technique  -Advised patient to remain active  -Continue pulmonary rehab  -Immunizations reviewed, patient adamantly declines Covid vaccination  -Counseled patient regarding lifestyle precautions in COVID-19 pandemic including wearing mask in public and confined spaces, social/physical distancing, frequent hand hygiene, etc.  Ang Barrera pt to receive COVID-19 vaccination when possible    Follow-up and Dispositions    · Return in about 6 months (around 2/19/2022).        Orders Placed This Encounter    CT LOW DOSE LUNG CANCER SCREENING       Sandie Longo MD/MPH     Pulmonary, Critical Care Medicine  [facility]

## 2021-08-19 NOTE — LETTER
8/23/2021    Patient: Dick Park   YOB: 1950   Date of Visit: 8/19/2021     Komal Thompson MD  [address] [facility]    Dear Komal Thompson MD,      Thank you for referring Ms. Trudy Ellis to [address] [place] [address] for evaluation. My notes for this consultation are attached. If you have questions, please do not hesitate to call me. I look forward to following your patient along with you.       Sincerely,    Edvin Burrell MD

## 2021-08-19 NOTE — PROGRESS NOTES
Jojo Hearn presents today for   Chief Complaint   Patient presents with    COPD     follow up from 5/21/2021    Other     pulmonary infiltrates, post-COVID syndrome, history of tobacco use, CHURCH    Results     Ct 7/23/2021       Is someone accompanying this pt? No    Is the patient using any DME equipment during OV? No    -DME Company N/A    Depression Screening:  3 most recent PHQ Screens 8/5/2021   PHQ Not Done -   Little interest or pleasure in doing things More than half the days   Feeling down, depressed, irritable, or hopeless Several days   Total Score PHQ 2 3   Trouble falling or staying asleep, or sleeping too much More than half the days   Feeling tired or having little energy Nearly every day   Poor appetite, weight loss, or overeating Several days   Feeling bad about yourself - or that you are a failure or have let yourself or your family down More than half the days   Trouble concentrating on things such as school, work, reading, or watching TV More than half the days   Moving or speaking so slowly that other people could have noticed; or the opposite being so fidgety that others notice Not at all   Thoughts of being better off dead, or hurting yourself in some way Not at all   PHQ 9 Score 13   How difficult have these problems made it for you to do your work, take care of your home and get along with others Not difficult at all       Learning Assessment:  Learning Assessment 5/21/2021   PRIMARY LEARNER Patient   PRIMARY LANGUAGE ENGLISH   LEARNER PREFERENCE PRIMARY DEMONSTRATION   ANSWERED BY Patient   RELATIONSHIP SELF       Abuse Screening:  Abuse Screening Questionnaire 5/21/2021   Do you ever feel afraid of your partner? N   Are you in a relationship with someone who physically or mentally threatens you? N   Is it safe for you to go home? Y       Fall Risk  Fall Risk Assessment, last 12 mths 8/19/2021   Able to walk? Yes   Fall in past 12 months? 0   Do you feel unsteady?  0   Are you worried about falling 0         Coordination of Care:  1. Have you been to the ER, urgent care clinic since your last visit? Hospitalized since your last visit? No    2. Have you seen or consulted any other health care providers outside of the [address] [place] [address] since your last visit? Include any pap smears or colon screening. Yes. Dr. John Haas, PCP    Per patient, did not receive Influenza vaccine or COVID vaccine.

## 2021-08-23 ENCOUNTER — APPOINTMENT (OUTPATIENT)
Dept: PULMONOLOGY | Age: 71
End: 2021-08-23
Payer: MEDICARE

## 2021-08-25 ENCOUNTER — HOSPITAL ENCOUNTER (OUTPATIENT)
Dept: PULMONOLOGY | Age: 71
Discharge: HOME | End: 2021-08-25
Payer: MEDICARE

## 2021-08-25 PROCEDURE — G0237 THERAPEUTIC PROCD STRG ENDUR: HCPCS

## 2021-08-25 NOTE — PROGRESS NOTES
Pulmonary/RT Daily Note     Visit # 6/36       Stephane Rees 73 y.o. presented today for pulmonary rehab. She stated that there have been  changes in medication or health since last visit. Pt had her anxiety medication refilled yesterday. Stephane Rees has a target heart rate of . She tolerated the exercise session well. Patient states RPE for session today was 14 and RPD was a 3. Her pain level upon finishing exercise session is a 0.  Today the pt did:    Nustep: 25 min level 4-6  Arm bike: 15 min 10 montalvo  Breathing retraining: 10 min  Stretches: 5 min            k    Physician available for emergencies: Jaja Alegira 8/25/2021 9:58 AM

## 2021-08-30 ENCOUNTER — HOSPITAL ENCOUNTER (OUTPATIENT)
Dept: PULMONOLOGY | Age: 71
Discharge: HOME | End: 2021-08-30
Payer: MEDICARE

## 2021-08-30 PROCEDURE — G0239 OTH RESP PROC, GROUP: HCPCS

## 2021-08-30 PROCEDURE — G0237 THERAPEUTIC PROCD STRG ENDUR: HCPCS

## 2021-08-30 NOTE — PROGRESS NOTES
Pulmonary/RT Daily Note      Visit # 7/36         Lyly Saldaña 73 y.o. presented today for pulmonary rehab. She stated that there have been  changes in medication or health since last visit.          Lyly Saldaña has a target heart rate of . She tolerated the exercise session well. Patient states RPE for session today was 14 and RPD was a 3. Her pain level upon finishing exercise session is a 0.  Today the pt did:     Nustep: 20 min level 5  Arm bike: 15 min 20 montalvo  Breathing retraining: 10 min  Stretches: 5 min            k    Physician available for emergencies: eLv Gray  :      Ana María López 8/30/2021 11:32 AM

## 2021-09-01 ENCOUNTER — HOSPITAL ENCOUNTER (OUTPATIENT)
Dept: PULMONOLOGY | Age: 71
Discharge: HOME | End: 2021-09-01
Payer: MEDICARE

## 2021-09-01 PROCEDURE — G0237 THERAPEUTIC PROCD STRG ENDUR: HCPCS

## 2021-09-01 PROCEDURE — G0239 OTH RESP PROC, GROUP: HCPCS

## 2021-09-01 NOTE — PROGRESS NOTES
Pulmonary/RT Daily Note      Visit # 8/36         Alisia Hercules 71 y.o. presented today for pulmonary rehab. She stated that there have been  changes in medication or health since last visit.          Xiao Hercules has a target heart rate of . She tolerated the exercise session well. Patient states RPE for session today was 15 and RPD was a 5. Her pain level upon finishing exercise session is a 0.  Today the pt did:     Nustep: 20 min level 4-6  Arm bike: 15 min 20 montalvo  Breathing retraining: 10 min

## 2021-09-02 NOTE — PROGRESS NOTES
Summary Report   Gila Rodarte  Pulmonary Tx Plan  Description: Female : 1950   Pulmonary ITP     RESP 1:1 from 2021 in [facility]   Co-morbidities Diabetes   Oxygen Use No   ITP Visit Type Re-Assessment   1st Date of Exercise 21   ITP Next Review Date 21   Visit #/Total Visits    Pulmonary ITP Exercise, Psychosocial, Tobacco, Education, Nutrition   Total Score    Stages of Change Action   Test Six minute walk test   Distance Walked in  ft   Distance Walked (ft) 1088 ft   Peak HR 89   Peak /86   Peak RPE 14   Peak Mets 2.6   O2 Saturation    Stops 0   SPO2 Range 96-98   BMI (calculated)    Mode Track, Treadmill, Bike, Stepper, Ergometer, Elliptical   Frequency per week 2 times   Duration per session 60 min   Intensity  METS       3-5   Progression slow-moderate   O2 Sat (%)    O2 Flow Rate (L/min)    Symptoms with Exercise Shortness of breath   Target Heart Rate    Resistance Training Yes   Assisted Devices None   Resting /82   Peak /81   Is BP WDL?  Yes   Target Goal(s) BP < 140/90 or < 130/80, if DM or CKD, Aerobic activity 30 + minutes/day  5 days/week, Home activity   Interventions PCP notified   Consults    Currently Taking Psychotropic Meds Yes   Target Goal(s) Maximizes coping skills, Positive support group   Uses Stress Mgmt Techniques Yes   Stages of Change Maintenance   Tobacco Use No   Quit Greater than 6 month   Date Started 73   Date Quit 10/07/11   # Cigarette Smoked/Day 1 pk   Smokeless Tobacco Use No   Smoking Cessation Referral No   Tobacco Adjunct Yes   Resource Information Provided No   Target Goal Complete cessation of tobacco use   Diabetes Yes   HbA1C    HbA1C Date    BG at Home No  [she does not take at home]   Date Lipids Drawn 18   Total 191   HDL 53   LDL 94.6   Triglycerides 217   Weight  83 kg (183 lb)   Height  5' 9\" (1.753 m)   BMI 27.08   Waist Circumference  41 in   Alcohol Special   Type wine, vokha Amount 1-0 cups   Nutrition Assessment Tool RYP   Rate Your Plate Total Score    Dietitian Consult No   Nurse/Patient Discussion Yes   Supplemental Nutrition n   Nutrition Class No   Diabetes Education Referral No   Lipid Clinic Referral No   Weight Management Referral No   Education Signs/Symptoms Hypo/Hyperglycemia, DM & CAD relationship, Healthy eating, Nutrition and lung disease, Supplemental dietary needs, Special diet   Target Goals Weight loss of 5-10%, LDL-C less than 70 for high risk, Non HDL-C less than 130   Learning Barrier Ready to learn   Education Tobacco triggers, CAD, Med Compliance, Cardiac A&P, Pulmonary Medications, Signs/Symptoms of Angina , Breaking the Dyspnea Cycle, Pulmonary A&P, lung/gas exchange, Heart/Lung association, Activity of Daily Living, Nebulizer Use, Preventing Infection, Bronchial Hygiene/controlled cough, Signs/Symptoms Hypo/hyperglycemia, DM & lung disease   Target Goals Correct demonstration of breathing techniques, Correct demonstration of MDI use and care         MD Signature: _______________________________________________        Date/Time:  _______________________________        Pharmacist Signature:_________________________________________        Date/Time: _______________________________

## 2021-09-08 ENCOUNTER — HOSPITAL ENCOUNTER (OUTPATIENT)
Dept: PULMONOLOGY | Age: 71
Discharge: HOME | End: 2021-09-08
Payer: MEDICARE

## 2021-09-08 PROCEDURE — G0239 OTH RESP PROC, GROUP: HCPCS

## 2021-09-08 PROCEDURE — G0237 THERAPEUTIC PROCD STRG ENDUR: HCPCS

## 2021-09-08 NOTE — PROGRESS NOTES
Pulmonary/RT Daily Note      Visit # 9/36         Mila Hercules 71 y.o. presented today for pulmonary rehab. She stated that there have been  changes in medication or health since last visit.          Xiao Hercules has a target heart rate of . She tolerated the exercise session well. Patient states RPE for session today was 14 and RPD was a 3. Her pain level upon finishing exercise session is a 0.  Today the pt did:     Nustep: 20 min level 5--7  Arm bike: 15 min 20 montalvo  Breathing retraining: 10 min  Stretches: 10 min              Physician available for emergencies: Juanita Burton

## 2021-09-13 ENCOUNTER — HOSPITAL ENCOUNTER (OUTPATIENT)
Dept: PULMONOLOGY | Age: 71
Discharge: HOME | End: 2021-09-13
Payer: MEDICARE

## 2021-09-13 PROCEDURE — G0237 THERAPEUTIC PROCD STRG ENDUR: HCPCS

## 2021-09-13 NOTE — PROGRESS NOTES
Pulmonary/RT Note    Visit #        10/36       Monica Shelbyr is a 73 y.o. female presented today for pulmonary rehab. She state that there have been no changes in medication or health since the last visit. She has a Crohn's disease, will hold off on walking on the treadmill today. She has a target heart rate of   . She tolerated the exercise session well and has a pain level of 0 after finishing exercises.  Today the pt did:     Nustep level: 20 min level 5-7  Arm bike:  15 min  Stretches: 15 min             Physician available for Yung Dudley 9/13/2021 9:32 AM

## 2021-09-15 ENCOUNTER — HOSPITAL ENCOUNTER (OUTPATIENT)
Dept: PULMONOLOGY | Age: 71
Discharge: HOME | End: 2021-09-15
Payer: MEDICARE

## 2021-09-15 ENCOUNTER — HOSPITAL ENCOUNTER (OUTPATIENT)
Dept: NUTRITION | Age: 71
Discharge: HOME | End: 2021-09-15
Payer: MEDICARE

## 2021-09-15 PROCEDURE — G0239 OTH RESP PROC, GROUP: HCPCS

## 2021-09-15 PROCEDURE — 97802 MEDICAL NUTRITION INDIV IN: CPT

## 2021-09-15 PROCEDURE — G0237 THERAPEUTIC PROCD STRG ENDUR: HCPCS

## 2021-09-15 NOTE — PROGRESS NOTES
Pulmonary/RT Note    Visit #       11/36        Dolly Clemons is a 73 y.o. female presented today for pulmonary rehab. She state that there have been changes in medication or health since the last visit. She states she's felt \"shaky\" at times throughout the day. Her blood pressure was 122/78, her blood sugar was taken her at the clinic and was normal (115). She has a hx of crohn's disease and has had frequent bowel movements prior to attending [place] had not adequately hydrated herself today. She had an appointment with a nutritionist today (possible celiac disease). She has an appointment next week with her PCP as well. She has a target heart rate of 106-120 . She tolerated the exercise session well and has a pain level of 0. Patient states RPE for session today was 14 and RPD was a 3.  Today the pt did:      Nustep:20 min level 4-6  Arm bike: 15 min  Breathing retraining:15 min         Physician available for emergencies: Kari Avelar 9/15/2021 1:30 PM

## 2021-09-16 NOTE — PROGRESS NOTES
[address] [place] [address]     Nutrition Assessment  Medical Nutrition Therapy   Outpatient Initial Evaluation         Patient Name: Rodrigue Cid : 1950   Treatment Diagnosis: IBS, Type 2 diabetes. Referral Source: Kurt Wood MD [facility] [place]): 2021     Gender: female Age: 73 y.o. Ht: 69 in Wt:  173 lb  kg   BMI: 32 BMR   Male  BMR Female [id number]     Past Medical History:  HTN  Type 2 diabetes     Pertinent Medications:   Metformin     Biochemical Data:   Lab Results   Component Value Date/Time    Hemoglobin A1c 7.9 (H) 2017 04:31 PM     Lab Results   Component Value Date/Time    Sodium 135 (L) 2021 10:50 PM    Potassium 3.8 2021 10:50 PM    Chloride 104 2021 10:50 PM    CO2 22 2021 10:50 PM    Anion gap 9 2021 10:50 PM    Glucose 199 (H) 2021 10:50 PM    BUN 16 2021 10:50 PM    Creatinine 1.20 2021 10:50 PM    BUN/Creatinine ratio 13 2021 10:50 PM    GFR est AA 54 (L) 2021 10:50 PM    GFR est non-AA 45 (L) 2021 10:50 PM    Calcium 10.3 (H) 2021 10:50 PM    Bilirubin, total 0.6 10/30/2020 03:16 AM    Alk. phosphatase 71 10/30/2020 03:16 AM    Protein, total 6.3 (L) 10/30/2020 03:16 AM    Albumin 3.6 10/30/2020 03:16 AM    Globulin 2.7 10/30/2020 03:16 AM    A-G Ratio 1.3 10/30/2020 03:16 AM    ALT (SGPT) 28 10/30/2020 03:16 AM    AST (SGOT) 18 10/30/2020 03:16 AM     Lab Results   Component Value Date/Time    Cholesterol, total 191 2018 09:39 AM    HDL Cholesterol 53 2018 09:39 AM    LDL, calculated 94.6 2018 09:39 AM    VLDL, calculated 43.4 2018 09:39 AM    Triglyceride 217 (H) 2018 09:39 AM    CHOL/HDL Ratio 3.6 2018 09:39 AM     Lab Results   Component Value Date/Time    ALT (SGPT) 28 10/30/2020 03:16 AM    AST (SGOT) 18 10/30/2020 03:16 AM    Alk.  phosphatase 71 10/30/2020 03:16 AM    Bilirubin, direct <0.1 2013 02:20 PM    Bilirubin, total 0.6 10/30/2020 03:16 AM     Lab Results   Component Value Date/Time    Creatinine 1.20 04/29/2021 10:50 PM     Lab Results   Component Value Date/Time    BUN 16 04/29/2021 10:50 PM     Lab Results   Component Value Date/Time    Microalb/Creat ratio (ug/mg creat.) 12.6 06/09/2015 04:06 PM        Assessment:   Patient is a 73year old female who has struggles with IBS and type 2 diabetes. Food & Nutrition: Patient shares that she is losing her appetite since most things she eats makes her uncomfortable. Estimate Needs   Calories: 8494-3929  Protein: 60-90 Carbs: <150 Fat: 60-70   Kcal/day  g/day  g/day  g/day                            Nutrition Diagnosis Altered nutrition related laboratory values related to Crohn's disease. Altered nutrition related laboratory values related to Type 2 Diabetes as evidenced by Hgb A1c of 7.9. Nutrition Intervention &  Education: IBS disease guidance.    Handouts Provided: []  Carbohydrates  []  Protein  []  Non-starchy Vegatbles  []  Food Label  []  Meal and Snack Ideas  []  Food Journals [x]  Diabetes  []  Cholesterol  []  Sodium  []  Gen Nutr Guidelines  []  SBGM Guidelines  [x]  Others:  IBS guidance   Information Reviewed with: Patient   Readiness to Change Stage: []  Pre-contemplative    []  Contemplative  []  Preparation               [x]  Action                  []  Maintenance   Potential Barriers to Learning: []  Decline in memory    []  Language barrier   []  Other:  []  Emotional                  []  Limited mobility  []  Lack of motivation     [] Vision, hearing or cognitive impairment   Expected Compliance: Good     Nutritional Goal - To promote lifestyle changes to result in:    []  Weight loss  []  Improved diabetic control  []  Decreased cholesterol levels  []  Decreased blood pressure  [x]  Weight maintenance [x]  Preventing any interactions associated with food allergies  []  Adequate weight gain toward goal weight  []  Other:     Patient Goals: To live without discomfort from the IBS as much as she is able. Dietitian Signature:  Eliu Walker RD Date: 09/15/2021   Follow-up: October 12th at 9:00 am Time: 3:34 PM

## 2021-09-20 ENCOUNTER — HOSPITAL ENCOUNTER (OUTPATIENT)
Dept: PULMONOLOGY | Age: 71
Discharge: HOME | End: 2021-09-20
Payer: MEDICARE

## 2021-09-20 PROCEDURE — G0237 THERAPEUTIC PROCD STRG ENDUR: HCPCS

## 2021-09-20 NOTE — CARDIO/PULMONARY
-Pulmonary/RT Note    Visit #               Vandana Mena is a 73 y.o. female presented today for pulmonary rehab. She state that there have been no changes in medication or health since the last visit. She has a target heart rate of . She tolerated the exercise session well and has a pain level of 0. Patient states RPE for session today was 15 and RPD was a 3.  Today the pt did:      Nustep 20 min level:5-7   Arm bike: 15 min  [place] min   Breathing retraining:15 min             Physician available for Linden Blizzard 2021 10:12 AM

## 2021-09-22 ENCOUNTER — APPOINTMENT (OUTPATIENT)
Dept: PULMONOLOGY | Age: 71
End: 2021-09-22
Payer: MEDICARE

## 2021-09-27 ENCOUNTER — HOSPITAL ENCOUNTER (OUTPATIENT)
Dept: PULMONOLOGY | Age: 71
Discharge: HOME | End: 2021-09-27
Payer: MEDICARE

## 2021-09-27 PROCEDURE — G0237 THERAPEUTIC PROCD STRG ENDUR: HCPCS

## 2021-09-27 NOTE — PROGRESS NOTES
Pulmonary/RT Note     Visit #        13/36         Heron Christensen is a 73 y.o. female presented today for pulmonary rehab. She state that there have been changes in medication or health since the last visit. She ran out of her Stiolto, her pharmacy will be receiving her inhalers on Wednesday, she will be taking her rescue inhaler more frequently as needed in the mean time. She shared that she hadn't eaten this morning and felt a short episode of lightheadedness, she denied feeling weak or dizzy and felt that she could exercise. She was advised to eat prior to attending [place].          She has a target heart rate of . She tolerated the exercise session well and has a pain level of 0. Patient states RPE for session today was 15 and RPD was a 5.  Today the pt did:        Nustep 20 min level:7, 8 for 4 min  Arm bike: 15 min  Breathing retraining:15 min  Stretch: 5 min             Physician available for emergencies:Bolivar

## 2021-09-29 NOTE — PROGRESS NOTES
Summary Report   Brennan Ards  Pulmonary Tx Plan  Description: Female : 1950   Pulmonary ITP     RESP 1:1 from 2021 in [facility]   Co-morbidities Diabetes   Oxygen Use No   ITP Visit Type Re-Assessment   1st Date of Exercise 21   ITP Next Review Date 10/27/21   Visit #/Total Visits 13/36   Pulmonary ITP Exercise, Psychosocial, Tobacco, Education, Nutrition   Stages of Change Action   Test Six minute walk test   Distance Walked in  ft   Distance Walked (ft) 1088 ft   Peak HR 89   Peak /86   Peak RPE 14   Peak Mets 2.6   O2 Saturation    Stops 0   SPO2 Range 96-98   BMI (calculated)    Mode Track, Treadmill, Bike, Stepper, Ergometer, Elliptical   Frequency per week 2 times   Duration per session 60 min   Intensity  METS       3-5   Progression slow-moderate   O2 Sat (%)    Symptoms with Exercise Shortness of breath   Target Heart Rate    Resistance Training Yes   Assisted Devices None   Resting /78   Peak /80   Is BP WDL?  No   Target Goal(s) BP < 140/90 or < 130/80, if DM or CKD, Home activity, Aerobic activity 30 + minutes/day  5 days/week   Stages of Change Maintenance   Interventions Currently under treatment for depression   Currently Taking Psychotropic Meds Yes   Target Goal(s) Maximizes coping skills, Positive support group   Uses Stress Mgmt Techniques Yes   Stages of Change Maintenance   Tobacco Use No   Quit Greater than 6 month   Date Started 73   Date Quit 10/07/11   # Cigarette Smoked/Day 1 pk   Smokeless Tobacco Use No   Smoking Cessation Referral No   Tobacco Adjunct Yes   Resource Information Provided No   Target Goal Complete cessation of tobacco use   Diabetes Yes   HbA1C 6.9   HbA1C Date 21   BG at Home No   Date Lipids Drawn 18   Total 191   HDL 53   LDL 94.6   Triglycerides 217   Weight  83 kg (183 lb)   Height  5' 9\" (1.753 m)   BMI 27.08   Waist Circumference  41 in   Alcohol Special   Type wine, vokha   Amount 1-0 cups Nutrition Assessment Tool RYP   Dietitian Consult No   Nurse/Patient Discussion Yes   Supplemental Nutrition n   Nutrition Class No   Diabetes Education Referral No   Lipid Clinic Referral No   Weight Management Referral No   Education DM & CAD relationship, Low fat & cholesterol diet, Low sodium diet, High fiber diet, Healthy eating, Supplemental dietary needs, Nutrition and lung disease   Target Goals Weight loss of 5-10%, LDL-C less than 70 for high risk, Non HDL-C less than 130   Learning Barrier Ready to learn   Education Risk factors, Med Compliance, Cardiac A&P, Signs/Symptoms of Angina , Breaking the Dyspnea Cycle, Pulmonary Medications, Pulmonary A&P, lung/gas exchange, Heart/Lung association, Activity of Daily Living, Nebulizer Use   Target Goals Correct demonstration of breathing techniques, Correct demonstration of MDI use and care         MD Signature: _______________________________________________        Date/Time:  _______________________________        Pharmacist Signature:_________________________________________        Date/Time: _______________________________      Goals Comments   1. Be more comfortable without becoming very SOB within the next 30 days []? initial  []? met                             []? not met  []? progressing  N/A   will assess in 30-60 day   2. COPD and education []? initial  []? met                             []? not met  [x]? progressing     3. Change the bed sheets without needing help within 60 days []? initial  []? met                             []? not met  []? progressing  N/A  will assess in 30-60 days   4. Pace herself and save energy when exerting within 60 days    []? initial  []? met                             []? not met  []?  progressing  N/A   will assess in 30-60 day

## 2021-10-04 ENCOUNTER — HOSPITAL ENCOUNTER (OUTPATIENT)
Dept: PULMONOLOGY | Age: 71
Discharge: HOME | End: 2021-10-04
Payer: MEDICARE

## 2021-10-04 PROCEDURE — G0237 THERAPEUTIC PROCD STRG ENDUR: HCPCS

## 2021-10-04 NOTE — PROGRESS NOTES
Pulmonary/RT Note     Visit #        56/92         Alto Carrel Frost is a 73 y.o. female presented today for pulmonary rehab. She state that there have been changes in medication or health since the last visit. She demonstrated good technique and breath coordination while taking her stiolto in the clinic today. She was more short of breath today and required long recovery periods ( dyspneic wise) between exercises today.          She has a target heart rate of . She tolerated the exercise session well and has a pain level of 0. Patient states RPE for session today was 15 and RPD was a 5.  Today the pt did:        Nustep 20 min level:6-7   Arm bike: 15 min  Breathing retraining:15 min         Physician available for emergencies: Zaki Kim 10/4/2021 11:25 AM

## 2021-10-06 ENCOUNTER — HOSPITAL ENCOUNTER (OUTPATIENT)
Dept: PULMONOLOGY | Age: 71
Discharge: HOME | End: 2021-10-06
Payer: MEDICARE

## 2021-10-06 PROCEDURE — G0239 OTH RESP PROC, GROUP: HCPCS

## 2021-10-06 PROCEDURE — G0237 THERAPEUTIC PROCD STRG ENDUR: HCPCS

## 2021-10-06 NOTE — PROGRESS NOTES
Pulmonary/RT Note    Visit #       15/36        Elaine Qiu is a 73 y.o. female presented today for pulmonary rehab. She state that there have been no changes in medication or health since the last visit. Her stress levels have been higher the past week, she has had family loss. She has a target heart rate of 106-120 . She tolerated the exercise session well and has a pain level of 0. Patient states RPE for session today was 14 and RPD was a 3.  Today the pt did:        Nustep:20 min level 6-7  Arm bike: 15 min  Breathing retraining:15 min     Physician available for emergencies: Joselo Root 10/6/2021 11:38 AM

## 2021-10-12 ENCOUNTER — HOSPITAL ENCOUNTER (OUTPATIENT)
Dept: NUTRITION | Age: 71
End: 2021-10-12

## 2021-10-13 ENCOUNTER — HOSPITAL ENCOUNTER (OUTPATIENT)
Dept: PULMONOLOGY | Age: 71
Discharge: HOME | End: 2021-10-13
Payer: MEDICARE

## 2021-10-13 PROCEDURE — G0239 OTH RESP PROC, GROUP: HCPCS

## 2021-10-13 PROCEDURE — G0237 THERAPEUTIC PROCD STRG ENDUR: HCPCS

## 2021-10-13 NOTE — PROGRESS NOTES
Pulmonary/RT Note     Visit #       16/36          Elizabet Perez is a 73 y.o. female presented today for pulmonary rehab. She state that there have been no changes in medication or health since the last visit.         She has a target heart rate of 106-120 .She tolerated the exercise session well and has a pain level of 0. Patient states RPE for session today was 14 and RPD was a 3.  Today the pt did:        Nustep:20 min level 6-7  Arm bike: 15 min  Breathing retraining:15 min     Physician available for emergencies: Bolivar Sarmiento 10/6/2021 11:38 AM

## 2021-10-18 ENCOUNTER — APPOINTMENT (OUTPATIENT)
Dept: PULMONOLOGY | Age: 71
End: 2021-10-18
Payer: MEDICARE

## 2021-10-20 ENCOUNTER — APPOINTMENT (OUTPATIENT)
Dept: PULMONOLOGY | Age: 71
End: 2021-10-20
Payer: MEDICARE

## 2021-10-22 ENCOUNTER — APPOINTMENT (OUTPATIENT)
Dept: PULMONOLOGY | Age: 71
End: 2021-10-22
Payer: MEDICARE

## 2021-10-25 ENCOUNTER — APPOINTMENT (OUTPATIENT)
Dept: PULMONOLOGY | Age: 71
End: 2021-10-25
Payer: MEDICARE

## 2021-10-25 NOTE — PROGRESS NOTES
Pt called out today from [place], she shared we would not be able to make it Wednesday or Friday of this week.

## 2021-10-27 ENCOUNTER — APPOINTMENT (OUTPATIENT)
Dept: PULMONOLOGY | Age: 71
End: 2021-10-27
Payer: MEDICARE

## 2021-10-28 NOTE — PROGRESS NOTES
Summary Report   Danica Glez  Pulmonary Tx Plan  Description: Female : 1950     Pulmonary ITP    Pt has been on vacation and has not attended [place] over the past week. RESP 1:1 from   [facility]   Most recent reading at 10/28/2021  1:17 PM   Co-morbidities Diabetes   Oxygen Use No   ITP Visit Type Re-Assessment   1st Date of Exercise 21   ITP Next Review Date 21   Visit #/Total Visits    Pulmonary ITP Exercise, Psychosocial, Tobacco, Education, Nutrition   Stages of Change Action   Test Six minute walk test   Distance Walked in  ft   Distance Walked (ft) 1088 ft   Peak HR 89   Peak /86   Peak RPE 14   Peak Mets 2.6   Stops 0   SPO2 Range 96-98   BMI (calculated) --   Mode Track, Treadmill, Bike, Stepper, Ergometer, Elliptical   Frequency per week 2 times   Duration per session 60 min   Intensity  METS       3-5   Progression slow-moderate   O2 Sat (%) --   Symptoms with Exercise Shortness of breath   Target Heart Rate    Resistance Training Yes   Assisted Devices None   Resting /75   Peak /76   Is BP WDL?  No   Target Goal(s) BP < 140/90 or < 130/80, if DM or CKD, Home activity, Aerobic activity 30 + minutes/day  5 days/week   Stages of Change Maintenance   Interventions Currently under treatment for depression   Currently Taking Psychotropic Meds Yes   Target Goal(s) Maximizes coping skills, Positive support group   Uses Stress Mgmt Techniques Yes   Stages of Change Maintenance   Tobacco Use No   Quit Greater than 6 month   Date Started 73   Date Quit 10/07/11   # Cigarette Smoked/Day 1 pk   Smokeless Tobacco Use No   Smoking Cessation Referral No   Tobacco Adjunct Yes   Resource Information Provided No   Target Goal Complete cessation of tobacco use   Diabetes Yes   HbA1C 6.9   HbA1C Date --   BG at Home No   Date Lipids Drawn 18   Total 191   HDL 53   LDL 94.6   Triglycerides 217   Weight  79.8 kg (176 lb)   Height  5' 9\" (1.753 m)   BMI 26.05   Waist Circumference  41 in   Alcohol Special   Type wine, vokha   Amount 1-0 cups   Nutrition Assessment Tool RYP   Dietitian Consult No   Nurse/Patient Discussion Yes   Supplemental Nutrition n   Nutrition Class No   Diabetes Education Referral No   Lipid Clinic Referral No   Weight Management Referral No   Education High fiber diet, Healthy eating, Nutrition and lung disease, Supplemental dietary needs, Special diet   Target Goals Weight loss of 5-10%, LDL-C less than 70 for high risk, Non HDL-C less than 130   Learning Barrier Ready to learn   Education Tobacco triggers, Risk factors, Pulmonary Medications, Pulmonary A&P, lung/gas exchange, Breaking the Dyspnea Cycle, Heart/Lung association   Target Goals Correct demonstration of breathing techniques, Correct demonstration of MDI use and care         MD Signature: _______________________________________________        Date/Time:  _______________________________        Pharmacist Signature:_________________________________________        Date/Time: _______________________________      Goals Comments   1. Be more comfortable without becoming very SOB within the next 30 days []? ? initial  []?? met                             []?? not met  []? ? progressing  N/A  will assess within 30 days   2. COPD and education []? ? initial  [x]? ? met                             []?? not met  []? ? progressing  N/A     3. Change the bed sheets without needing help within 60 days []? ? initial  []?? met                             []?? not met  []? ? progressing  N/A Will assess in 30 days   4. Pace herself and save energy when exerting within 60 days    []?? initial  []?? met                             []?? not met  []? ? progressing  N/A Will assess in 30 days

## 2021-10-29 ENCOUNTER — APPOINTMENT (OUTPATIENT)
Dept: PULMONOLOGY | Age: 71
End: 2021-10-29
Payer: MEDICARE

## 2021-11-01 ENCOUNTER — HOSPITAL ENCOUNTER (OUTPATIENT)
Dept: PULMONOLOGY | Age: 71
Discharge: HOME | End: 2021-11-01
Payer: MEDICARE

## 2021-11-01 PROCEDURE — G0237 THERAPEUTIC PROCD STRG ENDUR: HCPCS

## 2021-11-01 NOTE — PROGRESS NOTES
Pt arrived to [place] today with elevated stress levels (vital signs are normal), she couldn't tolerate her exercises (except walking), she's leaving town this week and will be contacting us for updates on resuming her [place].

## 2021-11-03 ENCOUNTER — APPOINTMENT (OUTPATIENT)
Dept: PULMONOLOGY | Age: 71
End: 2021-11-03
Payer: MEDICARE

## 2021-11-05 ENCOUNTER — APPOINTMENT (OUTPATIENT)
Dept: PULMONOLOGY | Age: 71
End: 2021-11-05
Payer: MEDICARE

## 2021-11-08 ENCOUNTER — APPOINTMENT (OUTPATIENT)
Dept: PULMONOLOGY | Age: 71
End: 2021-11-08
Payer: MEDICARE

## 2021-11-10 ENCOUNTER — HOSPITAL ENCOUNTER (OUTPATIENT)
Dept: PULMONOLOGY | Age: 71
End: 2021-11-10
Payer: MEDICARE

## 2021-11-11 NOTE — PROGRESS NOTES
Pulmonary Rehab Routine Discharge Psychosocial Note     Program Completed: WESLY Wheatley 73 y.o. 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 She had fair attendance and put forth good effort with all activities. She has completed her six minute walk test, walking 76 ft further today than on day of evaluation. She does not require supplemental oxygen with exercise. She is tobacco free.

## 2021-11-12 ENCOUNTER — APPOINTMENT (OUTPATIENT)
Dept: PULMONOLOGY | Age: 71
End: 2021-11-12
Payer: MEDICARE

## 2021-11-15 ENCOUNTER — APPOINTMENT (OUTPATIENT)
Dept: PULMONOLOGY | Age: 71
End: 2021-11-15
Payer: MEDICARE

## 2022-03-09 ENCOUNTER — APPOINTMENT (OUTPATIENT)
Dept: GENERAL RADIOLOGY | Age: 72
End: 2022-03-09
Attending: PHYSICIAN ASSISTANT
Payer: MEDICARE

## 2022-03-09 ENCOUNTER — APPOINTMENT (OUTPATIENT)
Dept: CT IMAGING | Age: 72
End: 2022-03-09
Attending: PHYSICIAN ASSISTANT
Payer: MEDICARE

## 2022-03-09 ENCOUNTER — HOSPITAL ENCOUNTER (EMERGENCY)
Age: 72
Discharge: HOME | End: 2022-03-09
Attending: STUDENT IN AN ORGANIZED HEALTH CARE EDUCATION/TRAINING PROGRAM
Payer: MEDICARE

## 2022-03-09 ENCOUNTER — APPOINTMENT (OUTPATIENT)
Dept: ULTRASOUND IMAGING | Age: 72
End: 2022-03-09
Attending: EMERGENCY MEDICINE
Payer: MEDICARE

## 2022-03-09 VITALS
WEIGHT: 176 LBS | SYSTOLIC BLOOD PRESSURE: 166 MMHG | HEIGHT: 69 IN | HEART RATE: 72 BPM | RESPIRATION RATE: 18 BRPM | TEMPERATURE: 97.9 F | OXYGEN SATURATION: 99 % | DIASTOLIC BLOOD PRESSURE: 86 MMHG | BODY MASS INDEX: 26.07 KG/M2

## 2022-03-09 DIAGNOSIS — I10 HYPERTENSION, UNSPECIFIED TYPE: Primary | ICD-10-CM

## 2022-03-09 LAB
25(OH)D3 SERPL-MCNC: 11.4 NG/ML (ref 30–100)
ALBUMIN SERPL-MCNC: 3.7 G/DL (ref 3.4–5)
ALBUMIN/GLOB SERPL: 1.2 {RATIO} (ref 0.8–1.7)
ALP SERPL-CCNC: 93 U/L (ref 45–117)
ALT SERPL-CCNC: 28 U/L (ref 13–56)
ANION GAP SERPL CALC-SCNC: 3 MMOL/L (ref 3–18)
APPEARANCE UR: CLEAR
AST SERPL-CCNC: 13 U/L (ref 10–38)
ATRIAL RATE: 68 BPM
BASOPHILS # BLD: 0 K/UL (ref 0–0.1)
BASOPHILS NFR BLD: 0 % (ref 0–2)
BILIRUB SERPL-MCNC: 0.7 MG/DL (ref 0.2–1)
BILIRUB UR QL: NEGATIVE
BUN SERPL-MCNC: 14 MG/DL (ref 7–18)
BUN/CREAT SERPL: 12 (ref 12–20)
CALCIUM SERPL-MCNC: 10.5 MG/DL (ref 8.5–10.1)
CALCIUM SERPL-MCNC: 10.5 MG/DL (ref 8.5–10.1)
CALCULATED P AXIS, ECG09: 56 DEGREES
CALCULATED R AXIS, ECG10: -19 DEGREES
CALCULATED T AXIS, ECG11: 69 DEGREES
CHLORIDE SERPL-SCNC: 105 MMOL/L (ref 100–111)
CO2 SERPL-SCNC: 30 MMOL/L (ref 21–32)
COLOR UR: YELLOW
CREAT SERPL-MCNC: 1.2 MG/DL (ref 0.6–1.3)
CREAT UR-MCNC: 70 MG/DL (ref 30–125)
DIAGNOSIS, 93000: NORMAL
DIFFERENTIAL METHOD BLD: ABNORMAL
EOSINOPHIL # BLD: 0.1 K/UL (ref 0–0.4)
EOSINOPHIL NFR BLD: 2 % (ref 0–5)
ERYTHROCYTE [DISTWIDTH] IN BLOOD BY AUTOMATED COUNT: 12.7 % (ref 11.6–14.5)
GLOBULIN SER CALC-MCNC: 3.1 G/DL (ref 2–4)
GLUCOSE SERPL-MCNC: 163 MG/DL (ref 74–99)
GLUCOSE UR STRIP.AUTO-MCNC: NEGATIVE MG/DL
HCT VFR BLD AUTO: 42 % (ref 35–45)
HGB BLD-MCNC: 13.7 G/DL (ref 12–16)
HGB UR QL STRIP: NEGATIVE
IMM GRANULOCYTES # BLD AUTO: 0 K/UL (ref 0–0.04)
IMM GRANULOCYTES NFR BLD AUTO: 0 % (ref 0–0.5)
KETONES UR QL STRIP.AUTO: NEGATIVE MG/DL
LEUKOCYTE ESTERASE UR QL STRIP.AUTO: NEGATIVE
LYMPHOCYTES # BLD: 1.8 K/UL (ref 0.9–3.6)
LYMPHOCYTES NFR BLD: 31 % (ref 21–52)
MCH RBC QN AUTO: 27.5 PG (ref 24–34)
MCHC RBC AUTO-ENTMCNC: 32.6 G/DL (ref 31–37)
MCV RBC AUTO: 84.3 FL (ref 78–100)
MONOCYTES # BLD: 0.3 K/UL (ref 0.05–1.2)
MONOCYTES NFR BLD: 5 % (ref 3–10)
NEUTS SEG # BLD: 3.6 K/UL (ref 1.8–8)
NEUTS SEG NFR BLD: 62 % (ref 40–73)
NITRITE UR QL STRIP.AUTO: NEGATIVE
NRBC # BLD: 0 K/UL (ref 0–0.01)
NRBC BLD-RTO: 0 PER 100 WBC
P-R INTERVAL, ECG05: 176 MS
PH UR STRIP: 6.5 [PH] (ref 5–8)
PLATELET # BLD AUTO: 283 K/UL (ref 135–420)
PMV BLD AUTO: 9.1 FL (ref 9.2–11.8)
POTASSIUM SERPL-SCNC: 4.6 MMOL/L (ref 3.5–5.5)
PROT SERPL-MCNC: 6.8 G/DL (ref 6.4–8.2)
PROT UR STRIP-MCNC: NEGATIVE MG/DL
PROT UR-MCNC: 15 MG/DL
PTH-INTACT SERPL-MCNC: 105.6 PG/ML (ref 18.4–88)
Q-T INTERVAL, ECG07: 372 MS
QRS DURATION, ECG06: 80 MS
QTC CALCULATION (BEZET), ECG08: 395 MS
RBC # BLD AUTO: 4.98 M/UL (ref 4.2–5.3)
SODIUM SERPL-SCNC: 138 MMOL/L (ref 136–145)
SODIUM UR-SCNC: 61 MMOL/L (ref 20–110)
SP GR UR REFRACTOMETRY: 1.01 (ref 1–1.03)
TROPONIN-HIGH SENSITIVITY: 6 NG/L (ref 0–54)
UROBILINOGEN UR QL STRIP.AUTO: 1 EU/DL (ref 0.2–1)
VENTRICULAR RATE, ECG03: 68 BPM
WBC # BLD AUTO: 5.8 K/UL (ref 4.6–13.2)

## 2022-03-09 PROCEDURE — 85025 COMPLETE CBC W/AUTO DIFF WBC: CPT

## 2022-03-09 PROCEDURE — 74011250637 HC RX REV CODE- 250/637: Performed by: PHYSICIAN ASSISTANT

## 2022-03-09 PROCEDURE — 84156 ASSAY OF PROTEIN URINE: CPT

## 2022-03-09 PROCEDURE — 71046 X-RAY EXAM CHEST 2 VIEWS: CPT

## 2022-03-09 PROCEDURE — 99285 EMERGENCY DEPT VISIT HI MDM: CPT

## 2022-03-09 PROCEDURE — 82306 VITAMIN D 25 HYDROXY: CPT

## 2022-03-09 PROCEDURE — 76770 US EXAM ABDO BACK WALL COMP: CPT

## 2022-03-09 PROCEDURE — 74011250637 HC RX REV CODE- 250/637: Performed by: EMERGENCY MEDICINE

## 2022-03-09 PROCEDURE — 80053 COMPREHEN METABOLIC PANEL: CPT

## 2022-03-09 PROCEDURE — 82570 ASSAY OF URINE CREATININE: CPT

## 2022-03-09 PROCEDURE — 81003 URINALYSIS AUTO W/O SCOPE: CPT

## 2022-03-09 PROCEDURE — 84300 ASSAY OF URINE SODIUM: CPT

## 2022-03-09 PROCEDURE — 84484 ASSAY OF TROPONIN QUANT: CPT

## 2022-03-09 PROCEDURE — 70450 CT HEAD/BRAIN W/O DYE: CPT

## 2022-03-09 PROCEDURE — 83970 ASSAY OF PARATHORMONE: CPT

## 2022-03-09 PROCEDURE — 93005 ELECTROCARDIOGRAM TRACING: CPT

## 2022-03-09 RX ORDER — LISINOPRIL 10 MG/1
10 TABLET ORAL
Status: COMPLETED | OUTPATIENT
Start: 2022-03-09 | End: 2022-03-09

## 2022-03-09 RX ORDER — LISINOPRIL 10 MG/1
10 TABLET ORAL DAILY
Qty: 30 TABLET | Refills: 2 | Status: SHIPPED | OUTPATIENT
Start: 2022-03-09 | End: 2022-04-08

## 2022-03-09 RX ORDER — ACETAMINOPHEN 500 MG
1000 TABLET ORAL
Status: COMPLETED | OUTPATIENT
Start: 2022-03-09 | End: 2022-03-09

## 2022-03-09 RX ADMIN — ACETAMINOPHEN 1000 MG: 500 TABLET ORAL at 11:13

## 2022-03-09 RX ADMIN — LISINOPRIL 10 MG: 10 TABLET ORAL at 13:16

## 2022-03-09 NOTE — ED NOTES
I performed a brief evaluation, including history and physical, of the patient here in triage and I have determined that pt will need further treatment and evaluation from the main side ER physician. I have placed initial orders to help in expediting patients care.      March 09, 2022 at 11:07 AM - DIANA Soria

## 2022-03-09 NOTE — ED TRIAGE NOTES
Pt arrives with concerns of high blood pressure. Pt was recently taken off of her high BP meds by her primary care doctor but when she went to the nephrologist yesterday her pressure was high. This morning when she self checked her pressure was in the 170s over 1-teens and she was concerned for having a stroke so she came in. Pt also complains of a headache that feels like her head is \"exploding. \"

## 2022-03-09 NOTE — ED PROVIDER NOTES
EMERGENCY DEPARTMENT HISTORY AND PHYSICAL EXAM    Date: 3/9/2022  Patient Name: Adam Palomo    History of Presenting Illness     Chief Complaint   Patient presents with    Headache    Hypertension         History Provided By: Patient      Additional History (Context): Adam Palomo is a 73 y.o. female with diabetes, hypertension and renal insufficiency who presents with c/o dizziness and HA's for several weeks now. Her PCP d/c'd her lisinopril but pt still had several weeks of meds left and completed them approx 1mo ago. Symptoms began @a week after that. Her nephrologist saw her yesterday and recommends full renal w/up. Denies vision changes, unilateral weakness, CP, SOB. HA is generalized. Denies n/v.    PCP: Frank Green MD    Current Outpatient Medications   Medication Sig Dispense Refill    lisinopriL (PriniviL) 10 mg tablet Take 1 Tablet by mouth daily for 30 days. 30 Tablet 2    tiotropium-olodateroL (Stiolto Respimat) 2.5-2.5 mcg/actuation inhaler Take 2 Puffs by inhalation daily. Please load and prime for patient 3 Inhaler 3    albuterol (PROVENTIL HFA, VENTOLIN HFA, PROAIR HFA) 90 mcg/actuation inhaler Take 1-2 Puffs by inhalation every four (4) hours as needed for Wheezing or Shortness of Breath. 1 Inhaler 5    pantoprazole (PROTONIX) 40 mg tablet TAKE ONE TABLET BY MOUTH DAILY (Patient taking differently: Take 40 mg by mouth every twelve (12) hours.) 90 Tab 2    atorvastatin (LIPITOR) 20 mg tablet TAKE ONE TABLET BY MOUTH DAILY (Patient taking differently: Take 20 mg by mouth daily.) 30 Tab 0    metFORMIN (GLUCOPHAGE) 500 mg tablet Take 1 Tab by mouth two (2) times daily (with meals). (Patient taking differently: Take 1,000 mg by mouth two (2) times daily (with meals). ) 60 Tab 6    lisinopril (PRINIVIL, ZESTRIL) 5 mg tablet Take 1 Tab by mouth daily.  80 Tab 1       Past History     Past Medical History:  Past Medical History:   Diagnosis Date    Abnormal nuclear cardiac imaging test 2015    Low risk. No ischemia or prior infarction. No RWMA. EF 68%. Neg EKG on pharm stress test.    Agatston CAC score, <100 05/15/2015    Coronary calcium score 70.  Chronic kidney disease     early stage kidney failure    Diabetes ([facility] [address]     Diabetes mellitus ([facility] [address]     GERD (gastroesophageal reflux disease)     crohn's disease    History of echocardiogram 2013    EF 65%. No RWMA. Mod LVH. Gr 1 DDfx. RVSP 25 mmHg.  HX OTHER MEDICAL     anxiety, \"mitral valve prolapse\"    Hyperlipidemia     Hypertension     Psychiatric disorder     anxiety    RLE venous duplex     in her 25s       Past Surgical History:  Past Surgical History:   Procedure Laterality Date    HX CHOLECYSTECTOMY      HX GYN      hysterectomy    HX OTHER SURGICAL      FATTY TISSUE REMOVED    HX TONSILLECTOMY      IL ABDOMEN SURGERY PROC UNLISTED      tumor removal       Family History:  Family History   Problem Relation Age of Onset    Heart Disease Father     Heart Attack Father     Heart Disease Paternal Grandmother     Heart Disease Paternal Grandfather     Breast Cancer Mother     Cancer Mother        Social History:  Social History     Tobacco Use    Smoking status: Former Smoker     Packs/day: 1.00     Years: 30.00     Pack years: 30.00     Types: Cigarettes     Quit date: 10/19/2012     Years since quittin.3    Smokeless tobacco: Former User   Vaping Use    Vaping Use: Never used   Substance Use Topics    Alcohol use: No     Alcohol/week: 0.0 standard drinks     Comment: occasional    Drug use: No       Allergies: Allergies   Allergen Reactions    Fish Containing Products Anaphylaxis and Other (comments)     Heart stops    Iodine Anaphylaxis and Other (comments)     Heart stops    Shellfish Derived Anaphylaxis and Other (comments)     Heart stops    Bee Venom Protein (Honey Bee) Hives         Review of Systems   Review of Systems   Constitutional: Negative for fatigue. HENT: Negative. Eyes: Negative. Negative for visual disturbance. Respiratory: Negative for shortness of breath. Cardiovascular: Negative for chest pain. Gastrointestinal: Negative for abdominal pain, nausea and vomiting. Endocrine: Negative. Genitourinary: Negative. Musculoskeletal: Positive for gait problem. Skin: Negative. Allergic/Immunologic: Negative. Neurological: Positive for dizziness, light-headedness and headaches. Hematological: Negative. Psychiatric/Behavioral: Negative. All Other Systems Negative  Physical Exam     Vitals:    03/09/22 1107   BP: (!) 170/91   Pulse: 83   Resp: 18   Temp: 97.8 °F (36.6 °C)   SpO2: 98%   Weight: 79.8 kg (176 lb)   Height: 5' 9\" (1.753 m)     Physical Exam  Vitals and nursing note reviewed. Constitutional:       Appearance: She is well-developed. HENT:      Head: Normocephalic and atraumatic. Right Ear: External ear normal.      Left Ear: External ear normal.      Nose: Nose normal.   Eyes:      Conjunctiva/sclera: Conjunctivae normal.      Pupils: Pupils are equal, round, and reactive to light. Neck:      Vascular: No JVD. Trachea: No tracheal deviation. Cardiovascular:      Rate and Rhythm: Normal rate and regular rhythm. Heart sounds: Normal heart sounds. No murmur heard. No friction rub. No gallop. Pulmonary:      Effort: Pulmonary effort is normal. No respiratory distress. Breath sounds: Normal breath sounds. No wheezing or rales. Abdominal:      General: Bowel sounds are normal. There is no distension. Palpations: Abdomen is soft. There is no mass. Tenderness: There is no abdominal tenderness. There is no guarding or rebound. Musculoskeletal:         General: No tenderness. Normal range of motion. Cervical back: Normal range of motion and neck supple. Skin:     General: Skin is warm and dry. Findings: No rash.    Neurological:      Mental Status: She is alert and oriented to person, place, and time. Cranial Nerves: No cranial nerve deficit. Deep Tendon Reflexes: Reflexes are normal and symmetric. Psychiatric:         Behavior: Behavior normal.          Diagnostic Study Results     Labs -     Recent Results (from the past 12 hour(s))   EKG, 12 LEAD, INITIAL    Collection Time: 03/09/22 11:17 AM   Result Value Ref Range    Ventricular Rate 68 BPM    Atrial Rate 68 BPM    P-R Interval 176 ms    QRS Duration 80 ms    Q-T Interval 372 ms    QTC Calculation (Bezet) 395 ms    Calculated P Axis 56 degrees    Calculated R Axis -19 degrees    Calculated T Axis 69 degrees    Diagnosis       Normal sinus rhythm  Nonspecific ST abnormality  Abnormal ECG  When compared with ECG of 29-APR-2021 22:32,  No significant change was found  Confirmed by Colin Rasheed MD, --- ([id number]) on 3/9/2022 12:28:08 PM     CBC WITH AUTOMATED DIFF    Collection Time: 03/09/22 11:20 AM   Result Value Ref Range    WBC 5.8 4.6 - 13.2 K/uL    RBC 4.98 4.20 - 5.30 M/uL    HGB 13.7 12.0 - 16.0 g/dL    HCT 42.0 35.0 - 45.0 %    MCV 84.3 78.0 - 100.0 FL    MCH 27.5 24.0 - 34.0 PG    MCHC 32.6 31.0 - 37.0 g/dL    RDW 12.7 11.6 - 14.5 %    PLATELET 831 224 - 115 K/uL    MPV 9.1 (L) 9.2 - 11.8 FL    NRBC 0.0 0  WBC    ABSOLUTE NRBC 0.00 0.00 - 0.01 K/uL    NEUTROPHILS 62 40 - 73 %    LYMPHOCYTES 31 21 - 52 %    MONOCYTES 5 3 - 10 %    EOSINOPHILS 2 0 - 5 %    BASOPHILS 0 0 - 2 %    IMMATURE GRANULOCYTES 0 0.0 - 0.5 %    ABS. NEUTROPHILS 3.6 1.8 - 8.0 K/UL    ABS. LYMPHOCYTES 1.8 0.9 - 3.6 K/UL    ABS. MONOCYTES 0.3 0.05 - 1.2 K/UL    ABS. EOSINOPHILS 0.1 0.0 - 0.4 K/UL    ABS. BASOPHILS 0.0 0.0 - 0.1 K/UL    ABS. IMM.  GRANS. 0.0 0.00 - 0.04 K/UL    DF AUTOMATED     METABOLIC PANEL, COMPREHENSIVE    Collection Time: 03/09/22 11:20 AM   Result Value Ref Range    Sodium 138 136 - 145 mmol/L    Potassium 4.6 3.5 - 5.5 mmol/L    Chloride 105 100 - 111 mmol/L    CO2 30 21 - 32 mmol/L    Anion gap 3 3.0 - 18 mmol/L Glucose 163 (H) 74 - 99 mg/dL    BUN 14 7.0 - 18 MG/DL    Creatinine 1.20 0.6 - 1.3 MG/DL    BUN/Creatinine ratio 12 12 - 20      GFR est AA 54 (L) >60 ml/min/1.73m2    GFR est non-AA 45 (L) >60 ml/min/1.73m2    Calcium 10.5 (H) 8.5 - 10.1 MG/DL    Bilirubin, total 0.7 0.2 - 1.0 MG/DL    ALT (SGPT) 28 13 - 56 U/L    AST (SGOT) 13 10 - 38 U/L    Alk. phosphatase 93 45 - 117 U/L    Protein, total 6.8 6.4 - 8.2 g/dL    Albumin 3.7 3.4 - 5.0 g/dL    Globulin 3.1 2.0 - 4.0 g/dL    A-G Ratio 1.2 0.8 - 1.7     TROPONIN-HIGH SENSITIVITY    Collection Time: 03/09/22 11:20 AM   Result Value Ref Range    Troponin-High Sensitivity 6 0 - 54 ng/L   URINALYSIS W/ RFLX MICROSCOPIC    Collection Time: 03/09/22 11:20 AM   Result Value Ref Range    Color YELLOW      Appearance CLEAR      Specific gravity 1.012 1.005 - 1.030      pH (UA) 6.5 5.0 - 8.0      Protein Negative NEG mg/dL    Glucose Negative NEG mg/dL    Ketone Negative NEG mg/dL    Bilirubin Negative NEG      Blood Negative NEG      Urobilinogen 1.0 0.2 - 1.0 EU/dL    Nitrites Negative NEG      Leukocyte Esterase Negative NEG     PROTEIN URINE, RANDOM    Collection Time: 03/09/22 11:20 AM   Result Value Ref Range    Protein, urine random 15 (H) <11.9 mg/dL   SODIUM, UR, RANDOM    Collection Time: 03/09/22 11:20 AM   Result Value Ref Range    Sodium,urine random 61 20 - 110 MMOL/L   CREATININE, UR, RANDOM    Collection Time: 03/09/22 11:20 AM   Result Value Ref Range    Creatinine, urine 70.00 30 - 125 mg/dL   PTH INTACT    Collection Time: 03/09/22 11:20 AM   Result Value Ref Range    Calcium 10.5 (H) 8.5 - 10.1 MG/DL    PTH, Intact 105.6 (H) 18.4 - 88.0 pg/mL   VITAMIN D, 25 HYDROXY    Collection Time: 03/09/22 11:20 AM   Result Value Ref Range    Vitamin D 25-Hydroxy 11.4 (L) 30 - 100 ng/mL       Radiologic Studies -   US RETROPERITONEUM COMP   Final Result      Left renal cyst. No other significant findings.       XR CHEST PA LAT   Final Result      No acute radiographic findings. CT HEAD WO CONT   Final Result   No acute intracranial abnormality. CT Results  (Last 48 hours)               03/09/22 1143  CT HEAD WO CONT Final result    Impression:  No acute intracranial abnormality. Narrative:  CT of the head without contrast       HISTORY: High blood pressure. Headache. COMPARISON: None       TECHNIQUE: Axial images of the brain were obtained, without the administration   of intravenous contrast. Coronal and sagittal reconstructions were generated. All CT scans at this facility are performed using dose optimization technique as   appropriate to a performed exam, to include automated exposure control,   adjustment of the MA and/or kV according to patient size (including appropriate   matching for site-specific examinations) or use of  iterative reconstruction   technique. FINDINGS:        Brain: No intracranial hemorrhage. No mass effect or midline shift. Gray-white   matter differentiations are maintained. Paranasal sinuses and mastoid air cells: Paranasal sinuses are clear. Mastoid   air cells are clear. Calvarium: No acute fracture. CXR Results  (Last 48 hours)               03/09/22 1158  XR CHEST PA LAT Final result    Impression:      No acute radiographic findings. Narrative:  EXAMINATION: Chest 2 views       INDICATION: Hypertension       COMPARISON: CT 7/23/2021       FINDINGS: Frontal and lateral views of the chest obtained. Mediastinal   silhouette and pulmonary vasculature unremarkable. Minimal aortic arch   calcifications. No confluent consolidation. No evidence of pneumothorax. No   acute osseous findings. Medical Decision Making   I am the first provider for this patient. I reviewed the vital signs, available nursing notes, past medical history, past surgical history, family history and social history. Vital Signs-Reviewed the patient's vital signs.     Records Reviewed: Nursing Notes    Procedures:  Procedures    Provider Notes (Medical Decision Making): pt off her BP meds x 1mo and symptomatic x 3 weeks. Her labs, CT scans, US all reviewed and discussed w/nephrology. Will resume her lisinopril which was sent to pharmacy and have her f/up with her PCP. MED RECONCILIATION:  No current facility-administered medications for this encounter. Current Outpatient Medications   Medication Sig    lisinopriL (PriniviL) 10 mg tablet Take 1 Tablet by mouth daily for 30 days.  tiotropium-olodateroL (Stiolto Respimat) 2.5-2.5 mcg/actuation inhaler Take 2 Puffs by inhalation daily. Please load and prime for patient    albuterol (PROVENTIL HFA, VENTOLIN HFA, PROAIR HFA) 90 mcg/actuation inhaler Take 1-2 Puffs by inhalation every four (4) hours as needed for Wheezing or Shortness of Breath.  pantoprazole (PROTONIX) 40 mg tablet TAKE ONE TABLET BY MOUTH DAILY (Patient taking differently: Take 40 mg by mouth every twelve (12) hours.)    atorvastatin (LIPITOR) 20 mg tablet TAKE ONE TABLET BY MOUTH DAILY (Patient taking differently: Take 20 mg by mouth daily.)    metFORMIN (GLUCOPHAGE) 500 mg tablet Take 1 Tab by mouth two (2) times daily (with meals). (Patient taking differently: Take 1,000 mg by mouth two (2) times daily (with meals). )    lisinopril (PRINIVIL, ZESTRIL) 5 mg tablet Take 1 Tab by mouth daily. Disposition:  home    DISCHARGE NOTE:   5:33 PM    Pt has been reexamined. Patient has no new complaints, changes, or physical findings. Care plan outlined and precautions discussed. Results of labs, CT, US were reviewed with the patient. All medications were reviewed with the patient; will d/c home with lisinopril. All of pt's questions and concerns were addressed. Patient was instructed and agrees to follow up with PCP, as well as to return to the ED upon further deterioration. Patient is ready to go home.     Follow-up Information     Follow up With Specialties Details Why Contact Edgard Estrada MD Family Medicine Schedule an appointment as soon as possible for a visit in 2 days  [address] [address]      Nkechi Huerta MD Nephrology Schedule an appointment as soon as possible for a visit in 2 days  [address] [place] [address] Drive [address] [facility] DEPT Emergency Medicine  If symptoms worsen return immediately [address]  [phone]          Current Discharge Medication List      START taking these medications    Details   !! lisinopriL (PriniviL) 10 mg tablet Take 1 Tablet by mouth daily for 30 days. Qty: 30 Tablet, Refills: 2  Start date: 3/9/2022, End date: 4/8/2022       !! - Potential duplicate medications found. Please discuss with provider. CONTINUE these medications which have NOT CHANGED    Details   !! lisinopril (PRINIVIL, ZESTRIL) 5 mg tablet Take 1 Tab by mouth daily. Qty: 90 Tab, Refills: 1       !! - Potential duplicate medications found. Please discuss with provider. Diagnosis     Clinical Impression:   1.  Hypertension, unspecified type

## 2022-03-10 ENCOUNTER — TRANSCRIBE ORDER (OUTPATIENT)
Dept: SCHEDULING | Age: 72
End: 2022-03-10

## 2022-03-10 DIAGNOSIS — N18.32 STAGE 3B CHRONIC KIDNEY DISEASE (HCC): Primary | ICD-10-CM

## 2022-03-18 PROBLEM — N18.30 CKD (CHRONIC KIDNEY DISEASE) STAGE 3, GFR 30-59 ML/MIN (HCC): Status: ACTIVE | Noted: 2021-05-20

## 2022-03-18 PROBLEM — R35.1 NOCTURIA MORE THAN TWICE PER NIGHT: Status: ACTIVE | Noted: 2021-05-20

## 2022-03-18 PROBLEM — M62.89 PELVIC FLOOR DYSFUNCTION: Status: ACTIVE | Noted: 2021-05-20

## 2022-03-18 PROBLEM — R35.89 POLYURIA: Status: ACTIVE | Noted: 2021-05-20

## 2022-03-18 PROBLEM — H57.9 LEFT EYE COMPLAINT: Status: ACTIVE | Noted: 2021-05-20

## 2022-03-18 PROBLEM — K58.9 IRRITABLE BOWEL SYNDROME (IBS): Status: ACTIVE | Noted: 2021-05-20

## 2022-03-19 PROBLEM — L98.9 SKIN LESION: Status: ACTIVE | Noted: 2021-05-20

## 2022-03-19 PROBLEM — R11.2 NAUSEA AND VOMITING: Status: ACTIVE | Noted: 2021-05-20

## 2022-03-19 PROBLEM — R76.8 HELICOBACTER PYLORI AB+: Status: ACTIVE | Noted: 2021-05-20

## 2022-03-19 PROBLEM — F32.A ANXIETY AND DEPRESSION: Status: ACTIVE | Noted: 2021-05-20

## 2022-03-19 PROBLEM — R82.90 ABNORMAL URINE ODOR: Status: ACTIVE | Noted: 2021-05-20

## 2022-03-19 PROBLEM — F41.9 ANXIETY AND DEPRESSION: Status: ACTIVE | Noted: 2021-05-20

## 2022-03-19 PROBLEM — R23.2 VASOMOTOR FLUSHING: Status: ACTIVE | Noted: 2021-05-20

## 2022-03-19 PROBLEM — Z13.9 SCREENING DUE: Status: ACTIVE | Noted: 2021-05-20

## 2022-03-19 PROBLEM — D21.20 FIBROMA OF FOOT: Status: ACTIVE | Noted: 2021-05-20

## 2022-03-19 PROBLEM — R10.13 EPIGASTRIC DISCOMFORT: Status: ACTIVE | Noted: 2021-05-20

## 2022-03-19 PROBLEM — L91.8 SKIN TAG: Status: ACTIVE | Noted: 2021-05-20

## 2022-03-19 PROBLEM — G47.00 INSOMNIA WITH SLEEP APNEA: Status: ACTIVE | Noted: 2021-05-20

## 2022-03-19 PROBLEM — R06.83 SNORING: Status: ACTIVE | Noted: 2021-05-20

## 2022-03-19 PROBLEM — E11.65 POORLY CONTROLLED DIABETES MELLITUS (HCC): Status: ACTIVE | Noted: 2021-05-20

## 2022-03-19 PROBLEM — E78.5 HYPERLIPIDEMIA: Status: ACTIVE | Noted: 2021-05-20

## 2022-03-19 PROBLEM — G47.30 INSOMNIA WITH SLEEP APNEA: Status: ACTIVE | Noted: 2021-05-20

## 2022-03-19 PROBLEM — E66.9 OBESITY (BMI 30-39.9): Status: ACTIVE | Noted: 2021-05-20

## 2022-03-19 PROBLEM — R35.0 URINE FREQUENCY: Status: ACTIVE | Noted: 2021-05-20

## 2022-03-19 PROBLEM — R42 VERTIGO: Status: ACTIVE | Noted: 2021-05-20

## 2022-03-19 PROBLEM — T78.00XA ANAPHYLAXIS DUE TO FOOD: Status: ACTIVE | Noted: 2021-05-20

## 2022-03-19 PROBLEM — R40.0 DAYTIME SLEEPINESS: Status: ACTIVE | Noted: 2021-05-20

## 2022-03-20 PROBLEM — N89.8 VAGINAL ODOR: Status: ACTIVE | Noted: 2021-05-20

## 2022-03-20 PROBLEM — Z78.0 POST-MENOPAUSAL: Status: ACTIVE | Noted: 2021-05-20

## 2022-03-20 PROBLEM — R07.9 CHEST PAIN: Status: ACTIVE | Noted: 2017-09-15

## 2022-03-20 PROBLEM — K29.70 GASTRITIS: Status: ACTIVE | Noted: 2021-05-20

## 2022-05-11 ENCOUNTER — HOSPITAL ENCOUNTER (OUTPATIENT)
Dept: LAB | Age: 72
Discharge: HOME | End: 2022-05-11
Payer: MEDICARE

## 2022-05-11 ENCOUNTER — TRANSCRIBE ORDER (OUTPATIENT)
Dept: REGISTRATION | Age: 72
End: 2022-05-11

## 2022-05-11 DIAGNOSIS — N18.32 STAGE 3B CHRONIC KIDNEY DISEASE (HCC): ICD-10-CM

## 2022-05-11 DIAGNOSIS — N18.32 STAGE 3B CHRONIC KIDNEY DISEASE (HCC): Primary | ICD-10-CM

## 2022-05-11 LAB
25(OH)D3 SERPL-MCNC: 21.3 NG/ML (ref 30–100)
ALBUMIN SERPL-MCNC: 3.9 G/DL (ref 3.4–5)
ANION GAP SERPL CALC-SCNC: 5 MMOL/L (ref 3–18)
APPEARANCE UR: CLEAR
BACTERIA URNS QL MICRO: NEGATIVE /HPF
BILIRUB UR QL: NEGATIVE
BUN SERPL-MCNC: 21 MG/DL (ref 7–18)
BUN/CREAT SERPL: 18 (ref 12–20)
CALCIUM SERPL-MCNC: 10.1 MG/DL (ref 8.5–10.1)
CALCIUM SERPL-MCNC: 9.9 MG/DL (ref 8.5–10.1)
CHLORIDE SERPL-SCNC: 104 MMOL/L (ref 100–111)
CO2 SERPL-SCNC: 30 MMOL/L (ref 21–32)
COLOR UR: YELLOW
CREAT SERPL-MCNC: 1.17 MG/DL (ref 0.6–1.3)
CREAT UR-MCNC: 104 MG/DL (ref 30–125)
EPITH CASTS URNS QL MICRO: ABNORMAL /LPF (ref 0–5)
ERYTHROCYTE [DISTWIDTH] IN BLOOD BY AUTOMATED COUNT: 13.2 % (ref 11.6–14.5)
GLUCOSE SERPL-MCNC: 146 MG/DL (ref 74–99)
GLUCOSE UR STRIP.AUTO-MCNC: NEGATIVE MG/DL
HCT VFR BLD AUTO: 40.4 % (ref 35–45)
HGB BLD-MCNC: 13.3 G/DL (ref 12–16)
HGB UR QL STRIP: NEGATIVE
KETONES UR QL STRIP.AUTO: NEGATIVE MG/DL
LEUKOCYTE ESTERASE UR QL STRIP.AUTO: ABNORMAL
MCH RBC QN AUTO: 28.3 PG (ref 24–34)
MCHC RBC AUTO-ENTMCNC: 32.9 G/DL (ref 31–37)
MCV RBC AUTO: 86 FL (ref 78–100)
NITRITE UR QL STRIP.AUTO: NEGATIVE
NRBC # BLD: 0 K/UL (ref 0–0.01)
NRBC BLD-RTO: 0 PER 100 WBC
PH UR STRIP: 5.5 [PH] (ref 5–8)
PHOSPHATE SERPL-MCNC: 2.7 MG/DL (ref 2.5–4.9)
PLATELET # BLD AUTO: 291 K/UL (ref 135–420)
PMV BLD AUTO: 9.9 FL (ref 9.2–11.8)
POTASSIUM SERPL-SCNC: 4.5 MMOL/L (ref 3.5–5.5)
PROT UR STRIP-MCNC: NEGATIVE MG/DL
PROT UR-MCNC: 19 MG/DL
PROT/CREAT UR-RTO: 0.2
PTH-INTACT SERPL-MCNC: 138.7 PG/ML (ref 18.4–88)
RBC # BLD AUTO: 4.7 M/UL (ref 4.2–5.3)
RBC #/AREA URNS HPF: NEGATIVE /HPF (ref 0–5)
SODIUM SERPL-SCNC: 139 MMOL/L (ref 136–145)
SP GR UR REFRACTOMETRY: 1.02 (ref 1–1.03)
UROBILINOGEN UR QL STRIP.AUTO: 1 EU/DL (ref 0.2–1)
WBC # BLD AUTO: 6.4 K/UL (ref 4.6–13.2)
WBC URNS QL MICRO: ABNORMAL /HPF (ref 0–4)

## 2022-05-11 PROCEDURE — 83970 ASSAY OF PARATHORMONE: CPT

## 2022-05-11 PROCEDURE — 80069 RENAL FUNCTION PANEL: CPT

## 2022-05-11 PROCEDURE — 36415 COLL VENOUS BLD VENIPUNCTURE: CPT

## 2022-05-11 PROCEDURE — 81001 URINALYSIS AUTO W/SCOPE: CPT

## 2022-05-11 PROCEDURE — 85027 COMPLETE CBC AUTOMATED: CPT

## 2022-05-11 PROCEDURE — 82306 VITAMIN D 25 HYDROXY: CPT

## 2022-05-11 PROCEDURE — 84156 ASSAY OF PROTEIN URINE: CPT

## 2022-08-02 ENCOUNTER — TRANSCRIBE ORDER (OUTPATIENT)
Dept: REGISTRATION | Age: 72
End: 2022-08-02

## 2022-08-02 ENCOUNTER — HOSPITAL ENCOUNTER (OUTPATIENT)
Dept: LAB | Age: 72
Discharge: HOME | End: 2022-08-02
Payer: MEDICARE

## 2022-08-02 DIAGNOSIS — N18.31 STAGE 3A CHRONIC KIDNEY DISEASE (HCC): ICD-10-CM

## 2022-08-02 DIAGNOSIS — N18.31 STAGE 3A CHRONIC KIDNEY DISEASE (HCC): Primary | ICD-10-CM

## 2022-08-02 LAB
ALBUMIN SERPL-MCNC: 4 G/DL (ref 3.4–5)
ANION GAP SERPL CALC-SCNC: 11 MMOL/L (ref 3–18)
BUN SERPL-MCNC: 30 MG/DL (ref 7–18)
BUN/CREAT SERPL: 19 (ref 12–20)
CALCIUM SERPL-MCNC: 10.4 MG/DL (ref 8.5–10.1)
CHLORIDE SERPL-SCNC: 98 MMOL/L (ref 100–111)
CO2 SERPL-SCNC: 28 MMOL/L (ref 21–32)
CREAT SERPL-MCNC: 1.56 MG/DL (ref 0.6–1.3)
GLUCOSE SERPL-MCNC: 312 MG/DL (ref 74–99)
PHOSPHATE SERPL-MCNC: 2.8 MG/DL (ref 2.5–4.9)
POTASSIUM SERPL-SCNC: 4.1 MMOL/L (ref 3.5–5.5)
SODIUM SERPL-SCNC: 137 MMOL/L (ref 136–145)

## 2022-08-02 PROCEDURE — 80069 RENAL FUNCTION PANEL: CPT

## 2022-08-02 PROCEDURE — 36415 COLL VENOUS BLD VENIPUNCTURE: CPT

## 2022-08-31 ENCOUNTER — HOSPITAL ENCOUNTER (OUTPATIENT)
Dept: LAB | Age: 72
Discharge: HOME | End: 2022-08-31

## 2022-08-31 ENCOUNTER — TRANSCRIBE ORDER (OUTPATIENT)
Dept: REGISTRATION | Age: 72
End: 2022-08-31

## 2022-08-31 DIAGNOSIS — N18.32 STAGE 3B CHRONIC KIDNEY DISEASE (HCC): ICD-10-CM

## 2022-08-31 DIAGNOSIS — N18.32 STAGE 3B CHRONIC KIDNEY DISEASE (HCC): Primary | ICD-10-CM

## 2022-10-03 ENCOUNTER — TRANSCRIBE ORDER (OUTPATIENT)
Dept: REGISTRATION | Age: 72
End: 2022-10-03

## 2022-10-03 ENCOUNTER — HOSPITAL ENCOUNTER (OUTPATIENT)
Dept: LAB | Age: 72
Discharge: HOME | End: 2022-10-03
Payer: MEDICARE

## 2022-10-03 DIAGNOSIS — N18.32 STAGE 3B CHRONIC KIDNEY DISEASE (HCC): Primary | ICD-10-CM

## 2022-10-03 DIAGNOSIS — N18.32 STAGE 3B CHRONIC KIDNEY DISEASE (HCC): ICD-10-CM

## 2022-10-03 LAB
ALBUMIN SERPL-MCNC: 3.9 G/DL (ref 3.4–5)
ANION GAP SERPL CALC-SCNC: 7 MMOL/L (ref 3–18)
BASOPHILS # BLD: 0 K/UL (ref 0–0.1)
BASOPHILS NFR BLD: 1 % (ref 0–2)
BUN SERPL-MCNC: 20 MG/DL (ref 7–18)
BUN/CREAT SERPL: 18 (ref 12–20)
CALCIUM SERPL-MCNC: 10 MG/DL (ref 8.5–10.1)
CHLORIDE SERPL-SCNC: 101 MMOL/L (ref 100–111)
CO2 SERPL-SCNC: 31 MMOL/L (ref 21–32)
CREAT SERPL-MCNC: 1.14 MG/DL (ref 0.6–1.3)
CREAT UR-MCNC: 62 MG/DL (ref 30–125)
DIFFERENTIAL METHOD BLD: NORMAL
EOSINOPHIL # BLD: 0.1 K/UL (ref 0–0.4)
EOSINOPHIL NFR BLD: 2 % (ref 0–5)
ERYTHROCYTE [DISTWIDTH] IN BLOOD BY AUTOMATED COUNT: 12.7 % (ref 11.6–14.5)
GLUCOSE SERPL-MCNC: 190 MG/DL (ref 74–99)
HCT VFR BLD AUTO: 43.3 % (ref 35–45)
HGB BLD-MCNC: 14.5 G/DL (ref 12–16)
IMM GRANULOCYTES # BLD AUTO: 0 K/UL (ref 0–0.04)
IMM GRANULOCYTES NFR BLD AUTO: 0 % (ref 0–0.5)
LYMPHOCYTES # BLD: 2.1 K/UL (ref 0.9–3.6)
LYMPHOCYTES NFR BLD: 33 % (ref 21–52)
MCH RBC QN AUTO: 28 PG (ref 24–34)
MCHC RBC AUTO-ENTMCNC: 33.5 G/DL (ref 31–37)
MCV RBC AUTO: 83.6 FL (ref 78–100)
MONOCYTES # BLD: 0.5 K/UL (ref 0.05–1.2)
MONOCYTES NFR BLD: 8 % (ref 3–10)
NEUTS SEG # BLD: 3.6 K/UL (ref 1.8–8)
NEUTS SEG NFR BLD: 57 % (ref 40–73)
NRBC # BLD: 0 K/UL (ref 0–0.01)
NRBC BLD-RTO: 0 PER 100 WBC
PHOSPHATE SERPL-MCNC: 2.4 MG/DL (ref 2.5–4.9)
PLATELET # BLD AUTO: 274 K/UL (ref 135–420)
PMV BLD AUTO: 9.6 FL (ref 9.2–11.8)
POTASSIUM SERPL-SCNC: 4.2 MMOL/L (ref 3.5–5.5)
PROT UR-MCNC: 8 MG/DL
PROT/CREAT UR-RTO: 0.1
RBC # BLD AUTO: 5.18 M/UL (ref 4.2–5.3)
SODIUM SERPL-SCNC: 139 MMOL/L (ref 136–145)
WBC # BLD AUTO: 6.4 K/UL (ref 4.6–13.2)

## 2022-10-03 PROCEDURE — 84156 ASSAY OF PROTEIN URINE: CPT

## 2022-10-03 PROCEDURE — 85025 COMPLETE CBC W/AUTO DIFF WBC: CPT

## 2022-10-03 PROCEDURE — 80069 RENAL FUNCTION PANEL: CPT

## 2022-10-03 PROCEDURE — 36415 COLL VENOUS BLD VENIPUNCTURE: CPT

## 2022-12-20 ENCOUNTER — TRANSCRIBE ORDER (OUTPATIENT)
Dept: SCHEDULING | Age: 72
End: 2022-12-20

## 2022-12-20 DIAGNOSIS — Z12.31 VISIT FOR SCREENING MAMMOGRAM: Primary | ICD-10-CM

## 2023-01-30 DIAGNOSIS — N18.32 STAGE 3B CHRONIC KIDNEY DISEASE (HCC): Primary | ICD-10-CM

## 2023-01-31 DIAGNOSIS — Z12.31 VISIT FOR SCREENING MAMMOGRAM: Primary | ICD-10-CM

## 2023-02-03 DIAGNOSIS — N18.32 STAGE 3B CHRONIC KIDNEY DISEASE (HCC): Primary | ICD-10-CM

## 2023-02-04 DIAGNOSIS — Z12.31 VISIT FOR SCREENING MAMMOGRAM: Primary | ICD-10-CM

## 2023-02-08 LAB — CREATININE, EXTERNAL: 1.22

## 2023-03-21 ENCOUNTER — HOSPITAL ENCOUNTER (OUTPATIENT)
Facility: HOSPITAL | Age: 73
Discharge: HOME | End: 2023-03-24
Payer: MEDICARE

## 2023-03-21 DIAGNOSIS — E83.52 HYPERCALCEMIA: ICD-10-CM

## 2023-03-21 DIAGNOSIS — N18.32 CHRONIC KIDNEY DISEASE (CKD) STAGE G3B/A1, MODERATELY DECREASED GLOMERULAR FILTRATION RATE (GFR) BETWEEN 30-44 ML/MIN/1.73 SQUARE METER AND ALBUMINURIA CREATININE RATIO LESS THAN 30 MG/G (HCC): ICD-10-CM

## 2023-03-21 LAB
25(OH)D3 SERPL-MCNC: 24.6 NG/ML (ref 30–100)
ALBUMIN SERPL-MCNC: 4 G/DL (ref 3.4–5)
ANION GAP SERPL CALC-SCNC: 7 MMOL/L (ref 3–18)
BUN SERPL-MCNC: 17 MG/DL (ref 7–18)
BUN/CREAT SERPL: 13 (ref 12–20)
CA-I SERPL-SCNC: 1.32 MMOL/L (ref 1.15–1.33)
CALCIUM SERPL-MCNC: 10.7 MG/DL (ref 8.5–10.1)
CALCIUM SERPL-MCNC: 10.9 MG/DL (ref 8.5–10.1)
CALCIUM SERPL-MCNC: 11.3 MG/DL (ref 8.5–10.1)
CHLORIDE SERPL-SCNC: 96 MMOL/L (ref 100–111)
CO2 SERPL-SCNC: 31 MMOL/L (ref 21–32)
CREAT SERPL-MCNC: 1.36 MG/DL (ref 0.6–1.3)
CREAT UR-MCNC: 187 MG/DL (ref 30–125)
ERYTHROCYTE [DISTWIDTH] IN BLOOD BY AUTOMATED COUNT: 13.3 % (ref 11.6–14.5)
GLUCOSE SERPL-MCNC: 167 MG/DL (ref 74–99)
HCT VFR BLD AUTO: 45.5 % (ref 35–45)
HGB BLD-MCNC: 15.4 G/DL (ref 12–16)
MCH RBC QN AUTO: 28.7 PG (ref 24–34)
MCHC RBC AUTO-ENTMCNC: 33.8 G/DL (ref 31–37)
MCV RBC AUTO: 84.7 FL (ref 78–100)
NRBC # BLD: 0 K/UL (ref 0–0.01)
NRBC BLD-RTO: 0 PER 100 WBC
PHOSPHATE SERPL-MCNC: 2.6 MG/DL (ref 2.5–4.9)
PLATELET # BLD AUTO: 295 K/UL (ref 135–420)
PMV BLD AUTO: 9.4 FL (ref 9.2–11.8)
POTASSIUM SERPL-SCNC: 3.3 MMOL/L (ref 3.5–5.5)
PROT UR-MCNC: 46 MG/DL
PROT/CREAT UR-RTO: 0.2
PTH-INTACT SERPL-MCNC: 88.7 PG/ML (ref 18.4–88)
PTH-INTACT SERPL-MCNC: 93.3 PG/ML (ref 18.4–88)
RBC # BLD AUTO: 5.37 M/UL (ref 4.2–5.3)
SODIUM SERPL-SCNC: 134 MMOL/L (ref 136–145)
WBC # BLD AUTO: 7.3 K/UL (ref 4.6–13.2)

## 2023-03-21 PROCEDURE — 84156 ASSAY OF PROTEIN URINE: CPT

## 2023-03-21 PROCEDURE — 80069 RENAL FUNCTION PANEL: CPT

## 2023-03-21 PROCEDURE — 82330 ASSAY OF CALCIUM: CPT

## 2023-03-21 PROCEDURE — 82306 VITAMIN D 25 HYDROXY: CPT

## 2023-03-21 PROCEDURE — 83970 ASSAY OF PARATHORMONE: CPT

## 2023-03-21 PROCEDURE — 85027 COMPLETE CBC AUTOMATED: CPT

## 2023-03-21 PROCEDURE — 36415 COLL VENOUS BLD VENIPUNCTURE: CPT

## 2023-03-24 ENCOUNTER — CLINICAL DOCUMENTATION (OUTPATIENT)
Age: 73
End: 2023-03-24

## 2023-03-24 NOTE — PROGRESS NOTES
3.24.23  LVM for pt to call office back to schedule next appointment w/Spirometry  Last seen 8.19.21

## 2023-04-04 ENCOUNTER — HOSPITAL ENCOUNTER (OUTPATIENT)
Facility: HOSPITAL | Age: 73
Discharge: HOME | End: 2023-04-06
Payer: MEDICARE

## 2023-04-04 ENCOUNTER — HOSPITAL ENCOUNTER (OUTPATIENT)
Facility: HOSPITAL | Age: 73
Discharge: HOME | End: 2023-04-07
Payer: MEDICARE

## 2023-04-04 VITALS
HEART RATE: 80 BPM | BODY MASS INDEX: 26.07 KG/M2 | SYSTOLIC BLOOD PRESSURE: 154 MMHG | HEIGHT: 69 IN | DIASTOLIC BLOOD PRESSURE: 86 MMHG | WEIGHT: 176 LBS

## 2023-04-04 DIAGNOSIS — R07.89 OTHER CHEST PAIN: ICD-10-CM

## 2023-04-04 LAB
ECHO BSA: 1.97 M2
EKG DIAGNOSIS: NORMAL
NUC REST EJECTION FRACTION: 76 %
STRESS BASELINE DIAS BP: 86 MMHG
STRESS BASELINE HR: 80 BPM
STRESS BASELINE SYS BP: 154 MMHG
STRESS ESTIMATED WORKLOAD: 1.6 METS
STRESS EXERCISE DUR MIN: 3 MIN
STRESS EXERCISE DUR SEC: 0 SEC
STRESS PEAK DIAS BP: 86 MMHG
STRESS PEAK SYS BP: 154 MMHG
STRESS PERCENT HR ACHIEVED: 70 %
STRESS POST PEAK HR: 105 BPM
STRESS RATE PRESSURE PRODUCT: NORMAL BPM*MMHG
STRESS ST DEPRESSION: 0 MM
STRESS TARGET HR: 150 BPM
TID: 0.92

## 2023-04-04 PROCEDURE — 93017 CV STRESS TEST TRACING ONLY: CPT

## 2023-04-04 PROCEDURE — 6360000002 HC RX W HCPCS

## 2023-04-04 PROCEDURE — A9502 TC99M TETROFOSMIN: HCPCS

## 2023-04-04 PROCEDURE — 3430000000 HC RX DIAGNOSTIC RADIOPHARMACEUTICAL

## 2023-04-04 PROCEDURE — 2580000003 HC RX 258

## 2023-04-04 RX ORDER — SODIUM CHLORIDE 9 MG/ML
INJECTION, SOLUTION INTRAVENOUS ONCE
Status: COMPLETED | OUTPATIENT
Start: 2023-04-04 | End: 2023-04-04

## 2023-04-04 RX ADMIN — TETROFOSMIN 33 MILLICURIE: 1.38 INJECTION, POWDER, LYOPHILIZED, FOR SOLUTION INTRAVENOUS at 10:30

## 2023-04-04 RX ADMIN — TETROFOSMIN 10.8 MILLICURIE: 1.38 INJECTION, POWDER, LYOPHILIZED, FOR SOLUTION INTRAVENOUS at 08:45

## 2023-04-04 RX ADMIN — SODIUM CHLORIDE: 900 INJECTION, SOLUTION INTRAVENOUS at 10:30

## 2023-04-04 RX ADMIN — REGADENOSON 0.4 MG: 0.08 INJECTION, SOLUTION INTRAVENOUS at 10:30

## 2023-04-25 ENCOUNTER — PREP FOR PROCEDURE (OUTPATIENT)
Age: 73
End: 2023-04-25

## 2023-04-25 ENCOUNTER — OFFICE VISIT (OUTPATIENT)
Age: 73
End: 2023-04-25
Payer: MEDICARE

## 2023-04-25 ENCOUNTER — TRANSCRIBE ORDERS (OUTPATIENT)
Facility: HOSPITAL | Age: 73
End: 2023-04-25

## 2023-04-25 ENCOUNTER — HOSPITAL ENCOUNTER (OUTPATIENT)
Facility: HOSPITAL | Age: 73
Discharge: HOME | End: 2023-04-28
Payer: MEDICARE

## 2023-04-25 VITALS
HEIGHT: 69 IN | HEART RATE: 85 BPM | BODY MASS INDEX: 26.07 KG/M2 | OXYGEN SATURATION: 96 % | DIASTOLIC BLOOD PRESSURE: 62 MMHG | SYSTOLIC BLOOD PRESSURE: 120 MMHG | WEIGHT: 176 LBS

## 2023-04-25 DIAGNOSIS — R94.39 ABNORMAL STRESS ECG: ICD-10-CM

## 2023-04-25 DIAGNOSIS — M25.511 ACUTE PAIN OF RIGHT SHOULDER: ICD-10-CM

## 2023-04-25 DIAGNOSIS — N18.32 STAGE 3B CHRONIC KIDNEY DISEASE (HCC): Primary | ICD-10-CM

## 2023-04-25 DIAGNOSIS — R07.9 CHEST PAIN, UNSPECIFIED TYPE: Primary | ICD-10-CM

## 2023-04-25 DIAGNOSIS — N18.32 STAGE 3B CHRONIC KIDNEY DISEASE (HCC): ICD-10-CM

## 2023-04-25 LAB
ALBUMIN SERPL-MCNC: 3.9 G/DL (ref 3.4–5)
ANION GAP SERPL CALC-SCNC: 6 MMOL/L (ref 3–18)
BUN SERPL-MCNC: 20 MG/DL (ref 7–18)
BUN/CREAT SERPL: 16 (ref 12–20)
CALCIUM SERPL-MCNC: 10.3 MG/DL (ref 8.5–10.1)
CHLORIDE SERPL-SCNC: 103 MMOL/L (ref 100–111)
CO2 SERPL-SCNC: 27 MMOL/L (ref 21–32)
CREAT SERPL-MCNC: 1.27 MG/DL (ref 0.6–1.3)
CREAT UR-MCNC: 73 MG/DL (ref 30–125)
GLUCOSE SERPL-MCNC: 201 MG/DL (ref 74–99)
MICROALBUMIN UR-MCNC: 3.63 MG/DL (ref 0–3)
MICROALBUMIN/CREAT UR-RTO: 50 MG/G (ref 0–30)
PHOSPHATE SERPL-MCNC: 2.2 MG/DL (ref 2.5–4.9)
POTASSIUM SERPL-SCNC: 4.4 MMOL/L (ref 3.5–5.5)
SODIUM SERPL-SCNC: 136 MMOL/L (ref 136–145)

## 2023-04-25 PROCEDURE — G8399 PT W/DXA RESULTS DOCUMENT: HCPCS | Performed by: INTERNAL MEDICINE

## 2023-04-25 PROCEDURE — 1123F ACP DISCUSS/DSCN MKR DOCD: CPT | Performed by: INTERNAL MEDICINE

## 2023-04-25 PROCEDURE — G8417 CALC BMI ABV UP PARAM F/U: HCPCS | Performed by: INTERNAL MEDICINE

## 2023-04-25 PROCEDURE — 80069 RENAL FUNCTION PANEL: CPT

## 2023-04-25 PROCEDURE — 3017F COLORECTAL CA SCREEN DOC REV: CPT | Performed by: INTERNAL MEDICINE

## 2023-04-25 PROCEDURE — 3074F SYST BP LT 130 MM HG: CPT | Performed by: INTERNAL MEDICINE

## 2023-04-25 PROCEDURE — 99205 OFFICE O/P NEW HI 60 MIN: CPT | Performed by: INTERNAL MEDICINE

## 2023-04-25 PROCEDURE — 73030 X-RAY EXAM OF SHOULDER: CPT

## 2023-04-25 PROCEDURE — 82570 ASSAY OF URINE CREATININE: CPT

## 2023-04-25 PROCEDURE — 82043 UR ALBUMIN QUANTITATIVE: CPT

## 2023-04-25 PROCEDURE — G8428 CUR MEDS NOT DOCUMENT: HCPCS | Performed by: INTERNAL MEDICINE

## 2023-04-25 PROCEDURE — 1090F PRES/ABSN URINE INCON ASSESS: CPT | Performed by: INTERNAL MEDICINE

## 2023-04-25 PROCEDURE — 36415 COLL VENOUS BLD VENIPUNCTURE: CPT

## 2023-04-25 PROCEDURE — 1036F TOBACCO NON-USER: CPT | Performed by: INTERNAL MEDICINE

## 2023-04-25 PROCEDURE — 3078F DIAST BP <80 MM HG: CPT | Performed by: INTERNAL MEDICINE

## 2023-04-25 RX ORDER — ASPIRIN 81 MG/1
81 TABLET ORAL DAILY
Qty: 30 TABLET | Refills: 5 | Status: SHIPPED | OUTPATIENT
Start: 2023-04-25

## 2023-04-25 RX ORDER — NITROGLYCERIN 0.4 MG/1
0.4 TABLET SUBLINGUAL EVERY 5 MIN PRN
Qty: 25 TABLET | Refills: 3 | Status: SHIPPED | OUTPATIENT
Start: 2023-04-25

## 2023-04-25 RX ORDER — PREDNISONE 50 MG/1
TABLET ORAL
Qty: 3 TABLET | Refills: 0 | Status: SHIPPED | OUTPATIENT
Start: 2023-04-25

## 2023-04-25 RX ORDER — DIPHENHYDRAMINE HCL 50 MG
CAPSULE ORAL
Qty: 1 CAPSULE | Refills: 0 | Status: SHIPPED | OUTPATIENT
Start: 2023-04-25

## 2023-04-25 NOTE — PATIENT INSTRUCTIONS
Instructions    Pre procedure labs(CBC, CMP, PT/INR) to be completed within 5-7 days prior to procedure. [address] [facility],[address] Floor [address] [facility], [address] at [facility], [address], [address], [address] [facility], [address] [facility], [address], [address] Name:  Tod Harris are scheduled to have a left heart catherization  on June 2, 2023  at  11:45 am.  Please check in at  10 am.   **YOU WILL NEED SOMEONE TO DRIVE YOU HOME AFTER PROCEDURE. Please go to [facility] ( [address] [place] [address], [place], [address]) and park in the outpatient parking lot that is located around to the back of the hospital and enter through the [facility] building. Once you enter through the [facility] check in with the  there. The  will either give you directions or assist you in getting to the cath holding area. You are not to eat or drink anything after midnight the night before your procedure. Small sips of water to take your medications is ok. If you are diabetic, do not take your insulin/sugar pill the morning of the procedure. MEDICATION INSTRUCTIONS:   Please take your morning medications with the following special instructions:    [x]          Please make sure to take your Blood pressure medication : pantoprazole, lisinopril, lipitor. [x]          Take your Aspirin . [x]          Stop your Glucophage (Metformin) 48 hrs prior to procedure and do not resume until after you have the blood work done. [x]         50 mg prednisone 13 hours, 7 hours, 1 hours prior to procedure. 50 mg of Benadryl 1 hour prior to procedure. This is to prevent you from having an allergic reaction to the dye. DO NOT DRIVE AFTER TAKING THE BENADRYL.       We encourage families to wait in the waiting room on the first floor while the

## 2023-04-25 NOTE — PROGRESS NOTES
[facility]    Clancy Prader is 81 y.o. female with diabetes, hypertension, hyperlipidemia    Patient is here today for cardiac relation  Denies prior history of MI or CHF  Patient recently went to urgent care for by emergency department for chest pain. Patient had a stress test evaluation of chest pain which showed inferior small defect. Upon questioning, patient states that she has anxiety disorder. She has been having on and off chest discomfort which she describes as a pressure and heavy sensation sometimes in the right jaw and right shoulder discomfort lasting for probably 10 to 15 minutes. She is also restarted with exertional activity and walking less than 200 feet. She has presyncope secondary to patient. Denies any nausea, vomiting, abdominal pain, fever, chills, sputum production. No hematuria or other bleeding complaints    Past Medical History:   Diagnosis Date    Agatston CAC score, <100 05/15/2015    Coronary calcium score 70. Chronic kidney disease     early stage kidney failure    Diabetes (HCC)     DVT (deep venous thrombosis) (HCC)     in her 25s    GERD (gastroesophageal reflux disease)     crohn's disease    Hyperlipidemia     Hypertension     Psychiatric disorder     anxiety       Review of Systems:  Cardiac symptoms as noted above in HPI. All others negative. Denies fatigue, malaise, skin rash, joint pain, blurring vision, photophobia, neck pain, hemoptysis, chronic cough, nausea, vomiting, hematuria, burning micturition, BRBPR, chronic headaches.     Current Outpatient Medications   Medication Sig    albuterol sulfate HFA (PROVENTIL;VENTOLIN;PROAIR) 108 (90 Base) MCG/ACT inhaler Inhale 1-2 puffs into the lungs every 4 hours as needed    atorvastatin (LIPITOR) 20 MG tablet TAKE ONE TABLET BY MOUTH DAILY    lisinopril (PRINIVIL;ZESTRIL) 5 MG tablet Take 1 tablet by mouth daily    metFORMIN (GLUCOPHAGE) 500 MG tablet

## 2023-04-26 PROBLEM — R53.83 FATIGUE: Status: ACTIVE | Noted: 2023-04-26

## 2023-04-26 PROBLEM — N28.1 SIMPLE RENAL CYST: Status: ACTIVE | Noted: 2022-05-12

## 2023-04-26 PROBLEM — J44.9 COPD (CHRONIC OBSTRUCTIVE PULMONARY DISEASE) (HCC): Status: ACTIVE | Noted: 2023-04-26

## 2023-04-26 PROBLEM — B02.29 POST HERPETIC NEURALGIA: Status: ACTIVE | Noted: 2023-04-26

## 2023-04-26 PROBLEM — E11.22 TYPE 2 DIABETES MELLITUS WITH CHRONIC KIDNEY DISEASE (HCC): Status: ACTIVE | Noted: 2022-03-08

## 2023-04-26 PROBLEM — F33.1 MAJOR DEPRESSIVE DISORDER, RECURRENT EPISODE, MODERATE (HCC): Status: ACTIVE | Noted: 2023-04-26

## 2023-04-26 PROBLEM — I12.9 MALIGNANT HYPERTENSIVE KIDNEY DISEASE WITH CHRONIC KIDNEY DISEASE STAGE I THROUGH STAGE IV, OR UNSPECIFIED: Status: ACTIVE | Noted: 2022-03-08

## 2023-04-26 PROBLEM — R94.39 ABNORMAL NUCLEAR STRESS TEST: Status: ACTIVE | Noted: 2023-04-26

## 2023-04-26 PROBLEM — E55.9 VITAMIN D DEFICIENCY: Status: ACTIVE | Noted: 2022-05-12

## 2023-04-26 PROBLEM — R80.1 PERSISTENT PROTEINURIA: Status: ACTIVE | Noted: 2022-03-08

## 2023-04-26 PROBLEM — C44.622 SQUAMOUS CELL CARCINOMA OF DORSUM OF RIGHT HAND: Status: ACTIVE | Noted: 2023-04-26

## 2023-04-27 RX ORDER — SODIUM CHLORIDE 9 MG/ML
INJECTION, SOLUTION INTRAVENOUS PRN
Status: CANCELLED | OUTPATIENT
Start: 2023-04-27

## 2023-04-27 RX ORDER — ASPIRIN 81 MG/1
81 TABLET, CHEWABLE ORAL ONCE
Status: CANCELLED | OUTPATIENT
Start: 2023-04-27 | End: 2023-04-27

## 2023-04-27 RX ORDER — SODIUM CHLORIDE 0.9 % (FLUSH) 0.9 %
5-40 SYRINGE (ML) INJECTION EVERY 12 HOURS SCHEDULED
Status: CANCELLED | OUTPATIENT
Start: 2023-04-27

## 2023-04-27 RX ORDER — SODIUM CHLORIDE 0.9 % (FLUSH) 0.9 %
5-40 SYRINGE (ML) INJECTION PRN
Status: CANCELLED | OUTPATIENT
Start: 2023-04-27

## 2023-04-28 NOTE — PROGRESS NOTES
1. Have you been to the ER, urgent care clinic since your last visit? Hospitalized since your last visit? No    2. Have you seen or consulted any other health care providers outside of the [address] [place] [address] since your last visit? Include any pap smears or colon screening.  Yes Where: Dr Jean Casey Chief Complaint   Patient presents with   • Office Visit   • Skin Assessment     This is a consult sent by Amy Shannon for Multiple Nevi.  Evaluation and treatment/recommendations are in the progress note below.  This note will be forwarded to the referring provider.  I reviewed the note    HPI:   The patient is a 75 year old female.    The patient presents for lesions on the face that gets flaky and itchy.  She is tan from spending 5 weeks every winter on the [facility]    ROS:  Constitutional:  In general, the patient feels well:  Yes  Integument:  The patient denies any other signs or symptoms of skin disease:  Yes  Cardiovascular:  The patient has a pacemaker:  No  The patient has a defibrillator:  No  Hematologic:  The patient bleeds easily because of being on aspirin or an anticoagulant:  Yes ASA 81 mg     ALLERGIES:  No Known Allergies    Current Outpatient Medications   Medication Sig Dispense Refill   • alendronate (FOSAMAX) 70 MG tablet Take 1 tablet by mouth every 7 days. 12 tablet 3   • glipiZIDE (GLUCOTROL) 5 MG tablet TAKE 2 TABLETS BY MOUTH  TWICE DAILY BEFORE MEALS 360 tablet 3   • OneTouch Verio test strip TEST BLOOD SUGAR 4 TIMES  DAILY BEFORE MEALS AND AT  BEDTIME 400 strip 3   • Lantus SoloStar 100 UNIT/ML pen-injector INJECT SUBCUTANEOUSLY 50  UNITS DAILY PRIME 2 UNITS  BEFORE EACH DOSE 60 mL 3   • metFORMIN (GLUCOPHAGE-XR) 500 MG 24 hr tablet TAKE 2 TABLETS BY MOUTH  TWICE DAILY WITH MEALS 360 tablet 3   • Insulin Pen Needle (Pen Needles) 31G X 8 MM Misc Use to inject insulin once daily 100 each 3   • simvastatin (ZOCOR) 20 MG tablet Take 1 tablet by mouth nightly. 90 tablet 3   • lisinopril-hydroCHLOROthiazide (ZESTORETIC) 20-25 MG per tablet Take 1 tablet by mouth daily. For high blood pressure 90 tablet 3   • amLODIPine (NORVASC) 10 MG tablet Take 1 tablet by mouth daily. For high blood pressure 90 tablet 3   • betamethasone dipropionate augmented (DIPROLENE AF) 0.05 % cream  APPLY TOPICALLY 2 TIMES  DAILY. USE FOR 2 WEEKS ON  THE RIGHT LOWER LEG AND  LEFT ARM AND THEN STOP FOR  2 WEEKS AT LEAST 30 g 1   • Blood Glucose Monitoring Suppl (OneTouch Verio) w/Device Kit 1 kit 4 times daily. Test blood sugar before meals and at bedtime(4times daily)  DX: E11.9 1 kit 0   • Insulin Syringe 31G X 5/16\" 0.5 ML Misc Use at bedtime 100 each 3   • Cyanocobalamin (VITAMIN B-12 PO) Take 2,500 mg by mouth 3 days a week.     • DISPENSE One touch Verio kit    Sig: Test blood sugar before meals and at bedtime, (4 times a day). DX: E11.9 1 kit 0   • DISPENSE One Touch Delica Lancets, Dx: 250.00 100 each 5   • aspirin 81 MG tablet Take 1 tablet by mouth daily.     • Cholecalciferol (VITAMIN D3) 2000 UNITS TABS Take 1 tablet by mouth daily.     • calcium carbonate (OS-VERENICE) 600 MG Tab Take 1,200 mg by mouth daily. 60 tablet      No current facility-administered medications for this visit.       Past Medical History:   Diagnosis Date   • Cataract    • Diabetes mellitus, type 2 (CMD)    • Disorder of bone and cartilage, unspecified 10/29/2008   • Essential hypertension, benign 10/2/2003   • Osteoporosis, unspecified 11/21/2013   • Other and unspecified hyperlipidemia 11/21/2013   • Sleep apnea 11/21/2013   • Subclinical hypothyroidism 5/23/2014    Lab May 2014. Prefers no rx, Check annually.   • Type II or unspecified type diabetes mellitus without mention of complication, not stated as uncontrolled 10/2/2003     DERMATOLOGY PMH:      PMH: The patient has a personal history of skin cancer  No.     History of Tanning bed usage.       FH:  The patient has a family history of melanoma:  No         SH: [facility] in [facility]    Physical Exam:    The patient is a 75 year old female who presents for lesions of concern.  The patient is in no acute distress, alert and oriented x3 who appears well and is well groomed.  See the diagram scanned into [employer].  Vitals are reviewed.  Significant findings include:      Pink, gritty papules:  Right nasal bridge x 1    Arms, legs and chest is tan with numerous tan to brown macules     Crusted pink papule with scale 3 mm on the left upper arm*-she said she cut it off with a razor blade    fleshy dermal papule(s) on the right chest, 8 mm    Mid chest has tan to brown stuck on papule/plaque    Angioma on the right forehead    Right nasal bridge has an tan to brown stuck on papule/plaque and a little scar    6 mm fleshy dermal papule below a cyst on the left cheek    Left mandible has a fleshy dermal papule    Assessment and Plan:    1.  Actinic Keratoses x 3 right nose and the nose  -  The diagnosis was discussed.    -  I recommended destruction with liquid nitrogen and after a discussion of risks and benefits of liquid nitrogen treatment the patient gave oral consent for the same.  -  Therefore liquid nitrogen was used to destroy the lesion(s).  -  Wound care and expected healing process discussed.    2. Shave Excision:  Impression: BCC  Location: Left Upper Arm     Consent was obtained to excise the growth. It was prepped in a sterile fashion and anesthetized with lidocaine with epinephrine. The lesion was excised into the subcutaneous tissue to 10 mm. Drysol was applied and the base was cauterized and wound care was discussed. The patient will be notified of histology and should follow up pending pathology results.    3. Intradermal Nevus   -counseled on the benign nature of this lesion.  -RTC (return to clinic) if there are any concerning changes to lesion.    4. Lentigines:   -  Reassurance of the benign nature of these lesions  -  Counseled that these lesions occur in response to excessive sun exposure over time  -  RTC (return to clinic) if any lesions have any new or concerning changes  -  Counseled on daily sun precautions including the use of sunscreen SPF 30+ daily and reapplying at least every 2 hours.  Also discussed sun protective clothing including long sleeves and  wide brimmed hats when anticipating sun exposure.  Avoid peak sun hours and seek shade from 10a-4p when possible.    5. Cherry angiomas:   -reassurance that these are a common, benign, vascular tumor that requires no treatment unless symptomatic.    6. Seborrheic Keratoses:   -  Reassurance of the benign nature of these lesions and that there is no need to treat unless they become symptomatic.    7. Epidermal Cyst - left cheek  -reassurance  -recommend observation unless the lesion becomes inflamed or symptomatic.  -discussed that removal will cause a scar in place of the lesion.  -patient opts to observe for now and call if any concerning changes.    8. The patient is tan  She spent 5 weeks each winter in [place]  [place] was discussed    Return to clinic as needed, pending biopsy results    On 4/28/2023, Heather LOPEZ MA, scribed the services personally performed by Karley Hernadez MD.The documentation recorded by the scribe accurately and completely reflects the service(s) I personally performed and the decisions made by me.

## 2023-05-05 ENCOUNTER — OFFICE VISIT (OUTPATIENT)
Age: 73
End: 2023-05-05
Payer: MEDICARE

## 2023-05-05 ENCOUNTER — PROCEDURE VISIT (OUTPATIENT)
Age: 73
End: 2023-05-05

## 2023-05-05 VITALS
OXYGEN SATURATION: 99 % | WEIGHT: 176 LBS | BODY MASS INDEX: 26.07 KG/M2 | SYSTOLIC BLOOD PRESSURE: 140 MMHG | HEIGHT: 69 IN | HEART RATE: 79 BPM | TEMPERATURE: 97.7 F | RESPIRATION RATE: 20 BRPM | DIASTOLIC BLOOD PRESSURE: 96 MMHG

## 2023-05-05 VITALS — SYSTOLIC BLOOD PRESSURE: 144 MMHG | DIASTOLIC BLOOD PRESSURE: 96 MMHG

## 2023-05-05 DIAGNOSIS — J44.9 CHRONIC OBSTRUCTIVE PULMONARY DISEASE, UNSPECIFIED COPD TYPE (HCC): Primary | ICD-10-CM

## 2023-05-05 DIAGNOSIS — J44.9 COPD, GROUP B, BY GOLD 2017 CLASSIFICATION (HCC): Primary | ICD-10-CM

## 2023-05-05 DIAGNOSIS — R06.02 SHORTNESS OF BREATH: ICD-10-CM

## 2023-05-05 DIAGNOSIS — Z87.891 PERSONAL HISTORY OF TOBACCO USE, PRESENTING HAZARDS TO HEALTH: ICD-10-CM

## 2023-05-05 PROCEDURE — 3080F DIAST BP >= 90 MM HG: CPT | Performed by: INTERNAL MEDICINE

## 2023-05-05 PROCEDURE — G8399 PT W/DXA RESULTS DOCUMENT: HCPCS | Performed by: INTERNAL MEDICINE

## 2023-05-05 PROCEDURE — 3023F SPIROM DOC REV: CPT | Performed by: INTERNAL MEDICINE

## 2023-05-05 PROCEDURE — G8417 CALC BMI ABV UP PARAM F/U: HCPCS | Performed by: INTERNAL MEDICINE

## 2023-05-05 PROCEDURE — 1123F ACP DISCUSS/DSCN MKR DOCD: CPT | Performed by: INTERNAL MEDICINE

## 2023-05-05 PROCEDURE — 99214 OFFICE O/P EST MOD 30 MIN: CPT | Performed by: INTERNAL MEDICINE

## 2023-05-05 PROCEDURE — 3077F SYST BP >= 140 MM HG: CPT | Performed by: INTERNAL MEDICINE

## 2023-05-05 PROCEDURE — 1090F PRES/ABSN URINE INCON ASSESS: CPT | Performed by: INTERNAL MEDICINE

## 2023-05-05 PROCEDURE — 1036F TOBACCO NON-USER: CPT | Performed by: INTERNAL MEDICINE

## 2023-05-05 PROCEDURE — G8428 CUR MEDS NOT DOCUMENT: HCPCS | Performed by: INTERNAL MEDICINE

## 2023-05-05 PROCEDURE — 3017F COLORECTAL CA SCREEN DOC REV: CPT | Performed by: INTERNAL MEDICINE

## 2023-05-05 PROCEDURE — 94060 EVALUATION OF WHEEZING: CPT | Performed by: INTERNAL MEDICINE

## 2023-05-05 RX ORDER — ALBUTEROL SULFATE 90 UG/1
1-2 AEROSOL, METERED RESPIRATORY (INHALATION) EVERY 4 HOURS PRN
Qty: 3 EACH | Refills: 3 | Status: SHIPPED | OUTPATIENT
Start: 2023-05-05

## 2023-05-05 RX ORDER — HYDRALAZINE HYDROCHLORIDE 25 MG/1
25 TABLET, FILM COATED ORAL DAILY
COMMUNITY

## 2023-05-05 RX ORDER — PAROXETINE HYDROCHLORIDE 40 MG/1
40 TABLET, FILM COATED ORAL EVERY MORNING
COMMUNITY

## 2023-05-05 RX ORDER — BUPROPION HYDROCHLORIDE 150 MG/1
150 TABLET ORAL EVERY MORNING
COMMUNITY

## 2023-05-05 ASSESSMENT — PATIENT HEALTH QUESTIONNAIRE - PHQ9
SUM OF ALL RESPONSES TO PHQ QUESTIONS 1-9: 0
2. FEELING DOWN, DEPRESSED OR HOPELESS: 0
SUM OF ALL RESPONSES TO PHQ9 QUESTIONS 1 & 2: 0
SUM OF ALL RESPONSES TO PHQ QUESTIONS 1-9: 0
1. LITTLE INTEREST OR PLEASURE IN DOING THINGS: 0

## 2023-05-05 NOTE — PROGRESS NOTES
[address] [facility]   [phone]        [facility]   Pulmonary, Critical Care, and Sleep Medicine      Pulmonary Office F/U  Name: Wing Moreno 81 y.o. female   MRN: [id number]  : 1950  Service Date: 23      Chief Complaint:   Chief Complaint   Patient presents with    COPD     Follow up from 2021    Nicotine Dependence     History of    Shortness of Breath    Results     CXR 3/9/2022         History of Present Illness:  Wing Moreno is a 81 y.o. female, who presents to Pulmonary clinic for follow-up of COPD. Patient was last seen in our clinic on 21. In the interval, pt reports some ongoing chest pain and dyspnea -- reports she saw Cardiology and has a cardiac catheterization on   No exacerbations  Infrequent use of PRN albuterol  Compliant with Stiol[employer]  Pt reports only able to get through pulmonary rehab [facility] due to issues with having to care for family friend's family. Past Medical History:   Diagnosis Date    Agatston CAC score, <100 05/15/2015    Coronary calcium score 70.       Chronic kidney disease     early stage kidney failure    COPD (chronic obstructive pulmonary disease) (HCC)     Diabetes (HCC)     DVT (deep venous thrombosis) (HCC)     in her 25s    GERD (gastroesophageal reflux disease)     crohn's disease    Hyperlipidemia     Hypertension     Psychiatric disorder     anxiety     Past Surgical History:   Procedure Laterality Date    CHOLECYSTECTOMY      GYN      hysterectomy    OTHER SURGICAL HISTORY      FATTY TISSUE REMOVED    GA UNLISTED PROCEDURE ABDOMEN PERITONEUM & OMENTUM  1999    tumor removal    TONSILLECTOMY       Family History   Problem Relation Age of Onset    Heart Disease Father     Cancer Mother     Breast Cancer Mother     Heart Attack Father     Heart Disease Paternal Grandmother     Heart Disease Paternal Grandfather      Social History     Tobacco Use    Smoking status:
System since your last visit? Include any pap smears or colon screening. Yes. Dr. Morris Downing, PCP, Dr. Melvin Pérez, cardiologist, Dr. Marilee Ferris, nephrologist    Medication list has been update per patient. Per patient, did not receive COVID and influenza vaccines.

## 2023-05-08 ENCOUNTER — HOSPITAL ENCOUNTER (OUTPATIENT)
Facility: HOSPITAL | Age: 73
Setting detail: RECURRING SERIES
Discharge: HOME | End: 2023-05-11
Payer: MEDICARE

## 2023-05-08 PROCEDURE — 97162 PT EVAL MOD COMPLEX 30 MIN: CPT

## 2023-05-08 PROCEDURE — 97110 THERAPEUTIC EXERCISES: CPT

## 2023-05-08 NOTE — PROGRESS NOTES
PT DAILY TREATMENT NOTE/SHOULDER EVAL     Patient Name: Jessi Kumar    Date: 2023    : 1950  Insurance: Payor: MEDICARE / Plan: MEDICARE PART A AND B / Product Type: *No Product type* /      Patient  verified yes     Visit #   Current / Total 1 24   Time   In / Out 11:36 12:15   Pain   In / Out 0 0   Subjective Functional Status/Changes: See below    Changes to:  Meds, Allergies, Med Hx, Sx Hx? If yes, update Summary List no         Treatment Area: Pain in right shoulder [M25.511]    SUBJECTIVE  Pain Level (0-10 scale): 0/10   []constant [x]intermittent []improving []worsening []no change since onset    Any medication changes, allergies to medications, adverse drug reactions, diagnosis change, or new procedure performed?: [x] No    [] Yes (see summary sheet for update)  Subjective functional status/changes:     PLOF: Patient is right hand dominant. Patient reports no functional deficits prior to onset. Limitations to PLOF: Patient exhibits decreased right shoulder AROM, decreased right shoulder PROM, tenderness along mid deltoid, and postural dysfunction. Patient demonstrated potential to make functional gains within a reasonable time frame. Patient would benefit from skilled PT to address above deficits and assist with return to PLOF. Mechanism of Injury: Patient presents with right shoulder pain that began 2023 with an insidious onset. Current symptoms/Complaints: Patient describes right shoulder discomfort as intermittent sharp pain that increases with movement. Patient describes it as a locking sensation and when the shoulder joint cracks it causes short term discomfort, but then is able to move shoulder easier. Patient denies any numbness/tingling. Patient has increased difficulty with reaching overhead, behind back/head, and lifting. Previous Treatment/Compliance: X-ray in [employer]. PMHx/Surgical Hx: No previous neck/shoulder/UE/back surgeries. COPD (no O2 per patient), No

## 2023-05-08 NOTE — PROGRESS NOTES
[facility] at [address] [facility], [address]. [facility]  Phone: [phone]      Fax:  [phone]    Plan of Care/ Statement of Necessity for Physical Therapy Services    Patient name: Sofya Richard Start of Care: 2023   Referral source: Sherryle Lose, PA : 1950    Medical Diagnosis: Pain in right shoulder [M25.511]       Onset Date:  2023    Treatment Diagnosis:      M25.511  RIGHT SHOULDER PAIN                           Prior Hospitalization: see medical history Provider#: [id number]   Medications: Verified on Patient Summary List     Comorbidities: Allergies, Arthritis, Back pain, COPD (no O2 per patient), DM, GI disease, HTN, Stage 3 kidney disease (no dialysis per patient), OP, Prior surgery, depression, thyroid problems, LBP. Patient reports recently found a blockage in heart so Is scheduled to Have a heart cath on 23 and was advised not to do any strenuous activity, but per patient was ok to participate in PT. Prior Level of Function: Patient is right hand dominant. Patient reports no functional deficits prior to onset. The Plan of Care and following information is based on the information from the initial evaluation. Assessment/ key information: Patient presents with right shoulder pain that began 2023 with an insidious onset. Patient describes right shoulder discomfort as intermittent sharp pain that increases with movement. Patient describes it as a locking sensation and when the shoulder joint cracks it causes short term discomfort, but then is able to move shoulder easier. Patient denies any numbness/tingling. Patient has increased difficulty with reaching overhead, behind back/head, and lifting. Patient exhibits decreased right shoulder AROM, decreased right shoulder PROM, tenderness along mid deltoid, and postural dysfunction. Patient demonstrates potential to make functional gains within a reasonable time frame.

## 2023-05-10 ENCOUNTER — TELEPHONE (OUTPATIENT)
Facility: HOSPITAL | Age: 73
End: 2023-05-10

## 2023-05-10 NOTE — TELEPHONE ENCOUNTER
Left MessageCommunicated - LVM d/t no show.  Stated patients next scheduled visit and made patient aware of no show cancellation policy

## 2023-05-15 ENCOUNTER — HOSPITAL ENCOUNTER (OUTPATIENT)
Facility: HOSPITAL | Age: 73
Setting detail: RECURRING SERIES
Discharge: HOME | End: 2023-05-18
Payer: MEDICARE

## 2023-05-15 PROCEDURE — 97110 THERAPEUTIC EXERCISES: CPT

## 2023-05-15 PROCEDURE — 97530 THERAPEUTIC ACTIVITIES: CPT

## 2023-05-15 PROCEDURE — 97140 MANUAL THERAPY 1/> REGIONS: CPT

## 2023-05-15 NOTE — PROGRESS NOTES
PHYSICAL / OCCUPATIONAL THERAPY - DAILY TREATMENT NOTE (updated )    Patient Name: Olivier Ramirez    Date: 5/15/2023    : 1950  Insurance: Payor: MEDICARE / Plan: MEDICARE PART A AND B / Product Type: *No Product type* /      Patient  verified Yes     Visit #   Current / Total 2 24   Time   In / Out [id number] [id number]   Pain   In / Out 2 0   Subjective Functional Status/Changes: Reports morning is when she experiences the most pain and stiffness. States \"Im going to have to switch these morning appointments because im just not with it this early\"    Changes to:  Meds, Allergies, Med Hx, Sx Hx? If yes, update Summary List no       TREATMENT AREA =  Pain in right shoulder [M25.511]    OBJECTIVE      Therapeutic Procedures: Tx Min Billable or 1:1 Min (if diff from Tx Min) Procedure, Rationale, Specifics   12  61440 Therapeutic Exercise (timed):  increase ROM, strength, coordination, balance, and proprioception to improve patient's ability to progress to PLOF and address remaining functional goals. (see flow sheet as applicable)     Details if applicable:       18  93789 Therapeutic Activity (timed):  use of dynamic activities replicating functional movements to increase ROM, strength, coordination, balance, and proprioception in order to improve patient's ability to progress to PLOF and address remaining functional goals. (see flow sheet as applicable)     Details if applicable:     10  88156 Manual Therapy (timed):  decrease pain, increase ROM, and increase tissue extensibility to improve patient's ability to progress to PLOF and address remaining functional goals. The manual therapy interventions were performed at a separate and distinct time from the therapeutic activities interventions . (see flow sheet as applicable)     Details if applicable:  PROM supine flx/abd/scaption/ER/IR, functional massage to anterior delt.  Side-lying scap mobs           Details if applicable:            Details if applicable:

## 2023-05-17 ENCOUNTER — HOSPITAL ENCOUNTER (OUTPATIENT)
Facility: HOSPITAL | Age: 73
Setting detail: RECURRING SERIES
End: 2023-05-17
Payer: MEDICARE

## 2023-05-26 ENCOUNTER — HOSPITAL ENCOUNTER (OUTPATIENT)
Facility: HOSPITAL | Age: 73
End: 2023-05-26
Payer: MEDICARE

## 2023-05-26 VITALS — WEIGHT: 176 LBS | HEIGHT: 69 IN | BODY MASS INDEX: 26.07 KG/M2

## 2023-05-26 DIAGNOSIS — R06.02 SHORTNESS OF BREATH: ICD-10-CM

## 2023-05-26 DIAGNOSIS — J44.9 COPD, GROUP B, BY GOLD 2017 CLASSIFICATION (HCC): ICD-10-CM

## 2023-05-26 DIAGNOSIS — Z87.891 PERSONAL HISTORY OF TOBACCO USE, PRESENTING HAZARDS TO HEALTH: ICD-10-CM

## 2023-05-26 PROCEDURE — 71271 CT THORAX LUNG CANCER SCR C-: CPT

## 2023-06-02 ENCOUNTER — HOSPITAL ENCOUNTER (OUTPATIENT)
Facility: HOSPITAL | Age: 73
Setting detail: OUTPATIENT SURGERY
Discharge: HOME | End: 2023-06-02
Attending: INTERNAL MEDICINE | Admitting: INTERNAL MEDICINE
Payer: MEDICARE

## 2023-06-02 VITALS
HEART RATE: 83 BPM | OXYGEN SATURATION: 90 % | HEIGHT: 69 IN | BODY MASS INDEX: 25.92 KG/M2 | WEIGHT: 175 LBS | DIASTOLIC BLOOD PRESSURE: 81 MMHG | SYSTOLIC BLOOD PRESSURE: 150 MMHG | RESPIRATION RATE: 18 BRPM

## 2023-06-02 DIAGNOSIS — R94.39 ABNORMAL STRESS ECG: ICD-10-CM

## 2023-06-02 DIAGNOSIS — R07.9 CHEST PAIN, UNSPECIFIED TYPE: ICD-10-CM

## 2023-06-02 DIAGNOSIS — R93.1 ABNORMAL ECHOCARDIOGRAM: ICD-10-CM

## 2023-06-02 LAB
ANION GAP BLD CALC-SCNC: ABNORMAL (ref 10–20)
BASOPHILS # BLD: 0.1 K/UL (ref 0–0.1)
BASOPHILS NFR BLD: 1 % (ref 0–2)
CA-I BLD-MCNC: 1.33 MMOL/L (ref 1.12–1.32)
CHLORIDE BLD-SCNC: 103 MMOL/L (ref 100–108)
CREAT UR-MCNC: 1.3 MG/DL (ref 0.6–1.3)
DIFFERENTIAL METHOD BLD: NORMAL
EOSINOPHIL # BLD: 0.1 K/UL (ref 0–0.4)
EOSINOPHIL NFR BLD: 2 % (ref 0–5)
ERYTHROCYTE [DISTWIDTH] IN BLOOD BY AUTOMATED COUNT: 12.5 % (ref 11.6–14.5)
GLUCOSE BLD STRIP.AUTO-MCNC: 142 MG/DL (ref 74–106)
HCT VFR BLD AUTO: 43.2 % (ref 35–45)
HGB BLD-MCNC: 14.2 G/DL (ref 12–16)
IMM GRANULOCYTES # BLD AUTO: 0 K/UL (ref 0–0.04)
IMM GRANULOCYTES NFR BLD AUTO: 0 % (ref 0–0.5)
LYMPHOCYTES # BLD: 2.1 K/UL (ref 0.9–3.6)
LYMPHOCYTES NFR BLD: 29 % (ref 21–52)
MCH RBC QN AUTO: 28 PG (ref 24–34)
MCHC RBC AUTO-ENTMCNC: 32.9 G/DL (ref 31–37)
MCV RBC AUTO: 85.2 FL (ref 78–100)
MONOCYTES # BLD: 0.4 K/UL (ref 0.05–1.2)
MONOCYTES NFR BLD: 5 % (ref 3–10)
NEUTS SEG # BLD: 4.5 K/UL (ref 1.8–8)
NEUTS SEG NFR BLD: 63 % (ref 40–73)
NRBC # BLD: 0 K/UL (ref 0–0.01)
NRBC BLD-RTO: 0 PER 100 WBC
PLATELET # BLD AUTO: 277 K/UL (ref 135–420)
PMV BLD AUTO: 9.4 FL (ref 9.2–11.8)
POTASSIUM BLD-SCNC: 4.4 MMOL/L (ref 3.5–5.5)
RBC # BLD AUTO: 5.07 M/UL (ref 4.2–5.3)
SODIUM BLD-SCNC: 139 MMOL/L (ref 136–145)
WBC # BLD AUTO: 7.1 K/UL (ref 4.6–13.2)

## 2023-06-02 PROCEDURE — 6370000000 HC RX 637 (ALT 250 FOR IP)

## 2023-06-02 PROCEDURE — 2500000003 HC RX 250 WO HCPCS: Performed by: INTERNAL MEDICINE

## 2023-06-02 PROCEDURE — 85025 COMPLETE CBC W/AUTO DIFF WBC: CPT

## 2023-06-02 PROCEDURE — 93452 LEFT HRT CATH W/VENTRCLGRPHY: CPT | Performed by: INTERNAL MEDICINE

## 2023-06-02 PROCEDURE — 2709999900 HC NON-CHARGEABLE SUPPLY: Performed by: INTERNAL MEDICINE

## 2023-06-02 PROCEDURE — C1713 ANCHOR/SCREW BN/BN,TIS/BN: HCPCS | Performed by: INTERNAL MEDICINE

## 2023-06-02 PROCEDURE — 76000 FLUOROSCOPY <1 HR PHYS/QHP: CPT | Performed by: INTERNAL MEDICINE

## 2023-06-02 PROCEDURE — 6360000002 HC RX W HCPCS: Performed by: INTERNAL MEDICINE

## 2023-06-02 PROCEDURE — 6360000002 HC RX W HCPCS

## 2023-06-02 PROCEDURE — 99152 MOD SED SAME PHYS/QHP 5/>YRS: CPT | Performed by: INTERNAL MEDICINE

## 2023-06-02 PROCEDURE — 93458 L HRT ARTERY/VENTRICLE ANGIO: CPT | Performed by: INTERNAL MEDICINE

## 2023-06-02 PROCEDURE — 80047 BASIC METABLC PNL IONIZED CA: CPT

## 2023-06-02 PROCEDURE — C1894 INTRO/SHEATH, NON-LASER: HCPCS | Performed by: INTERNAL MEDICINE

## 2023-06-02 RX ORDER — SODIUM CHLORIDE 0.9 % (FLUSH) 0.9 %
5-40 SYRINGE (ML) INJECTION PRN
Status: DISCONTINUED | OUTPATIENT
Start: 2023-06-02 | End: 2023-06-02 | Stop reason: HOSPADM

## 2023-06-02 RX ORDER — ASPIRIN 81 MG/1
81 TABLET, CHEWABLE ORAL ONCE
Status: DISCONTINUED | OUTPATIENT
Start: 2023-06-02 | End: 2023-06-02

## 2023-06-02 RX ORDER — VERAPAMIL HYDROCHLORIDE 2.5 MG/ML
INJECTION, SOLUTION INTRAVENOUS PRN
Status: DISCONTINUED | OUTPATIENT
Start: 2023-06-02 | End: 2023-06-02 | Stop reason: HOSPADM

## 2023-06-02 RX ORDER — NITROGLYCERIN 20 MG/100ML
INJECTION INTRAVENOUS PRN
Status: DISCONTINUED | OUTPATIENT
Start: 2023-06-02 | End: 2023-06-02 | Stop reason: HOSPADM

## 2023-06-02 RX ORDER — ASPIRIN 81 MG/1
TABLET, CHEWABLE ORAL
Status: COMPLETED
Start: 2023-06-02 | End: 2023-06-02

## 2023-06-02 RX ORDER — SODIUM CHLORIDE 0.9 % (FLUSH) 0.9 %
5-40 SYRINGE (ML) INJECTION EVERY 12 HOURS SCHEDULED
Status: DISCONTINUED | OUTPATIENT
Start: 2023-06-02 | End: 2023-06-02 | Stop reason: HOSPADM

## 2023-06-02 RX ORDER — SODIUM CHLORIDE 9 MG/ML
INJECTION, SOLUTION INTRAVENOUS PRN
Status: DISCONTINUED | OUTPATIENT
Start: 2023-06-02 | End: 2023-06-02 | Stop reason: HOSPADM

## 2023-06-02 RX ORDER — METHYLPREDNISOLONE SODIUM SUCCINATE 125 MG/2ML
125 INJECTION, POWDER, LYOPHILIZED, FOR SOLUTION INTRAMUSCULAR; INTRAVENOUS ONCE
Status: COMPLETED | OUTPATIENT
Start: 2023-06-02 | End: 2023-06-02

## 2023-06-02 RX ORDER — DIPHENHYDRAMINE HYDROCHLORIDE 50 MG/ML
50 INJECTION INTRAMUSCULAR; INTRAVENOUS ONCE
Status: COMPLETED | OUTPATIENT
Start: 2023-06-02 | End: 2023-06-02

## 2023-06-02 RX ORDER — EPINEPHRINE 1 MG/ML(1)
0.5 AMPUL (ML) INJECTION ONCE
Status: DISCONTINUED | OUTPATIENT
Start: 2023-06-02 | End: 2023-06-02 | Stop reason: HOSPADM

## 2023-06-02 RX ORDER — METHYLPREDNISOLONE SODIUM SUCCINATE 125 MG/2ML
INJECTION, POWDER, LYOPHILIZED, FOR SOLUTION INTRAMUSCULAR; INTRAVENOUS
Status: COMPLETED
Start: 2023-06-02 | End: 2023-06-02

## 2023-06-02 RX ORDER — MIDAZOLAM HYDROCHLORIDE 1 MG/ML
INJECTION INTRAMUSCULAR; INTRAVENOUS PRN
Status: DISCONTINUED | OUTPATIENT
Start: 2023-06-02 | End: 2023-06-02 | Stop reason: HOSPADM

## 2023-06-02 RX ORDER — HEPARIN SODIUM 1000 [USP'U]/ML
INJECTION, SOLUTION INTRAVENOUS; SUBCUTANEOUS PRN
Status: DISCONTINUED | OUTPATIENT
Start: 2023-06-02 | End: 2023-06-02 | Stop reason: HOSPADM

## 2023-06-02 RX ORDER — ASPIRIN 81 MG/1
81 TABLET, CHEWABLE ORAL ONCE
Status: COMPLETED | OUTPATIENT
Start: 2023-06-02 | End: 2023-06-02

## 2023-06-02 RX ORDER — FENTANYL CITRATE 50 UG/ML
INJECTION, SOLUTION INTRAMUSCULAR; INTRAVENOUS PRN
Status: DISCONTINUED | OUTPATIENT
Start: 2023-06-02 | End: 2023-06-02 | Stop reason: HOSPADM

## 2023-06-02 RX ORDER — DIPHENHYDRAMINE HYDROCHLORIDE 50 MG/ML
INJECTION INTRAMUSCULAR; INTRAVENOUS
Status: COMPLETED
Start: 2023-06-02 | End: 2023-06-02

## 2023-06-02 RX ADMIN — METHYLPREDNISOLONE SODIUM SUCCINATE 125 MG: 125 INJECTION INTRAMUSCULAR; INTRAVENOUS at 11:44

## 2023-06-02 RX ADMIN — ASPIRIN 81 MG: 81 TABLET, CHEWABLE ORAL at 11:45

## 2023-06-02 RX ADMIN — DIPHENHYDRAMINE HYDROCHLORIDE 50 MG: 50 INJECTION, SOLUTION INTRAMUSCULAR; INTRAVENOUS at 11:45

## 2023-06-02 RX ADMIN — ASPIRIN 81 MG CHEWABLE TABLET 81 MG: 81 TABLET CHEWABLE at 11:45

## 2023-06-02 RX ADMIN — METHYLPREDNISOLONE SODIUM SUCCINATE 125 MG: 125 INJECTION, POWDER, LYOPHILIZED, FOR SOLUTION INTRAMUSCULAR; INTRAVENOUS at 11:44

## 2023-06-02 RX ADMIN — DIPHENHYDRAMINE HYDROCHLORIDE 50 MG: 50 INJECTION INTRAMUSCULAR; INTRAVENOUS at 11:45

## 2023-06-02 ASSESSMENT — PAIN - FUNCTIONAL ASSESSMENT: PAIN_FUNCTIONAL_ASSESSMENT: NONE - DENIES PAIN

## 2023-06-02 NOTE — H&P
Please see clinic note from04/2023 for detail  Chest pain dyspnea and abnormal stress test  H/O Selfish allergy. Pretreated with IV solumedrol and benadrl  Had CTA chest in 2021 without any issues. I saw and examined patient and confirmed above. No interval change. Labs reviewed. Procedure explained to patient and all risk and benefit discussed with patient. Risk, benefit, complication of LHC and possible PCI ( including but not limited to bleeding, infection, heart failure, stroke, MI, emergent bypass surgery, kidney failure, dialysis and death ) were discussed with patient and willing to proceed with procedure. Proceed as planned. DW     History and physical has been reviewed.  There have been no significant clinical changes since the completion of the originally dated History and Physical.  Will be using moderate sedation.    ------------------------------------------------------------------------------------------------------------------

## 2023-06-02 NOTE — DISCHARGE INSTRUCTIONS
Coronary Angiogram: What to Expect at [address] [facility]     A coronary angiogram is a test to examine the large blood vessels of your heart (coronary arteries). The doctor inserted a thin, flexible tube (catheter) into a blood vessel in your groin or wrist.  Your groin or wrist may have a bruise and feel sore for a few days after the procedure. You can do light activities around the house. But do not do anything strenuous until your doctor says it is okay. This may be for several days. This care sheet gives you a general idea about how long it will take for you to recover. But each person recovers at a different pace. Follow the steps below to feel better as quickly as possible. How can you care for yourself at home? Activity    If the doctor gave you a sedative: For 24 hours, don't do anything that requires attention to detail, such as going to work, making important decisions, or signing any legal documents. It takes time for the medicine's effects to completely wear off. For your safety, do not drive or operate any machinery that could be dangerous. Wait until the medicine wears off and you can think clearly and react easily. Do not do strenuous exercise and do not lift, pull, or push anything heavy until your doctor says it is okay. This may be for several days. You can walk around the house and do light activity, such as cooking. If the catheter was placed in your groin, try not to walk up stairs for the first couple of days. If the catheter was placed in your wrist, do not bend your wrist deeply for the first couple of days. Be careful using your hand to get into and out of a chair or bed. Get regular exercise. Walking may be a good choice. Try for at least 30 minutes on most days of the week. Diet    Drink plenty of fluids to help your body flush out the dye.  If you have kidney, heart, or liver disease and have to limit fluids, talk with your doctor before you increase the amount

## 2023-06-02 NOTE — PROGRESS NOTES
Cath holding summary:    1030: Patient ambulated from waiting area without difficulty, placed on monitor 2. A&O, no c/o. ID, NPO status, allergies, home meds verified. PIV x2 inserted, blood sent to lab. Groin prep completed, consent ready for signature. 1345: Verbal report received from Isi Fonseca [facility] being received from [facility] for routine post-op. Report consisted of patient's Situation, Background, Assessment and Recommendations (SBAR). Information from the following report(s) MAR and Event Log was reviewed with the receiving nurse. Opportunity for questions and clarification was provided. Assessment completed upon patient's arrival to unit and care assumed. 1530: TR band removed from right wrist, manual pressure held x20 minutes to achieve hemostasis. Site covered with hemostatic dressing and tegaderm. Patient denies pain, pulse palpable and brisk cap refill distal to site. Cath site instructions and post care including s/s of bleeding and methods of bleeding prevention reviewed with patient who verbalized understanding. 1700: AVS Discharge instructions reviewed with patient, all questions answered. Patient verbalized understanding, copy given. Procedural site within normal limits, PIV removed. No hematoma or bleeding noted from procedural or IV site. Patient denied complaints, discharged with support person in stable condition. Escorted out to vehicle for transport home.

## 2023-06-02 NOTE — H&P
Please see clinic note 4/25/23 below for detail. Dyspnea, chest pain and abnormal stress test  I saw and examined patient and confirmed above. No interval change. Labs reviewed. Procedure explained to patient and all risk and benefit discussed with patient. Risk, benefit, complication of LHC and possible PCI ( including but not limited to bleeding, infection, heart failure, stroke, MI, emergent bypass surgery, kidney failure, dialysis and death ) were discussed with patient and willing to proceed with procedure. Proceed as planned. History and physical has been reviewed.  There have been no significant clinical changes since the completion of the originally dated History and Physical.  Will be using moderate sedation.    ------------------------------------------------------------------------------------------------------------------

## 2023-06-05 ENCOUNTER — ANESTHESIA EVENT (OUTPATIENT)
Facility: HOSPITAL | Age: 73
End: 2023-06-05
Payer: MEDICARE

## 2023-06-07 ENCOUNTER — HOSPITAL ENCOUNTER (OUTPATIENT)
Facility: HOSPITAL | Age: 73
Setting detail: RECURRING SERIES
Discharge: HOME | End: 2023-06-10
Payer: MEDICARE

## 2023-06-07 PROCEDURE — 97112 NEUROMUSCULAR REEDUCATION: CPT

## 2023-06-07 PROCEDURE — 97110 THERAPEUTIC EXERCISES: CPT

## 2023-06-07 PROCEDURE — 97140 MANUAL THERAPY 1/> REGIONS: CPT

## 2023-06-07 NOTE — PROGRESS NOTES
tolerated  []  Discharge due to :  []  Other:    Melo Hernández, KIRSTY    6/7/2023    11:37 AM    Future Appointments   Date Time Provider Melanie Herrera   7/17/2023  1:15 PM Paulie Hudson MD [facility] AMB

## 2023-06-08 ENCOUNTER — ANESTHESIA (OUTPATIENT)
Facility: HOSPITAL | Age: 73
End: 2023-06-08
Payer: MEDICARE

## 2023-06-08 ENCOUNTER — HOSPITAL ENCOUNTER (OUTPATIENT)
Facility: HOSPITAL | Age: 73
Setting detail: OUTPATIENT SURGERY
Discharge: HOME | End: 2023-06-08
Attending: INTERNAL MEDICINE | Admitting: INTERNAL MEDICINE
Payer: MEDICARE

## 2023-06-08 VITALS
TEMPERATURE: 98.3 F | HEIGHT: 69 IN | SYSTOLIC BLOOD PRESSURE: 136 MMHG | RESPIRATION RATE: 12 BRPM | DIASTOLIC BLOOD PRESSURE: 59 MMHG | BODY MASS INDEX: 26.02 KG/M2 | OXYGEN SATURATION: 97 % | WEIGHT: 175.7 LBS | HEART RATE: 69 BPM

## 2023-06-08 LAB
GLUCOSE BLD STRIP.AUTO-MCNC: 175 MG/DL (ref 70–110)
GLUCOSE BLD STRIP.AUTO-MCNC: 194 MG/DL (ref 70–110)

## 2023-06-08 PROCEDURE — 2500000003 HC RX 250 WO HCPCS: Performed by: ANESTHESIOLOGY

## 2023-06-08 PROCEDURE — 2709999900 HC NON-CHARGEABLE SUPPLY: Performed by: INTERNAL MEDICINE

## 2023-06-08 PROCEDURE — 2580000003 HC RX 258: Performed by: NURSE ANESTHETIST, CERTIFIED REGISTERED

## 2023-06-08 PROCEDURE — 7100000010 HC PHASE II RECOVERY - FIRST 15 MIN: Performed by: INTERNAL MEDICINE

## 2023-06-08 PROCEDURE — 6360000002 HC RX W HCPCS: Performed by: ANESTHESIOLOGY

## 2023-06-08 PROCEDURE — 3700000000 HC ANESTHESIA ATTENDED CARE: Performed by: INTERNAL MEDICINE

## 2023-06-08 PROCEDURE — 82962 GLUCOSE BLOOD TEST: CPT

## 2023-06-08 PROCEDURE — 7100000000 HC PACU RECOVERY - FIRST 15 MIN: Performed by: INTERNAL MEDICINE

## 2023-06-08 PROCEDURE — 3600007502: Performed by: INTERNAL MEDICINE

## 2023-06-08 RX ORDER — LIDOCAINE HYDROCHLORIDE 20 MG/ML
INJECTION, SOLUTION EPIDURAL; INFILTRATION; INTRACAUDAL; PERINEURAL PRN
Status: DISCONTINUED | OUTPATIENT
Start: 2023-06-08 | End: 2023-06-08 | Stop reason: SDUPTHER

## 2023-06-08 RX ORDER — SODIUM CHLORIDE, SODIUM LACTATE, POTASSIUM CHLORIDE, CALCIUM CHLORIDE 600; 310; 30; 20 MG/100ML; MG/100ML; MG/100ML; MG/100ML
INJECTION, SOLUTION INTRAVENOUS CONTINUOUS
Status: DISCONTINUED | OUTPATIENT
Start: 2023-06-08 | End: 2023-06-08 | Stop reason: HOSPADM

## 2023-06-08 RX ORDER — PROPOFOL 10 MG/ML
INJECTION, EMULSION INTRAVENOUS PRN
Status: DISCONTINUED | OUTPATIENT
Start: 2023-06-08 | End: 2023-06-08 | Stop reason: SDUPTHER

## 2023-06-08 RX ORDER — LIDOCAINE HYDROCHLORIDE 10 MG/ML
1 INJECTION, SOLUTION EPIDURAL; INFILTRATION; INTRACAUDAL; PERINEURAL
Status: DISCONTINUED | OUTPATIENT
Start: 2023-06-08 | End: 2023-06-08 | Stop reason: HOSPADM

## 2023-06-08 RX ORDER — INSULIN LISPRO 100 [IU]/ML
1-15 INJECTION, SOLUTION INTRAVENOUS; SUBCUTANEOUS ONCE
Status: DISCONTINUED | OUTPATIENT
Start: 2023-06-08 | End: 2023-06-08

## 2023-06-08 RX ORDER — DEXTROSE MONOHYDRATE 100 MG/ML
INJECTION, SOLUTION INTRAVENOUS CONTINUOUS PRN
Status: DISCONTINUED | OUTPATIENT
Start: 2023-06-08 | End: 2023-06-08 | Stop reason: HOSPADM

## 2023-06-08 RX ADMIN — SODIUM CHLORIDE, POTASSIUM CHLORIDE, SODIUM LACTATE AND CALCIUM CHLORIDE: 600; 310; 30; 20 INJECTION, SOLUTION INTRAVENOUS at 09:15

## 2023-06-08 RX ADMIN — PROPOFOL 100 MG: 10 INJECTION, EMULSION INTRAVENOUS at 09:40

## 2023-06-08 RX ADMIN — LIDOCAINE HYDROCHLORIDE 80 MG: 20 INJECTION, SOLUTION EPIDURAL; INFILTRATION; INTRACAUDAL; PERINEURAL at 09:40

## 2023-06-08 ASSESSMENT — PAIN - FUNCTIONAL ASSESSMENT: PAIN_FUNCTIONAL_ASSESSMENT: 0-10

## 2023-06-08 NOTE — ANESTHESIA POSTPROCEDURE EVALUATION
Department of Anesthesiology  Postprocedure Note    Patient: Merissa Maradiaga  MRN: [id number]  YOB: 1950  Date of evaluation: 6/8/2023      Procedure Summary     Date: 06/08/23 Room / Location: [facility] ENDO 03 / [facility] END[facility]    Anesthesia Start: 0933 Anesthesia Stop: 6895    Procedure: EGD ESOPHAGOGASTRODUODENOSCOPY with 54Fr Dilation (Upper GI Region) Diagnosis:       Personal history of colonic polyps      Gastroesophageal reflux disease, unspecified whether esophagitis present      Constipation, unspecified constipation type      Peptic ulcer disease      Overweight      (DX)    Surgeons: Maddie Garcia MD Responsible Provider: Valdo Mitchell MD    Anesthesia Type: MAC ASA Status: 3          Anesthesia Type: MAC    Claribel Phase I: Claribel Score: 8    Claribel Phase II: Claribel Score: 10      Anesthesia Post Evaluation    Patient location during evaluation: bedside  Patient participation: complete - patient participated  Pain score: 0  Airway patency: patent  Nausea & Vomiting: no nausea  Complications: no  Cardiovascular status: hemodynamically stable  Respiratory status: acceptable  Hydration status: euvolemic

## 2023-06-08 NOTE — DISCHARGE INSTRUCTIONS
get from your doctor or therapist. These ideas may help:  Sit upright when eating, drinking, and taking pills. Take small bites of food. Chew completely and swallow before taking another bite. Take small sips of liquids. If eating makes you tired, eat smaller but more frequent meals. Tip your chin down when there is food in your mouth. Where can you learn more? Go to http://www.hi.com/ and enter D018 to learn more about \"Learning About Swallowing Problems. \"  Current as of: May 4, 2022               Content Version: 13.6  © 2006-2023 Healthwise, [employer]. Care instructions adapted under license by [facility] ([facility]). If you have questions about a medical condition or this instruction, always ask your healthcare professional. [place] [address] any warranty or liability for your use of this information. DISCHARGE SUMMARY from Nurse     POST-PROCEDURE INSTRUCTIONS:    Call your Physician if you:  Observe any excess bleeding. Develop a temperature over 100.5o F. Experience abdominal, shoulder or chest pain. Notice any signs of decreased circulation or nerve impairment to an extremity such as a change in color, persistent numbness, tingling, coldness or increase in pain. Vomit blood or you have nausea and vomiting lasting longer than 4 hours. Are unable to take medications. Are unable to urinate within 8 hours after discharge following general anesthesia or intravenous sedation. For the next 24 hours after receiving general anesthesia or intravenous sedation, or while taking prescription Narcotics, limit your activities:  Do NOT drive a motor vehicle, operate hazard machinery or power tools, or perform tasks that require coordination. The medication you received during your procedure may have some effect on your mental awareness. Do NOT make important personal or business decisions.   The medication you received during your procedure may have some effect on your

## 2023-06-08 NOTE — ANESTHESIA PRE PROCEDURE
vomiting R11.2    Anxiety and depression F41.9, F32. A    Screening due Z13.9    Hyperlipidemia E78.5    Poorly controlled diabetes mellitus ([facility] [address] E11.65    Vertigo R42    Skin lesion L98.9    Abnormal urine odor R82.90    Skin tag L91.8    Urine frequency R35.0    Daytime sleepiness R40.0    Epigastric discomfort R10.13    GOMES (dyspnea on exertion) R06.09    Essential hypertension I10    Advanced care planning/counseling discussion Z71.89    Chest pain R07.9    Vaginal odor N89.8    Gastritis K29.70    Post-menopausal Z78.0    Seasonal allergic rhinitis J30.2    Abnormal nuclear stress test R94.39    COPD (chronic obstructive pulmonary disease) ([facility]) J44.9    Fatigue R53.83    Major depressive disorder, recurrent episode, moderate ([facility]) F33.1    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       Chronic kidney disease     early stage kidney failure    COPD (chronic obstructive pulmonary disease) ([facility])     Diabetes ([facility] [address]     DVT (deep venous thrombosis) ([facility])     in her 25s    GERD (gastroesophageal reflux disease)     crohn's disease    Hyperlipidemia     Hypertension     Psychiatric disorder     anxiety       Past Surgical History:        Procedure Laterality Date    CARDIAC PROCEDURE N/A 6/2/2023    Left heart cath performed by Cam Patel MD at [address]    OTHER SURGICAL HISTORY      FATTY TISSUE REMOVED    PA UNLISTED PROCEDURE ABDOMEN

## 2023-06-20 ENCOUNTER — HOSPITAL ENCOUNTER (OUTPATIENT)
Facility: HOSPITAL | Age: 73
Setting detail: RECURRING SERIES
Discharge: HOME | End: 2023-06-23
Payer: MEDICARE

## 2023-06-20 PROCEDURE — 97110 THERAPEUTIC EXERCISES: CPT

## 2023-06-20 PROCEDURE — 97530 THERAPEUTIC ACTIVITIES: CPT

## 2023-06-20 PROCEDURE — 97140 MANUAL THERAPY 1/> REGIONS: CPT

## 2023-06-20 NOTE — PROGRESS NOTES
[facility] at [address] [facility] [facility], [address] [place] [address]  Phone: [phone]      Fax:  [phone]    Progress Note  Patient name: Sofya Richard Start of Care: 2023   Referral source: Sherryle Nito Tysonelisama : 1950               Medical Diagnosis: Pain in right shoulder [M25.511]        Onset Date:            2023    Treatment Diagnosis:      M25.511  RIGHT SHOULDER PAIN                           Prior Hospitalization: see medical history Provider#: [id number]   Medications: Verified on Patient Summary List      Comorbidities: Allergies, Arthritis, Back pain, COPD (no O2 per patient), DM, GI disease, HTN, Stage 3 kidney disease (no dialysis per patient), OP, Prior surgery, depression, thyroid problems, LBP. Patient reports recently found a blockage in heart so Is scheduled to Have a heart cath on 23 and was advised not to do any strenuous activity, but per patient was ok to participate in PT. Prior Level of Function: Patient is right hand dominant. Patient reports no functional deficits prior to onset. Visits from Start of Care: 4    Missed Visits: 1    Goals/Measure of Progress:   Short Term Goals: To be accomplished in 6 weeks              Patient will be independent and compliant with HEP to increase ease with ADLs. Eval: HEP established   Current: progressing 23  2. Patient will improve right shoulder AAROM flexion and scaption to 130deg to increase ease with overhead activites. Eval: Right shoulder AROM: flexion: 88deg, scaption: 84deg               Current: flexion 145 degs, scaption 142 degs with increase to 4/10 pain 23  Long Term Goals: To be accomplished in 12 weeks  Patient will improve FOTO to at least 62 to demonstrate improved function. Eval: FOTO: 44  Current: progressing FOTO 50  23  2.  Patient will improve right shoulder AROM Flexion and scaption to 120deg with pain no more then 2/10 to increase ease
procedures   [x] Review HEP    [] Progressed/Changed HEP, detail:    [] Other detail:       Objective Information/Functional Measures/Assessment    Pt has made some progress towards goals reporting 30% improvement in sxs and attending 4 visits total.  Functional IR and ER has improved AAROM and AROM but pt continues to have increased pain up to 5/10 with movement and at rest.  Pt will benefit from additional PT to address pain, t/s and rib mobility deficits and functional strength deficits to return to PLOF. Patient will continue to benefit from skilled PT / OT services to modify and progress therapeutic interventions, analyze and address functional mobility deficits, analyze and address ROM deficits, analyze and address strength deficits, and analyze and address soft tissue restrictions to address functional deficits and attain remaining goals. Progress toward goals / Updated goals:  []  See Progress Note/Recertification    Short Term Goals: To be accomplished in 6 weeks              Patient will be independent and compliant with HEP to increase ease with ADLs. Eval: HEP established   Current: progressing 6/20/23  2. Patient will improve right shoulder AAROM flexion and scaption to 130deg to increase ease with overhead activites. Eval: Right shoulder AROM: flexion: 88deg, scaption: 84deg    Current: flexion 145 degs, scaption 142 degs with increase to 4/10 pain 6/20/23  Long Term Goals: To be accomplished in 12 weeks  Patient will improve FOTO to at least 62 to demonstrate improved function. Eval: FOTO: 44  Current: progressing FOTO 50  6/20/23  2. Patient will improve right shoulder AROM Flexion and scaption to 120deg with pain no more then 2/10 to increase ease with household tasks. Eval: Right shoulder AROM: flexion: 88deg, scaption: 84deg    Current: flexion 136 degs, scaption 121 degs with increase to 5/10 pain 6/20/23  3.  Patient will improve right functional ER behind head

## 2023-06-26 ENCOUNTER — APPOINTMENT (OUTPATIENT)
Facility: HOSPITAL | Age: 73
End: 2023-06-26
Payer: MEDICARE

## 2023-06-27 ENCOUNTER — HOSPITAL ENCOUNTER (OUTPATIENT)
Facility: HOSPITAL | Age: 73
Setting detail: RECURRING SERIES
Discharge: HOME | End: 2023-06-30
Payer: MEDICARE

## 2023-06-27 PROCEDURE — 97110 THERAPEUTIC EXERCISES: CPT

## 2023-06-27 PROCEDURE — 97112 NEUROMUSCULAR REEDUCATION: CPT

## 2023-06-27 PROCEDURE — 97140 MANUAL THERAPY 1/> REGIONS: CPT

## 2023-06-30 ENCOUNTER — HOSPITAL ENCOUNTER (OUTPATIENT)
Facility: HOSPITAL | Age: 73
Setting detail: RECURRING SERIES
End: 2023-06-30
Payer: MEDICARE

## 2023-07-17 ENCOUNTER — OFFICE VISIT (OUTPATIENT)
Age: 73
End: 2023-07-17
Payer: MEDICARE

## 2023-07-17 VITALS
HEIGHT: 69 IN | DIASTOLIC BLOOD PRESSURE: 84 MMHG | BODY MASS INDEX: 26.51 KG/M2 | OXYGEN SATURATION: 96 % | SYSTOLIC BLOOD PRESSURE: 122 MMHG | WEIGHT: 179 LBS | HEART RATE: 84 BPM

## 2023-07-17 DIAGNOSIS — R07.9 CHEST PAIN, UNSPECIFIED TYPE: ICD-10-CM

## 2023-07-17 DIAGNOSIS — R94.39 ABNORMAL STRESS ECG: ICD-10-CM

## 2023-07-17 DIAGNOSIS — R93.1 ABNORMAL ECHOCARDIOGRAM: Primary | ICD-10-CM

## 2023-07-17 PROCEDURE — 1036F TOBACCO NON-USER: CPT | Performed by: INTERNAL MEDICINE

## 2023-07-17 PROCEDURE — 99213 OFFICE O/P EST LOW 20 MIN: CPT | Performed by: INTERNAL MEDICINE

## 2023-07-17 PROCEDURE — 3017F COLORECTAL CA SCREEN DOC REV: CPT | Performed by: INTERNAL MEDICINE

## 2023-07-17 PROCEDURE — 1090F PRES/ABSN URINE INCON ASSESS: CPT | Performed by: INTERNAL MEDICINE

## 2023-07-17 PROCEDURE — 3074F SYST BP LT 130 MM HG: CPT | Performed by: INTERNAL MEDICINE

## 2023-07-17 PROCEDURE — 3079F DIAST BP 80-89 MM HG: CPT | Performed by: INTERNAL MEDICINE

## 2023-07-17 PROCEDURE — G8428 CUR MEDS NOT DOCUMENT: HCPCS | Performed by: INTERNAL MEDICINE

## 2023-07-17 PROCEDURE — G8417 CALC BMI ABV UP PARAM F/U: HCPCS | Performed by: INTERNAL MEDICINE

## 2023-07-17 PROCEDURE — 1123F ACP DISCUSS/DSCN MKR DOCD: CPT | Performed by: INTERNAL MEDICINE

## 2023-07-17 PROCEDURE — G8399 PT W/DXA RESULTS DOCUMENT: HCPCS | Performed by: INTERNAL MEDICINE

## 2023-07-17 NOTE — PROGRESS NOTES
with patient who is in agreement. Thank you for allowing me to participate in patient care. Please feel free to call me if you have any question or concern. Bettina Serna MD  Please note: This document has been produced using voice recognition software. Unrecognized errors in transcription may be present.

## 2023-07-19 NOTE — PERIOP NOTE
Instructions for your procedure at [facility] Date: 7/19/2023      Patient's Name: Victorino Pino      Procedure Date: 7/26/2023        Please enter the main entrance of the hospital and check-in at the  located in the lobby. Do NOT eat or drink anything, including candy, gum, or ice chips after midnight prior to your procedure, unless it is part of your prep. Brush your teeth before coming to the hospital.You may swish with water, but do not swallow. No smoking/Vaping/E-Cigarettes 24 hours prior to the day of procedure. No alcohol 24 hours prior to the day of procedure. No recreational drugs for one week prior to the day of procedure. Bring Photo ID, Insurance information, and Co-pay if required on day of procedure. Bring in pertinent legal documents, such as, Medical Power of CHAPINCITO SIMPSON, DNR, Advance Directive, etc.  Leave all other valuables, including money/purse, at home. Remove jewelry, including ALL body piercings, nail polish, acrylic nails, and makeup (including mascara); no lotions, powders, deodorant, and/or perfume/cologne/after shave on the skin. Glasses and dentures may be worn to the hospital.  They must be removed prior to procedure. Please bring case/container for glasses or dentures. 11. Contacts should not be worn on day of procedure. 12. Call the office ([phone]) if you have symptoms of a cold or illness within 24-48 hours prior to your procedure. 13. AN ADULT (relative or friend 18 years or older) MUST DRIVE YOU HOME AFTER YOUR PROCEDURE. 14. Please make arrangements for a responsible adult (18 years or older) to be with you for 24 hours after your procedure. 13. ONE VISITOR will be allowed in the waiting area during your procedure. Special Instructions:      Bring list of CURRENT medications.   Follow instructions from the office regarding Bowel Prep, Vitamins, Iron, Blood Thinners, Insulin, Seizure, and Blood Pressure/Heart medications. Bring inhaler. Any questions regarding prep, please call the office at [phone]. For any questions or concerns on the day of procedure, please call the Endo Suite at [phone]. These surgical instructions were reviewed with patient during the PAT phone call.

## 2023-07-25 ENCOUNTER — ANESTHESIA EVENT (OUTPATIENT)
Facility: HOSPITAL | Age: 73
End: 2023-07-25
Payer: MEDICARE

## 2023-07-26 ENCOUNTER — ANESTHESIA (OUTPATIENT)
Facility: HOSPITAL | Age: 73
End: 2023-07-26
Payer: MEDICARE

## 2023-07-26 ENCOUNTER — HOSPITAL ENCOUNTER (OUTPATIENT)
Facility: HOSPITAL | Age: 73
Setting detail: OUTPATIENT SURGERY
Discharge: HOME | End: 2023-07-26
Attending: INTERNAL MEDICINE | Admitting: INTERNAL MEDICINE
Payer: MEDICARE

## 2023-07-26 VITALS
DIASTOLIC BLOOD PRESSURE: 70 MMHG | HEART RATE: 75 BPM | TEMPERATURE: 98.4 F | SYSTOLIC BLOOD PRESSURE: 147 MMHG | HEIGHT: 69 IN | RESPIRATION RATE: 17 BRPM | BODY MASS INDEX: 26.08 KG/M2 | WEIGHT: 176.1 LBS | OXYGEN SATURATION: 97 %

## 2023-07-26 LAB
GLUCOSE BLD STRIP.AUTO-MCNC: 144 MG/DL (ref 70–110)
GLUCOSE BLD STRIP.AUTO-MCNC: 175 MG/DL (ref 70–110)

## 2023-07-26 PROCEDURE — 3700000000 HC ANESTHESIA ATTENDED CARE: Performed by: INTERNAL MEDICINE

## 2023-07-26 PROCEDURE — 3600007502: Performed by: INTERNAL MEDICINE

## 2023-07-26 PROCEDURE — 3700000001 HC ADD 15 MINUTES (ANESTHESIA): Performed by: INTERNAL MEDICINE

## 2023-07-26 PROCEDURE — 6360000002 HC RX W HCPCS: Performed by: ANESTHESIOLOGY

## 2023-07-26 PROCEDURE — 3600007512: Performed by: INTERNAL MEDICINE

## 2023-07-26 PROCEDURE — 2709999900 HC NON-CHARGEABLE SUPPLY: Performed by: INTERNAL MEDICINE

## 2023-07-26 PROCEDURE — 88305 TISSUE EXAM BY PATHOLOGIST: CPT

## 2023-07-26 PROCEDURE — 82962 GLUCOSE BLOOD TEST: CPT

## 2023-07-26 PROCEDURE — 7100000010 HC PHASE II RECOVERY - FIRST 15 MIN: Performed by: INTERNAL MEDICINE

## 2023-07-26 PROCEDURE — 7100000001 HC PACU RECOVERY - ADDTL 15 MIN: Performed by: INTERNAL MEDICINE

## 2023-07-26 PROCEDURE — 7100000000 HC PACU RECOVERY - FIRST 15 MIN: Performed by: INTERNAL MEDICINE

## 2023-07-26 RX ORDER — DEXTROSE MONOHYDRATE 100 MG/ML
INJECTION, SOLUTION INTRAVENOUS CONTINUOUS PRN
Status: DISCONTINUED | OUTPATIENT
Start: 2023-07-26 | End: 2023-07-26 | Stop reason: HOSPADM

## 2023-07-26 RX ORDER — SODIUM CHLORIDE, SODIUM LACTATE, POTASSIUM CHLORIDE, CALCIUM CHLORIDE 600; 310; 30; 20 MG/100ML; MG/100ML; MG/100ML; MG/100ML
INJECTION, SOLUTION INTRAVENOUS CONTINUOUS
Status: DISCONTINUED | OUTPATIENT
Start: 2023-07-26 | End: 2023-07-26 | Stop reason: HOSPADM

## 2023-07-26 RX ORDER — SODIUM CHLORIDE 0.9 % (FLUSH) 0.9 %
5-40 SYRINGE (ML) INJECTION PRN
Status: DISCONTINUED | OUTPATIENT
Start: 2023-07-26 | End: 2023-07-26 | Stop reason: HOSPADM

## 2023-07-26 RX ORDER — INSULIN LISPRO 100 [IU]/ML
0-15 INJECTION, SOLUTION INTRAVENOUS; SUBCUTANEOUS ONCE
Status: DISCONTINUED | OUTPATIENT
Start: 2023-07-26 | End: 2023-07-26 | Stop reason: HOSPADM

## 2023-07-26 RX ORDER — PROPOFOL 10 MG/ML
INJECTION, EMULSION INTRAVENOUS PRN
Status: DISCONTINUED | OUTPATIENT
Start: 2023-07-26 | End: 2023-07-26 | Stop reason: SDUPTHER

## 2023-07-26 RX ORDER — LIDOCAINE HYDROCHLORIDE 10 MG/ML
1 INJECTION, SOLUTION EPIDURAL; INFILTRATION; INTRACAUDAL; PERINEURAL
Status: DISCONTINUED | OUTPATIENT
Start: 2023-07-26 | End: 2023-07-26 | Stop reason: HOSPADM

## 2023-07-26 RX ADMIN — PROPOFOL 50 MG: 10 INJECTION, EMULSION INTRAVENOUS at 10:42

## 2023-07-26 RX ADMIN — PROPOFOL 100 MG: 10 INJECTION, EMULSION INTRAVENOUS at 10:34

## 2023-07-26 RX ADMIN — PROPOFOL 50 MG: 10 INJECTION, EMULSION INTRAVENOUS at 10:38

## 2023-07-26 RX ADMIN — PROPOFOL 50 MG: 10 INJECTION, EMULSION INTRAVENOUS at 10:35

## 2023-07-26 ASSESSMENT — ENCOUNTER SYMPTOMS: SHORTNESS OF BREATH: 1

## 2023-07-26 ASSESSMENT — PAIN SCALES - GENERAL
PAINLEVEL_OUTOF10: 0
PAINLEVEL_OUTOF10: 0

## 2023-07-26 ASSESSMENT — PAIN - FUNCTIONAL ASSESSMENT: PAIN_FUNCTIONAL_ASSESSMENT: 0-10

## 2023-07-26 NOTE — ANESTHESIA POSTPROCEDURE EVALUATION
Department of Anesthesiology  Postprocedure Note    Patient: Marlin Monzon  MRN: [id number]  YOB: 1950  Date of evaluation: 7/26/2023      Procedure Summary     Date: 07/26/23 Room / Location: [facility] ENDO 02 / [facility]    Anesthesia Start: 1024 Anesthesia Stop: 8783    Procedure: COLONOSCOPY with polypectomies (Abdomen) Diagnosis:       Personal history of colonic polyps      Gastroesophageal reflux disease, unspecified whether esophagitis present      Constipation, unspecified constipation type      Personal history of peptic ulcer disease      Over weight      (Personal history of colonic polyps [Z86.010])      (Gastroesophageal reflux disease, unspecified whether esophagitis present [K21.9])      (Constipation, unspecified constipation type [K59.00])      (Personal history of peptic ulcer disease [Z87.11])      (Over weight [E66.3])    Surgeons: Ursula Jimenez MD Responsible Provider: López Acevedo MD    Anesthesia Type: MAC ASA Status: 3          Anesthesia Type: No value filed.     Claribel Phase I: Claribel Score: 10    Claribel Phase II:        Anesthesia Post Evaluation    Patient location during evaluation: PACU  Patient participation: complete - patient participated  Level of consciousness: awake and alert  Pain score: 0  Airway patency: patent  Nausea & Vomiting: no vomiting and no nausea  Complications: no  Cardiovascular status: hemodynamically stable  Respiratory status: acceptable  Hydration status: stable

## 2023-07-26 NOTE — H&P
[phone]    Gastroenterology pre op History and Physical     Impression:   1. High risk colon cancer screening/Colon polyp surveillance exam      Plan:     1. Colonoscopy    Addendum: All lab tests orders pertaining to the procedure have been ordered by the anesthesia personnel and results will be addressed by the anesthesia team      Chief Complaint: high risk colon cancer screening exam.      HPI:  Meagan Goins is a 81 y.o. female who is being seen on consult for high risk colon cancer screening with colonoscopy. There is a previous history of colon polyps, and the patient returns for a surveillence exam    PMH:   Past Medical History:   Diagnosis Date    Agatston CAC score, <100 05/15/2015    Coronary calcium score 70.       Chronic kidney disease     stage 3b    COPD (chronic obstructive pulmonary disease) (HCC)     Diabetes (HCC)     DVT (deep venous thrombosis) (HCC)     in her 25s    GERD (gastroesophageal reflux disease)     crohn's disease    Hyperlipidemia     Hypertension     Psychiatric disorder     anxiety       PSH:   Past Surgical History:   Procedure Laterality Date    CARDIAC PROCEDURE N/A 06/02/2023    Left heart cath performed by Muna Simmons MD at [address] (CERVIX STATUS UNKNOWN)  1997    OTHER SURGICAL HISTORY      FATTY TISSUE REMOVED    VT UNLISTED PROCEDURE ABDOMEN PERITONEUM & OMENTUM  1999    tumor removal    TONSILLECTOMY      UPPER GASTROINTESTINAL ENDOSCOPY N/A 06/08/2023    EGD ESOPHAGOGASTRODUODENOSCOPY with 54Fr Dilation performed by Krystal Gonzalez MD at [facility]       Social HX:   Social History     Socioeconomic History    Marital status:      Spouse name: Not on file    Number of children: Not on file    Years of education: Not on file    Highest education level: Not on file   Occupational History    Not on file   Tobacco Use    Smoking status: Former     Packs/day: 1.00     Years: 37.00     Pack years: and affect: no evidence of depression, anxiety or agitation. orientation: oriented to time, space and person. Basic Metabolic Profile   No results for input(s): NA, K, CL, CO2, BUN, GLU, CA, MG, PHOS in the last 72 hours. Invalid input(s): CREAT      CBC w/Diff    No results for input(s): WBC, RBC, HGB, HCT, MCV, MCH, MCHC, RDW, PLT, MPV in the last 72 hours. No results for input(s): MONO, BASO, PRO, METAS, BLAST in the last 72 hours. Invalid input(s): GRANS, LYMPH, EOS, MYELO     Hepatic Function   No results for input(s): ALB, TP, AML in the last 72 hours. Invalid input(s): DBILI, TBILI, GPT, SGOT, AP, LPSE       Joan Coyne MD  [address] [facility]  [facility], [address]  [email or web address]

## 2023-09-22 ENCOUNTER — OFFICE VISIT (OUTPATIENT)
Age: 73
End: 2023-09-22

## 2023-09-22 VITALS — RESPIRATION RATE: 18 BRPM | HEIGHT: 69 IN | BODY MASS INDEX: 26.01 KG/M2

## 2023-09-22 DIAGNOSIS — G89.29 CHRONIC RIGHT SHOULDER PAIN: Primary | ICD-10-CM

## 2023-09-22 DIAGNOSIS — M25.511 CHRONIC RIGHT SHOULDER PAIN: Primary | ICD-10-CM

## 2023-09-22 NOTE — PROGRESS NOTES
2.5-2.5 MCG/ACT AERS Inhale 2 puffs into the lungs daily 3 each 3    albuterol sulfate HFA (PROVENTIL;VENTOLIN;PROAIR) 108 (90 Base) MCG/ACT inhaler Inhale 1-2 puffs into the lungs every 4 hours as needed for Wheezing or Shortness of Breath 3 each 3    aspirin EC 81 MG EC tablet Take 1 tablet by mouth daily 30 tablet 5    atorvastatin (LIPITOR) 20 MG tablet Take 1 tablet by mouth daily      metFORMIN (GLUCOPHAGE) 500 MG tablet Take 1 tablet by mouth 2 times daily (with meals)      pantoprazole (PROTONIX) 40 MG tablet Take 1 tablet by mouth daily       No current facility-administered medications for this visit.        Allergies   Allergen Reactions    Fish-Derived Products Anaphylaxis and Other (See Comments)     Heart stops    Iodinated Contrast Media Anaphylaxis    Iodine Anaphylaxis and Other (See Comments)     Heart stops    Shellfish Allergy Anaphylaxis and Other (See Comments)     Heart stops    Bee Venom Hives       Past Surgical History:   Procedure Laterality Date    CARDIAC PROCEDURE N/A 06/02/2023    Left heart cath performed by Beth Lutz MD at [facility]    CHOLECYSTECTOMY      COLONOSCOPY N/A 7/26/2023    COLONOSCOPY with polypectomies performed by Mata Ernst MD at [address] [facility] ([facility]  [address] [place] [address]    tumor removal    TONSILLECTOMY      UPPER GASTROINTESTINAL ENDOSCOPY N/A 06/08/2023    EGD ESOPHAGOGASTRODUODENOSCOPY with 54Fr Dilation performed by Mata Ernst MD at [facility]       Social History     Socioeconomic History    Marital status:      Spouse name: Not on file    Number of children: Not on file    Years of education: Not on file    Highest education level: Not on file   Occupational History    Not on file   Tobacco Use    Smoking status: Former     Packs/day: 1.00     Years: 37.00     Additional pack years: 0.00     Total

## 2023-10-12 ENCOUNTER — HOSPITAL ENCOUNTER (OUTPATIENT)
Facility: HOSPITAL | Age: 73
Discharge: HOME | End: 2023-10-12
Payer: MEDICARE

## 2023-10-12 DIAGNOSIS — E83.52 HYPERCALCEMIA: ICD-10-CM

## 2023-10-12 LAB
ALBUMIN SERPL-MCNC: 3.6 G/DL (ref 3.4–5)
ANION GAP SERPL CALC-SCNC: 6 MMOL/L (ref 3–18)
BUN SERPL-MCNC: 24 MG/DL (ref 7–18)
BUN/CREAT SERPL: 18 (ref 12–20)
CALCIUM SERPL-MCNC: 10.3 MG/DL (ref 8.5–10.1)
CALCIUM SERPL-MCNC: 9.9 MG/DL (ref 8.5–10.1)
CHLORIDE SERPL-SCNC: 102 MMOL/L (ref 100–111)
CO2 SERPL-SCNC: 26 MMOL/L (ref 21–32)
CREAT SERPL-MCNC: 1.31 MG/DL (ref 0.6–1.3)
CREAT UR-MCNC: 61 MG/DL (ref 30–125)
GLUCOSE SERPL-MCNC: 342 MG/DL (ref 74–99)
MICROALBUMIN UR-MCNC: 4.44 MG/DL (ref 0–3)
MICROALBUMIN/CREAT UR-RTO: 73 MG/G (ref 0–30)
PHOSPHATE SERPL-MCNC: 2.7 MG/DL (ref 2.5–4.9)
POTASSIUM SERPL-SCNC: 4.9 MMOL/L (ref 3.5–5.5)
PTH-INTACT SERPL-MCNC: 99.4 PG/ML (ref 18.4–88)
SODIUM SERPL-SCNC: 134 MMOL/L (ref 136–145)

## 2023-10-12 PROCEDURE — 83970 ASSAY OF PARATHORMONE: CPT

## 2023-10-12 PROCEDURE — 80069 RENAL FUNCTION PANEL: CPT

## 2023-10-12 PROCEDURE — 82570 ASSAY OF URINE CREATININE: CPT

## 2023-10-12 PROCEDURE — 36415 COLL VENOUS BLD VENIPUNCTURE: CPT

## 2023-10-12 PROCEDURE — 82043 UR ALBUMIN QUANTITATIVE: CPT

## 2023-11-20 ENCOUNTER — HOSPITAL ENCOUNTER (OUTPATIENT)
Facility: HOSPITAL | Age: 73
Discharge: HOME | End: 2023-11-22
Attending: PODIATRIST
Payer: MEDICARE

## 2023-11-20 DIAGNOSIS — E11.52 DIABETIC GANGRENE (HCC): ICD-10-CM

## 2023-11-20 LAB
VAS LEFT ABI: 1.1
VAS LEFT DORSALIS PEDIS BP: 149 MMHG
VAS LEFT PTA BP: 161 MMHG
VAS LEFT TBI: 0.78
VAS LEFT TOE PRESSURE: 115 MMHG
VAS RIGHT ABI: 1.2
VAS RIGHT ARM BP: 147 MMHG
VAS RIGHT DORSALIS PEDIS BP: 168 MMHG
VAS RIGHT PTA BP: 176 MMHG
VAS RIGHT TBI: 0.86
VAS RIGHT TOE PRESSURE: 127 MMHG

## 2023-11-20 PROCEDURE — 93922 UPR/L XTREMITY ART 2 LEVELS: CPT | Performed by: INTERNAL MEDICINE

## 2023-11-20 PROCEDURE — 93922 UPR/L XTREMITY ART 2 LEVELS: CPT

## 2023-12-15 ENCOUNTER — APPOINTMENT (OUTPATIENT)
Facility: HOSPITAL | Age: 73
End: 2023-12-15
Payer: MEDICARE

## 2023-12-15 ENCOUNTER — HOSPITAL ENCOUNTER (EMERGENCY)
Facility: HOSPITAL | Age: 73
Discharge: HOME | End: 2023-12-15
Attending: EMERGENCY MEDICINE
Payer: MEDICARE

## 2023-12-15 VITALS
WEIGHT: 168 LBS | DIASTOLIC BLOOD PRESSURE: 74 MMHG | OXYGEN SATURATION: 94 % | HEIGHT: 69 IN | HEART RATE: 68 BPM | BODY MASS INDEX: 24.88 KG/M2 | RESPIRATION RATE: 18 BRPM | TEMPERATURE: 97.5 F | SYSTOLIC BLOOD PRESSURE: 126 MMHG

## 2023-12-15 DIAGNOSIS — J44.1 COPD EXACERBATION (HCC): ICD-10-CM

## 2023-12-15 DIAGNOSIS — J02.0 STREP PHARYNGITIS: Primary | ICD-10-CM

## 2023-12-15 LAB
ANION GAP SERPL CALC-SCNC: 4 MMOL/L (ref 3–18)
BASOPHILS # BLD: 0 K/UL (ref 0–0.1)
BASOPHILS NFR BLD: 1 % (ref 0–2)
BUN SERPL-MCNC: 19 MG/DL (ref 7–18)
BUN/CREAT SERPL: 16 (ref 12–20)
CALCIUM SERPL-MCNC: 10.4 MG/DL (ref 8.5–10.1)
CHLORIDE SERPL-SCNC: 101 MMOL/L (ref 100–111)
CO2 SERPL-SCNC: 31 MMOL/L (ref 21–32)
CREAT SERPL-MCNC: 1.16 MG/DL (ref 0.6–1.3)
DIFFERENTIAL METHOD BLD: NORMAL
EOSINOPHIL # BLD: 0.1 K/UL (ref 0–0.4)
EOSINOPHIL NFR BLD: 2 % (ref 0–5)
ERYTHROCYTE [DISTWIDTH] IN BLOOD BY AUTOMATED COUNT: 12.8 % (ref 11.6–14.5)
FLUAV RNA SPEC QL NAA+PROBE: NOT DETECTED
FLUBV RNA SPEC QL NAA+PROBE: NOT DETECTED
GLUCOSE SERPL-MCNC: 210 MG/DL (ref 74–99)
HCT VFR BLD AUTO: 42.8 % (ref 35–45)
HGB BLD-MCNC: 14.5 G/DL (ref 12–16)
IMM GRANULOCYTES # BLD AUTO: 0 K/UL (ref 0–0.04)
IMM GRANULOCYTES NFR BLD AUTO: 0 % (ref 0–0.5)
LYMPHOCYTES # BLD: 2.1 K/UL (ref 0.9–3.6)
LYMPHOCYTES NFR BLD: 33 % (ref 21–52)
MCH RBC QN AUTO: 28 PG (ref 24–34)
MCHC RBC AUTO-ENTMCNC: 33.9 G/DL (ref 31–37)
MCV RBC AUTO: 82.8 FL (ref 78–100)
MONOCYTES # BLD: 0.3 K/UL (ref 0.05–1.2)
MONOCYTES NFR BLD: 5 % (ref 3–10)
NEUTS SEG # BLD: 3.9 K/UL (ref 1.8–8)
NEUTS SEG NFR BLD: 60 % (ref 40–73)
NRBC # BLD: 0 K/UL (ref 0–0.01)
NRBC BLD-RTO: 0 PER 100 WBC
NT PRO BNP: 90 PG/ML (ref 0–900)
PLATELET # BLD AUTO: 234 K/UL (ref 135–420)
PMV BLD AUTO: 9.4 FL (ref 9.2–11.8)
POTASSIUM SERPL-SCNC: 4.1 MMOL/L (ref 3.5–5.5)
RBC # BLD AUTO: 5.17 M/UL (ref 4.2–5.3)
SARS-COV-2 RNA RESP QL NAA+PROBE: NOT DETECTED
SODIUM SERPL-SCNC: 136 MMOL/L (ref 136–145)
TROPONIN I SERPL HS-MCNC: 5 NG/L (ref 0–54)
WBC # BLD AUTO: 6.6 K/UL (ref 4.6–13.2)

## 2023-12-15 PROCEDURE — 2580000003 HC RX 258: Performed by: EMERGENCY MEDICINE

## 2023-12-15 PROCEDURE — 84484 ASSAY OF TROPONIN QUANT: CPT

## 2023-12-15 PROCEDURE — 80048 BASIC METABOLIC PNL TOTAL CA: CPT

## 2023-12-15 PROCEDURE — 93005 ELECTROCARDIOGRAM TRACING: CPT | Performed by: EMERGENCY MEDICINE

## 2023-12-15 PROCEDURE — 87636 SARSCOV2 & INF A&B AMP PRB: CPT

## 2023-12-15 PROCEDURE — 85025 COMPLETE CBC W/AUTO DIFF WBC: CPT

## 2023-12-15 PROCEDURE — 96374 THER/PROPH/DIAG INJ IV PUSH: CPT

## 2023-12-15 PROCEDURE — 6370000000 HC RX 637 (ALT 250 FOR IP): Performed by: EMERGENCY MEDICINE

## 2023-12-15 PROCEDURE — 83880 ASSAY OF NATRIURETIC PEPTIDE: CPT

## 2023-12-15 PROCEDURE — 71046 X-RAY EXAM CHEST 2 VIEWS: CPT

## 2023-12-15 PROCEDURE — 99285 EMERGENCY DEPT VISIT HI MDM: CPT

## 2023-12-15 PROCEDURE — 6360000002 HC RX W HCPCS: Performed by: EMERGENCY MEDICINE

## 2023-12-15 RX ORDER — METHYLPREDNISOLONE 4 MG/1
TABLET ORAL
Qty: 1 KIT | Refills: 0 | Status: SHIPPED | OUTPATIENT
Start: 2023-12-15 | End: 2023-12-15

## 2023-12-15 RX ORDER — GUAIFENESIN 600 MG/1
600 TABLET, EXTENDED RELEASE ORAL 2 TIMES DAILY
Qty: 20 TABLET | Refills: 0 | Status: SHIPPED | OUTPATIENT
Start: 2023-12-15 | End: 2023-12-25

## 2023-12-15 RX ORDER — BENZONATATE 100 MG/1
100 CAPSULE ORAL 2 TIMES DAILY PRN
Qty: 10 CAPSULE | Refills: 0 | Status: SHIPPED | OUTPATIENT
Start: 2023-12-15 | End: 2023-12-25

## 2023-12-15 RX ORDER — AZITHROMYCIN 250 MG/1
TABLET, FILM COATED ORAL
Qty: 1 PACKET | Refills: 0 | Status: SHIPPED | OUTPATIENT
Start: 2023-12-15 | End: 2023-12-19

## 2023-12-15 RX ORDER — IPRATROPIUM BROMIDE AND ALBUTEROL SULFATE 2.5; .5 MG/3ML; MG/3ML
1 SOLUTION RESPIRATORY (INHALATION) ONCE
Status: COMPLETED | OUTPATIENT
Start: 2023-12-15 | End: 2023-12-15

## 2023-12-15 RX ORDER — PREDNISONE 10 MG/1
10 TABLET ORAL DAILY
Qty: 5 TABLET | Refills: 0 | Status: SHIPPED | OUTPATIENT
Start: 2023-12-15 | End: 2023-12-20

## 2023-12-15 RX ADMIN — METHYLPREDNISOLONE SODIUM SUCCINATE 80 MG: 125 INJECTION INTRAMUSCULAR; INTRAVENOUS at 16:02

## 2023-12-15 RX ADMIN — IPRATROPIUM BROMIDE AND ALBUTEROL SULFATE 1 DOSE: .5; 3 SOLUTION RESPIRATORY (INHALATION) at 16:06

## 2023-12-15 ASSESSMENT — LIFESTYLE VARIABLES
HOW MANY STANDARD DRINKS CONTAINING ALCOHOL DO YOU HAVE ON A TYPICAL DAY: PATIENT DOES NOT DRINK
HOW OFTEN DO YOU HAVE A DRINK CONTAINING ALCOHOL: NEVER

## 2023-12-15 ASSESSMENT — PAIN SCALES - GENERAL: PAINLEVEL_OUTOF10: 0

## 2023-12-15 ASSESSMENT — PAIN - FUNCTIONAL ASSESSMENT: PAIN_FUNCTIONAL_ASSESSMENT: 0-10

## 2023-12-15 NOTE — ED NOTES
[facility] EMERGENCY DEPT  EMERGENCY DEPARTMENT ENCOUNTER      Pt Name: Paresh Bustillo  MRN: [id number]  [address] [facility] 1950  Date of evaluation: 12/15/2023  Provider: Conchita Ceballos MD  2:01 PM    CHIEF COMPLAINT       Chief Complaint   Patient presents with    Pharyngitis         HISTORY OF PRESENT ILLNESS    Paresh Bustillo is a 72 y.o. female who presents to the emergency department with cough and shortness of breath. HPI  21-year-old female with a past medical history notable for COPD and malignant hypertensive chronic kidney disease stage III, who presents with 1.5 weeks of cough shortness of breath and congestion, with positive streptococcal pharyngitis on 12/12 for which she has been taking Augmentin. Patient states that symptoms started 1.5 weeks ago with cough, shortness of breath, congestion for which she was found to be COVID-negative, influenza negative, and strep positive on December 12 at urgent care. She was then started on a course of Augmentin. She states in the following time her symptoms have gotten worse she has continued to have cough she is had progressive shortness of breath. She states she had 1 episode of forceful coughing which resulted in the production of yellow blood-tinged sputum. She states during this period she has had fever, chills, no nausea no vomiting no diarrhea. On presentation to the [facility] ED she denies headache, abdominal pain. She endorses chest wall discomfort/pain from episodes of persistent coughing. Nursing Notes were reviewed. REVIEW OF SYSTEMS             PAST MEDICAL HISTORY     Past Medical History:   Diagnosis Date    Agatston CAC score, <100 05/15/2015    Coronary calcium score 70.       Chronic kidney disease     stage 3b    COPD (chronic obstructive pulmonary disease) (HCC)     Diabetes (HCC)     DVT (deep venous thrombosis) ([address])     in her 25s    GERD (gastroesophageal reflux disease)     crohn's disease    Hyperlipidemia     Hypertension

## 2023-12-15 NOTE — DISCHARGE INSTRUCTIONS
- Continue and finish taking your Amoxicillin-Clavulanic Acid / (Augmentin) antibiotics. - Start Taking Azithromycin (Zithromax) antibiotics. - Start taking Prednisone.   - Start taking the Mucinex as indicated. - Start taking the Tessalon cough suppressant as indicated. - Call your PCP to follow up on your ED admission.

## 2023-12-15 NOTE — ED TRIAGE NOTES
PATIENT C/O SORE THROAT X1 WEEK, WAS DIAGNOSED WITH STREP. ALSO C/O COUGH, CHEST DISCOMFORT AND CONGESTION THAT HAS WORSENED.

## 2023-12-16 LAB
EKG ATRIAL RATE: 74 BPM
EKG DIAGNOSIS: NORMAL
EKG P AXIS: 72 DEGREES
EKG P-R INTERVAL: 192 MS
EKG Q-T INTERVAL: 368 MS
EKG QRS DURATION: 82 MS
EKG QTC CALCULATION (BAZETT): 408 MS
EKG R AXIS: -37 DEGREES
EKG T AXIS: 76 DEGREES
EKG VENTRICULAR RATE: 74 BPM

## 2023-12-16 PROCEDURE — 93010 ELECTROCARDIOGRAM REPORT: CPT | Performed by: INTERNAL MEDICINE

## 2023-12-22 PROBLEM — G89.29 CHRONIC RIGHT SHOULDER PAIN: Status: ACTIVE | Noted: 2023-12-22

## 2023-12-22 PROBLEM — M25.511 CHRONIC RIGHT SHOULDER PAIN: Status: ACTIVE | Noted: 2023-12-22

## 2023-12-30 ENCOUNTER — HOSPITAL ENCOUNTER (EMERGENCY)
Facility: HOSPITAL | Age: 73
Discharge: LEFT BEFORE DISCHARGE | End: 2023-12-30
Payer: MEDICARE

## 2023-12-30 VITALS
RESPIRATION RATE: 16 BRPM | TEMPERATURE: 97.9 F | DIASTOLIC BLOOD PRESSURE: 91 MMHG | HEIGHT: 69 IN | SYSTOLIC BLOOD PRESSURE: 138 MMHG | OXYGEN SATURATION: 98 % | BODY MASS INDEX: 27.7 KG/M2 | HEART RATE: 69 BPM | WEIGHT: 187 LBS

## 2023-12-30 LAB — GLUCOSE BLD STRIP.AUTO-MCNC: 221 MG/DL (ref 70–110)

## 2023-12-30 PROCEDURE — 82962 GLUCOSE BLOOD TEST: CPT

## 2023-12-30 PROCEDURE — 4500000002 HC ER NO CHARGE

## 2023-12-30 ASSESSMENT — PAIN - FUNCTIONAL ASSESSMENT: PAIN_FUNCTIONAL_ASSESSMENT: NONE - DENIES PAIN

## 2023-12-30 NOTE — ED TRIAGE NOTES
Pt c/o nausea, headache fatigue and BS was 265 at home, hx of DM type 2 per she has not taken her metformin x 4 months and they were trying to switch her metformin to something else.  in triage.

## 2024-04-03 ENCOUNTER — HOSPITAL ENCOUNTER (OUTPATIENT)
Facility: HOSPITAL | Age: 74
Discharge: HOME | End: 2024-04-06
Payer: MEDICARE

## 2024-04-03 DIAGNOSIS — Z78.0 POST-MENOPAUSAL: ICD-10-CM

## 2024-04-03 DIAGNOSIS — Z12.31 BREAST CANCER SCREENING BY MAMMOGRAM: ICD-10-CM

## 2024-04-03 PROCEDURE — 77067 SCR MAMMO BI INCL CAD: CPT

## 2024-04-03 PROCEDURE — 77080 DXA BONE DENSITY AXIAL: CPT

## 2024-04-18 ENCOUNTER — OFFICE VISIT (OUTPATIENT)
Age: 74
End: 2024-04-18
Payer: MEDICARE

## 2024-04-18 DIAGNOSIS — R06.02 SHORTNESS OF BREATH: ICD-10-CM

## 2024-04-18 DIAGNOSIS — Z87.891 PERSONAL HISTORY OF TOBACCO USE, PRESENTING HAZARDS TO HEALTH: ICD-10-CM

## 2024-04-18 DIAGNOSIS — J44.9 COPD, GROUP B, BY GOLD 2017 CLASSIFICATION (HCC): Primary | ICD-10-CM

## 2024-04-18 PROCEDURE — G8417 CALC BMI ABV UP PARAM F/U: HCPCS | Performed by: INTERNAL MEDICINE

## 2024-04-18 PROCEDURE — 3017F COLORECTAL CA SCREEN DOC REV: CPT | Performed by: INTERNAL MEDICINE

## 2024-04-18 PROCEDURE — 1123F ACP DISCUSS/DSCN MKR DOCD: CPT | Performed by: INTERNAL MEDICINE

## 2024-04-18 PROCEDURE — G8428 CUR MEDS NOT DOCUMENT: HCPCS | Performed by: INTERNAL MEDICINE

## 2024-04-18 PROCEDURE — 1036F TOBACCO NON-USER: CPT | Performed by: INTERNAL MEDICINE

## 2024-04-18 PROCEDURE — 99214 OFFICE O/P EST MOD 30 MIN: CPT | Performed by: INTERNAL MEDICINE

## 2024-04-18 PROCEDURE — G8399 PT W/DXA RESULTS DOCUMENT: HCPCS | Performed by: INTERNAL MEDICINE

## 2024-04-18 PROCEDURE — 1090F PRES/ABSN URINE INCON ASSESS: CPT | Performed by: INTERNAL MEDICINE

## 2024-04-18 PROCEDURE — 3023F SPIROM DOC REV: CPT | Performed by: INTERNAL MEDICINE

## 2024-04-18 NOTE — PROGRESS NOTES
Marii Siegel presents today for   Chief Complaint   Patient presents with    COPD    Shortness of Breath       Is someone accompanying this pt? No    Is the patient using any DME equipment during OV? No    -DME Company NA    Depression Screenin/5/2023     9:31 AM   PHQ-9 Questionaire   Little interest or pleasure in doing things 0   Feeling down, depressed, or hopeless 0   PHQ-9 Total Score 0       Learning Needs Questionnaire:     Who is the primary learner? Patient    What is the preferred language for health care of the primary learner? ENGLISH    How does the primary learner prefer to learn new concepts? DEMONSTRATION    Answered By Patient    Relationship to Learner SELF          Fall Risk:         2023     9:53 AM   Fall Risk   2 or more falls in past year? no   Fall with injury in past year? no        Abuse Screenin/5/2023     9:00 AM   AMB Abuse Screening   Do you ever feel afraid of your partner? N   Are you in a relationship with someone who physically or mentally threatens you? N   Is it safe for you to go home? Y         Coordination of Care:    1. Have you been to the ER, urgent care clinic since your last visit? Hospitalized since your last visit? No    2. Have you seen or consulted any other health care providers outside of the [facility] since your last visit? Include any pap smears or colon screening. PCP-Dr.Karo Christian, Cardiologist-Craig Delacruz MD,  Nephrology-Reno Lin MD     Medication list has been update per patient.        
medial hepatic lobe, previously 4.3  cm. Degenerative changes.    Impression  4 mm juxtapleural nodule right lower lobe. Lung RADS 2: Benign appearance or  behavior. Management: 1 year follow-up low-dose lung screening CT.           PFTs:  5/14/2021, spirometry is normal without BD response, lung volumes show a mild restriction,  diffusion capacity is moderately reduced     TTE:  I have reviewed the patient's TTE results   10/29/20      ECHO ADULT COMPLETE 10/30/2020 10/30/2020      Interpretation Summary   · LV: Estimated LVEF is 55 - 60%. Visually measured ejection fraction. Normal cavity size and systolic function (ejection fraction normal). Mild concentric hypertrophy.  Wall motion: normal. Age-appropriate left ventricular diastolic function.   · PV: Mild pulmonic valve regurgitation is present.   · AV: Mild aortic valve regurgitation is present.   · PA: Pulmonary arterial systolic pressure is 25 mmHg.      Signed by: Wallace Stauffer MD on 10/30/2020  8:50 PM           Assessment and Plan:  73 y.o. female with:     Impression:  1.  COPD, gold risk category B  2.  Dyspnea on exertion/shortness of breath: Due to above as well as mild deconditioning  3.  History of tobacco use: Quit smoking in October 2011, prior 1 pack/day smoker for approximately 35 years    Plan:  -CT lung cancer screening ordered for May.  Shared decision making performed.  Advised patient to have this performed annually until 15 years post smoking cessation (around 2025) per USPSTF guidelines updated in 2021  -Continue Stiolto Respimat 2 puffs daily --- advised to use daily instead of PRN  -Continue albuterol HFA 1-2puffs q4-6h PRN.  Counseled patient that this is their rescue inhaler and to carry with them at all times.  -Counseled patient on proper inhaler technique  -Advised patient to remain active  -CAD management per Cardiology  -Immunizations reviewed, needs updated pneumococcal vaccination at next visit      Return in about 1

## 2024-04-22 ENCOUNTER — HOSPITAL ENCOUNTER (OUTPATIENT)
Facility: HOSPITAL | Age: 74
Discharge: HOME | End: 2024-04-25
Payer: MEDICARE

## 2024-04-22 DIAGNOSIS — N18.32 CHRONIC KIDNEY DISEASE (CKD) STAGE G3B/A1, MODERATELY DECREASED GLOMERULAR FILTRATION RATE (GFR) BETWEEN 30-44 ML/MIN/1.73 SQUARE METER AND ALBUMINURIA CREATININE RATIO LESS THAN 30 MG/G (HCC): ICD-10-CM

## 2024-04-22 LAB
25(OH)D3 SERPL-MCNC: 31.6 NG/ML (ref 30–100)
ALBUMIN SERPL-MCNC: 4 G/DL (ref 3.4–5)
ANION GAP SERPL CALC-SCNC: 5 MMOL/L (ref 3–18)
BUN SERPL-MCNC: 23 MG/DL (ref 7–18)
BUN/CREAT SERPL: 18 (ref 12–20)
CALCIUM SERPL-MCNC: 10.9 MG/DL (ref 8.5–10.1)
CALCIUM SERPL-MCNC: 10.9 MG/DL (ref 8.5–10.1)
CHLORIDE SERPL-SCNC: 102 MMOL/L (ref 100–111)
CO2 SERPL-SCNC: 30 MMOL/L (ref 21–32)
CREAT SERPL-MCNC: 1.26 MG/DL (ref 0.6–1.3)
CREAT UR-MCNC: 142 MG/DL (ref 30–125)
ERYTHROCYTE [DISTWIDTH] IN BLOOD BY AUTOMATED COUNT: 12.6 % (ref 11.6–14.5)
GLUCOSE SERPL-MCNC: 143 MG/DL (ref 74–99)
HCT VFR BLD AUTO: 46.5 % (ref 35–45)
HGB BLD-MCNC: 15 G/DL (ref 12–16)
MCH RBC QN AUTO: 27.7 PG (ref 24–34)
MCHC RBC AUTO-ENTMCNC: 32.3 G/DL (ref 31–37)
MCV RBC AUTO: 86 FL (ref 78–100)
MICROALBUMIN UR-MCNC: 5.33 MG/DL (ref 0–3)
MICROALBUMIN/CREAT UR-RTO: 38 MG/G (ref 0–30)
NRBC # BLD: 0 K/UL (ref 0–0.01)
NRBC BLD-RTO: 0 PER 100 WBC
PHOSPHATE SERPL-MCNC: 2.8 MG/DL (ref 2.5–4.9)
PLATELET # BLD AUTO: 277 K/UL (ref 135–420)
PMV BLD AUTO: 10 FL (ref 9.2–11.8)
POTASSIUM SERPL-SCNC: 4.5 MMOL/L (ref 3.5–5.5)
PTH-INTACT SERPL-MCNC: 76.8 PG/ML (ref 18.4–88)
RBC # BLD AUTO: 5.41 M/UL (ref 4.2–5.3)
SODIUM SERPL-SCNC: 137 MMOL/L (ref 136–145)
WBC # BLD AUTO: 7.8 K/UL (ref 4.6–13.2)

## 2024-04-22 PROCEDURE — 85027 COMPLETE CBC AUTOMATED: CPT

## 2024-04-22 PROCEDURE — 83970 ASSAY OF PARATHORMONE: CPT

## 2024-04-22 PROCEDURE — 82306 VITAMIN D 25 HYDROXY: CPT

## 2024-04-22 PROCEDURE — 82570 ASSAY OF URINE CREATININE: CPT

## 2024-04-22 PROCEDURE — 82043 UR ALBUMIN QUANTITATIVE: CPT

## 2024-04-22 PROCEDURE — 36415 COLL VENOUS BLD VENIPUNCTURE: CPT

## 2024-04-22 PROCEDURE — 80069 RENAL FUNCTION PANEL: CPT

## 2024-05-29 ENCOUNTER — APPOINTMENT (OUTPATIENT)
Facility: HOSPITAL | Age: 74
End: 2024-05-29
Payer: MEDICARE

## 2024-05-29 ENCOUNTER — HOSPITAL ENCOUNTER (EMERGENCY)
Facility: HOSPITAL | Age: 74
Discharge: HOME | End: 2024-05-30
Attending: STUDENT IN AN ORGANIZED HEALTH CARE EDUCATION/TRAINING PROGRAM
Payer: MEDICARE

## 2024-05-29 DIAGNOSIS — R06.00 DYSPNEA, UNSPECIFIED TYPE: Primary | ICD-10-CM

## 2024-05-29 DIAGNOSIS — R07.89 CHEST TIGHTNESS: ICD-10-CM

## 2024-05-29 LAB
ALBUMIN SERPL-MCNC: 3.8 G/DL (ref 3.4–5)
ALBUMIN/GLOB SERPL: 1.3 (ref 0.8–1.7)
ALP SERPL-CCNC: 93 U/L (ref 45–117)
ALT SERPL-CCNC: 29 U/L (ref 13–56)
ANION GAP SERPL CALC-SCNC: 8 MMOL/L (ref 3–18)
AST SERPL-CCNC: 11 U/L (ref 10–38)
BASOPHILS # BLD: 0.1 K/UL (ref 0–0.1)
BASOPHILS NFR BLD: 1 % (ref 0–2)
BILIRUB SERPL-MCNC: 0.4 MG/DL (ref 0.2–1)
BUN SERPL-MCNC: 23 MG/DL (ref 7–18)
BUN/CREAT SERPL: 14 (ref 12–20)
CALCIUM SERPL-MCNC: 10.5 MG/DL (ref 8.5–10.1)
CHLORIDE SERPL-SCNC: 102 MMOL/L (ref 100–111)
CO2 SERPL-SCNC: 28 MMOL/L (ref 21–32)
CREAT SERPL-MCNC: 1.7 MG/DL (ref 0.6–1.3)
D DIMER PPP FEU-MCNC: 0.46 UG/ML(FEU)
DIFFERENTIAL METHOD BLD: NORMAL
EOSINOPHIL # BLD: 0.1 K/UL (ref 0–0.4)
EOSINOPHIL NFR BLD: 2 % (ref 0–5)
ERYTHROCYTE [DISTWIDTH] IN BLOOD BY AUTOMATED COUNT: 13.2 % (ref 11.6–14.5)
FLUAV RNA SPEC QL NAA+PROBE: NOT DETECTED
FLUBV RNA SPEC QL NAA+PROBE: NOT DETECTED
GLOBULIN SER CALC-MCNC: 3 G/DL (ref 2–4)
GLUCOSE SERPL-MCNC: 248 MG/DL (ref 74–99)
HCT VFR BLD AUTO: 42 % (ref 35–45)
HGB BLD-MCNC: 13.8 G/DL (ref 12–16)
IMM GRANULOCYTES # BLD AUTO: 0 K/UL (ref 0–0.04)
IMM GRANULOCYTES NFR BLD AUTO: 0 % (ref 0–0.5)
LYMPHOCYTES # BLD: 2.9 K/UL (ref 0.9–3.6)
LYMPHOCYTES NFR BLD: 36 % (ref 21–52)
MAGNESIUM SERPL-MCNC: 1.8 MG/DL (ref 1.6–2.6)
MCH RBC QN AUTO: 27.9 PG (ref 24–34)
MCHC RBC AUTO-ENTMCNC: 32.9 G/DL (ref 31–37)
MCV RBC AUTO: 84.8 FL (ref 78–100)
MONOCYTES # BLD: 0.5 K/UL (ref 0.05–1.2)
MONOCYTES NFR BLD: 6 % (ref 3–10)
NEUTS SEG # BLD: 4.4 K/UL (ref 1.8–8)
NEUTS SEG NFR BLD: 55 % (ref 40–73)
NRBC # BLD: 0 K/UL (ref 0–0.01)
NRBC BLD-RTO: 0 PER 100 WBC
NT PRO BNP: 67 PG/ML (ref 0–900)
PLATELET # BLD AUTO: 276 K/UL (ref 135–420)
PMV BLD AUTO: 9.5 FL (ref 9.2–11.8)
POTASSIUM SERPL-SCNC: 4.4 MMOL/L (ref 3.5–5.5)
PROT SERPL-MCNC: 6.8 G/DL (ref 6.4–8.2)
RBC # BLD AUTO: 4.95 M/UL (ref 4.2–5.3)
SARS-COV-2 RNA RESP QL NAA+PROBE: NOT DETECTED
SODIUM SERPL-SCNC: 138 MMOL/L (ref 136–145)
TROPONIN I SERPL HS-MCNC: 3 NG/L (ref 0–54)
TROPONIN I SERPL HS-MCNC: 4 NG/L (ref 0–54)
WBC # BLD AUTO: 8 K/UL (ref 4.6–13.2)

## 2024-05-29 PROCEDURE — 84484 ASSAY OF TROPONIN QUANT: CPT

## 2024-05-29 PROCEDURE — 83735 ASSAY OF MAGNESIUM: CPT

## 2024-05-29 PROCEDURE — 85025 COMPLETE CBC W/AUTO DIFF WBC: CPT

## 2024-05-29 PROCEDURE — 71045 X-RAY EXAM CHEST 1 VIEW: CPT

## 2024-05-29 PROCEDURE — 99285 EMERGENCY DEPT VISIT HI MDM: CPT

## 2024-05-29 PROCEDURE — 87636 SARSCOV2 & INF A&B AMP PRB: CPT

## 2024-05-29 PROCEDURE — 80053 COMPREHEN METABOLIC PANEL: CPT

## 2024-05-29 PROCEDURE — 85379 FIBRIN DEGRADATION QUANT: CPT

## 2024-05-29 PROCEDURE — 93005 ELECTROCARDIOGRAM TRACING: CPT | Performed by: STUDENT IN AN ORGANIZED HEALTH CARE EDUCATION/TRAINING PROGRAM

## 2024-05-29 PROCEDURE — 6370000000 HC RX 637 (ALT 250 FOR IP): Performed by: STUDENT IN AN ORGANIZED HEALTH CARE EDUCATION/TRAINING PROGRAM

## 2024-05-29 PROCEDURE — 83880 ASSAY OF NATRIURETIC PEPTIDE: CPT

## 2024-05-29 RX ORDER — IPRATROPIUM BROMIDE AND ALBUTEROL SULFATE 2.5; .5 MG/3ML; MG/3ML
1 SOLUTION RESPIRATORY (INHALATION)
Status: COMPLETED | OUTPATIENT
Start: 2024-05-29 | End: 2024-05-29

## 2024-05-29 RX ADMIN — IPRATROPIUM BROMIDE AND ALBUTEROL SULFATE 1 DOSE: .5; 3 SOLUTION RESPIRATORY (INHALATION) at 22:07

## 2024-05-29 ASSESSMENT — PAIN SCALES - GENERAL: PAINLEVEL_OUTOF10: 1

## 2024-05-29 ASSESSMENT — PAIN - FUNCTIONAL ASSESSMENT: PAIN_FUNCTIONAL_ASSESSMENT: 0-10

## 2024-05-29 ASSESSMENT — PAIN DESCRIPTION - LOCATION: LOCATION: CHEST

## 2024-05-30 VITALS
TEMPERATURE: 97.5 F | RESPIRATION RATE: 15 BRPM | HEART RATE: 67 BPM | OXYGEN SATURATION: 96 % | SYSTOLIC BLOOD PRESSURE: 150 MMHG | BODY MASS INDEX: 26.96 KG/M2 | WEIGHT: 182 LBS | DIASTOLIC BLOOD PRESSURE: 73 MMHG | HEIGHT: 69 IN

## 2024-05-30 LAB
EKG ATRIAL RATE: 76 BPM
EKG DIAGNOSIS: NORMAL
EKG P AXIS: 58 DEGREES
EKG P-R INTERVAL: 184 MS
EKG Q-T INTERVAL: 370 MS
EKG QRS DURATION: 78 MS
EKG QTC CALCULATION (BAZETT): 416 MS
EKG R AXIS: -35 DEGREES
EKG T AXIS: 74 DEGREES
EKG VENTRICULAR RATE: 76 BPM

## 2024-05-30 PROCEDURE — 93010 ELECTROCARDIOGRAM REPORT: CPT | Performed by: INTERNAL MEDICINE

## 2024-05-30 NOTE — DISCHARGE INSTRUCTIONS
You were seen in [facility] today for shortness of breath and chest tightness.  Your workup did not show any emergent cause for your symptoms.  Please follow-up with your primary care provider if you should have continued symptoms.  Please return to the emergency rib should you have any new worsening chest pain, shortness of breath, or any other worrisome symptom to you.

## 2024-05-30 NOTE — ED PROVIDER NOTES
EMERGENCY DEPARTMENT HISTORY AND PHYSICAL EXAM      Date: 5/29/2024  Patient Name: Marii Siegel    History of Presenting Illness     Chief Complaint   Patient presents with    Shortness of Breath       History (Context): Marii Siegel is a 73 y.o. female  presents to the ED today with chief complaint of chest pain and tightness that started yesterday morning.  She describes the chest pain as a tightness.  She also describes shortness of breath that has not been relieved with her usual COPD medications.  Nothing is made the symptoms better or worse.  The tightness is located substernally without any radiation to the shoulders, back, neck.  She denies any knowledge of triggers.  The symptoms have been constant since it started.  She denies any nausea, vomiting, fever, abdominal pain.  She does admit to a cough and headache.      PCP: Darline Christian MD    No current facility-administered medications for this encounter.     Current Outpatient Medications   Medication Sig Dispense Refill    amLODIPine (NORVASC) 5 MG tablet Take 1 tablet by mouth daily      lidocaine (LIDODERM) 5 % APPLY 1 PATCH TO THE AFFECTED AREA AND LEAVE IN PLACE FOR 12 HOURS, THEN REMOVE AND LEAVE OFF FOR 12 HOURS.      losartan (COZAAR) 50 MG tablet Take 1 tablet by mouth daily      vitamin D 25 MCG (1000 UT) CAPS Take 1 capsule by mouth daily      buPROPion (WELLBUTRIN XL) 150 MG extended release tablet Take 1 tablet by mouth every morning      PARoxetine (PAXIL) 40 MG tablet Take 1 tablet by mouth every morning      tiotropium-olodaterol (STIOLTO) 2.5-2.5 MCG/ACT AERS Inhale 2 puffs into the lungs daily 3 each 3    albuterol sulfate HFA (PROVENTIL;VENTOLIN;PROAIR) 108 (90 Base) MCG/ACT inhaler Inhale 1-2 puffs into the lungs every 4 hours as needed for Wheezing or Shortness of Breath 3 each 3    aspirin EC 81 MG EC tablet Take 1 tablet by mouth daily 30 tablet 5    atorvastatin (LIPITOR) 20 MG tablet Take 1 tablet by mouth daily

## 2024-05-30 NOTE — ED TRIAGE NOTES
Pt arrived via Triage ambulatory c/o SOB since yesterday, mild chest pain due to cough. Pt has been using her inhaler with no relief. Pt in no acute distress in triage.

## 2024-05-30 NOTE — ED NOTES
Discharge instructions reviewed with patient. Patient verbalized understanding. PIV removed. Patient ambulatory with steady gait to the ED lobby in no acute distress.

## 2024-06-17 ENCOUNTER — OFFICE VISIT (OUTPATIENT)
Age: 74
End: 2024-06-17
Payer: MEDICARE

## 2024-06-17 VITALS
WEIGHT: 184 LBS | OXYGEN SATURATION: 97 % | SYSTOLIC BLOOD PRESSURE: 114 MMHG | DIASTOLIC BLOOD PRESSURE: 70 MMHG | BODY MASS INDEX: 27.17 KG/M2 | HEART RATE: 79 BPM

## 2024-06-17 DIAGNOSIS — E78.00 PURE HYPERCHOLESTEROLEMIA: ICD-10-CM

## 2024-06-17 DIAGNOSIS — I10 ESSENTIAL HYPERTENSION WITH GOAL BLOOD PRESSURE LESS THAN 140/90: Primary | ICD-10-CM

## 2024-06-17 PROCEDURE — 1036F TOBACCO NON-USER: CPT | Performed by: INTERNAL MEDICINE

## 2024-06-17 PROCEDURE — G8428 CUR MEDS NOT DOCUMENT: HCPCS | Performed by: INTERNAL MEDICINE

## 2024-06-17 PROCEDURE — 3078F DIAST BP <80 MM HG: CPT | Performed by: INTERNAL MEDICINE

## 2024-06-17 PROCEDURE — 1090F PRES/ABSN URINE INCON ASSESS: CPT | Performed by: INTERNAL MEDICINE

## 2024-06-17 PROCEDURE — 3017F COLORECTAL CA SCREEN DOC REV: CPT | Performed by: INTERNAL MEDICINE

## 2024-06-17 PROCEDURE — 3074F SYST BP LT 130 MM HG: CPT | Performed by: INTERNAL MEDICINE

## 2024-06-17 PROCEDURE — G8399 PT W/DXA RESULTS DOCUMENT: HCPCS | Performed by: INTERNAL MEDICINE

## 2024-06-17 PROCEDURE — G8417 CALC BMI ABV UP PARAM F/U: HCPCS | Performed by: INTERNAL MEDICINE

## 2024-06-17 PROCEDURE — 99214 OFFICE O/P EST MOD 30 MIN: CPT | Performed by: INTERNAL MEDICINE

## 2024-06-17 PROCEDURE — 1123F ACP DISCUSS/DSCN MKR DOCD: CPT | Performed by: INTERNAL MEDICINE

## 2024-06-17 NOTE — PROGRESS NOTES
[facility]    Marii Siegel is 73 y.o. female with diabetes, hypertension, hyperlipidemia    Patient is here today for appointment  Denies prior history of MI or CHF  Patient initially in 06/2023 for chest pain.  NST showed perfusion defect  LHC in 07/2023 did not show obstructive coronary disease    Patient states that she has been doing very well.  She does not have any chest pain or chest tightness concerning for angina.  She is able to perform activity daily living without any limitation.  She has mild dyspnea on moderate exertion, remained stable likely from her COPD and remote history of tobacco use disorder.  Denies any nausea, vomiting, abdominal pain, fever, chills, sputum production. No hematuria or other bleeding complaints    Past Medical History:   Diagnosis Date    Agatston CAC score, <100 05/15/2015    Coronary calcium score 70.      Chronic kidney disease     stage 3b    COPD (chronic obstructive pulmonary disease) (HCC)     Diabetes (HCC)     DVT (deep venous thrombosis) ([facility])     in her 20s    GERD (gastroesophageal reflux disease)     crohn's disease    Hyperlipidemia     Hypertension     Psychiatric disorder     anxiety       Review of Systems:  Cardiac symptoms as noted above in HPI. All others negative.    Current Outpatient Medications   Medication Sig    lidocaine (LIDODERM) 5 % APPLY 1 PATCH TO THE AFFECTED AREA AND LEAVE IN PLACE FOR 12 HOURS, THEN REMOVE AND LEAVE OFF FOR 12 HOURS.    losartan (COZAAR) 50 MG tablet Take 2 tablets by mouth daily    vitamin D 25 MCG (1000 UT) CAPS Take 1 capsule by mouth daily    buPROPion (WELLBUTRIN XL) 150 MG extended release tablet Take 1 tablet by mouth every morning    PARoxetine (PAXIL) 40 MG tablet Take 1 tablet by mouth every morning    tiotropium-olodaterol (STIOLTO) 2.5-2.5 MCG/ACT AERS Inhale 2 puffs into the lungs daily    albuterol sulfate HFA (PROVENTIL;VENTOLIN;PROAIR) 108

## 2024-07-10 ENCOUNTER — TRANSCRIBE ORDERS (OUTPATIENT)
Facility: HOSPITAL | Age: 74
End: 2024-07-10

## 2024-07-10 ENCOUNTER — HOSPITAL ENCOUNTER (OUTPATIENT)
Facility: HOSPITAL | Age: 74
Discharge: HOME | End: 2024-07-13
Payer: MEDICARE

## 2024-07-10 DIAGNOSIS — M25.551 RIGHT HIP PAIN: Primary | ICD-10-CM

## 2024-07-10 DIAGNOSIS — M25.551 RIGHT HIP PAIN: ICD-10-CM

## 2024-07-10 PROCEDURE — 73502 X-RAY EXAM HIP UNI 2-3 VIEWS: CPT

## 2025-02-14 ENCOUNTER — TRANSCRIBE ORDERS (OUTPATIENT)
Facility: HOSPITAL | Age: 75
End: 2025-02-14

## 2025-02-14 DIAGNOSIS — Z12.31 OTHER SCREENING MAMMOGRAM: Primary | ICD-10-CM

## 2025-02-17 RX ORDER — DULAGLUTIDE 1.5 MG/.5ML
1.5 INJECTION, SOLUTION SUBCUTANEOUS WEEKLY
COMMUNITY

## 2025-02-18 NOTE — PERIOP NOTE
Instructions for your procedure at [facility]      Today's Date: 2/18/2025      Patient's Name: Marii Siegel      Procedure Date: 2/26/25        Please enter the main entrance of the hospital and check-in at the  located in the lobby.      Do NOT eat or drink anything, including candy, gum, or ice chips after midnight prior to your procedure, unless it is part of your prep.  Brush your teeth before coming to the hospital.You may swish with water, but do not swallow.  No smoking/Vaping/E-Cigarettes 24 hours prior to the day of procedure.  No alcohol 24 hours prior to the day of procedure.  No recreational drugs for one week prior to the day of procedure.  Bring Photo ID, Insurance information, and Co-pay if required on day of procedure.  Bring in pertinent legal documents, such as, Medical Power of , DNR, Advance Directive, etc.  Leave all other valuables, including money/purse, weapons at home.  Remove jewelry, including ALL body piercings, nail polish, acrylic nails, and makeup (including mascara); no lotions, powders, deodorant, and/or perfume/cologne/after shave on the skin.  Glasses and dentures may be worn to the hospital.  They must be removed prior to procedure. Please bring case/container for glasses or dentures.  11. Contacts should not be worn on day of procedure.   12. Call the office ([phone]) if you have symptoms of a cold or illness within 24-48 hours prior to your procedure.   13. AN ADULT (relative or friend 18 years or older) MUST DRIVE YOU HOME AFTER YOUR PROCEDURE.   14. Please make arrangements for a responsible adult (18 years or older) to be with you for 24 hours after your procedure.   15. TWO VISITORS will be allowed in the waiting area during your procedure.       Special Instructions:      Bring list of CURRENT medications.  Follow instructions from the office regarding Bowel Prep, Vitamins, Iron, Blood Thinners, Insulin, Seizure, and

## 2025-02-25 ENCOUNTER — ANESTHESIA EVENT (OUTPATIENT)
Facility: HOSPITAL | Age: 75
End: 2025-02-25
Payer: MEDICARE

## 2025-02-26 ENCOUNTER — HOSPITAL ENCOUNTER (OUTPATIENT)
Facility: HOSPITAL | Age: 75
Setting detail: OUTPATIENT SURGERY
Discharge: HOME | End: 2025-02-26
Attending: INTERNAL MEDICINE | Admitting: INTERNAL MEDICINE
Payer: MEDICARE

## 2025-02-26 ENCOUNTER — ANESTHESIA (OUTPATIENT)
Facility: HOSPITAL | Age: 75
End: 2025-02-26
Payer: MEDICARE

## 2025-02-26 VITALS
TEMPERATURE: 98.4 F | SYSTOLIC BLOOD PRESSURE: 145 MMHG | BODY MASS INDEX: 25.82 KG/M2 | HEART RATE: 67 BPM | OXYGEN SATURATION: 97 % | RESPIRATION RATE: 16 BRPM | WEIGHT: 174.3 LBS | HEIGHT: 69 IN | DIASTOLIC BLOOD PRESSURE: 78 MMHG

## 2025-02-26 LAB
ANION GAP SERPL CALC-SCNC: 6 MMOL/L (ref 3–18)
BUN SERPL-MCNC: 16 MG/DL (ref 7–18)
BUN/CREAT SERPL: 13 (ref 12–20)
CALCIUM SERPL-MCNC: 10.2 MG/DL (ref 8.5–10.1)
CHLORIDE SERPL-SCNC: 107 MMOL/L (ref 100–111)
CO2 SERPL-SCNC: 26 MMOL/L (ref 21–32)
CREAT SERPL-MCNC: 1.23 MG/DL (ref 0.6–1.3)
GLUCOSE BLD STRIP.AUTO-MCNC: 120 MG/DL (ref 70–110)
GLUCOSE SERPL-MCNC: 126 MG/DL (ref 74–99)
POTASSIUM SERPL-SCNC: 3.9 MMOL/L (ref 3.5–5.5)
SODIUM SERPL-SCNC: 139 MMOL/L (ref 136–145)

## 2025-02-26 PROCEDURE — 3600007502: Performed by: INTERNAL MEDICINE

## 2025-02-26 PROCEDURE — 3600007512: Performed by: INTERNAL MEDICINE

## 2025-02-26 PROCEDURE — 3700000001 HC ADD 15 MINUTES (ANESTHESIA): Performed by: INTERNAL MEDICINE

## 2025-02-26 PROCEDURE — 82962 GLUCOSE BLOOD TEST: CPT

## 2025-02-26 PROCEDURE — 7100000000 HC PACU RECOVERY - FIRST 15 MIN: Performed by: INTERNAL MEDICINE

## 2025-02-26 PROCEDURE — 80048 BASIC METABOLIC PNL TOTAL CA: CPT

## 2025-02-26 PROCEDURE — 7100000011 HC PHASE II RECOVERY - ADDTL 15 MIN: Performed by: INTERNAL MEDICINE

## 2025-02-26 PROCEDURE — 6360000002 HC RX W HCPCS: Performed by: STUDENT IN AN ORGANIZED HEALTH CARE EDUCATION/TRAINING PROGRAM

## 2025-02-26 PROCEDURE — 2580000003 HC RX 258: Performed by: NURSE ANESTHETIST, CERTIFIED REGISTERED

## 2025-02-26 PROCEDURE — 7100000010 HC PHASE II RECOVERY - FIRST 15 MIN: Performed by: INTERNAL MEDICINE

## 2025-02-26 PROCEDURE — 2709999900 HC NON-CHARGEABLE SUPPLY: Performed by: INTERNAL MEDICINE

## 2025-02-26 PROCEDURE — 3700000000 HC ANESTHESIA ATTENDED CARE: Performed by: INTERNAL MEDICINE

## 2025-02-26 RX ORDER — DEXTROSE MONOHYDRATE 100 MG/ML
INJECTION, SOLUTION INTRAVENOUS CONTINUOUS PRN
Status: DISCONTINUED | OUTPATIENT
Start: 2025-02-26 | End: 2025-02-26 | Stop reason: HOSPADM

## 2025-02-26 RX ORDER — LIDOCAINE HYDROCHLORIDE 10 MG/ML
1 INJECTION, SOLUTION EPIDURAL; INFILTRATION; INTRACAUDAL; PERINEURAL
Status: DISCONTINUED | OUTPATIENT
Start: 2025-02-26 | End: 2025-02-26 | Stop reason: HOSPADM

## 2025-02-26 RX ORDER — SODIUM CHLORIDE 9 MG/ML
INJECTION, SOLUTION INTRAVENOUS CONTINUOUS
Status: DISCONTINUED | OUTPATIENT
Start: 2025-02-26 | End: 2025-02-26 | Stop reason: HOSPADM

## 2025-02-26 RX ORDER — LIDOCAINE HYDROCHLORIDE 20 MG/ML
INJECTION, SOLUTION EPIDURAL; INFILTRATION; INTRACAUDAL; PERINEURAL
Status: DISCONTINUED | OUTPATIENT
Start: 2025-02-26 | End: 2025-02-26 | Stop reason: SDUPTHER

## 2025-02-26 RX ADMIN — SODIUM CHLORIDE: 9 INJECTION, SOLUTION INTRAVENOUS at 11:25

## 2025-02-26 RX ADMIN — PROPOFOL 30 MG: 10 INJECTION, EMULSION INTRAVENOUS at 11:38

## 2025-02-26 RX ADMIN — PROPOFOL 30 MG: 10 INJECTION, EMULSION INTRAVENOUS at 11:36

## 2025-02-26 RX ADMIN — LIDOCAINE HYDROCHLORIDE 100 MG: 20 SOLUTION INTRAVENOUS at 11:33

## 2025-02-26 RX ADMIN — PROPOFOL 30 MG: 10 INJECTION, EMULSION INTRAVENOUS at 11:37

## 2025-02-26 RX ADMIN — PROPOFOL 30 MG: 10 INJECTION, EMULSION INTRAVENOUS at 11:34

## 2025-02-26 ASSESSMENT — PAIN - FUNCTIONAL ASSESSMENT
PAIN_FUNCTIONAL_ASSESSMENT: 0-10
PAIN_FUNCTIONAL_ASSESSMENT: NONE - DENIES PAIN
PAIN_FUNCTIONAL_ASSESSMENT: NONE - DENIES PAIN

## 2025-02-26 ASSESSMENT — ENCOUNTER SYMPTOMS: SHORTNESS OF BREATH: 1

## 2025-02-26 NOTE — H&P
GASTROENTEROLOGY Pre-Procedure H and P      Impression/Plan:   1. This patient is consented for an EGD for GERD    Addendum: All lab tests orders pertaining to the procedure have been ordered by the anesthesia personnel and results will be addressed by the anesthesia team    Chief Complaint: GERD    HPI:  Marii Siegel is a 74 y.o. female who is having an EGD for GERD    PMH:   Past Medical History:   Diagnosis Date    Agatston CAC score, <100 05/15/2015    Coronary calcium score 70.      Chronic kidney disease     stage 3b    COPD (chronic obstructive pulmonary disease) (HCC)     Diabetes (HCC)     DVT (deep venous thrombosis) (HCC)     in her 20s    Dysphagia     having difficulty with liquids    GERD (gastroesophageal reflux disease)     crohn's disease    Hx of migraines     last one about 2022    Hyperlipidemia     Hypertension     Psychiatric disorder     anxiety       PSH:   Past Surgical History:   Procedure Laterality Date    CARDIAC PROCEDURE N/A 06/02/2023    Left heart cath performed by Paulie RIVAS MD at [facility] CARDIAC CATH LAB    CHOLECYSTECTOMY      COLONOSCOPY N/A 07/26/2023    COLONOSCOPY with polypectomies performed by Grzegorz Flores MD at [facility] ENDOSCOPY    HYSTERECTOMY (CERVIX STATUS UNKNOWN)  1997    OTHER SURGICAL HISTORY      FATTY TISSUE REMOVED    OVARY REMOVAL      CO UNLISTED PROCEDURE ABDOMEN PERITONEUM & OMENTUM  1999    tumor removal    TONSILLECTOMY      UPPER GASTROINTESTINAL ENDOSCOPY N/A 06/08/2023    EGD ESOPHAGOGASTRODUODENOSCOPY with 54Fr Dilation performed by Grzegorz Flores MD at [facility] ENDOSCOPY       Social HX:   Social History     Socioeconomic History    Marital status:      Spouse name: Not on file    Number of children: Not on file    Years of education: Not on file    Highest education level: Not on file   Occupational History    Not on file   Tobacco Use    Smoking status: Former     Current packs/day: 0.00     Average packs/day: 1 pack/day for 37.0 years

## 2025-02-26 NOTE — ANESTHESIA PRE PROCEDURE
Department of Anesthesiology  Preprocedure Note       Name:  Marii Siegel   Age:  74 y.o.  :  1950                                          MRN:  [id number]         Date:  2025      Surgeon: Surgeon(s):  Grzegorz Flores MD    Procedure: Procedure(s):  ESOPHAGOGASTRODUODENOSCOPY WITH DILATION    Medications prior to admission:   Prior to Admission medications    Medication Sig Start Date End Date Taking? Authorizing Provider   dulaglutide (TRULICITY) 1.5 MG/0.5ML SC injection Inject 0.5 mLs into the skin once a week    Yes Octavia Dick MD   PARoxetine (PAXIL) 40 MG tablet Take 1 tablet by mouth every morning   Yes Octavia Dick MD   tiotropium-olodaterol (STIOLTO) 2.5-2.5 MCG/ACT AERS Inhale 2 puffs into the lungs daily 23  Yes Mukund Esparza MD   albuterol sulfate HFA (PROVENTIL;VENTOLIN;PROAIR) 108 (90 Base) MCG/ACT inhaler Inhale 1-2 puffs into the lungs every 4 hours as needed for Wheezing or Shortness of Breath 23  Yes Mukund Esparza MD   aspirin EC 81 MG EC tablet Take 1 tablet by mouth daily 23  Yes Paulie Esparza MD   atorvastatin (LIPITOR) 20 MG tablet Take 1 tablet by mouth daily 18  Yes Automatic Reconciliation, Ar   metFORMIN (GLUCOPHAGE) 500 MG tablet Take 1 tablet by mouth 2 times daily (with meals) 17  Yes Automatic Reconciliation, Ar   pantoprazole (PROTONIX) 40 MG tablet Take 1 tablet by mouth 2 times daily 18  Yes Automatic Reconciliation, Ar   lidocaine (LIDODERM) 5 % APPLY 1 PATCH TO THE AFFECTED AREA AND LEAVE IN PLACE FOR 12 HOURS, THEN REMOVE AND LEAVE OFF FOR 12 HOURS. 23   Octavia Dick MD   losartan (COZAAR) 50 MG tablet Take 2 tablets by mouth daily 10/13/23 10/12/24  Octavia Dick MD   vitamin D 25 MCG (1000 UT) CAPS Take 1 capsule by mouth daily    Octavia Dick MD       Current medications:    Current Facility-Administered Medications   Medication Dose Route Frequency Provider Last Rate Last

## 2025-02-26 NOTE — ANESTHESIA POSTPROCEDURE EVALUATION
Department of Anesthesiology  Postprocedure Note    Patient: Marii Siegel  MRN: [id number]  YOB: 1950  Date of evaluation: 2/26/2025    Procedure Summary       Date: 02/26/25 Room / Location: [facility] ENDO 02 / [facility] ENDOSCOPY    Anesthesia Start: 1131 Anesthesia Stop: 1146    Procedure: ESOPHAGOGASTRODUODENOSCOPY WITH DILATION (Upper GI Region) Diagnosis:       Abdominal pain, epigastric      Heartburn      Caregiver stress      Gastroesophageal reflux disease, unspecified whether esophagitis present      Over weight      Hx of colonic polyps      Constipation, unspecified constipation type      (Abdominal pain, epigastric [R10.13])      (Heartburn [R12])      (Caregiver stress [Z63.6])      (Gastroesophageal reflux disease, unspecified whether esophagitis present [K21.9])      (Over weight [E66.3])      (Hx of colonic polyps [Z86.0100])      (Constipation, unspecified constipation type [K59.00])    Surgeons: Grzegorz Flores MD Responsible Provider: Kang Cox MD    Anesthesia Type: MAC ASA Status: 3            Anesthesia Type: MAC    Claribel Phase I: Claribel Score: 9    Claribel Phase II:      Anesthesia Post Evaluation    Patient location during evaluation: PACU  Patient participation: complete - patient participated  Level of consciousness: sleepy but conscious  Pain score: 0  Airway patency: patent  Nausea & Vomiting: no nausea and no vomiting  Cardiovascular status: blood pressure returned to baseline  Respiratory status: acceptable  Hydration status: euvolemic  Pain management: adequate    No notable events documented.

## 2025-04-08 ENCOUNTER — HOSPITAL ENCOUNTER (EMERGENCY)
Facility: HOSPITAL | Age: 75
Discharge: HOME | End: 2025-04-08
Attending: EMERGENCY MEDICINE
Payer: MEDICARE

## 2025-04-08 ENCOUNTER — APPOINTMENT (OUTPATIENT)
Facility: HOSPITAL | Age: 75
End: 2025-04-08
Payer: MEDICARE

## 2025-04-08 VITALS
RESPIRATION RATE: 18 BRPM | DIASTOLIC BLOOD PRESSURE: 95 MMHG | SYSTOLIC BLOOD PRESSURE: 148 MMHG | OXYGEN SATURATION: 97 % | BODY MASS INDEX: 25.77 KG/M2 | HEART RATE: 74 BPM | TEMPERATURE: 98.7 F | WEIGHT: 174 LBS | HEIGHT: 69 IN

## 2025-04-08 DIAGNOSIS — M79.672 PAIN IN BOTH FEET: Primary | ICD-10-CM

## 2025-04-08 DIAGNOSIS — M79.671 PAIN IN BOTH FEET: Primary | ICD-10-CM

## 2025-04-08 LAB — GLUCOSE BLD STRIP.AUTO-MCNC: 141 MG/DL (ref 70–110)

## 2025-04-08 PROCEDURE — 99283 EMERGENCY DEPT VISIT LOW MDM: CPT

## 2025-04-08 PROCEDURE — 82962 GLUCOSE BLOOD TEST: CPT

## 2025-04-08 PROCEDURE — 73630 X-RAY EXAM OF FOOT: CPT

## 2025-04-08 ASSESSMENT — PAIN DESCRIPTION - LOCATION: LOCATION: FOOT

## 2025-04-08 ASSESSMENT — PAIN - FUNCTIONAL ASSESSMENT: PAIN_FUNCTIONAL_ASSESSMENT: 0-10

## 2025-04-08 ASSESSMENT — PAIN DESCRIPTION - DESCRIPTORS: DESCRIPTORS: ACHING

## 2025-04-08 ASSESSMENT — PAIN SCALES - GENERAL: PAINLEVEL_OUTOF10: 2

## 2025-04-08 ASSESSMENT — PAIN DESCRIPTION - ORIENTATION: ORIENTATION: RIGHT;LEFT

## 2025-04-08 NOTE — ED PROVIDER NOTES
(0693 HF) Caps               Where to Get Your Medications        These medications were sent to [id number] - [place], [place] - [address] - P [phone] - F [phone] [address] [facility] [address]      Phone: [phone]   amoxicillin-clavulanate 875-125 MG per tablet         Disposition: home     Patient condition at time of disposition: Stable    DISCHARGE NOTE:   Pt has been reexamined. Patient has no new complaints, changes, or physical findings.  Care plan outlined and precautions discussed.  Results were reviewed with the patient. All medications were reviewed with the patient. All of pt's questions and concerns were addressed.  Alarm symptoms and return precautions associated with chief complaint and evaluation were reviewed with the patient in detail.  The patient demonstrated adequate understanding.  Patient was instructed to follow up with PCP, as well as given strict return precautions to the ED upon further deterioration. Patient is ready for discharge home.          Dragon Disclaimer     Please note that this dictation was completed with [employer], the computer voice recognition software.  Quite often unanticipated grammatical, syntax, homophones, and other interpretive errors are inadvertently transcribed by the computer software.  Please disregard these errors.  Please excuse any errors that have escaped final proofreading.      NIDHI Norman Danielle, PA  04/08/25 [id number]

## 2025-04-08 NOTE — ED TRIAGE NOTES
Pt ambulatory to triage c/o right foot pain after Nepali salazar jumped on her already damaged foot and left foot has one toe (fourth digit) that is turning black.     Hx DM

## 2025-04-08 NOTE — DISCHARGE INSTRUCTIONS
Take medication as prescribed. Follow-up with your podiatrist within 2 days for reassessment. Bring the results from this visit with you for their review. Return to the ED immediately for any new, worsening, or persistent symptoms,

## 2025-08-04 ENCOUNTER — OFFICE VISIT (OUTPATIENT)
Age: 75
End: 2025-08-04

## 2025-08-04 VITALS
OXYGEN SATURATION: 98 % | HEIGHT: 69 IN | WEIGHT: 168 LBS | DIASTOLIC BLOOD PRESSURE: 76 MMHG | SYSTOLIC BLOOD PRESSURE: 126 MMHG | BODY MASS INDEX: 24.88 KG/M2 | HEART RATE: 81 BPM

## 2025-08-04 DIAGNOSIS — R06.02 SHORTNESS OF BREATH: Primary | ICD-10-CM

## (undated) DEVICE — PROCEDURE KIT FLUID MGMT 10 FR CUST MAINFOLD

## (undated) DEVICE — SYRINGE MED 25GA 3ML L5/8IN SUBQ PLAS W/ DETACH NDL SFTY

## (undated) DEVICE — STOCKINETTE,IMPERVIOUS,12X48,STERILE: Brand: MEDLINE

## (undated) DEVICE — UNDERCAST PADDING: Brand: DEROYAL

## (undated) DEVICE — SYRINGE MED 50ML LUERSLIP TIP

## (undated) DEVICE — 4-PORT MANIFOLD: Brand: NEPTUNE 2

## (undated) DEVICE — DRAPE,ANGIO,BRACH,STERILE,38X44: Brand: MEDLINE

## (undated) DEVICE — DISPOSABLE TOURNIQUET CUFF SINGLE BLADDER, DUAL PORT AND QUICK CONNECT CONNECTOR: Brand: COLOR CUFF

## (undated) DEVICE — WRAP COMPR W4INXL5YD NONSTERILE TAN SELF ADH COBAN

## (undated) DEVICE — PACK PROCEDURE SURG MAJ W/ BASIN LF

## (undated) DEVICE — DRAPE TWL SURG 16X26IN BLU ORB04] ALLCARE INC]

## (undated) DEVICE — FLEX ADVANTAGE 3000CC: Brand: FLEX ADVANTAGE

## (undated) DEVICE — Z CONVERTED USE 2274618 CRUTCH ALU ADLT 5.2IN-5.10IN PREM

## (undated) DEVICE — BANDAGE COMPR SELF ADH 5 YDX4 IN TAN STRL PREMIERPRO LF

## (undated) DEVICE — SYRINGE MED 10ML LUERLOCK TIP W/O SFTY DISP

## (undated) DEVICE — FORCEPS BX L240CM JAW DIA2.4MM ORNG L CAP W/ NDL DISP RAD

## (undated) DEVICE — GOWN PLASTIC FILM THMBHKS UNIV BLUE: Brand: CARDINAL HEALTH

## (undated) DEVICE — PRESSURE MONITORING SET: Brand: TRUWAVE

## (undated) DEVICE — UNDERPAD INCONT W23XL36IN STD BLU POLYPR BK FLUF SFT

## (undated) DEVICE — STERILE POLYISOPRENE POWDER-FREE SURGICAL GLOVES: Brand: PROTEXIS

## (undated) DEVICE — SHOE POSTOP HK LOOP MED M -- NYL RUBBER

## (undated) DEVICE — GAUZE SPONGES,16 PLY: Brand: CURITY

## (undated) DEVICE — HEX-LOCKING BLADE ELECTRODE: Brand: EDGE

## (undated) DEVICE — GOWN,SIRUS,POLYRNF,BRTHSLV,XL,30/CS: Brand: MEDLINE

## (undated) DEVICE — CANNULA NSL AD TBNG L14FT STD PVC O2 CRV CONN NONFLARED NSL

## (undated) DEVICE — CANNULA ORIG TL CLR W FOAM CUSHIONS AND 14FT SUPL TB 3 CHN

## (undated) DEVICE — CATHETER SUCT TR FL TIP 14FR W/ O CTRL

## (undated) DEVICE — REM POLYHESIVE ADULT PATIENT RETURN ELECTRODE: Brand: VALLEYLAB

## (undated) DEVICE — BAND COMPR L24CM REG CLR PLAS HEMSTAT EXT HK AND LOOP RETEN

## (undated) DEVICE — BITE BLOCK ENDOSCP UNIV AD 6 TO 9.4 MM

## (undated) DEVICE — MEDI-VAC NON-CONDUCTIVE SUCTION TUBING: Brand: CARDINAL HEALTH

## (undated) DEVICE — YANKAUER,SMOOTH HANDLE,HIGH CAPACITY: Brand: MEDLINE INDUSTRIES, INC.

## (undated) DEVICE — LINER SUCT CANSTR 3000CC PLAS SFT PRE ASSEMB W/OUT TBNG W/

## (undated) DEVICE — SOLUTION IRRIG 1000ML H2O STRL BLT

## (undated) DEVICE — PACK PROCEDURE SURG VASC CATH 161 MMC LF

## (undated) DEVICE — INTENDED FOR TISSUE SEPARATION, AND OTHER PROCEDURES THAT REQUIRE A SHARP SURGICAL BLADE TO PUNCTURE OR CUT.: Brand: BARD-PARKER SAFETY BLADES SIZE 10, STERILE

## (undated) DEVICE — KIT CLN UP BON SECOURS MARYV

## (undated) DEVICE — SET FLD ADMIN 3 W STPCOCK FIX FEM L BOR 1IN

## (undated) DEVICE — CURITY NON-ADHERENT STRIPS: Brand: CURITY

## (undated) DEVICE — RADIFOCUS OPTITORQUE ANGIOGRAPHIC CATHETER: Brand: OPTITORQUE

## (undated) DEVICE — 3M™ BAIR PAWS FLEX™ WARMING GOWN, STANDARD, 20 PER CASE 81003: Brand: BAIR PAWS™

## (undated) DEVICE — NEEDLE HYPO 18GA L1.5IN PNK S STL HUB POLYPR SHLD REG BVL

## (undated) DEVICE — NEEDLE HYPO 25GA L1.5IN BVL ORIENTED ECLIPSE

## (undated) DEVICE — CATHETER ANGIO INFIN 038IN AMPLATZ 534545T

## (undated) DEVICE — BASIN EMSIS 16OZ GRAPHITE PLAS KID SHP MOLD GRAD FOR ORAL

## (undated) DEVICE — ENDOSCOPY PUMP TUBING/ CAP SET: Brand: ERBE

## (undated) DEVICE — SNARE POLYP M W27MMXL240CM OVL STIFF DISP CAPTIVATOR

## (undated) DEVICE — GLIDESHEATH SLENDER STAINLESS STEEL KIT: Brand: GLIDESHEATH SLENDER

## (undated) DEVICE — SYRINGE 20ML LL S/C 50

## (undated) DEVICE — PREP SKN PREVAIL 40ML APPL --

## (undated) DEVICE — SUTURE VCRL SZ 4-0 L18IN ABSRB UD L19MM PC-5 3/8 CIR J823G

## (undated) DEVICE — GOWN,REINFORCED,POLY,AURORA,XLARGE,STRL: Brand: MEDLINE

## (undated) DEVICE — KENDALL SCD EXPRESS SLEEVES, KNEE LENGTH, MEDIUM: Brand: KENDALL SCD

## (undated) DEVICE — TABLE COVER: Brand: CONVERTORS

## (undated) DEVICE — 3M™ STERI-STRIP™ COMPOUND BENZOIN TINCTURE 40 BAGS/CARTON 4 CARTONS/CASE C1544: Brand: 3M™ STERI-STRIP™

## (undated) DEVICE — BLADE ASSEMB CLP HAIR FINE --

## (undated) DEVICE — FORCEPS BX L240CM JAW DIA2.8MM L CAP W/ NDL MIC MESH TOOTH

## (undated) DEVICE — (D)PREP SKN CHLRAPRP APPL 26ML -- CONVERT TO ITEM 371833

## (undated) DEVICE — 7 FRENCH DRAIN SYSTEM, STERILE: Brand: TLS

## (undated) DEVICE — BANDAGE COMPR W4INXL5YD BGE COHESIVE SELF ADH ADBAN CBN1104] AVCOR HEALTHCARE PRODUCTS INC]

## (undated) DEVICE — KERLIX BANDAGE ROLL: Brand: KERLIX

## (undated) DEVICE — THREE-QUARTER SHEET: Brand: CONVERTORS

## (undated) DEVICE — DRAPE,EXTREMITY,89X128,STERILE: Brand: MEDLINE

## (undated) DEVICE — SYR 10ML LUER LOK 1/5ML GRAD --

## (undated) DEVICE — MAYO STAND COVER: Brand: CONVERTORS

## (undated) DEVICE — GAUZE,SPONGE,4"X4",16PLY,STRL,LF,10/TRAY: Brand: MEDLINE

## (undated) DEVICE — TR BAND RADIAL ARTERY COMPRESSION DEVICE: Brand: TR BAND

## (undated) DEVICE — 3M™ STERI-STRIP™ REINFORCED ADHESIVE SKIN CLOSURES, R1542, 1/4 IN X 1-1/2 IN (6 MM X 38 MM), 6 STRIPS/ENVELOPE: Brand: 3M™ STERI-STRIP™

## (undated) DEVICE — BNDG COHESIVE 6X5YD TAN NS --

## (undated) DEVICE — SNARE VASC L240CM LOOP W10MM SHTH DIA2.4MM RND STIFF CLD

## (undated) DEVICE — TRAP SPEC POLYP REM STRNR CLN DSGN MAGNIFYING WIND DISP